# Patient Record
Sex: MALE | Race: WHITE | NOT HISPANIC OR LATINO
[De-identification: names, ages, dates, MRNs, and addresses within clinical notes are randomized per-mention and may not be internally consistent; named-entity substitution may affect disease eponyms.]

---

## 2021-01-01 ENCOUNTER — RESULT REVIEW (OUTPATIENT)
Age: 86
End: 2021-01-01

## 2021-01-01 ENCOUNTER — OUTPATIENT (OUTPATIENT)
Dept: OUTPATIENT SERVICES | Facility: HOSPITAL | Age: 86
LOS: 1 days | Discharge: HOME | End: 2021-01-01

## 2021-01-01 ENCOUNTER — APPOINTMENT (OUTPATIENT)
Dept: GASTROENTEROLOGY | Facility: CLINIC | Age: 86
End: 2021-01-01

## 2021-01-01 ENCOUNTER — INPATIENT (INPATIENT)
Facility: HOSPITAL | Age: 86
LOS: 26 days | Discharge: NURSING, REHAB OR LONG-TERM CARE | End: 2021-11-19
Attending: INTERNAL MEDICINE | Admitting: INTERNAL MEDICINE
Payer: MEDICARE

## 2021-01-01 ENCOUNTER — INPATIENT (INPATIENT)
Facility: HOSPITAL | Age: 86
LOS: 1 days | End: 2021-12-08
Attending: INTERNAL MEDICINE | Admitting: INTERNAL MEDICINE
Payer: MEDICARE

## 2021-01-01 ENCOUNTER — INPATIENT (INPATIENT)
Facility: HOSPITAL | Age: 86
LOS: 12 days | Discharge: NURSING, REHAB OR LONG-TERM CARE | End: 2021-09-14
Attending: HOSPITALIST | Admitting: HOSPITALIST
Payer: MEDICARE

## 2021-01-01 ENCOUNTER — TRANSCRIPTION ENCOUNTER (OUTPATIENT)
Age: 86
End: 2021-01-01

## 2021-01-01 VITALS
TEMPERATURE: 98 F | HEIGHT: 67 IN | HEART RATE: 86 BPM | RESPIRATION RATE: 19 BRPM | OXYGEN SATURATION: 98 % | DIASTOLIC BLOOD PRESSURE: 70 MMHG | SYSTOLIC BLOOD PRESSURE: 148 MMHG

## 2021-01-01 VITALS
DIASTOLIC BLOOD PRESSURE: 77 MMHG | HEIGHT: 67 IN | TEMPERATURE: 98 F | SYSTOLIC BLOOD PRESSURE: 178 MMHG | RESPIRATION RATE: 18 BRPM | HEART RATE: 75 BPM | OXYGEN SATURATION: 99 % | WEIGHT: 134.92 LBS

## 2021-01-01 VITALS
HEART RATE: 80 BPM | WEIGHT: 136.03 LBS | SYSTOLIC BLOOD PRESSURE: 156 MMHG | HEIGHT: 78 IN | DIASTOLIC BLOOD PRESSURE: 101 MMHG | OXYGEN SATURATION: 92 % | RESPIRATION RATE: 24 BRPM | TEMPERATURE: 94 F

## 2021-01-01 VITALS
OXYGEN SATURATION: 100 % | HEIGHT: 68 IN | RESPIRATION RATE: 18 BRPM | DIASTOLIC BLOOD PRESSURE: 81 MMHG | SYSTOLIC BLOOD PRESSURE: 206 MMHG | HEART RATE: 62 BPM | TEMPERATURE: 96 F | WEIGHT: 139.99 LBS

## 2021-01-01 VITALS
OXYGEN SATURATION: 100 % | HEART RATE: 56 BPM | RESPIRATION RATE: 20 BRPM | DIASTOLIC BLOOD PRESSURE: 81 MMHG | SYSTOLIC BLOOD PRESSURE: 172 MMHG

## 2021-01-01 VITALS
SYSTOLIC BLOOD PRESSURE: 185 MMHG | RESPIRATION RATE: 18 BRPM | DIASTOLIC BLOOD PRESSURE: 81 MMHG | HEART RATE: 54 BPM | OXYGEN SATURATION: 99 %

## 2021-01-01 VITALS
TEMPERATURE: 98 F | OXYGEN SATURATION: 97 % | DIASTOLIC BLOOD PRESSURE: 59 MMHG | SYSTOLIC BLOOD PRESSURE: 128 MMHG | RESPIRATION RATE: 18 BRPM | HEART RATE: 81 BPM

## 2021-01-01 VITALS
RESPIRATION RATE: 17 BRPM | DIASTOLIC BLOOD PRESSURE: 71 MMHG | SYSTOLIC BLOOD PRESSURE: 132 MMHG | TEMPERATURE: 96 F | HEART RATE: 79 BPM

## 2021-01-01 VITALS — HEART RATE: 44 BPM | OXYGEN SATURATION: 84 %

## 2021-01-01 VITALS
RESPIRATION RATE: 18 BRPM | DIASTOLIC BLOOD PRESSURE: 86 MMHG | OXYGEN SATURATION: 99 % | TEMPERATURE: 96 F | SYSTOLIC BLOOD PRESSURE: 115 MMHG | HEART RATE: 79 BPM | HEIGHT: 66 IN

## 2021-01-01 DIAGNOSIS — I27.20 PULMONARY HYPERTENSION, UNSPECIFIED: ICD-10-CM

## 2021-01-01 DIAGNOSIS — H25.12 AGE-RELATED NUCLEAR CATARACT, LEFT EYE: ICD-10-CM

## 2021-01-01 DIAGNOSIS — Z93.3 COLOSTOMY STATUS: Chronic | ICD-10-CM

## 2021-01-01 DIAGNOSIS — I82.512 CHRONIC EMBOLISM AND THROMBOSIS OF LEFT FEMORAL VEIN: ICD-10-CM

## 2021-01-01 DIAGNOSIS — K50.90 CROHN'S DISEASE, UNSPECIFIED, WITHOUT COMPLICATIONS: ICD-10-CM

## 2021-01-01 DIAGNOSIS — Z98.890 OTHER SPECIFIED POSTPROCEDURAL STATES: Chronic | ICD-10-CM

## 2021-01-01 DIAGNOSIS — I10 ESSENTIAL (PRIMARY) HYPERTENSION: ICD-10-CM

## 2021-01-01 DIAGNOSIS — E78.5 HYPERLIPIDEMIA, UNSPECIFIED: ICD-10-CM

## 2021-01-01 DIAGNOSIS — Z79.82 LONG TERM (CURRENT) USE OF ASPIRIN: ICD-10-CM

## 2021-01-01 DIAGNOSIS — E11.22 TYPE 2 DIABETES MELLITUS WITH DIABETIC CHRONIC KIDNEY DISEASE: ICD-10-CM

## 2021-01-01 DIAGNOSIS — N17.9 ACUTE KIDNEY FAILURE, UNSPECIFIED: ICD-10-CM

## 2021-01-01 DIAGNOSIS — K21.9 GASTRO-ESOPHAGEAL REFLUX DISEASE WITHOUT ESOPHAGITIS: ICD-10-CM

## 2021-01-01 DIAGNOSIS — N30.00 ACUTE CYSTITIS WITHOUT HEMATURIA: ICD-10-CM

## 2021-01-01 DIAGNOSIS — N18.4 CHRONIC KIDNEY DISEASE, STAGE 4 (SEVERE): ICD-10-CM

## 2021-01-01 DIAGNOSIS — R05 COUGH: ICD-10-CM

## 2021-01-01 DIAGNOSIS — R65.20 SEVERE SEPSIS WITHOUT SEPTIC SHOCK: ICD-10-CM

## 2021-01-01 DIAGNOSIS — H25.11 AGE-RELATED NUCLEAR CATARACT, RIGHT EYE: ICD-10-CM

## 2021-01-01 DIAGNOSIS — E87.6 HYPOKALEMIA: ICD-10-CM

## 2021-01-01 DIAGNOSIS — N17.0 ACUTE KIDNEY FAILURE WITH TUBULAR NECROSIS: ICD-10-CM

## 2021-01-01 DIAGNOSIS — I82.452 ACUTE EMBOLISM AND THROMBOSIS OF LEFT PERONEAL VEIN: ICD-10-CM

## 2021-01-01 DIAGNOSIS — L03.311 CELLULITIS OF ABDOMINAL WALL: ICD-10-CM

## 2021-01-01 DIAGNOSIS — J98.11 ATELECTASIS: ICD-10-CM

## 2021-01-01 DIAGNOSIS — E83.42 HYPOMAGNESEMIA: ICD-10-CM

## 2021-01-01 DIAGNOSIS — Z11.59 ENCOUNTER FOR SCREENING FOR OTHER VIRAL DISEASES: ICD-10-CM

## 2021-01-01 DIAGNOSIS — G93.41 METABOLIC ENCEPHALOPATHY: ICD-10-CM

## 2021-01-01 DIAGNOSIS — Z79.84 LONG TERM (CURRENT) USE OF ORAL HYPOGLYCEMIC DRUGS: ICD-10-CM

## 2021-01-01 DIAGNOSIS — E87.1 HYPO-OSMOLALITY AND HYPONATREMIA: ICD-10-CM

## 2021-01-01 DIAGNOSIS — N10 ACUTE PYELONEPHRITIS: ICD-10-CM

## 2021-01-01 DIAGNOSIS — E11.36 TYPE 2 DIABETES MELLITUS WITH DIABETIC CATARACT: ICD-10-CM

## 2021-01-01 DIAGNOSIS — I50.9 HEART FAILURE, UNSPECIFIED: ICD-10-CM

## 2021-01-01 DIAGNOSIS — A41.9 SEPSIS, UNSPECIFIED ORGANISM: ICD-10-CM

## 2021-01-01 DIAGNOSIS — N40.1 BENIGN PROSTATIC HYPERPLASIA WITH LOWER URINARY TRACT SYMPTOMS: ICD-10-CM

## 2021-01-01 DIAGNOSIS — E78.00 PURE HYPERCHOLESTEROLEMIA, UNSPECIFIED: ICD-10-CM

## 2021-01-01 DIAGNOSIS — Z93.3 COLOSTOMY STATUS: ICD-10-CM

## 2021-01-01 DIAGNOSIS — U07.1 COVID-19: ICD-10-CM

## 2021-01-01 DIAGNOSIS — I13.0 HYPERTENSIVE HEART AND CHRONIC KIDNEY DISEASE WITH HEART FAILURE AND STAGE 1 THROUGH STAGE 4 CHRONIC KIDNEY DISEASE, OR UNSPECIFIED CHRONIC KIDNEY DISEASE: ICD-10-CM

## 2021-01-01 DIAGNOSIS — K43.9 VENTRAL HERNIA WITHOUT OBSTRUCTION OR GANGRENE: ICD-10-CM

## 2021-01-01 DIAGNOSIS — J12.82 PNEUMONIA DUE TO CORONAVIRUS DISEASE 2019: ICD-10-CM

## 2021-01-01 DIAGNOSIS — F05 DELIRIUM DUE TO KNOWN PHYSIOLOGICAL CONDITION: ICD-10-CM

## 2021-01-01 DIAGNOSIS — I82.442 ACUTE EMBOLISM AND THROMBOSIS OF LEFT TIBIAL VEIN: ICD-10-CM

## 2021-01-01 DIAGNOSIS — D63.1 ANEMIA IN CHRONIC KIDNEY DISEASE: ICD-10-CM

## 2021-01-01 DIAGNOSIS — E87.5 HYPERKALEMIA: ICD-10-CM

## 2021-01-01 DIAGNOSIS — E87.2 ACIDOSIS: ICD-10-CM

## 2021-01-01 DIAGNOSIS — F03.90 UNSPECIFIED DEMENTIA, UNSPECIFIED SEVERITY, WITHOUT BEHAVIORAL DISTURBANCE, PSYCHOTIC DISTURBANCE, MOOD DISTURBANCE, AND ANXIETY: ICD-10-CM

## 2021-01-01 DIAGNOSIS — E44.0 MODERATE PROTEIN-CALORIE MALNUTRITION: ICD-10-CM

## 2021-01-01 DIAGNOSIS — E11.9 TYPE 2 DIABETES MELLITUS WITHOUT COMPLICATIONS: ICD-10-CM

## 2021-01-01 DIAGNOSIS — R33.9 RETENTION OF URINE, UNSPECIFIED: ICD-10-CM

## 2021-01-01 DIAGNOSIS — K94.02 COLOSTOMY INFECTION: ICD-10-CM

## 2021-01-01 LAB
% ALBUMIN: 54.4 % — SIGNIFICANT CHANGE UP
% ALPHA 1: 7.4 % — SIGNIFICANT CHANGE UP
% ALPHA 2: 16.9 % — SIGNIFICANT CHANGE UP
% BETA: 12.5 % — SIGNIFICANT CHANGE UP
% GAMMA: 8.8 % — SIGNIFICANT CHANGE UP
A1C WITH ESTIMATED AVERAGE GLUCOSE RESULT: 5.8 % — HIGH (ref 4–5.6)
A1C WITH ESTIMATED AVERAGE GLUCOSE RESULT: 8.4 % — HIGH (ref 4–5.6)
ABO RH CONFIRMATION: SIGNIFICANT CHANGE UP
ALBUMIN SERPL ELPH-MCNC: 2.2 G/DL — LOW (ref 3.6–5.5)
ALBUMIN SERPL ELPH-MCNC: 2.3 G/DL — LOW (ref 3.5–5.2)
ALBUMIN SERPL ELPH-MCNC: 2.3 G/DL — LOW (ref 3.5–5.2)
ALBUMIN SERPL ELPH-MCNC: 2.4 G/DL — LOW (ref 3.5–5.2)
ALBUMIN SERPL ELPH-MCNC: 2.5 G/DL — LOW (ref 3.5–5.2)
ALBUMIN SERPL ELPH-MCNC: 2.6 G/DL — LOW (ref 3.5–5.2)
ALBUMIN SERPL ELPH-MCNC: 2.7 G/DL — LOW (ref 3.5–5.2)
ALBUMIN SERPL ELPH-MCNC: 2.8 G/DL — LOW (ref 3.5–5.2)
ALBUMIN SERPL ELPH-MCNC: 2.9 G/DL — LOW (ref 3.5–5.2)
ALBUMIN SERPL ELPH-MCNC: 3 G/DL — LOW (ref 3.5–5.2)
ALBUMIN SERPL ELPH-MCNC: 3.2 G/DL — LOW (ref 3.5–5.2)
ALBUMIN SERPL ELPH-MCNC: 3.3 G/DL — LOW (ref 3.5–5.2)
ALBUMIN SERPL ELPH-MCNC: 3.5 G/DL — SIGNIFICANT CHANGE UP (ref 3.5–5.2)
ALBUMIN/GLOB SERPL ELPH: 1.2 RATIO — SIGNIFICANT CHANGE UP
ALP SERPL-CCNC: 101 U/L — SIGNIFICANT CHANGE UP (ref 30–115)
ALP SERPL-CCNC: 105 U/L — SIGNIFICANT CHANGE UP (ref 30–115)
ALP SERPL-CCNC: 109 U/L — SIGNIFICANT CHANGE UP (ref 30–115)
ALP SERPL-CCNC: 109 U/L — SIGNIFICANT CHANGE UP (ref 30–115)
ALP SERPL-CCNC: 112 U/L — SIGNIFICANT CHANGE UP (ref 30–115)
ALP SERPL-CCNC: 113 U/L — SIGNIFICANT CHANGE UP (ref 30–115)
ALP SERPL-CCNC: 118 U/L — HIGH (ref 30–115)
ALP SERPL-CCNC: 59 U/L — SIGNIFICANT CHANGE UP (ref 30–115)
ALP SERPL-CCNC: 61 U/L — SIGNIFICANT CHANGE UP (ref 30–115)
ALP SERPL-CCNC: 65 U/L — SIGNIFICANT CHANGE UP (ref 30–115)
ALP SERPL-CCNC: 65 U/L — SIGNIFICANT CHANGE UP (ref 30–115)
ALP SERPL-CCNC: 66 U/L — SIGNIFICANT CHANGE UP (ref 30–115)
ALP SERPL-CCNC: 66 U/L — SIGNIFICANT CHANGE UP (ref 30–115)
ALP SERPL-CCNC: 68 U/L — SIGNIFICANT CHANGE UP (ref 30–115)
ALP SERPL-CCNC: 69 U/L — SIGNIFICANT CHANGE UP (ref 30–115)
ALP SERPL-CCNC: 71 U/L — SIGNIFICANT CHANGE UP (ref 30–115)
ALP SERPL-CCNC: 72 U/L — SIGNIFICANT CHANGE UP (ref 30–115)
ALP SERPL-CCNC: 72 U/L — SIGNIFICANT CHANGE UP (ref 30–115)
ALP SERPL-CCNC: 74 U/L — SIGNIFICANT CHANGE UP (ref 30–115)
ALP SERPL-CCNC: 74 U/L — SIGNIFICANT CHANGE UP (ref 30–115)
ALP SERPL-CCNC: 76 U/L — SIGNIFICANT CHANGE UP (ref 30–115)
ALP SERPL-CCNC: 77 U/L — SIGNIFICANT CHANGE UP (ref 30–115)
ALP SERPL-CCNC: 80 U/L — SIGNIFICANT CHANGE UP (ref 30–115)
ALP SERPL-CCNC: 81 U/L — SIGNIFICANT CHANGE UP (ref 30–115)
ALP SERPL-CCNC: 81 U/L — SIGNIFICANT CHANGE UP (ref 30–115)
ALP SERPL-CCNC: 82 U/L — SIGNIFICANT CHANGE UP (ref 30–115)
ALP SERPL-CCNC: 83 U/L — SIGNIFICANT CHANGE UP (ref 30–115)
ALP SERPL-CCNC: 83 U/L — SIGNIFICANT CHANGE UP (ref 30–115)
ALP SERPL-CCNC: 85 U/L — SIGNIFICANT CHANGE UP (ref 30–115)
ALP SERPL-CCNC: 85 U/L — SIGNIFICANT CHANGE UP (ref 30–115)
ALP SERPL-CCNC: 86 U/L — SIGNIFICANT CHANGE UP (ref 30–115)
ALP SERPL-CCNC: 89 U/L — SIGNIFICANT CHANGE UP (ref 30–115)
ALP SERPL-CCNC: 90 U/L — SIGNIFICANT CHANGE UP (ref 30–115)
ALP SERPL-CCNC: 90 U/L — SIGNIFICANT CHANGE UP (ref 30–115)
ALP SERPL-CCNC: 91 U/L — SIGNIFICANT CHANGE UP (ref 30–115)
ALP SERPL-CCNC: 92 U/L — SIGNIFICANT CHANGE UP (ref 30–115)
ALP SERPL-CCNC: 93 U/L — SIGNIFICANT CHANGE UP (ref 30–115)
ALP SERPL-CCNC: 93 U/L — SIGNIFICANT CHANGE UP (ref 30–115)
ALP SERPL-CCNC: 94 U/L — SIGNIFICANT CHANGE UP (ref 30–115)
ALP SERPL-CCNC: 94 U/L — SIGNIFICANT CHANGE UP (ref 30–115)
ALPHA1 GLOB SERPL ELPH-MCNC: 0.3 G/DL — SIGNIFICANT CHANGE UP (ref 0.1–0.4)
ALPHA2 GLOB SERPL ELPH-MCNC: 0.7 G/DL — SIGNIFICANT CHANGE UP (ref 0.5–1)
ALT FLD-CCNC: 10 U/L — SIGNIFICANT CHANGE UP (ref 0–41)
ALT FLD-CCNC: 11 U/L — SIGNIFICANT CHANGE UP (ref 0–41)
ALT FLD-CCNC: 12 U/L — SIGNIFICANT CHANGE UP (ref 0–41)
ALT FLD-CCNC: 13 U/L — SIGNIFICANT CHANGE UP (ref 0–41)
ALT FLD-CCNC: 14 U/L — SIGNIFICANT CHANGE UP (ref 0–41)
ALT FLD-CCNC: 15 U/L — SIGNIFICANT CHANGE UP (ref 0–41)
ALT FLD-CCNC: 16 U/L — SIGNIFICANT CHANGE UP (ref 0–41)
ALT FLD-CCNC: 16 U/L — SIGNIFICANT CHANGE UP (ref 0–41)
ALT FLD-CCNC: 17 U/L — SIGNIFICANT CHANGE UP (ref 0–41)
ALT FLD-CCNC: 17 U/L — SIGNIFICANT CHANGE UP (ref 0–41)
ALT FLD-CCNC: 19 U/L — SIGNIFICANT CHANGE UP (ref 0–41)
ALT FLD-CCNC: 20 U/L — SIGNIFICANT CHANGE UP (ref 0–41)
ALT FLD-CCNC: 22 U/L — SIGNIFICANT CHANGE UP (ref 0–41)
ALT FLD-CCNC: 23 U/L — SIGNIFICANT CHANGE UP (ref 0–41)
ALT FLD-CCNC: 24 U/L — SIGNIFICANT CHANGE UP (ref 0–41)
ALT FLD-CCNC: 29 U/L — SIGNIFICANT CHANGE UP (ref 0–41)
ALT FLD-CCNC: 30 U/L — SIGNIFICANT CHANGE UP (ref 0–41)
ALT FLD-CCNC: 8 U/L — SIGNIFICANT CHANGE UP (ref 0–41)
ALT FLD-CCNC: 9 U/L — SIGNIFICANT CHANGE UP (ref 0–41)
ANION GAP SERPL CALC-SCNC: 10 MMOL/L — SIGNIFICANT CHANGE UP (ref 7–14)
ANION GAP SERPL CALC-SCNC: 11 MMOL/L — SIGNIFICANT CHANGE UP (ref 7–14)
ANION GAP SERPL CALC-SCNC: 12 MMOL/L — SIGNIFICANT CHANGE UP (ref 7–14)
ANION GAP SERPL CALC-SCNC: 12 MMOL/L — SIGNIFICANT CHANGE UP (ref 7–14)
ANION GAP SERPL CALC-SCNC: 13 MMOL/L — SIGNIFICANT CHANGE UP (ref 7–14)
ANION GAP SERPL CALC-SCNC: 13 MMOL/L — SIGNIFICANT CHANGE UP (ref 7–14)
ANION GAP SERPL CALC-SCNC: 14 MMOL/L — SIGNIFICANT CHANGE UP (ref 7–14)
ANION GAP SERPL CALC-SCNC: 15 MMOL/L — HIGH (ref 7–14)
ANION GAP SERPL CALC-SCNC: 16 MMOL/L — HIGH (ref 7–14)
ANION GAP SERPL CALC-SCNC: 17 MMOL/L — HIGH (ref 7–14)
ANION GAP SERPL CALC-SCNC: 18 MMOL/L — HIGH (ref 7–14)
ANION GAP SERPL CALC-SCNC: 19 MMOL/L — HIGH (ref 7–14)
ANION GAP SERPL CALC-SCNC: 20 MMOL/L — HIGH (ref 7–14)
ANION GAP SERPL CALC-SCNC: 20 MMOL/L — HIGH (ref 7–14)
ANION GAP SERPL CALC-SCNC: 22 MMOL/L — HIGH (ref 7–14)
ANION GAP SERPL CALC-SCNC: 23 MMOL/L — HIGH (ref 7–14)
ANION GAP SERPL CALC-SCNC: 24 MMOL/L — HIGH (ref 7–14)
ANION GAP SERPL CALC-SCNC: 7 MMOL/L — SIGNIFICANT CHANGE UP (ref 7–14)
ANION GAP SERPL CALC-SCNC: 9 MMOL/L — SIGNIFICANT CHANGE UP (ref 7–14)
ANISOCYTOSIS BLD QL: SLIGHT — SIGNIFICANT CHANGE UP
APPEARANCE UR: ABNORMAL
APPEARANCE UR: CLEAR — SIGNIFICANT CHANGE UP
APPEARANCE UR: CLEAR — SIGNIFICANT CHANGE UP
APTT BLD: 100 SEC — CRITICAL HIGH (ref 27–39.2)
APTT BLD: 100.4 SEC — CRITICAL HIGH (ref 27–39.2)
APTT BLD: 108.6 SEC — CRITICAL HIGH (ref 27–39.2)
APTT BLD: 111.1 SEC — CRITICAL HIGH (ref 27–39.2)
APTT BLD: 124.6 SEC — CRITICAL HIGH (ref 27–39.2)
APTT BLD: 128.3 SEC — CRITICAL HIGH (ref 27–39.2)
APTT BLD: 137.2 SEC — CRITICAL HIGH (ref 27–39.2)
APTT BLD: 141.2 SEC — CRITICAL HIGH (ref 27–39.2)
APTT BLD: 197.5 SEC — CRITICAL HIGH (ref 27–39.2)
APTT BLD: 29.9 SEC — SIGNIFICANT CHANGE UP (ref 27–39.2)
APTT BLD: 30.1 SEC — SIGNIFICANT CHANGE UP (ref 27–39.2)
APTT BLD: 30.5 SEC — SIGNIFICANT CHANGE UP (ref 27–39.2)
APTT BLD: 30.6 SEC — SIGNIFICANT CHANGE UP (ref 27–39.2)
APTT BLD: 30.7 SEC — SIGNIFICANT CHANGE UP (ref 27–39.2)
APTT BLD: 30.8 SEC — SIGNIFICANT CHANGE UP (ref 27–39.2)
APTT BLD: 31 SEC — SIGNIFICANT CHANGE UP (ref 27–39.2)
APTT BLD: 31.1 SEC — SIGNIFICANT CHANGE UP (ref 27–39.2)
APTT BLD: 31.3 SEC — SIGNIFICANT CHANGE UP (ref 27–39.2)
APTT BLD: 31.3 SEC — SIGNIFICANT CHANGE UP (ref 27–39.2)
APTT BLD: 31.4 SEC — SIGNIFICANT CHANGE UP (ref 27–39.2)
APTT BLD: 31.4 SEC — SIGNIFICANT CHANGE UP (ref 27–39.2)
APTT BLD: 31.5 SEC — SIGNIFICANT CHANGE UP (ref 27–39.2)
APTT BLD: 31.5 SEC — SIGNIFICANT CHANGE UP (ref 27–39.2)
APTT BLD: 31.6 SEC — SIGNIFICANT CHANGE UP (ref 27–39.2)
APTT BLD: 31.9 SEC — SIGNIFICANT CHANGE UP (ref 27–39.2)
APTT BLD: 32.1 SEC — SIGNIFICANT CHANGE UP (ref 27–39.2)
APTT BLD: 32.3 SEC — SIGNIFICANT CHANGE UP (ref 27–39.2)
APTT BLD: 32.4 SEC — SIGNIFICANT CHANGE UP (ref 27–39.2)
APTT BLD: 33 SEC — SIGNIFICANT CHANGE UP (ref 27–39.2)
APTT BLD: 33.4 SEC — SIGNIFICANT CHANGE UP (ref 27–39.2)
APTT BLD: 33.8 SEC — SIGNIFICANT CHANGE UP (ref 27–39.2)
APTT BLD: 34 SEC — SIGNIFICANT CHANGE UP (ref 27–39.2)
APTT BLD: 34.1 SEC — SIGNIFICANT CHANGE UP (ref 27–39.2)
APTT BLD: 34.3 SEC — SIGNIFICANT CHANGE UP (ref 27–39.2)
APTT BLD: 35.4 SEC — SIGNIFICANT CHANGE UP (ref 27–39.2)
APTT BLD: 39.1 SEC — SIGNIFICANT CHANGE UP (ref 27–39.2)
APTT BLD: 39.9 SEC — HIGH (ref 27–39.2)
APTT BLD: 40.9 SEC — HIGH (ref 27–39.2)
APTT BLD: 41.7 SEC — HIGH (ref 27–39.2)
APTT BLD: 43.8 SEC — HIGH (ref 27–39.2)
APTT BLD: 48.9 SEC — HIGH (ref 27–39.2)
APTT BLD: 50.9 SEC — HIGH (ref 27–39.2)
APTT BLD: 54.9 SEC — HIGH (ref 27–39.2)
APTT BLD: 55.5 SEC — HIGH (ref 27–39.2)
APTT BLD: 55.6 SEC — HIGH (ref 27–39.2)
APTT BLD: 56.2 SEC — HIGH (ref 27–39.2)
APTT BLD: 57.8 SEC — HIGH (ref 27–39.2)
APTT BLD: 58.1 SEC — HIGH (ref 27–39.2)
APTT BLD: 58.3 SEC — HIGH (ref 27–39.2)
APTT BLD: 58.8 SEC — HIGH (ref 27–39.2)
APTT BLD: 59.5 SEC — HIGH (ref 27–39.2)
APTT BLD: 60.9 SEC — HIGH (ref 27–39.2)
APTT BLD: 61.8 SEC — HIGH (ref 27–39.2)
APTT BLD: 62.2 SEC — HIGH (ref 27–39.2)
APTT BLD: 62.9 SEC — HIGH (ref 27–39.2)
APTT BLD: 63.1 SEC — HIGH (ref 27–39.2)
APTT BLD: 64.4 SEC — HIGH (ref 27–39.2)
APTT BLD: 65.6 SEC — HIGH (ref 27–39.2)
APTT BLD: 65.6 SEC — HIGH (ref 27–39.2)
APTT BLD: 66.7 SEC — HIGH (ref 27–39.2)
APTT BLD: 67.3 SEC — HIGH (ref 27–39.2)
APTT BLD: 67.4 SEC — HIGH (ref 27–39.2)
APTT BLD: 70 SEC — HIGH (ref 27–39.2)
APTT BLD: 73.7 SEC — CRITICAL HIGH (ref 27–39.2)
APTT BLD: 73.8 SEC — CRITICAL HIGH (ref 27–39.2)
APTT BLD: 75.1 SEC — CRITICAL HIGH (ref 27–39.2)
APTT BLD: 77.4 SEC — CRITICAL HIGH (ref 27–39.2)
APTT BLD: 86.6 SEC — CRITICAL HIGH (ref 27–39.2)
APTT BLD: 90.2 SEC — CRITICAL HIGH (ref 27–39.2)
APTT BLD: 93.9 SEC — CRITICAL HIGH (ref 27–39.2)
APTT BLD: 98.1 SEC — CRITICAL HIGH (ref 27–39.2)
APTT BLD: >200 SEC — CRITICAL HIGH (ref 27–39.2)
AST SERPL-CCNC: 12 U/L — SIGNIFICANT CHANGE UP (ref 0–41)
AST SERPL-CCNC: 12 U/L — SIGNIFICANT CHANGE UP (ref 0–41)
AST SERPL-CCNC: 13 U/L — SIGNIFICANT CHANGE UP (ref 0–41)
AST SERPL-CCNC: 14 U/L — SIGNIFICANT CHANGE UP (ref 0–41)
AST SERPL-CCNC: 14 U/L — SIGNIFICANT CHANGE UP (ref 0–41)
AST SERPL-CCNC: 15 U/L — SIGNIFICANT CHANGE UP (ref 0–41)
AST SERPL-CCNC: 16 U/L — SIGNIFICANT CHANGE UP (ref 0–41)
AST SERPL-CCNC: 17 U/L — SIGNIFICANT CHANGE UP (ref 0–41)
AST SERPL-CCNC: 18 U/L — SIGNIFICANT CHANGE UP (ref 0–41)
AST SERPL-CCNC: 19 U/L — SIGNIFICANT CHANGE UP (ref 0–41)
AST SERPL-CCNC: 20 U/L — SIGNIFICANT CHANGE UP (ref 0–41)
AST SERPL-CCNC: 21 U/L — SIGNIFICANT CHANGE UP (ref 0–41)
AST SERPL-CCNC: 22 U/L — SIGNIFICANT CHANGE UP (ref 0–41)
AST SERPL-CCNC: 31 U/L — SIGNIFICANT CHANGE UP (ref 0–41)
AST SERPL-CCNC: 32 U/L — SIGNIFICANT CHANGE UP (ref 0–41)
AST SERPL-CCNC: 32 U/L — SIGNIFICANT CHANGE UP (ref 0–41)
AST SERPL-CCNC: 38 U/L — SIGNIFICANT CHANGE UP (ref 0–41)
AST SERPL-CCNC: 39 U/L — SIGNIFICANT CHANGE UP (ref 0–41)
B-GLOBULIN SERPL ELPH-MCNC: 0.5 G/DL — SIGNIFICANT CHANGE UP (ref 0.5–1)
BACTERIA # UR AUTO: ABNORMAL
BACTERIA # UR AUTO: NEGATIVE — SIGNIFICANT CHANGE UP
BASE EXCESS BLDV CALC-SCNC: -10.5 MMOL/L — LOW (ref -2–3)
BASE EXCESS BLDV CALC-SCNC: -10.5 MMOL/L — LOW (ref -2–3)
BASE EXCESS BLDV CALC-SCNC: -17.1 MMOL/L — LOW (ref -2–3)
BASOPHILS # BLD AUTO: 0 K/UL — SIGNIFICANT CHANGE UP (ref 0–0.2)
BASOPHILS # BLD AUTO: 0.01 K/UL — SIGNIFICANT CHANGE UP (ref 0–0.2)
BASOPHILS # BLD AUTO: 0.02 K/UL — SIGNIFICANT CHANGE UP (ref 0–0.2)
BASOPHILS # BLD AUTO: 0.03 K/UL — SIGNIFICANT CHANGE UP (ref 0–0.2)
BASOPHILS # BLD AUTO: 0.04 K/UL — SIGNIFICANT CHANGE UP (ref 0–0.2)
BASOPHILS # BLD AUTO: 0.05 K/UL — SIGNIFICANT CHANGE UP (ref 0–0.2)
BASOPHILS # BLD AUTO: 0.08 K/UL — SIGNIFICANT CHANGE UP (ref 0–0.2)
BASOPHILS NFR BLD AUTO: 0 % — SIGNIFICANT CHANGE UP (ref 0–1)
BASOPHILS NFR BLD AUTO: 0.1 % — SIGNIFICANT CHANGE UP (ref 0–1)
BASOPHILS NFR BLD AUTO: 0.2 % — SIGNIFICANT CHANGE UP (ref 0–1)
BASOPHILS NFR BLD AUTO: 0.3 % — SIGNIFICANT CHANGE UP (ref 0–1)
BASOPHILS NFR BLD AUTO: 0.4 % — SIGNIFICANT CHANGE UP (ref 0–1)
BASOPHILS NFR BLD AUTO: 0.5 % — SIGNIFICANT CHANGE UP (ref 0–1)
BASOPHILS NFR BLD AUTO: 0.6 % — SIGNIFICANT CHANGE UP (ref 0–1)
BASOPHILS NFR BLD AUTO: 0.8 % — SIGNIFICANT CHANGE UP (ref 0–1)
BILIRUB SERPL-MCNC: 0.2 MG/DL — SIGNIFICANT CHANGE UP (ref 0.2–1.2)
BILIRUB SERPL-MCNC: 0.3 MG/DL — SIGNIFICANT CHANGE UP (ref 0.2–1.2)
BILIRUB SERPL-MCNC: 0.4 MG/DL — SIGNIFICANT CHANGE UP (ref 0.2–1.2)
BILIRUB SERPL-MCNC: 0.5 MG/DL — SIGNIFICANT CHANGE UP (ref 0.2–1.2)
BILIRUB SERPL-MCNC: <0.2 MG/DL — SIGNIFICANT CHANGE UP (ref 0.2–1.2)
BILIRUB UR-MCNC: NEGATIVE — SIGNIFICANT CHANGE UP
BLD GP AB SCN SERPL QL: SIGNIFICANT CHANGE UP
BUN SERPL-MCNC: 23 MG/DL — HIGH (ref 10–20)
BUN SERPL-MCNC: 24 MG/DL — HIGH (ref 10–20)
BUN SERPL-MCNC: 24 MG/DL — HIGH (ref 10–20)
BUN SERPL-MCNC: 25 MG/DL — HIGH (ref 10–20)
BUN SERPL-MCNC: 25 MG/DL — HIGH (ref 10–20)
BUN SERPL-MCNC: 26 MG/DL — HIGH (ref 10–20)
BUN SERPL-MCNC: 27 MG/DL — HIGH (ref 10–20)
BUN SERPL-MCNC: 28 MG/DL — HIGH (ref 10–20)
BUN SERPL-MCNC: 32 MG/DL — HIGH (ref 10–20)
BUN SERPL-MCNC: 44 MG/DL — HIGH (ref 10–20)
BUN SERPL-MCNC: 46 MG/DL — HIGH (ref 10–20)
BUN SERPL-MCNC: 47 MG/DL — HIGH (ref 10–20)
BUN SERPL-MCNC: 48 MG/DL — HIGH (ref 10–20)
BUN SERPL-MCNC: 49 MG/DL — HIGH (ref 10–20)
BUN SERPL-MCNC: 50 MG/DL — HIGH (ref 10–20)
BUN SERPL-MCNC: 55 MG/DL — HIGH (ref 10–20)
BUN SERPL-MCNC: 57 MG/DL — HIGH (ref 10–20)
BUN SERPL-MCNC: 58 MG/DL — HIGH (ref 10–20)
BUN SERPL-MCNC: 63 MG/DL — CRITICAL HIGH (ref 10–20)
BUN SERPL-MCNC: 66 MG/DL — CRITICAL HIGH (ref 10–20)
BUN SERPL-MCNC: 67 MG/DL — CRITICAL HIGH (ref 10–20)
BUN SERPL-MCNC: 69 MG/DL — CRITICAL HIGH (ref 10–20)
BUN SERPL-MCNC: 72 MG/DL — CRITICAL HIGH (ref 10–20)
BUN SERPL-MCNC: 73 MG/DL — CRITICAL HIGH (ref 10–20)
BUN SERPL-MCNC: 74 MG/DL — CRITICAL HIGH (ref 10–20)
BUN SERPL-MCNC: 75 MG/DL — CRITICAL HIGH (ref 10–20)
BUN SERPL-MCNC: 77 MG/DL — CRITICAL HIGH (ref 10–20)
BUN SERPL-MCNC: 78 MG/DL — CRITICAL HIGH (ref 10–20)
BUN SERPL-MCNC: 79 MG/DL — CRITICAL HIGH (ref 10–20)
BUN SERPL-MCNC: 82 MG/DL — CRITICAL HIGH (ref 10–20)
BUN SERPL-MCNC: 82 MG/DL — CRITICAL HIGH (ref 10–20)
BUN SERPL-MCNC: 84 MG/DL — CRITICAL HIGH (ref 10–20)
BUN SERPL-MCNC: 85 MG/DL — CRITICAL HIGH (ref 10–20)
BUN SERPL-MCNC: 85 MG/DL — CRITICAL HIGH (ref 10–20)
BUN SERPL-MCNC: 87 MG/DL — CRITICAL HIGH (ref 10–20)
BUN SERPL-MCNC: 88 MG/DL — CRITICAL HIGH (ref 10–20)
BUN SERPL-MCNC: 90 MG/DL — CRITICAL HIGH (ref 10–20)
CA-I SERPL-SCNC: 1.15 MMOL/L — SIGNIFICANT CHANGE UP (ref 1.15–1.33)
CA-I SERPL-SCNC: 1.24 MMOL/L — SIGNIFICANT CHANGE UP (ref 1.15–1.33)
CALCIUM SERPL-MCNC: 7 MG/DL — LOW (ref 8.5–10.1)
CALCIUM SERPL-MCNC: 7.5 MG/DL — LOW (ref 8.5–10.1)
CALCIUM SERPL-MCNC: 7.5 MG/DL — LOW (ref 8.5–10.1)
CALCIUM SERPL-MCNC: 7.6 MG/DL — LOW (ref 8.5–10.1)
CALCIUM SERPL-MCNC: 7.7 MG/DL — LOW (ref 8.5–10.1)
CALCIUM SERPL-MCNC: 7.8 MG/DL — LOW (ref 8.5–10.1)
CALCIUM SERPL-MCNC: 7.9 MG/DL — LOW (ref 8.5–10.1)
CALCIUM SERPL-MCNC: 8 MG/DL — LOW (ref 8.5–10.1)
CALCIUM SERPL-MCNC: 8.1 MG/DL — LOW (ref 8.5–10.1)
CALCIUM SERPL-MCNC: 8.2 MG/DL — LOW (ref 8.5–10.1)
CALCIUM SERPL-MCNC: 8.3 MG/DL — LOW (ref 8.5–10.1)
CALCIUM SERPL-MCNC: 8.3 MG/DL — LOW (ref 8.5–10.1)
CALCIUM SERPL-MCNC: 8.4 MG/DL — LOW (ref 8.5–10.1)
CALCIUM SERPL-MCNC: 8.4 MG/DL — LOW (ref 8.5–10.1)
CALCIUM SERPL-MCNC: 8.5 MG/DL — SIGNIFICANT CHANGE UP (ref 8.5–10.1)
CALCIUM SERPL-MCNC: 8.5 MG/DL — SIGNIFICANT CHANGE UP (ref 8.5–10.1)
CALCIUM SERPL-MCNC: 8.6 MG/DL — SIGNIFICANT CHANGE UP (ref 8.5–10.1)
CALCIUM SERPL-MCNC: 8.6 MG/DL — SIGNIFICANT CHANGE UP (ref 8.5–10.1)
CHLORIDE SERPL-SCNC: 100 MMOL/L — SIGNIFICANT CHANGE UP (ref 98–110)
CHLORIDE SERPL-SCNC: 101 MMOL/L — SIGNIFICANT CHANGE UP (ref 98–110)
CHLORIDE SERPL-SCNC: 101 MMOL/L — SIGNIFICANT CHANGE UP (ref 98–110)
CHLORIDE SERPL-SCNC: 102 MMOL/L — SIGNIFICANT CHANGE UP (ref 98–110)
CHLORIDE SERPL-SCNC: 103 MMOL/L — SIGNIFICANT CHANGE UP (ref 98–110)
CHLORIDE SERPL-SCNC: 104 MMOL/L — SIGNIFICANT CHANGE UP (ref 98–110)
CHLORIDE SERPL-SCNC: 105 MMOL/L — SIGNIFICANT CHANGE UP (ref 98–110)
CHLORIDE SERPL-SCNC: 106 MMOL/L — SIGNIFICANT CHANGE UP (ref 98–110)
CHLORIDE SERPL-SCNC: 107 MMOL/L — SIGNIFICANT CHANGE UP (ref 98–110)
CHLORIDE SERPL-SCNC: 109 MMOL/L — SIGNIFICANT CHANGE UP (ref 98–110)
CHLORIDE SERPL-SCNC: 112 MMOL/L — HIGH (ref 98–110)
CHLORIDE SERPL-SCNC: 92 MMOL/L — LOW (ref 98–110)
CHLORIDE SERPL-SCNC: 95 MMOL/L — LOW (ref 98–110)
CHLORIDE SERPL-SCNC: 96 MMOL/L — LOW (ref 98–110)
CHLORIDE SERPL-SCNC: 97 MMOL/L — LOW (ref 98–110)
CHLORIDE SERPL-SCNC: 98 MMOL/L — SIGNIFICANT CHANGE UP (ref 98–110)
CHLORIDE SERPL-SCNC: 99 MMOL/L — SIGNIFICANT CHANGE UP (ref 98–110)
CHLORIDE UR-SCNC: 45 — SIGNIFICANT CHANGE UP
CHOLEST SERPL-MCNC: 116 MG/DL — SIGNIFICANT CHANGE UP
CK MB CFR SERPL CALC: 14.2 NG/ML — HIGH (ref 0.6–6.3)
CK SERPL-CCNC: 63 U/L — SIGNIFICANT CHANGE UP (ref 0–225)
CO2 SERPL-SCNC: 10 MMOL/L — LOW (ref 17–32)
CO2 SERPL-SCNC: 10 MMOL/L — LOW (ref 17–32)
CO2 SERPL-SCNC: 12 MMOL/L — LOW (ref 17–32)
CO2 SERPL-SCNC: 13 MMOL/L — LOW (ref 17–32)
CO2 SERPL-SCNC: 14 MMOL/L — LOW (ref 17–32)
CO2 SERPL-SCNC: 17 MMOL/L — SIGNIFICANT CHANGE UP (ref 17–32)
CO2 SERPL-SCNC: 18 MMOL/L — SIGNIFICANT CHANGE UP (ref 17–32)
CO2 SERPL-SCNC: 19 MMOL/L — SIGNIFICANT CHANGE UP (ref 17–32)
CO2 SERPL-SCNC: 20 MMOL/L — SIGNIFICANT CHANGE UP (ref 17–32)
CO2 SERPL-SCNC: 21 MMOL/L — SIGNIFICANT CHANGE UP (ref 17–32)
CO2 SERPL-SCNC: 22 MMOL/L — SIGNIFICANT CHANGE UP (ref 17–32)
CO2 SERPL-SCNC: 23 MMOL/L — SIGNIFICANT CHANGE UP (ref 17–32)
CO2 SERPL-SCNC: 23 MMOL/L — SIGNIFICANT CHANGE UP (ref 17–32)
CO2 SERPL-SCNC: 24 MMOL/L — SIGNIFICANT CHANGE UP (ref 17–32)
CO2 SERPL-SCNC: 26 MMOL/L — SIGNIFICANT CHANGE UP (ref 17–32)
CO2 SERPL-SCNC: 8 MMOL/L — CRITICAL LOW (ref 17–32)
CO2 SERPL-SCNC: 8 MMOL/L — CRITICAL LOW (ref 17–32)
COD CRY URNS QL: NEGATIVE — SIGNIFICANT CHANGE UP
COLOR SPEC: ABNORMAL
COLOR SPEC: SIGNIFICANT CHANGE UP
COLOR SPEC: SIGNIFICANT CHANGE UP
COLOR SPEC: YELLOW — SIGNIFICANT CHANGE UP
COVID-19 SPIKE DOMAIN AB INTERP: POSITIVE
COVID-19 SPIKE DOMAIN AB INTERP: POSITIVE
COVID-19 SPIKE DOMAIN ANTIBODY RESULT: >250 U/ML — HIGH
COVID-19 SPIKE DOMAIN ANTIBODY RESULT: >250 U/ML — HIGH
CREAT ?TM UR-MCNC: 48 MG/DL — SIGNIFICANT CHANGE UP
CREAT ?TM UR-MCNC: 82 MG/DL — SIGNIFICANT CHANGE UP
CREAT ?TM UR-MCNC: 84 MG/DL — SIGNIFICANT CHANGE UP
CREAT SERPL-MCNC: 2.1 MG/DL — HIGH (ref 0.7–1.5)
CREAT SERPL-MCNC: 2.2 MG/DL — HIGH (ref 0.7–1.5)
CREAT SERPL-MCNC: 2.3 MG/DL — HIGH (ref 0.7–1.5)
CREAT SERPL-MCNC: 2.4 MG/DL — HIGH (ref 0.7–1.5)
CREAT SERPL-MCNC: 2.5 MG/DL — HIGH (ref 0.7–1.5)
CREAT SERPL-MCNC: 2.5 MG/DL — HIGH (ref 0.7–1.5)
CREAT SERPL-MCNC: 4.4 MG/DL — CRITICAL HIGH (ref 0.7–1.5)
CREAT SERPL-MCNC: 4.5 MG/DL — CRITICAL HIGH (ref 0.7–1.5)
CREAT SERPL-MCNC: 4.6 MG/DL — CRITICAL HIGH (ref 0.7–1.5)
CREAT SERPL-MCNC: 4.6 MG/DL — CRITICAL HIGH (ref 0.7–1.5)
CREAT SERPL-MCNC: 4.7 MG/DL — CRITICAL HIGH (ref 0.7–1.5)
CREAT SERPL-MCNC: 4.8 MG/DL — CRITICAL HIGH (ref 0.7–1.5)
CREAT SERPL-MCNC: 4.9 MG/DL — CRITICAL HIGH (ref 0.7–1.5)
CREAT SERPL-MCNC: 4.9 MG/DL — CRITICAL HIGH (ref 0.7–1.5)
CREAT SERPL-MCNC: 5 MG/DL — CRITICAL HIGH (ref 0.7–1.5)
CREAT SERPL-MCNC: 5.1 MG/DL — CRITICAL HIGH (ref 0.7–1.5)
CREAT SERPL-MCNC: 5.2 MG/DL — CRITICAL HIGH (ref 0.7–1.5)
CREAT SERPL-MCNC: 5.4 MG/DL — CRITICAL HIGH (ref 0.7–1.5)
CREAT SERPL-MCNC: 5.5 MG/DL — CRITICAL HIGH (ref 0.7–1.5)
CREAT SERPL-MCNC: 5.7 MG/DL — CRITICAL HIGH (ref 0.7–1.5)
CREAT SERPL-MCNC: 6 MG/DL — CRITICAL HIGH (ref 0.7–1.5)
CREAT SERPL-MCNC: 6 MG/DL — CRITICAL HIGH (ref 0.7–1.5)
CREAT SERPL-MCNC: 6.2 MG/DL — CRITICAL HIGH (ref 0.7–1.5)
CREAT SERPL-MCNC: 6.3 MG/DL — CRITICAL HIGH (ref 0.7–1.5)
CREAT SERPL-MCNC: 6.9 MG/DL — CRITICAL HIGH (ref 0.7–1.5)
CREAT SERPL-MCNC: 7.2 MG/DL — CRITICAL HIGH (ref 0.7–1.5)
CREAT SERPL-MCNC: 7.3 MG/DL — CRITICAL HIGH (ref 0.7–1.5)
CREAT SERPL-MCNC: 7.8 MG/DL — CRITICAL HIGH (ref 0.7–1.5)
CREAT SERPL-MCNC: 7.9 MG/DL — CRITICAL HIGH (ref 0.7–1.5)
CREAT SERPL-MCNC: 8 MG/DL — CRITICAL HIGH (ref 0.7–1.5)
CREAT SERPL-MCNC: 8.1 MG/DL — CRITICAL HIGH (ref 0.7–1.5)
CREAT SERPL-MCNC: 8.3 MG/DL — CRITICAL HIGH (ref 0.7–1.5)
CREAT SERPL-MCNC: 8.3 MG/DL — CRITICAL HIGH (ref 0.7–1.5)
CREAT SERPL-MCNC: 8.5 MG/DL — CRITICAL HIGH (ref 0.7–1.5)
CREAT SERPL-MCNC: 8.5 MG/DL — CRITICAL HIGH (ref 0.7–1.5)
CRP SERPL-MCNC: 48 MG/L — HIGH
CRP SERPL-MCNC: 53 MG/L — HIGH
CULTURE RESULTS: NO GROWTH — SIGNIFICANT CHANGE UP
CULTURE RESULTS: SIGNIFICANT CHANGE UP
D DIMER BLD IA.RAPID-MCNC: 1135 NG/ML DDU — HIGH (ref 0–230)
D DIMER BLD IA.RAPID-MCNC: 1209 NG/ML DDU — HIGH (ref 0–230)
DIFF PNL FLD: ABNORMAL
EOSINOPHIL # BLD AUTO: 0.01 K/UL — SIGNIFICANT CHANGE UP (ref 0–0.7)
EOSINOPHIL # BLD AUTO: 0.01 K/UL — SIGNIFICANT CHANGE UP (ref 0–0.7)
EOSINOPHIL # BLD AUTO: 0.02 K/UL — SIGNIFICANT CHANGE UP (ref 0–0.7)
EOSINOPHIL # BLD AUTO: 0.03 K/UL — SIGNIFICANT CHANGE UP (ref 0–0.7)
EOSINOPHIL # BLD AUTO: 0.04 K/UL — SIGNIFICANT CHANGE UP (ref 0–0.7)
EOSINOPHIL # BLD AUTO: 0.07 K/UL — SIGNIFICANT CHANGE UP (ref 0–0.7)
EOSINOPHIL # BLD AUTO: 0.08 K/UL — SIGNIFICANT CHANGE UP (ref 0–0.7)
EOSINOPHIL # BLD AUTO: 0.08 K/UL — SIGNIFICANT CHANGE UP (ref 0–0.7)
EOSINOPHIL # BLD AUTO: 0.09 K/UL — SIGNIFICANT CHANGE UP (ref 0–0.7)
EOSINOPHIL # BLD AUTO: 0.09 K/UL — SIGNIFICANT CHANGE UP (ref 0–0.7)
EOSINOPHIL # BLD AUTO: 0.11 K/UL — SIGNIFICANT CHANGE UP (ref 0–0.7)
EOSINOPHIL # BLD AUTO: 0.11 K/UL — SIGNIFICANT CHANGE UP (ref 0–0.7)
EOSINOPHIL # BLD AUTO: 0.12 K/UL — SIGNIFICANT CHANGE UP (ref 0–0.7)
EOSINOPHIL # BLD AUTO: 0.13 K/UL — SIGNIFICANT CHANGE UP (ref 0–0.7)
EOSINOPHIL # BLD AUTO: 0.14 K/UL — SIGNIFICANT CHANGE UP (ref 0–0.7)
EOSINOPHIL # BLD AUTO: 0.15 K/UL — SIGNIFICANT CHANGE UP (ref 0–0.7)
EOSINOPHIL # BLD AUTO: 0.15 K/UL — SIGNIFICANT CHANGE UP (ref 0–0.7)
EOSINOPHIL # BLD AUTO: 0.16 K/UL — SIGNIFICANT CHANGE UP (ref 0–0.7)
EOSINOPHIL # BLD AUTO: 0.17 K/UL — SIGNIFICANT CHANGE UP (ref 0–0.7)
EOSINOPHIL # BLD AUTO: 0.18 K/UL — SIGNIFICANT CHANGE UP (ref 0–0.7)
EOSINOPHIL # BLD AUTO: 0.18 K/UL — SIGNIFICANT CHANGE UP (ref 0–0.7)
EOSINOPHIL # BLD AUTO: 0.19 K/UL — SIGNIFICANT CHANGE UP (ref 0–0.7)
EOSINOPHIL # BLD AUTO: 0.22 K/UL — SIGNIFICANT CHANGE UP (ref 0–0.7)
EOSINOPHIL # BLD AUTO: 0.22 K/UL — SIGNIFICANT CHANGE UP (ref 0–0.7)
EOSINOPHIL # BLD AUTO: 0.24 K/UL — SIGNIFICANT CHANGE UP (ref 0–0.7)
EOSINOPHIL # BLD AUTO: 0.25 K/UL — SIGNIFICANT CHANGE UP (ref 0–0.7)
EOSINOPHIL # BLD AUTO: 0.27 K/UL — SIGNIFICANT CHANGE UP (ref 0–0.7)
EOSINOPHIL # BLD AUTO: 0.27 K/UL — SIGNIFICANT CHANGE UP (ref 0–0.7)
EOSINOPHIL # BLD AUTO: 0.3 K/UL — SIGNIFICANT CHANGE UP (ref 0–0.7)
EOSINOPHIL # BLD AUTO: 0.31 K/UL — SIGNIFICANT CHANGE UP (ref 0–0.7)
EOSINOPHIL NFR BLD AUTO: 0.1 % — SIGNIFICANT CHANGE UP (ref 0–8)
EOSINOPHIL NFR BLD AUTO: 0.2 % — SIGNIFICANT CHANGE UP (ref 0–8)
EOSINOPHIL NFR BLD AUTO: 0.4 % — SIGNIFICANT CHANGE UP (ref 0–8)
EOSINOPHIL NFR BLD AUTO: 0.4 % — SIGNIFICANT CHANGE UP (ref 0–8)
EOSINOPHIL NFR BLD AUTO: 0.5 % — SIGNIFICANT CHANGE UP (ref 0–8)
EOSINOPHIL NFR BLD AUTO: 0.7 % — SIGNIFICANT CHANGE UP (ref 0–8)
EOSINOPHIL NFR BLD AUTO: 0.8 % — SIGNIFICANT CHANGE UP (ref 0–8)
EOSINOPHIL NFR BLD AUTO: 0.9 % — SIGNIFICANT CHANGE UP (ref 0–8)
EOSINOPHIL NFR BLD AUTO: 1 % — SIGNIFICANT CHANGE UP (ref 0–8)
EOSINOPHIL NFR BLD AUTO: 1 % — SIGNIFICANT CHANGE UP (ref 0–8)
EOSINOPHIL NFR BLD AUTO: 1.1 % — SIGNIFICANT CHANGE UP (ref 0–8)
EOSINOPHIL NFR BLD AUTO: 1.5 % — SIGNIFICANT CHANGE UP (ref 0–8)
EOSINOPHIL NFR BLD AUTO: 1.6 % — SIGNIFICANT CHANGE UP (ref 0–8)
EOSINOPHIL NFR BLD AUTO: 1.6 % — SIGNIFICANT CHANGE UP (ref 0–8)
EOSINOPHIL NFR BLD AUTO: 1.9 % — SIGNIFICANT CHANGE UP (ref 0–8)
EOSINOPHIL NFR BLD AUTO: 2 % — SIGNIFICANT CHANGE UP (ref 0–8)
EOSINOPHIL NFR BLD AUTO: 2.1 % — SIGNIFICANT CHANGE UP (ref 0–8)
EOSINOPHIL NFR BLD AUTO: 2.3 % — SIGNIFICANT CHANGE UP (ref 0–8)
EOSINOPHIL NFR BLD AUTO: 2.3 % — SIGNIFICANT CHANGE UP (ref 0–8)
EOSINOPHIL NFR BLD AUTO: 2.4 % — SIGNIFICANT CHANGE UP (ref 0–8)
EOSINOPHIL NFR BLD AUTO: 2.5 % — SIGNIFICANT CHANGE UP (ref 0–8)
EOSINOPHIL NFR BLD AUTO: 3.1 % — SIGNIFICANT CHANGE UP (ref 0–8)
EOSINOPHIL NFR BLD AUTO: 3.3 % — SIGNIFICANT CHANGE UP (ref 0–8)
EOSINOPHIL NFR BLD AUTO: 3.4 % — SIGNIFICANT CHANGE UP (ref 0–8)
EOSINOPHIL NFR BLD AUTO: 3.7 % — SIGNIFICANT CHANGE UP (ref 0–8)
EOSINOPHIL NFR BLD AUTO: 4 % — SIGNIFICANT CHANGE UP (ref 0–8)
EPI CELLS # UR: 0 /HPF — SIGNIFICANT CHANGE UP (ref 0–5)
EPI CELLS # UR: 0 /HPF — SIGNIFICANT CHANGE UP (ref 0–5)
EPI CELLS # UR: 1 /HPF — SIGNIFICANT CHANGE UP (ref 0–5)
EPI CELLS # UR: 1 /HPF — SIGNIFICANT CHANGE UP (ref 0–5)
EPI CELLS # UR: 2 /HPF — SIGNIFICANT CHANGE UP (ref 0–5)
EPI CELLS # UR: 2 /HPF — SIGNIFICANT CHANGE UP (ref 0–5)
EPI CELLS # UR: ABNORMAL /HPF
ESTIMATED AVERAGE GLUCOSE: 120 MG/DL — HIGH (ref 68–114)
ESTIMATED AVERAGE GLUCOSE: 194 MG/DL — HIGH (ref 68–114)
FERRITIN SERPL-MCNC: 1179 NG/ML — HIGH (ref 30–400)
FERRITIN SERPL-MCNC: 1401 NG/ML — HIGH (ref 30–400)
FERRITIN SERPL-MCNC: 326 NG/ML — SIGNIFICANT CHANGE UP (ref 30–400)
FOLATE SERPL-MCNC: >20 NG/ML — SIGNIFICANT CHANGE UP
GAMMA GLOBULIN: 0.4 G/DL — LOW (ref 0.6–1.6)
GAS PNL BLDA: SIGNIFICANT CHANGE UP
GAS PNL BLDV: 127 MMOL/L — LOW (ref 136–145)
GAS PNL BLDV: 133 MMOL/L — LOW (ref 136–145)
GAS PNL BLDV: SIGNIFICANT CHANGE UP
GAS PNL BLDV: SIGNIFICANT CHANGE UP
GLUCOSE BLDC GLUCOMTR-MCNC: 101 MG/DL — HIGH (ref 70–99)
GLUCOSE BLDC GLUCOMTR-MCNC: 101 MG/DL — HIGH (ref 70–99)
GLUCOSE BLDC GLUCOMTR-MCNC: 102 MG/DL — HIGH (ref 70–99)
GLUCOSE BLDC GLUCOMTR-MCNC: 106 MG/DL — HIGH (ref 70–99)
GLUCOSE BLDC GLUCOMTR-MCNC: 107 MG/DL — HIGH (ref 70–99)
GLUCOSE BLDC GLUCOMTR-MCNC: 107 MG/DL — HIGH (ref 70–99)
GLUCOSE BLDC GLUCOMTR-MCNC: 108 MG/DL — HIGH (ref 70–99)
GLUCOSE BLDC GLUCOMTR-MCNC: 108 MG/DL — HIGH (ref 70–99)
GLUCOSE BLDC GLUCOMTR-MCNC: 109 MG/DL — HIGH (ref 70–99)
GLUCOSE BLDC GLUCOMTR-MCNC: 113 MG/DL — HIGH (ref 70–99)
GLUCOSE BLDC GLUCOMTR-MCNC: 116 MG/DL — HIGH (ref 70–99)
GLUCOSE BLDC GLUCOMTR-MCNC: 117 MG/DL — HIGH (ref 70–99)
GLUCOSE BLDC GLUCOMTR-MCNC: 119 MG/DL — HIGH (ref 70–99)
GLUCOSE BLDC GLUCOMTR-MCNC: 119 MG/DL — HIGH (ref 70–99)
GLUCOSE BLDC GLUCOMTR-MCNC: 120 MG/DL — HIGH (ref 70–99)
GLUCOSE BLDC GLUCOMTR-MCNC: 122 MG/DL — HIGH (ref 70–99)
GLUCOSE BLDC GLUCOMTR-MCNC: 122 MG/DL — HIGH (ref 70–99)
GLUCOSE BLDC GLUCOMTR-MCNC: 123 MG/DL — HIGH (ref 70–99)
GLUCOSE BLDC GLUCOMTR-MCNC: 123 MG/DL — HIGH (ref 70–99)
GLUCOSE BLDC GLUCOMTR-MCNC: 124 MG/DL — HIGH (ref 70–99)
GLUCOSE BLDC GLUCOMTR-MCNC: 125 MG/DL — HIGH (ref 70–99)
GLUCOSE BLDC GLUCOMTR-MCNC: 127 MG/DL — HIGH (ref 70–99)
GLUCOSE BLDC GLUCOMTR-MCNC: 127 MG/DL — HIGH (ref 70–99)
GLUCOSE BLDC GLUCOMTR-MCNC: 129 MG/DL — HIGH (ref 70–99)
GLUCOSE BLDC GLUCOMTR-MCNC: 129 MG/DL — HIGH (ref 70–99)
GLUCOSE BLDC GLUCOMTR-MCNC: 131 MG/DL — HIGH (ref 70–99)
GLUCOSE BLDC GLUCOMTR-MCNC: 131 MG/DL — HIGH (ref 70–99)
GLUCOSE BLDC GLUCOMTR-MCNC: 132 MG/DL — HIGH (ref 70–99)
GLUCOSE BLDC GLUCOMTR-MCNC: 133 MG/DL — HIGH (ref 70–99)
GLUCOSE BLDC GLUCOMTR-MCNC: 134 MG/DL — HIGH (ref 70–99)
GLUCOSE BLDC GLUCOMTR-MCNC: 135 MG/DL — HIGH (ref 70–99)
GLUCOSE BLDC GLUCOMTR-MCNC: 136 MG/DL — HIGH (ref 70–99)
GLUCOSE BLDC GLUCOMTR-MCNC: 136 MG/DL — HIGH (ref 70–99)
GLUCOSE BLDC GLUCOMTR-MCNC: 137 MG/DL — HIGH (ref 70–99)
GLUCOSE BLDC GLUCOMTR-MCNC: 138 MG/DL — HIGH (ref 70–99)
GLUCOSE BLDC GLUCOMTR-MCNC: 139 MG/DL — HIGH (ref 70–99)
GLUCOSE BLDC GLUCOMTR-MCNC: 139 MG/DL — HIGH (ref 70–99)
GLUCOSE BLDC GLUCOMTR-MCNC: 140 MG/DL — HIGH (ref 70–99)
GLUCOSE BLDC GLUCOMTR-MCNC: 143 MG/DL — HIGH (ref 70–99)
GLUCOSE BLDC GLUCOMTR-MCNC: 143 MG/DL — HIGH (ref 70–99)
GLUCOSE BLDC GLUCOMTR-MCNC: 144 MG/DL — HIGH (ref 70–99)
GLUCOSE BLDC GLUCOMTR-MCNC: 144 MG/DL — HIGH (ref 70–99)
GLUCOSE BLDC GLUCOMTR-MCNC: 147 MG/DL — HIGH (ref 70–99)
GLUCOSE BLDC GLUCOMTR-MCNC: 148 MG/DL — HIGH (ref 70–99)
GLUCOSE BLDC GLUCOMTR-MCNC: 149 MG/DL — HIGH (ref 70–99)
GLUCOSE BLDC GLUCOMTR-MCNC: 150 MG/DL — HIGH (ref 70–99)
GLUCOSE BLDC GLUCOMTR-MCNC: 150 MG/DL — HIGH (ref 70–99)
GLUCOSE BLDC GLUCOMTR-MCNC: 151 MG/DL — HIGH (ref 70–99)
GLUCOSE BLDC GLUCOMTR-MCNC: 152 MG/DL — HIGH (ref 70–99)
GLUCOSE BLDC GLUCOMTR-MCNC: 152 MG/DL — HIGH (ref 70–99)
GLUCOSE BLDC GLUCOMTR-MCNC: 153 MG/DL — HIGH (ref 70–99)
GLUCOSE BLDC GLUCOMTR-MCNC: 153 MG/DL — HIGH (ref 70–99)
GLUCOSE BLDC GLUCOMTR-MCNC: 154 MG/DL — HIGH (ref 70–99)
GLUCOSE BLDC GLUCOMTR-MCNC: 155 MG/DL — HIGH (ref 70–99)
GLUCOSE BLDC GLUCOMTR-MCNC: 155 MG/DL — HIGH (ref 70–99)
GLUCOSE BLDC GLUCOMTR-MCNC: 156 MG/DL — HIGH (ref 70–99)
GLUCOSE BLDC GLUCOMTR-MCNC: 156 MG/DL — HIGH (ref 70–99)
GLUCOSE BLDC GLUCOMTR-MCNC: 157 MG/DL — HIGH (ref 70–99)
GLUCOSE BLDC GLUCOMTR-MCNC: 157 MG/DL — HIGH (ref 70–99)
GLUCOSE BLDC GLUCOMTR-MCNC: 158 MG/DL — HIGH (ref 70–99)
GLUCOSE BLDC GLUCOMTR-MCNC: 158 MG/DL — HIGH (ref 70–99)
GLUCOSE BLDC GLUCOMTR-MCNC: 159 MG/DL — HIGH (ref 70–99)
GLUCOSE BLDC GLUCOMTR-MCNC: 160 MG/DL — HIGH (ref 70–99)
GLUCOSE BLDC GLUCOMTR-MCNC: 161 MG/DL — HIGH (ref 70–99)
GLUCOSE BLDC GLUCOMTR-MCNC: 162 MG/DL — HIGH (ref 70–99)
GLUCOSE BLDC GLUCOMTR-MCNC: 165 MG/DL — HIGH (ref 70–99)
GLUCOSE BLDC GLUCOMTR-MCNC: 168 MG/DL — HIGH (ref 70–99)
GLUCOSE BLDC GLUCOMTR-MCNC: 171 MG/DL — HIGH (ref 70–99)
GLUCOSE BLDC GLUCOMTR-MCNC: 171 MG/DL — HIGH (ref 70–99)
GLUCOSE BLDC GLUCOMTR-MCNC: 172 MG/DL — HIGH (ref 70–99)
GLUCOSE BLDC GLUCOMTR-MCNC: 174 MG/DL — HIGH (ref 70–99)
GLUCOSE BLDC GLUCOMTR-MCNC: 176 MG/DL — HIGH (ref 70–99)
GLUCOSE BLDC GLUCOMTR-MCNC: 177 MG/DL — HIGH (ref 70–99)
GLUCOSE BLDC GLUCOMTR-MCNC: 180 MG/DL — HIGH (ref 70–99)
GLUCOSE BLDC GLUCOMTR-MCNC: 185 MG/DL — HIGH (ref 70–99)
GLUCOSE BLDC GLUCOMTR-MCNC: 186 MG/DL — HIGH (ref 70–99)
GLUCOSE BLDC GLUCOMTR-MCNC: 186 MG/DL — HIGH (ref 70–99)
GLUCOSE BLDC GLUCOMTR-MCNC: 188 MG/DL — HIGH (ref 70–99)
GLUCOSE BLDC GLUCOMTR-MCNC: 191 MG/DL — HIGH (ref 70–99)
GLUCOSE BLDC GLUCOMTR-MCNC: 192 MG/DL — HIGH (ref 70–99)
GLUCOSE BLDC GLUCOMTR-MCNC: 195 MG/DL — HIGH (ref 70–99)
GLUCOSE BLDC GLUCOMTR-MCNC: 196 MG/DL — HIGH (ref 70–99)
GLUCOSE BLDC GLUCOMTR-MCNC: 196 MG/DL — HIGH (ref 70–99)
GLUCOSE BLDC GLUCOMTR-MCNC: 197 MG/DL — HIGH (ref 70–99)
GLUCOSE BLDC GLUCOMTR-MCNC: 199 MG/DL — HIGH (ref 70–99)
GLUCOSE BLDC GLUCOMTR-MCNC: 200 MG/DL — HIGH (ref 70–99)
GLUCOSE BLDC GLUCOMTR-MCNC: 201 MG/DL — HIGH (ref 70–99)
GLUCOSE BLDC GLUCOMTR-MCNC: 201 MG/DL — HIGH (ref 70–99)
GLUCOSE BLDC GLUCOMTR-MCNC: 203 MG/DL — HIGH (ref 70–99)
GLUCOSE BLDC GLUCOMTR-MCNC: 207 MG/DL — HIGH (ref 70–99)
GLUCOSE BLDC GLUCOMTR-MCNC: 207 MG/DL — HIGH (ref 70–99)
GLUCOSE BLDC GLUCOMTR-MCNC: 209 MG/DL — HIGH (ref 70–99)
GLUCOSE BLDC GLUCOMTR-MCNC: 210 MG/DL — HIGH (ref 70–99)
GLUCOSE BLDC GLUCOMTR-MCNC: 213 MG/DL — HIGH (ref 70–99)
GLUCOSE BLDC GLUCOMTR-MCNC: 216 MG/DL — HIGH (ref 70–99)
GLUCOSE BLDC GLUCOMTR-MCNC: 222 MG/DL — HIGH (ref 70–99)
GLUCOSE BLDC GLUCOMTR-MCNC: 235 MG/DL — HIGH (ref 70–99)
GLUCOSE BLDC GLUCOMTR-MCNC: 239 MG/DL — HIGH (ref 70–99)
GLUCOSE BLDC GLUCOMTR-MCNC: 241 MG/DL — HIGH (ref 70–99)
GLUCOSE BLDC GLUCOMTR-MCNC: 269 MG/DL — HIGH (ref 70–99)
GLUCOSE BLDC GLUCOMTR-MCNC: 276 MG/DL — HIGH (ref 70–99)
GLUCOSE BLDC GLUCOMTR-MCNC: 295 MG/DL — HIGH (ref 70–99)
GLUCOSE BLDC GLUCOMTR-MCNC: 309 MG/DL — HIGH (ref 70–99)
GLUCOSE BLDC GLUCOMTR-MCNC: 316 MG/DL — HIGH (ref 70–99)
GLUCOSE BLDC GLUCOMTR-MCNC: 391 MG/DL — HIGH (ref 70–99)
GLUCOSE BLDC GLUCOMTR-MCNC: 75 MG/DL — SIGNIFICANT CHANGE UP (ref 70–99)
GLUCOSE BLDC GLUCOMTR-MCNC: 81 MG/DL — SIGNIFICANT CHANGE UP (ref 70–99)
GLUCOSE BLDC GLUCOMTR-MCNC: 81 MG/DL — SIGNIFICANT CHANGE UP (ref 70–99)
GLUCOSE BLDC GLUCOMTR-MCNC: 85 MG/DL — SIGNIFICANT CHANGE UP (ref 70–99)
GLUCOSE BLDC GLUCOMTR-MCNC: 88 MG/DL — SIGNIFICANT CHANGE UP (ref 70–99)
GLUCOSE BLDC GLUCOMTR-MCNC: 88 MG/DL — SIGNIFICANT CHANGE UP (ref 70–99)
GLUCOSE BLDC GLUCOMTR-MCNC: 92 MG/DL — SIGNIFICANT CHANGE UP (ref 70–99)
GLUCOSE BLDC GLUCOMTR-MCNC: 94 MG/DL — SIGNIFICANT CHANGE UP (ref 70–99)
GLUCOSE BLDC GLUCOMTR-MCNC: 96 MG/DL — SIGNIFICANT CHANGE UP (ref 70–99)
GLUCOSE BLDC GLUCOMTR-MCNC: 96 MG/DL — SIGNIFICANT CHANGE UP (ref 70–99)
GLUCOSE BLDC GLUCOMTR-MCNC: 98 MG/DL — SIGNIFICANT CHANGE UP (ref 70–99)
GLUCOSE BLDC GLUCOMTR-MCNC: 99 MG/DL — SIGNIFICANT CHANGE UP (ref 70–99)
GLUCOSE SERPL-MCNC: 101 MG/DL — HIGH (ref 70–99)
GLUCOSE SERPL-MCNC: 103 MG/DL — HIGH (ref 70–99)
GLUCOSE SERPL-MCNC: 105 MG/DL — HIGH (ref 70–99)
GLUCOSE SERPL-MCNC: 110 MG/DL — HIGH (ref 70–99)
GLUCOSE SERPL-MCNC: 111 MG/DL — HIGH (ref 70–99)
GLUCOSE SERPL-MCNC: 111 MG/DL — HIGH (ref 70–99)
GLUCOSE SERPL-MCNC: 112 MG/DL — HIGH (ref 70–99)
GLUCOSE SERPL-MCNC: 112 MG/DL — HIGH (ref 70–99)
GLUCOSE SERPL-MCNC: 113 MG/DL — HIGH (ref 70–99)
GLUCOSE SERPL-MCNC: 114 MG/DL — HIGH (ref 70–99)
GLUCOSE SERPL-MCNC: 114 MG/DL — HIGH (ref 70–99)
GLUCOSE SERPL-MCNC: 115 MG/DL — HIGH (ref 70–99)
GLUCOSE SERPL-MCNC: 116 MG/DL — HIGH (ref 70–99)
GLUCOSE SERPL-MCNC: 121 MG/DL — HIGH (ref 70–99)
GLUCOSE SERPL-MCNC: 122 MG/DL — HIGH (ref 70–99)
GLUCOSE SERPL-MCNC: 123 MG/DL — HIGH (ref 70–99)
GLUCOSE SERPL-MCNC: 124 MG/DL — HIGH (ref 70–99)
GLUCOSE SERPL-MCNC: 125 MG/DL — HIGH (ref 70–99)
GLUCOSE SERPL-MCNC: 126 MG/DL — HIGH (ref 70–99)
GLUCOSE SERPL-MCNC: 126 MG/DL — HIGH (ref 70–99)
GLUCOSE SERPL-MCNC: 127 MG/DL — HIGH (ref 70–99)
GLUCOSE SERPL-MCNC: 128 MG/DL — HIGH (ref 70–99)
GLUCOSE SERPL-MCNC: 131 MG/DL — HIGH (ref 70–99)
GLUCOSE SERPL-MCNC: 132 MG/DL — HIGH (ref 70–99)
GLUCOSE SERPL-MCNC: 134 MG/DL — HIGH (ref 70–99)
GLUCOSE SERPL-MCNC: 137 MG/DL — HIGH (ref 70–99)
GLUCOSE SERPL-MCNC: 138 MG/DL — HIGH (ref 70–99)
GLUCOSE SERPL-MCNC: 142 MG/DL — HIGH (ref 70–99)
GLUCOSE SERPL-MCNC: 143 MG/DL — HIGH (ref 70–99)
GLUCOSE SERPL-MCNC: 144 MG/DL — HIGH (ref 70–99)
GLUCOSE SERPL-MCNC: 144 MG/DL — HIGH (ref 70–99)
GLUCOSE SERPL-MCNC: 148 MG/DL — HIGH (ref 70–99)
GLUCOSE SERPL-MCNC: 150 MG/DL — HIGH (ref 70–99)
GLUCOSE SERPL-MCNC: 151 MG/DL — HIGH (ref 70–99)
GLUCOSE SERPL-MCNC: 158 MG/DL — HIGH (ref 70–99)
GLUCOSE SERPL-MCNC: 167 MG/DL — HIGH (ref 70–99)
GLUCOSE SERPL-MCNC: 172 MG/DL — HIGH (ref 70–99)
GLUCOSE SERPL-MCNC: 182 MG/DL — HIGH (ref 70–99)
GLUCOSE SERPL-MCNC: 203 MG/DL — HIGH (ref 70–99)
GLUCOSE SERPL-MCNC: 243 MG/DL — HIGH (ref 70–99)
GLUCOSE SERPL-MCNC: 259 MG/DL — HIGH (ref 70–99)
GLUCOSE SERPL-MCNC: 68 MG/DL — LOW (ref 70–99)
GLUCOSE SERPL-MCNC: 80 MG/DL — SIGNIFICANT CHANGE UP (ref 70–99)
GLUCOSE SERPL-MCNC: 86 MG/DL — SIGNIFICANT CHANGE UP (ref 70–99)
GLUCOSE SERPL-MCNC: 88 MG/DL — SIGNIFICANT CHANGE UP (ref 70–99)
GLUCOSE SERPL-MCNC: 88 MG/DL — SIGNIFICANT CHANGE UP (ref 70–99)
GLUCOSE SERPL-MCNC: 90 MG/DL — SIGNIFICANT CHANGE UP (ref 70–99)
GLUCOSE SERPL-MCNC: 93 MG/DL — SIGNIFICANT CHANGE UP (ref 70–99)
GLUCOSE SERPL-MCNC: 96 MG/DL — SIGNIFICANT CHANGE UP (ref 70–99)
GLUCOSE SERPL-MCNC: 98 MG/DL — SIGNIFICANT CHANGE UP (ref 70–99)
GLUCOSE SERPL-MCNC: 99 MG/DL — SIGNIFICANT CHANGE UP (ref 70–99)
GLUCOSE SERPL-MCNC: 99 MG/DL — SIGNIFICANT CHANGE UP (ref 70–99)
GLUCOSE UR QL: ABNORMAL
GLUCOSE UR QL: NEGATIVE MG/DL — SIGNIFICANT CHANGE UP
GLUCOSE UR QL: NEGATIVE — SIGNIFICANT CHANGE UP
GLUCOSE UR QL: SIGNIFICANT CHANGE UP
GLUCOSE UR QL: SIGNIFICANT CHANGE UP
GRAN CASTS # UR COMP ASSIST: NEGATIVE — SIGNIFICANT CHANGE UP
HAV IGG SER QL IA: SIGNIFICANT CHANGE UP
HAV IGM SER-ACNC: SIGNIFICANT CHANGE UP
HBV CORE AB SER-ACNC: SIGNIFICANT CHANGE UP
HBV CORE IGM SER-ACNC: SIGNIFICANT CHANGE UP
HBV SURFACE AB SER-ACNC: REACTIVE
HBV SURFACE AG SER-ACNC: SIGNIFICANT CHANGE UP
HCO3 BLDV-SCNC: 14 MMOL/L — LOW (ref 22–29)
HCO3 BLDV-SCNC: 17 MMOL/L — LOW (ref 22–29)
HCO3 BLDV-SCNC: 9 MMOL/L — CRITICAL LOW (ref 22–29)
HCT VFR BLD CALC: 19.5 % — LOW (ref 42–52)
HCT VFR BLD CALC: 19.7 % — LOW (ref 42–52)
HCT VFR BLD CALC: 21.7 % — LOW (ref 42–52)
HCT VFR BLD CALC: 22.3 % — LOW (ref 42–52)
HCT VFR BLD CALC: 22.9 % — LOW (ref 42–52)
HCT VFR BLD CALC: 23 % — LOW (ref 42–52)
HCT VFR BLD CALC: 23.2 % — LOW (ref 42–52)
HCT VFR BLD CALC: 23.4 % — LOW (ref 42–52)
HCT VFR BLD CALC: 23.6 % — LOW (ref 42–52)
HCT VFR BLD CALC: 23.6 % — LOW (ref 42–52)
HCT VFR BLD CALC: 23.7 % — LOW (ref 42–52)
HCT VFR BLD CALC: 23.9 % — LOW (ref 42–52)
HCT VFR BLD CALC: 24 % — LOW (ref 42–52)
HCT VFR BLD CALC: 24 % — LOW (ref 42–52)
HCT VFR BLD CALC: 24.1 % — LOW (ref 42–52)
HCT VFR BLD CALC: 24.2 % — LOW (ref 42–52)
HCT VFR BLD CALC: 24.3 % — LOW (ref 42–52)
HCT VFR BLD CALC: 24.4 % — LOW (ref 42–52)
HCT VFR BLD CALC: 24.4 % — LOW (ref 42–52)
HCT VFR BLD CALC: 24.5 % — LOW (ref 42–52)
HCT VFR BLD CALC: 24.6 % — LOW (ref 42–52)
HCT VFR BLD CALC: 24.7 % — LOW (ref 42–52)
HCT VFR BLD CALC: 24.7 % — LOW (ref 42–52)
HCT VFR BLD CALC: 24.8 % — LOW (ref 42–52)
HCT VFR BLD CALC: 24.9 % — LOW (ref 42–52)
HCT VFR BLD CALC: 25 % — LOW (ref 42–52)
HCT VFR BLD CALC: 25.2 % — LOW (ref 42–52)
HCT VFR BLD CALC: 25.3 % — LOW (ref 42–52)
HCT VFR BLD CALC: 25.4 % — LOW (ref 42–52)
HCT VFR BLD CALC: 25.5 % — LOW (ref 42–52)
HCT VFR BLD CALC: 25.5 % — LOW (ref 42–52)
HCT VFR BLD CALC: 25.7 % — LOW (ref 42–52)
HCT VFR BLD CALC: 25.9 % — LOW (ref 42–52)
HCT VFR BLD CALC: 25.9 % — LOW (ref 42–52)
HCT VFR BLD CALC: 26 % — LOW (ref 42–52)
HCT VFR BLD CALC: 26.2 % — LOW (ref 42–52)
HCT VFR BLD CALC: 26.3 % — LOW (ref 42–52)
HCT VFR BLD CALC: 26.3 % — LOW (ref 42–52)
HCT VFR BLD CALC: 26.4 % — LOW (ref 42–52)
HCT VFR BLD CALC: 26.7 % — LOW (ref 42–52)
HCT VFR BLD CALC: 26.8 % — LOW (ref 42–52)
HCT VFR BLD CALC: 26.9 % — LOW (ref 42–52)
HCT VFR BLD CALC: 27.1 % — LOW (ref 42–52)
HCT VFR BLD CALC: 27.2 % — LOW (ref 42–52)
HCT VFR BLD CALC: 27.3 % — LOW (ref 42–52)
HCT VFR BLD CALC: 27.3 % — LOW (ref 42–52)
HCT VFR BLD CALC: 27.4 % — LOW (ref 42–52)
HCT VFR BLD CALC: 27.5 % — LOW (ref 42–52)
HCT VFR BLD CALC: 27.9 % — LOW (ref 42–52)
HCT VFR BLD CALC: 28 % — LOW (ref 42–52)
HCT VFR BLD CALC: 28.1 % — LOW (ref 42–52)
HCT VFR BLD CALC: 28.2 % — LOW (ref 42–52)
HCT VFR BLD CALC: 30 % — LOW (ref 42–52)
HCT VFR BLD CALC: 32.6 % — LOW (ref 42–52)
HCT VFR BLD CALC: 32.7 % — LOW (ref 42–52)
HCT VFR BLD CALC: 33.3 % — LOW (ref 42–52)
HCT VFR BLDA CALC: 23 % — LOW (ref 39–51)
HCT VFR BLDA CALC: 56 % — HIGH (ref 39–51)
HDLC SERPL-MCNC: 39 MG/DL — LOW
HGB BLD CALC-MCNC: 18.5 G/DL — HIGH (ref 12.6–17.4)
HGB BLD CALC-MCNC: 7.6 G/DL — LOW (ref 12.6–17.4)
HGB BLD-MCNC: 11 G/DL — LOW (ref 14–18)
HGB BLD-MCNC: 11 G/DL — LOW (ref 14–18)
HGB BLD-MCNC: 11.1 G/DL — LOW (ref 14–18)
HGB BLD-MCNC: 6.2 G/DL — CRITICAL LOW (ref 14–18)
HGB BLD-MCNC: 6.6 G/DL — CRITICAL LOW (ref 14–18)
HGB BLD-MCNC: 6.9 G/DL — CRITICAL LOW (ref 14–18)
HGB BLD-MCNC: 7.3 G/DL — LOW (ref 14–18)
HGB BLD-MCNC: 7.4 G/DL — LOW (ref 14–18)
HGB BLD-MCNC: 7.6 G/DL — LOW (ref 14–18)
HGB BLD-MCNC: 7.6 G/DL — LOW (ref 14–18)
HGB BLD-MCNC: 7.7 G/DL — LOW (ref 14–18)
HGB BLD-MCNC: 7.8 G/DL — LOW (ref 14–18)
HGB BLD-MCNC: 7.9 G/DL — LOW (ref 14–18)
HGB BLD-MCNC: 7.9 G/DL — LOW (ref 14–18)
HGB BLD-MCNC: 8 G/DL — LOW (ref 14–18)
HGB BLD-MCNC: 8.1 G/DL — LOW (ref 14–18)
HGB BLD-MCNC: 8.1 G/DL — LOW (ref 14–18)
HGB BLD-MCNC: 8.2 G/DL — LOW (ref 14–18)
HGB BLD-MCNC: 8.3 G/DL — LOW (ref 14–18)
HGB BLD-MCNC: 8.4 G/DL — LOW (ref 14–18)
HGB BLD-MCNC: 8.4 G/DL — LOW (ref 14–18)
HGB BLD-MCNC: 8.5 G/DL — LOW (ref 14–18)
HGB BLD-MCNC: 8.6 G/DL — LOW (ref 14–18)
HGB BLD-MCNC: 8.7 G/DL — LOW (ref 14–18)
HGB BLD-MCNC: 8.8 G/DL — LOW (ref 14–18)
HGB BLD-MCNC: 8.9 G/DL — LOW (ref 14–18)
HGB BLD-MCNC: 8.9 G/DL — LOW (ref 14–18)
HGB BLD-MCNC: 9 G/DL — LOW (ref 14–18)
HGB BLD-MCNC: 9 G/DL — LOW (ref 14–18)
HGB BLD-MCNC: 9.2 G/DL — LOW (ref 14–18)
HGB BLD-MCNC: 9.3 G/DL — LOW (ref 14–18)
HGB BLD-MCNC: 9.5 G/DL — LOW (ref 14–18)
HOROWITZ INDEX BLDV+IHG-RTO: 21 — SIGNIFICANT CHANGE UP
HYALINE CASTS # UR AUTO: 0 /LPF — SIGNIFICANT CHANGE UP (ref 0–7)
HYALINE CASTS # UR AUTO: 10 /LPF — HIGH (ref 0–7)
HYALINE CASTS # UR AUTO: 3 /LPF — SIGNIFICANT CHANGE UP (ref 0–7)
HYALINE CASTS # UR AUTO: 4 /LPF — SIGNIFICANT CHANGE UP (ref 0–7)
HYALINE CASTS # UR AUTO: 6 /LPF — SIGNIFICANT CHANGE UP (ref 0–7)
HYALINE CASTS # UR AUTO: NEGATIVE — SIGNIFICANT CHANGE UP
IMM GRANULOCYTES NFR BLD AUTO: 0.4 % — HIGH (ref 0.1–0.3)
IMM GRANULOCYTES NFR BLD AUTO: 0.5 % — HIGH (ref 0.1–0.3)
IMM GRANULOCYTES NFR BLD AUTO: 0.6 % — HIGH (ref 0.1–0.3)
IMM GRANULOCYTES NFR BLD AUTO: 0.7 % — HIGH (ref 0.1–0.3)
IMM GRANULOCYTES NFR BLD AUTO: 0.8 % — HIGH (ref 0.1–0.3)
IMM GRANULOCYTES NFR BLD AUTO: 1.2 % — HIGH (ref 0.1–0.3)
IMM GRANULOCYTES NFR BLD AUTO: 1.3 % — HIGH (ref 0.1–0.3)
IMM GRANULOCYTES NFR BLD AUTO: 1.4 % — HIGH (ref 0.1–0.3)
IMM GRANULOCYTES NFR BLD AUTO: 1.5 % — HIGH (ref 0.1–0.3)
IMM GRANULOCYTES NFR BLD AUTO: 1.6 % — HIGH (ref 0.1–0.3)
IMM GRANULOCYTES NFR BLD AUTO: 1.6 % — HIGH (ref 0.1–0.3)
IMM GRANULOCYTES NFR BLD AUTO: 1.8 % — HIGH (ref 0.1–0.3)
IMM GRANULOCYTES NFR BLD AUTO: 2 % — HIGH (ref 0.1–0.3)
IMM GRANULOCYTES NFR BLD AUTO: 2.1 % — HIGH (ref 0.1–0.3)
IMM GRANULOCYTES NFR BLD AUTO: 2.3 % — HIGH (ref 0.1–0.3)
IMM GRANULOCYTES NFR BLD AUTO: 2.6 % — HIGH (ref 0.1–0.3)
IMM GRANULOCYTES NFR BLD AUTO: 2.6 % — HIGH (ref 0.1–0.3)
IMM GRANULOCYTES NFR BLD AUTO: 2.7 % — HIGH (ref 0.1–0.3)
IMM GRANULOCYTES NFR BLD AUTO: 3.9 % — HIGH (ref 0.1–0.3)
IMM GRANULOCYTES NFR BLD AUTO: 4.1 % — HIGH (ref 0.1–0.3)
INR BLD: 1 RATIO — SIGNIFICANT CHANGE UP (ref 0.65–1.3)
INR BLD: 1.02 RATIO — SIGNIFICANT CHANGE UP (ref 0.65–1.3)
INR BLD: 1.05 RATIO — SIGNIFICANT CHANGE UP (ref 0.65–1.3)
INR BLD: 1.07 RATIO — SIGNIFICANT CHANGE UP (ref 0.65–1.3)
INR BLD: 1.08 RATIO — SIGNIFICANT CHANGE UP (ref 0.65–1.3)
INR BLD: 1.08 RATIO — SIGNIFICANT CHANGE UP (ref 0.65–1.3)
INR BLD: 1.28 RATIO — SIGNIFICANT CHANGE UP (ref 0.65–1.3)
INR BLD: 1.38 RATIO — HIGH (ref 0.65–1.3)
INR BLD: 1.41 RATIO — HIGH (ref 0.65–1.3)
INR BLD: 1.48 RATIO — HIGH (ref 0.65–1.3)
INTERPRETATION SERPL IFE-IMP: SIGNIFICANT CHANGE UP
IRON SATN MFR SERPL: 48 % — SIGNIFICANT CHANGE UP (ref 15–50)
IRON SATN MFR SERPL: 87 UG/DL — SIGNIFICANT CHANGE UP (ref 35–150)
KAPPA LC SER QL IFE: 4.29 MG/DL — HIGH (ref 0.33–1.94)
KAPPA/LAMBDA FREE LIGHT CHAIN RATIO, SERUM: 1.11 RATIO — SIGNIFICANT CHANGE UP (ref 0.26–1.65)
KETONES UR-MCNC: 15
KETONES UR-MCNC: NEGATIVE — SIGNIFICANT CHANGE UP
KETONES UR-MCNC: SIGNIFICANT CHANGE UP
LACTATE BLDV-MCNC: 0.7 MMOL/L — SIGNIFICANT CHANGE UP (ref 0.5–2)
LACTATE BLDV-MCNC: 0.7 MMOL/L — SIGNIFICANT CHANGE UP (ref 0.5–2)
LACTATE BLDV-MCNC: 1 MMOL/L — SIGNIFICANT CHANGE UP (ref 0.5–2)
LACTATE SERPL-SCNC: 1.3 MMOL/L — SIGNIFICANT CHANGE UP (ref 0.7–2)
LAMBDA LC SER QL IFE: 3.88 MG/DL — HIGH (ref 0.57–2.63)
LEUKOCYTE ESTERASE UR-ACNC: ABNORMAL
LEUKOCYTE ESTERASE UR-ACNC: NEGATIVE — SIGNIFICANT CHANGE UP
LIPID PNL WITH DIRECT LDL SERPL: 35 MG/DL — SIGNIFICANT CHANGE UP
LYMPHOCYTES # BLD AUTO: 0.29 K/UL — LOW (ref 1.2–3.4)
LYMPHOCYTES # BLD AUTO: 0.29 K/UL — LOW (ref 1.2–3.4)
LYMPHOCYTES # BLD AUTO: 0.32 K/UL — LOW (ref 1.2–3.4)
LYMPHOCYTES # BLD AUTO: 0.33 K/UL — LOW (ref 1.2–3.4)
LYMPHOCYTES # BLD AUTO: 0.34 K/UL — LOW (ref 1.2–3.4)
LYMPHOCYTES # BLD AUTO: 0.35 K/UL — LOW (ref 1.2–3.4)
LYMPHOCYTES # BLD AUTO: 0.38 K/UL — LOW (ref 1.2–3.4)
LYMPHOCYTES # BLD AUTO: 0.38 K/UL — LOW (ref 1.2–3.4)
LYMPHOCYTES # BLD AUTO: 0.39 K/UL — LOW (ref 1.2–3.4)
LYMPHOCYTES # BLD AUTO: 0.39 K/UL — LOW (ref 1.2–3.4)
LYMPHOCYTES # BLD AUTO: 0.4 K/UL — LOW (ref 1.2–3.4)
LYMPHOCYTES # BLD AUTO: 0.4 K/UL — LOW (ref 1.2–3.4)
LYMPHOCYTES # BLD AUTO: 0.41 K/UL — LOW (ref 1.2–3.4)
LYMPHOCYTES # BLD AUTO: 0.42 K/UL — LOW (ref 1.2–3.4)
LYMPHOCYTES # BLD AUTO: 0.42 K/UL — LOW (ref 1.2–3.4)
LYMPHOCYTES # BLD AUTO: 0.43 K/UL — LOW (ref 1.2–3.4)
LYMPHOCYTES # BLD AUTO: 0.44 K/UL — LOW (ref 1.2–3.4)
LYMPHOCYTES # BLD AUTO: 0.46 K/UL — LOW (ref 1.2–3.4)
LYMPHOCYTES # BLD AUTO: 0.47 K/UL — LOW (ref 1.2–3.4)
LYMPHOCYTES # BLD AUTO: 0.47 K/UL — LOW (ref 1.2–3.4)
LYMPHOCYTES # BLD AUTO: 0.49 K/UL — LOW (ref 1.2–3.4)
LYMPHOCYTES # BLD AUTO: 0.5 K/UL — LOW (ref 1.2–3.4)
LYMPHOCYTES # BLD AUTO: 0.51 K/UL — LOW (ref 1.2–3.4)
LYMPHOCYTES # BLD AUTO: 0.51 K/UL — LOW (ref 1.2–3.4)
LYMPHOCYTES # BLD AUTO: 0.53 K/UL — LOW (ref 1.2–3.4)
LYMPHOCYTES # BLD AUTO: 0.54 K/UL — LOW (ref 1.2–3.4)
LYMPHOCYTES # BLD AUTO: 0.56 K/UL — LOW (ref 1.2–3.4)
LYMPHOCYTES # BLD AUTO: 0.57 K/UL — LOW (ref 1.2–3.4)
LYMPHOCYTES # BLD AUTO: 0.58 K/UL — LOW (ref 1.2–3.4)
LYMPHOCYTES # BLD AUTO: 0.6 K/UL — LOW (ref 1.2–3.4)
LYMPHOCYTES # BLD AUTO: 0.63 K/UL — LOW (ref 1.2–3.4)
LYMPHOCYTES # BLD AUTO: 0.65 K/UL — LOW (ref 1.2–3.4)
LYMPHOCYTES # BLD AUTO: 0.66 K/UL — LOW (ref 1.2–3.4)
LYMPHOCYTES # BLD AUTO: 0.67 K/UL — LOW (ref 1.2–3.4)
LYMPHOCYTES # BLD AUTO: 0.68 K/UL — LOW (ref 1.2–3.4)
LYMPHOCYTES # BLD AUTO: 0.68 K/UL — LOW (ref 1.2–3.4)
LYMPHOCYTES # BLD AUTO: 10.4 % — LOW (ref 20.5–51.1)
LYMPHOCYTES # BLD AUTO: 10.4 % — LOW (ref 20.5–51.1)
LYMPHOCYTES # BLD AUTO: 10.5 % — LOW (ref 20.5–51.1)
LYMPHOCYTES # BLD AUTO: 10.6 % — LOW (ref 20.5–51.1)
LYMPHOCYTES # BLD AUTO: 11.1 % — LOW (ref 20.5–51.1)
LYMPHOCYTES # BLD AUTO: 11.4 % — LOW (ref 20.5–51.1)
LYMPHOCYTES # BLD AUTO: 2.4 % — LOW (ref 20.5–51.1)
LYMPHOCYTES # BLD AUTO: 2.6 % — LOW (ref 20.5–51.1)
LYMPHOCYTES # BLD AUTO: 3.2 % — LOW (ref 20.5–51.1)
LYMPHOCYTES # BLD AUTO: 3.2 % — LOW (ref 20.5–51.1)
LYMPHOCYTES # BLD AUTO: 3.6 % — LOW (ref 20.5–51.1)
LYMPHOCYTES # BLD AUTO: 3.6 % — LOW (ref 20.5–51.1)
LYMPHOCYTES # BLD AUTO: 4.1 % — LOW (ref 20.5–51.1)
LYMPHOCYTES # BLD AUTO: 4.3 % — LOW (ref 20.5–51.1)
LYMPHOCYTES # BLD AUTO: 4.4 % — LOW (ref 20.5–51.1)
LYMPHOCYTES # BLD AUTO: 4.4 % — LOW (ref 20.5–51.1)
LYMPHOCYTES # BLD AUTO: 4.7 % — LOW (ref 20.5–51.1)
LYMPHOCYTES # BLD AUTO: 4.7 % — LOW (ref 20.5–51.1)
LYMPHOCYTES # BLD AUTO: 5 % — LOW (ref 20.5–51.1)
LYMPHOCYTES # BLD AUTO: 5.4 % — LOW (ref 20.5–51.1)
LYMPHOCYTES # BLD AUTO: 5.4 % — LOW (ref 20.5–51.1)
LYMPHOCYTES # BLD AUTO: 5.5 % — LOW (ref 20.5–51.1)
LYMPHOCYTES # BLD AUTO: 5.8 % — LOW (ref 20.5–51.1)
LYMPHOCYTES # BLD AUTO: 6 % — LOW (ref 20.5–51.1)
LYMPHOCYTES # BLD AUTO: 6 % — LOW (ref 20.5–51.1)
LYMPHOCYTES # BLD AUTO: 6.2 % — LOW (ref 20.5–51.1)
LYMPHOCYTES # BLD AUTO: 6.2 % — LOW (ref 20.5–51.1)
LYMPHOCYTES # BLD AUTO: 6.9 % — LOW (ref 20.5–51.1)
LYMPHOCYTES # BLD AUTO: 7 % — LOW (ref 20.5–51.1)
LYMPHOCYTES # BLD AUTO: 7.1 % — LOW (ref 20.5–51.1)
LYMPHOCYTES # BLD AUTO: 7.1 % — LOW (ref 20.5–51.1)
LYMPHOCYTES # BLD AUTO: 7.4 % — LOW (ref 20.5–51.1)
LYMPHOCYTES # BLD AUTO: 7.6 % — LOW (ref 20.5–51.1)
LYMPHOCYTES # BLD AUTO: 7.7 % — LOW (ref 20.5–51.1)
LYMPHOCYTES # BLD AUTO: 7.9 % — LOW (ref 20.5–51.1)
LYMPHOCYTES # BLD AUTO: 8 % — LOW (ref 20.5–51.1)
LYMPHOCYTES # BLD AUTO: 8.3 % — LOW (ref 20.5–51.1)
LYMPHOCYTES # BLD AUTO: 8.7 % — LOW (ref 20.5–51.1)
MAGNESIUM SERPL-MCNC: 1.4 MG/DL — LOW (ref 1.8–2.4)
MAGNESIUM SERPL-MCNC: 1.4 MG/DL — LOW (ref 1.8–2.4)
MAGNESIUM SERPL-MCNC: 1.6 MG/DL — LOW (ref 1.8–2.4)
MAGNESIUM SERPL-MCNC: 1.7 MG/DL — LOW (ref 1.8–2.4)
MAGNESIUM SERPL-MCNC: 1.8 MG/DL — SIGNIFICANT CHANGE UP (ref 1.8–2.4)
MAGNESIUM SERPL-MCNC: 1.9 MG/DL — SIGNIFICANT CHANGE UP (ref 1.8–2.4)
MAGNESIUM SERPL-MCNC: 2 MG/DL — SIGNIFICANT CHANGE UP (ref 1.8–2.4)
MAGNESIUM SERPL-MCNC: 2.1 MG/DL — SIGNIFICANT CHANGE UP (ref 1.8–2.4)
MAGNESIUM SERPL-MCNC: 2.2 MG/DL — SIGNIFICANT CHANGE UP (ref 1.8–2.4)
MAGNESIUM SERPL-MCNC: 2.2 MG/DL — SIGNIFICANT CHANGE UP (ref 1.8–2.4)
MANUAL SMEAR VERIFICATION: SIGNIFICANT CHANGE UP
MCHC RBC-ENTMCNC: 28.3 PG — SIGNIFICANT CHANGE UP (ref 27–31)
MCHC RBC-ENTMCNC: 28.4 PG — SIGNIFICANT CHANGE UP (ref 27–31)
MCHC RBC-ENTMCNC: 28.5 PG — SIGNIFICANT CHANGE UP (ref 27–31)
MCHC RBC-ENTMCNC: 28.6 PG — SIGNIFICANT CHANGE UP (ref 27–31)
MCHC RBC-ENTMCNC: 28.7 PG — SIGNIFICANT CHANGE UP (ref 27–31)
MCHC RBC-ENTMCNC: 28.7 PG — SIGNIFICANT CHANGE UP (ref 27–31)
MCHC RBC-ENTMCNC: 28.8 PG — SIGNIFICANT CHANGE UP (ref 27–31)
MCHC RBC-ENTMCNC: 28.9 PG — SIGNIFICANT CHANGE UP (ref 27–31)
MCHC RBC-ENTMCNC: 29 PG — SIGNIFICANT CHANGE UP (ref 27–31)
MCHC RBC-ENTMCNC: 29.1 PG — SIGNIFICANT CHANGE UP (ref 27–31)
MCHC RBC-ENTMCNC: 29.2 PG — SIGNIFICANT CHANGE UP (ref 27–31)
MCHC RBC-ENTMCNC: 29.3 PG — SIGNIFICANT CHANGE UP (ref 27–31)
MCHC RBC-ENTMCNC: 29.4 PG — SIGNIFICANT CHANGE UP (ref 27–31)
MCHC RBC-ENTMCNC: 29.5 PG — SIGNIFICANT CHANGE UP (ref 27–31)
MCHC RBC-ENTMCNC: 29.5 PG — SIGNIFICANT CHANGE UP (ref 27–31)
MCHC RBC-ENTMCNC: 29.6 PG — SIGNIFICANT CHANGE UP (ref 27–31)
MCHC RBC-ENTMCNC: 29.7 G/DL — LOW (ref 32–37)
MCHC RBC-ENTMCNC: 29.7 PG — SIGNIFICANT CHANGE UP (ref 27–31)
MCHC RBC-ENTMCNC: 29.8 PG — SIGNIFICANT CHANGE UP (ref 27–31)
MCHC RBC-ENTMCNC: 29.8 PG — SIGNIFICANT CHANGE UP (ref 27–31)
MCHC RBC-ENTMCNC: 29.9 PG — SIGNIFICANT CHANGE UP (ref 27–31)
MCHC RBC-ENTMCNC: 30 PG — SIGNIFICANT CHANGE UP (ref 27–31)
MCHC RBC-ENTMCNC: 30 PG — SIGNIFICANT CHANGE UP (ref 27–31)
MCHC RBC-ENTMCNC: 30.2 PG — SIGNIFICANT CHANGE UP (ref 27–31)
MCHC RBC-ENTMCNC: 30.4 PG — SIGNIFICANT CHANGE UP (ref 27–31)
MCHC RBC-ENTMCNC: 30.5 PG — SIGNIFICANT CHANGE UP (ref 27–31)
MCHC RBC-ENTMCNC: 30.8 PG — SIGNIFICANT CHANGE UP (ref 27–31)
MCHC RBC-ENTMCNC: 31.6 G/DL — LOW (ref 32–37)
MCHC RBC-ENTMCNC: 31.7 G/DL — LOW (ref 32–37)
MCHC RBC-ENTMCNC: 31.8 G/DL — LOW (ref 32–37)
MCHC RBC-ENTMCNC: 32 G/DL — SIGNIFICANT CHANGE UP (ref 32–37)
MCHC RBC-ENTMCNC: 32.1 G/DL — SIGNIFICANT CHANGE UP (ref 32–37)
MCHC RBC-ENTMCNC: 32.1 G/DL — SIGNIFICANT CHANGE UP (ref 32–37)
MCHC RBC-ENTMCNC: 32.2 G/DL — SIGNIFICANT CHANGE UP (ref 32–37)
MCHC RBC-ENTMCNC: 32.3 G/DL — SIGNIFICANT CHANGE UP (ref 32–37)
MCHC RBC-ENTMCNC: 32.4 G/DL — SIGNIFICANT CHANGE UP (ref 32–37)
MCHC RBC-ENTMCNC: 32.5 G/DL — SIGNIFICANT CHANGE UP (ref 32–37)
MCHC RBC-ENTMCNC: 32.6 G/DL — SIGNIFICANT CHANGE UP (ref 32–37)
MCHC RBC-ENTMCNC: 32.7 G/DL — SIGNIFICANT CHANGE UP (ref 32–37)
MCHC RBC-ENTMCNC: 32.8 G/DL — SIGNIFICANT CHANGE UP (ref 32–37)
MCHC RBC-ENTMCNC: 32.9 G/DL — SIGNIFICANT CHANGE UP (ref 32–37)
MCHC RBC-ENTMCNC: 33 G/DL — SIGNIFICANT CHANGE UP (ref 32–37)
MCHC RBC-ENTMCNC: 33.2 G/DL — SIGNIFICANT CHANGE UP (ref 32–37)
MCHC RBC-ENTMCNC: 33.3 G/DL — SIGNIFICANT CHANGE UP (ref 32–37)
MCHC RBC-ENTMCNC: 33.5 G/DL — SIGNIFICANT CHANGE UP (ref 32–37)
MCHC RBC-ENTMCNC: 33.6 G/DL — SIGNIFICANT CHANGE UP (ref 32–37)
MCHC RBC-ENTMCNC: 34 G/DL — SIGNIFICANT CHANGE UP (ref 32–37)
MCHC RBC-ENTMCNC: 34 G/DL — SIGNIFICANT CHANGE UP (ref 32–37)
MCV RBC AUTO: 100.4 FL — HIGH (ref 80–94)
MCV RBC AUTO: 86.6 FL — SIGNIFICANT CHANGE UP (ref 80–94)
MCV RBC AUTO: 86.7 FL — SIGNIFICANT CHANGE UP (ref 80–94)
MCV RBC AUTO: 87.3 FL — SIGNIFICANT CHANGE UP (ref 80–94)
MCV RBC AUTO: 87.3 FL — SIGNIFICANT CHANGE UP (ref 80–94)
MCV RBC AUTO: 87.5 FL — SIGNIFICANT CHANGE UP (ref 80–94)
MCV RBC AUTO: 87.6 FL — SIGNIFICANT CHANGE UP (ref 80–94)
MCV RBC AUTO: 87.6 FL — SIGNIFICANT CHANGE UP (ref 80–94)
MCV RBC AUTO: 87.8 FL — SIGNIFICANT CHANGE UP (ref 80–94)
MCV RBC AUTO: 87.8 FL — SIGNIFICANT CHANGE UP (ref 80–94)
MCV RBC AUTO: 88.1 FL — SIGNIFICANT CHANGE UP (ref 80–94)
MCV RBC AUTO: 88.2 FL — SIGNIFICANT CHANGE UP (ref 80–94)
MCV RBC AUTO: 88.3 FL — SIGNIFICANT CHANGE UP (ref 80–94)
MCV RBC AUTO: 88.4 FL — SIGNIFICANT CHANGE UP (ref 80–94)
MCV RBC AUTO: 88.6 FL — SIGNIFICANT CHANGE UP (ref 80–94)
MCV RBC AUTO: 88.7 FL — SIGNIFICANT CHANGE UP (ref 80–94)
MCV RBC AUTO: 88.7 FL — SIGNIFICANT CHANGE UP (ref 80–94)
MCV RBC AUTO: 88.8 FL — SIGNIFICANT CHANGE UP (ref 80–94)
MCV RBC AUTO: 88.9 FL — SIGNIFICANT CHANGE UP (ref 80–94)
MCV RBC AUTO: 89 FL — SIGNIFICANT CHANGE UP (ref 80–94)
MCV RBC AUTO: 89.2 FL — SIGNIFICANT CHANGE UP (ref 80–94)
MCV RBC AUTO: 89.3 FL — SIGNIFICANT CHANGE UP (ref 80–94)
MCV RBC AUTO: 89.4 FL — SIGNIFICANT CHANGE UP (ref 80–94)
MCV RBC AUTO: 89.5 FL — SIGNIFICANT CHANGE UP (ref 80–94)
MCV RBC AUTO: 89.5 FL — SIGNIFICANT CHANGE UP (ref 80–94)
MCV RBC AUTO: 89.6 FL — SIGNIFICANT CHANGE UP (ref 80–94)
MCV RBC AUTO: 89.6 FL — SIGNIFICANT CHANGE UP (ref 80–94)
MCV RBC AUTO: 89.7 FL — SIGNIFICANT CHANGE UP (ref 80–94)
MCV RBC AUTO: 89.8 FL — SIGNIFICANT CHANGE UP (ref 80–94)
MCV RBC AUTO: 90.2 FL — SIGNIFICANT CHANGE UP (ref 80–94)
MCV RBC AUTO: 90.2 FL — SIGNIFICANT CHANGE UP (ref 80–94)
MCV RBC AUTO: 90.4 FL — SIGNIFICANT CHANGE UP (ref 80–94)
MCV RBC AUTO: 90.6 FL — SIGNIFICANT CHANGE UP (ref 80–94)
MCV RBC AUTO: 90.6 FL — SIGNIFICANT CHANGE UP (ref 80–94)
MCV RBC AUTO: 90.7 FL — SIGNIFICANT CHANGE UP (ref 80–94)
MCV RBC AUTO: 90.7 FL — SIGNIFICANT CHANGE UP (ref 80–94)
MCV RBC AUTO: 90.8 FL — SIGNIFICANT CHANGE UP (ref 80–94)
MCV RBC AUTO: 90.9 FL — SIGNIFICANT CHANGE UP (ref 80–94)
MCV RBC AUTO: 91 FL — SIGNIFICANT CHANGE UP (ref 80–94)
MCV RBC AUTO: 91.2 FL — SIGNIFICANT CHANGE UP (ref 80–94)
MCV RBC AUTO: 91.2 FL — SIGNIFICANT CHANGE UP (ref 80–94)
MCV RBC AUTO: 91.3 FL — SIGNIFICANT CHANGE UP (ref 80–94)
MCV RBC AUTO: 91.3 FL — SIGNIFICANT CHANGE UP (ref 80–94)
MCV RBC AUTO: 91.5 FL — SIGNIFICANT CHANGE UP (ref 80–94)
MCV RBC AUTO: 91.9 FL — SIGNIFICANT CHANGE UP (ref 80–94)
MCV RBC AUTO: 92 FL — SIGNIFICANT CHANGE UP (ref 80–94)
MCV RBC AUTO: 92.3 FL — SIGNIFICANT CHANGE UP (ref 80–94)
MCV RBC AUTO: 92.5 FL — SIGNIFICANT CHANGE UP (ref 80–94)
MCV RBC AUTO: 93 FL — SIGNIFICANT CHANGE UP (ref 80–94)
MCV RBC AUTO: 93.4 FL — SIGNIFICANT CHANGE UP (ref 80–94)
MCV RBC AUTO: 93.5 FL — SIGNIFICANT CHANGE UP (ref 80–94)
MCV RBC AUTO: 93.9 FL — SIGNIFICANT CHANGE UP (ref 80–94)
MCV RBC AUTO: 97 FL — HIGH (ref 80–94)
MICROCYTES BLD QL: SLIGHT — SIGNIFICANT CHANGE UP
MONOCYTES # BLD AUTO: 0.32 K/UL — SIGNIFICANT CHANGE UP (ref 0.1–0.6)
MONOCYTES # BLD AUTO: 0.35 K/UL — SIGNIFICANT CHANGE UP (ref 0.1–0.6)
MONOCYTES # BLD AUTO: 0.36 K/UL — SIGNIFICANT CHANGE UP (ref 0.1–0.6)
MONOCYTES # BLD AUTO: 0.43 K/UL — SIGNIFICANT CHANGE UP (ref 0.1–0.6)
MONOCYTES # BLD AUTO: 0.44 K/UL — SIGNIFICANT CHANGE UP (ref 0.1–0.6)
MONOCYTES # BLD AUTO: 0.45 K/UL — SIGNIFICANT CHANGE UP (ref 0.1–0.6)
MONOCYTES # BLD AUTO: 0.47 K/UL — SIGNIFICANT CHANGE UP (ref 0.1–0.6)
MONOCYTES # BLD AUTO: 0.47 K/UL — SIGNIFICANT CHANGE UP (ref 0.1–0.6)
MONOCYTES # BLD AUTO: 0.48 K/UL — SIGNIFICANT CHANGE UP (ref 0.1–0.6)
MONOCYTES # BLD AUTO: 0.5 K/UL — SIGNIFICANT CHANGE UP (ref 0.1–0.6)
MONOCYTES # BLD AUTO: 0.51 K/UL — SIGNIFICANT CHANGE UP (ref 0.1–0.6)
MONOCYTES # BLD AUTO: 0.52 K/UL — SIGNIFICANT CHANGE UP (ref 0.1–0.6)
MONOCYTES # BLD AUTO: 0.53 K/UL — SIGNIFICANT CHANGE UP (ref 0.1–0.6)
MONOCYTES # BLD AUTO: 0.54 K/UL — SIGNIFICANT CHANGE UP (ref 0.1–0.6)
MONOCYTES # BLD AUTO: 0.55 K/UL — SIGNIFICANT CHANGE UP (ref 0.1–0.6)
MONOCYTES # BLD AUTO: 0.56 K/UL — SIGNIFICANT CHANGE UP (ref 0.1–0.6)
MONOCYTES # BLD AUTO: 0.58 K/UL — SIGNIFICANT CHANGE UP (ref 0.1–0.6)
MONOCYTES # BLD AUTO: 0.6 K/UL — SIGNIFICANT CHANGE UP (ref 0.1–0.6)
MONOCYTES # BLD AUTO: 0.62 K/UL — HIGH (ref 0.1–0.6)
MONOCYTES # BLD AUTO: 0.63 K/UL — HIGH (ref 0.1–0.6)
MONOCYTES # BLD AUTO: 0.64 K/UL — HIGH (ref 0.1–0.6)
MONOCYTES # BLD AUTO: 0.66 K/UL — HIGH (ref 0.1–0.6)
MONOCYTES # BLD AUTO: 0.67 K/UL — HIGH (ref 0.1–0.6)
MONOCYTES # BLD AUTO: 0.67 K/UL — HIGH (ref 0.1–0.6)
MONOCYTES # BLD AUTO: 0.7 K/UL — HIGH (ref 0.1–0.6)
MONOCYTES # BLD AUTO: 0.72 K/UL — HIGH (ref 0.1–0.6)
MONOCYTES # BLD AUTO: 0.74 K/UL — HIGH (ref 0.1–0.6)
MONOCYTES NFR BLD AUTO: 10.6 % — HIGH (ref 1.7–9.3)
MONOCYTES NFR BLD AUTO: 11.9 % — HIGH (ref 1.7–9.3)
MONOCYTES NFR BLD AUTO: 12.8 % — HIGH (ref 1.7–9.3)
MONOCYTES NFR BLD AUTO: 2.3 % — SIGNIFICANT CHANGE UP (ref 1.7–9.3)
MONOCYTES NFR BLD AUTO: 2.4 % — SIGNIFICANT CHANGE UP (ref 1.7–9.3)
MONOCYTES NFR BLD AUTO: 4.5 % — SIGNIFICANT CHANGE UP (ref 1.7–9.3)
MONOCYTES NFR BLD AUTO: 4.7 % — SIGNIFICANT CHANGE UP (ref 1.7–9.3)
MONOCYTES NFR BLD AUTO: 4.9 % — SIGNIFICANT CHANGE UP (ref 1.7–9.3)
MONOCYTES NFR BLD AUTO: 5.1 % — SIGNIFICANT CHANGE UP (ref 1.7–9.3)
MONOCYTES NFR BLD AUTO: 5.3 % — SIGNIFICANT CHANGE UP (ref 1.7–9.3)
MONOCYTES NFR BLD AUTO: 5.3 % — SIGNIFICANT CHANGE UP (ref 1.7–9.3)
MONOCYTES NFR BLD AUTO: 5.4 % — SIGNIFICANT CHANGE UP (ref 1.7–9.3)
MONOCYTES NFR BLD AUTO: 5.6 % — SIGNIFICANT CHANGE UP (ref 1.7–9.3)
MONOCYTES NFR BLD AUTO: 5.6 % — SIGNIFICANT CHANGE UP (ref 1.7–9.3)
MONOCYTES NFR BLD AUTO: 5.8 % — SIGNIFICANT CHANGE UP (ref 1.7–9.3)
MONOCYTES NFR BLD AUTO: 5.8 % — SIGNIFICANT CHANGE UP (ref 1.7–9.3)
MONOCYTES NFR BLD AUTO: 5.9 % — SIGNIFICANT CHANGE UP (ref 1.7–9.3)
MONOCYTES NFR BLD AUTO: 6 % — SIGNIFICANT CHANGE UP (ref 1.7–9.3)
MONOCYTES NFR BLD AUTO: 6 % — SIGNIFICANT CHANGE UP (ref 1.7–9.3)
MONOCYTES NFR BLD AUTO: 6.3 % — SIGNIFICANT CHANGE UP (ref 1.7–9.3)
MONOCYTES NFR BLD AUTO: 6.5 % — SIGNIFICANT CHANGE UP (ref 1.7–9.3)
MONOCYTES NFR BLD AUTO: 6.6 % — SIGNIFICANT CHANGE UP (ref 1.7–9.3)
MONOCYTES NFR BLD AUTO: 6.8 % — SIGNIFICANT CHANGE UP (ref 1.7–9.3)
MONOCYTES NFR BLD AUTO: 7.2 % — SIGNIFICANT CHANGE UP (ref 1.7–9.3)
MONOCYTES NFR BLD AUTO: 7.4 % — SIGNIFICANT CHANGE UP (ref 1.7–9.3)
MONOCYTES NFR BLD AUTO: 7.5 % — SIGNIFICANT CHANGE UP (ref 1.7–9.3)
MONOCYTES NFR BLD AUTO: 7.9 % — SIGNIFICANT CHANGE UP (ref 1.7–9.3)
MONOCYTES NFR BLD AUTO: 8 % — SIGNIFICANT CHANGE UP (ref 1.7–9.3)
MONOCYTES NFR BLD AUTO: 8.1 % — SIGNIFICANT CHANGE UP (ref 1.7–9.3)
MONOCYTES NFR BLD AUTO: 8.1 % — SIGNIFICANT CHANGE UP (ref 1.7–9.3)
MONOCYTES NFR BLD AUTO: 8.2 % — SIGNIFICANT CHANGE UP (ref 1.7–9.3)
MONOCYTES NFR BLD AUTO: 8.5 % — SIGNIFICANT CHANGE UP (ref 1.7–9.3)
MONOCYTES NFR BLD AUTO: 8.6 % — SIGNIFICANT CHANGE UP (ref 1.7–9.3)
MONOCYTES NFR BLD AUTO: 8.8 % — SIGNIFICANT CHANGE UP (ref 1.7–9.3)
MONOCYTES NFR BLD AUTO: 8.9 % — SIGNIFICANT CHANGE UP (ref 1.7–9.3)
MONOCYTES NFR BLD AUTO: 9.2 % — SIGNIFICANT CHANGE UP (ref 1.7–9.3)
MONOCYTES NFR BLD AUTO: 9.3 % — SIGNIFICANT CHANGE UP (ref 1.7–9.3)
MONOCYTES NFR BLD AUTO: 9.3 % — SIGNIFICANT CHANGE UP (ref 1.7–9.3)
MRSA PCR RESULT.: POSITIVE
MYELOCYTES NFR BLD: 0.9 % — HIGH (ref 0–0)
NEUTROPHILS # BLD AUTO: 11.72 K/UL — HIGH (ref 1.4–6.5)
NEUTROPHILS # BLD AUTO: 14.21 K/UL — HIGH (ref 1.4–6.5)
NEUTROPHILS # BLD AUTO: 21.68 K/UL — HIGH (ref 1.4–6.5)
NEUTROPHILS # BLD AUTO: 3.31 K/UL — SIGNIFICANT CHANGE UP (ref 1.4–6.5)
NEUTROPHILS # BLD AUTO: 3.72 K/UL — SIGNIFICANT CHANGE UP (ref 1.4–6.5)
NEUTROPHILS # BLD AUTO: 3.8 K/UL — SIGNIFICANT CHANGE UP (ref 1.4–6.5)
NEUTROPHILS # BLD AUTO: 3.8 K/UL — SIGNIFICANT CHANGE UP (ref 1.4–6.5)
NEUTROPHILS # BLD AUTO: 4 K/UL — SIGNIFICANT CHANGE UP (ref 1.4–6.5)
NEUTROPHILS # BLD AUTO: 4.08 K/UL — SIGNIFICANT CHANGE UP (ref 1.4–6.5)
NEUTROPHILS # BLD AUTO: 4.35 K/UL — SIGNIFICANT CHANGE UP (ref 1.4–6.5)
NEUTROPHILS # BLD AUTO: 4.38 K/UL — SIGNIFICANT CHANGE UP (ref 1.4–6.5)
NEUTROPHILS # BLD AUTO: 4.71 K/UL — SIGNIFICANT CHANGE UP (ref 1.4–6.5)
NEUTROPHILS # BLD AUTO: 4.78 K/UL — SIGNIFICANT CHANGE UP (ref 1.4–6.5)
NEUTROPHILS # BLD AUTO: 4.93 K/UL — SIGNIFICANT CHANGE UP (ref 1.4–6.5)
NEUTROPHILS # BLD AUTO: 4.94 K/UL — SIGNIFICANT CHANGE UP (ref 1.4–6.5)
NEUTROPHILS # BLD AUTO: 5.62 K/UL — SIGNIFICANT CHANGE UP (ref 1.4–6.5)
NEUTROPHILS # BLD AUTO: 5.62 K/UL — SIGNIFICANT CHANGE UP (ref 1.4–6.5)
NEUTROPHILS # BLD AUTO: 5.93 K/UL — SIGNIFICANT CHANGE UP (ref 1.4–6.5)
NEUTROPHILS # BLD AUTO: 6.07 K/UL — SIGNIFICANT CHANGE UP (ref 1.4–6.5)
NEUTROPHILS # BLD AUTO: 6.4 K/UL — SIGNIFICANT CHANGE UP (ref 1.4–6.5)
NEUTROPHILS # BLD AUTO: 6.47 K/UL — SIGNIFICANT CHANGE UP (ref 1.4–6.5)
NEUTROPHILS # BLD AUTO: 6.52 K/UL — HIGH (ref 1.4–6.5)
NEUTROPHILS # BLD AUTO: 6.52 K/UL — HIGH (ref 1.4–6.5)
NEUTROPHILS # BLD AUTO: 6.76 K/UL — HIGH (ref 1.4–6.5)
NEUTROPHILS # BLD AUTO: 6.88 K/UL — HIGH (ref 1.4–6.5)
NEUTROPHILS # BLD AUTO: 6.91 K/UL — HIGH (ref 1.4–6.5)
NEUTROPHILS # BLD AUTO: 7.04 K/UL — HIGH (ref 1.4–6.5)
NEUTROPHILS # BLD AUTO: 7.16 K/UL — HIGH (ref 1.4–6.5)
NEUTROPHILS # BLD AUTO: 7.26 K/UL — HIGH (ref 1.4–6.5)
NEUTROPHILS # BLD AUTO: 7.32 K/UL — HIGH (ref 1.4–6.5)
NEUTROPHILS # BLD AUTO: 7.86 K/UL — HIGH (ref 1.4–6.5)
NEUTROPHILS # BLD AUTO: 7.91 K/UL — HIGH (ref 1.4–6.5)
NEUTROPHILS # BLD AUTO: 8.17 K/UL — HIGH (ref 1.4–6.5)
NEUTROPHILS # BLD AUTO: 8.32 K/UL — HIGH (ref 1.4–6.5)
NEUTROPHILS # BLD AUTO: 8.33 K/UL — HIGH (ref 1.4–6.5)
NEUTROPHILS # BLD AUTO: 8.86 K/UL — HIGH (ref 1.4–6.5)
NEUTROPHILS # BLD AUTO: 9.05 K/UL — HIGH (ref 1.4–6.5)
NEUTROPHILS # BLD AUTO: 9.36 K/UL — HIGH (ref 1.4–6.5)
NEUTROPHILS # BLD AUTO: 9.53 K/UL — HIGH (ref 1.4–6.5)
NEUTROPHILS # BLD AUTO: 9.87 K/UL — HIGH (ref 1.4–6.5)
NEUTROPHILS NFR BLD AUTO: 73.9 % — SIGNIFICANT CHANGE UP (ref 42.2–75.2)
NEUTROPHILS NFR BLD AUTO: 74 % — SIGNIFICANT CHANGE UP (ref 42.2–75.2)
NEUTROPHILS NFR BLD AUTO: 75.2 % — SIGNIFICANT CHANGE UP (ref 42.2–75.2)
NEUTROPHILS NFR BLD AUTO: 76 % — HIGH (ref 42.2–75.2)
NEUTROPHILS NFR BLD AUTO: 76.1 % — HIGH (ref 42.2–75.2)
NEUTROPHILS NFR BLD AUTO: 76.9 % — HIGH (ref 42.2–75.2)
NEUTROPHILS NFR BLD AUTO: 77.6 % — HIGH (ref 42.2–75.2)
NEUTROPHILS NFR BLD AUTO: 77.6 % — HIGH (ref 42.2–75.2)
NEUTROPHILS NFR BLD AUTO: 78.5 % — HIGH (ref 42.2–75.2)
NEUTROPHILS NFR BLD AUTO: 79.4 % — HIGH (ref 42.2–75.2)
NEUTROPHILS NFR BLD AUTO: 81.3 % — HIGH (ref 42.2–75.2)
NEUTROPHILS NFR BLD AUTO: 81.5 % — HIGH (ref 42.2–75.2)
NEUTROPHILS NFR BLD AUTO: 81.8 % — HIGH (ref 42.2–75.2)
NEUTROPHILS NFR BLD AUTO: 82 % — HIGH (ref 42.2–75.2)
NEUTROPHILS NFR BLD AUTO: 82.1 % — HIGH (ref 42.2–75.2)
NEUTROPHILS NFR BLD AUTO: 82.4 % — HIGH (ref 42.2–75.2)
NEUTROPHILS NFR BLD AUTO: 82.5 % — HIGH (ref 42.2–75.2)
NEUTROPHILS NFR BLD AUTO: 82.9 % — HIGH (ref 42.2–75.2)
NEUTROPHILS NFR BLD AUTO: 83 % — HIGH (ref 42.2–75.2)
NEUTROPHILS NFR BLD AUTO: 83.6 % — HIGH (ref 42.2–75.2)
NEUTROPHILS NFR BLD AUTO: 83.9 % — HIGH (ref 42.2–75.2)
NEUTROPHILS NFR BLD AUTO: 84.4 % — HIGH (ref 42.2–75.2)
NEUTROPHILS NFR BLD AUTO: 84.6 % — HIGH (ref 42.2–75.2)
NEUTROPHILS NFR BLD AUTO: 84.7 % — HIGH (ref 42.2–75.2)
NEUTROPHILS NFR BLD AUTO: 85.1 % — HIGH (ref 42.2–75.2)
NEUTROPHILS NFR BLD AUTO: 85.5 % — HIGH (ref 42.2–75.2)
NEUTROPHILS NFR BLD AUTO: 85.6 % — HIGH (ref 42.2–75.2)
NEUTROPHILS NFR BLD AUTO: 86 % — HIGH (ref 42.2–75.2)
NEUTROPHILS NFR BLD AUTO: 86.2 % — HIGH (ref 42.2–75.2)
NEUTROPHILS NFR BLD AUTO: 87.3 % — HIGH (ref 42.2–75.2)
NEUTROPHILS NFR BLD AUTO: 87.6 % — HIGH (ref 42.2–75.2)
NEUTROPHILS NFR BLD AUTO: 87.9 % — HIGH (ref 42.2–75.2)
NEUTROPHILS NFR BLD AUTO: 88.2 % — HIGH (ref 42.2–75.2)
NEUTROPHILS NFR BLD AUTO: 88.2 % — HIGH (ref 42.2–75.2)
NEUTROPHILS NFR BLD AUTO: 88.3 % — HIGH (ref 42.2–75.2)
NEUTROPHILS NFR BLD AUTO: 88.7 % — HIGH (ref 42.2–75.2)
NEUTROPHILS NFR BLD AUTO: 88.8 % — HIGH (ref 42.2–75.2)
NEUTROPHILS NFR BLD AUTO: 88.9 % — HIGH (ref 42.2–75.2)
NEUTROPHILS NFR BLD AUTO: 90.9 % — HIGH (ref 42.2–75.2)
NEUTROPHILS NFR BLD AUTO: 93.6 % — HIGH (ref 42.2–75.2)
NITRITE UR-MCNC: NEGATIVE — SIGNIFICANT CHANGE UP
NON HDL CHOLESTEROL: 77 MG/DL — SIGNIFICANT CHANGE UP
NRBC # BLD: 0 /100 WBCS — SIGNIFICANT CHANGE UP (ref 0–0)
NT-PROBNP SERPL-SCNC: 2048 PG/ML — HIGH (ref 0–300)
NT-PROBNP SERPL-SCNC: 4241 PG/ML — HIGH (ref 0–300)
OSMOLALITY SERPL: 288 MOS/KG — SIGNIFICANT CHANGE UP (ref 280–301)
OSMOLALITY SERPL: 298 MOS/KG — SIGNIFICANT CHANGE UP (ref 280–301)
OSMOLALITY UR: 236 MOS/KG — SIGNIFICANT CHANGE UP (ref 50–1200)
OSMOLALITY UR: 249 MOS/KG — SIGNIFICANT CHANGE UP (ref 50–1200)
PCO2 BLDV: 23 MMHG — LOW (ref 42–55)
PCO2 BLDV: 26 MMHG — LOW (ref 42–55)
PCO2 BLDV: 41 MMHG — LOW (ref 42–55)
PH BLDV: 7.21 — LOW (ref 7.32–7.43)
PH BLDV: 7.22 — LOW (ref 7.32–7.43)
PH BLDV: 7.33 — SIGNIFICANT CHANGE UP (ref 7.32–7.43)
PH UR: 5.5 — SIGNIFICANT CHANGE UP (ref 5–8)
PH UR: 6.5 — SIGNIFICANT CHANGE UP (ref 5–8)
PH UR: 7 — SIGNIFICANT CHANGE UP (ref 5–8)
PH UR: 7.5 — SIGNIFICANT CHANGE UP (ref 5–8)
PHOSPHATE SERPL-MCNC: 3.8 MG/DL — SIGNIFICANT CHANGE UP (ref 2.1–4.9)
PHOSPHATE SERPL-MCNC: 3.9 MG/DL — SIGNIFICANT CHANGE UP (ref 2.1–4.9)
PHOSPHATE SERPL-MCNC: 4.6 MG/DL — SIGNIFICANT CHANGE UP (ref 2.1–4.9)
PHOSPHATE SERPL-MCNC: 5.6 MG/DL — HIGH (ref 2.1–4.9)
PHOSPHATE SERPL-MCNC: 5.8 MG/DL — HIGH (ref 2.1–4.9)
PHOSPHATE SERPL-MCNC: 5.8 MG/DL — HIGH (ref 2.1–4.9)
PHOSPHATE SERPL-MCNC: 5.9 MG/DL — HIGH (ref 2.1–4.9)
PHOSPHATE SERPL-MCNC: 6 MG/DL — HIGH (ref 2.1–4.9)
PHOSPHATE SERPL-MCNC: 6.1 MG/DL — HIGH (ref 2.1–4.9)
PHOSPHATE SERPL-MCNC: 6.3 MG/DL — HIGH (ref 2.1–4.9)
PHOSPHATE SERPL-MCNC: 6.4 MG/DL — HIGH (ref 2.1–4.9)
PHOSPHATE SERPL-MCNC: 6.6 MG/DL — HIGH (ref 2.1–4.9)
PHOSPHATE SERPL-MCNC: 9.9 MG/DL — HIGH (ref 2.1–4.9)
PLAT MORPH BLD: NORMAL — SIGNIFICANT CHANGE UP
PLATELET # BLD AUTO: 103 K/UL — LOW (ref 130–400)
PLATELET # BLD AUTO: 133 K/UL — SIGNIFICANT CHANGE UP (ref 130–400)
PLATELET # BLD AUTO: 134 K/UL — SIGNIFICANT CHANGE UP (ref 130–400)
PLATELET # BLD AUTO: 135 K/UL — SIGNIFICANT CHANGE UP (ref 130–400)
PLATELET # BLD AUTO: 136 K/UL — SIGNIFICANT CHANGE UP (ref 130–400)
PLATELET # BLD AUTO: 137 K/UL — SIGNIFICANT CHANGE UP (ref 130–400)
PLATELET # BLD AUTO: 138 K/UL — SIGNIFICANT CHANGE UP (ref 130–400)
PLATELET # BLD AUTO: 139 K/UL — SIGNIFICANT CHANGE UP (ref 130–400)
PLATELET # BLD AUTO: 142 K/UL — SIGNIFICANT CHANGE UP (ref 130–400)
PLATELET # BLD AUTO: 144 K/UL — SIGNIFICANT CHANGE UP (ref 130–400)
PLATELET # BLD AUTO: 144 K/UL — SIGNIFICANT CHANGE UP (ref 130–400)
PLATELET # BLD AUTO: 146 K/UL — SIGNIFICANT CHANGE UP (ref 130–400)
PLATELET # BLD AUTO: 146 K/UL — SIGNIFICANT CHANGE UP (ref 130–400)
PLATELET # BLD AUTO: 147 K/UL — SIGNIFICANT CHANGE UP (ref 130–400)
PLATELET # BLD AUTO: 148 K/UL — SIGNIFICANT CHANGE UP (ref 130–400)
PLATELET # BLD AUTO: 148 K/UL — SIGNIFICANT CHANGE UP (ref 130–400)
PLATELET # BLD AUTO: 150 K/UL — SIGNIFICANT CHANGE UP (ref 130–400)
PLATELET # BLD AUTO: 150 K/UL — SIGNIFICANT CHANGE UP (ref 130–400)
PLATELET # BLD AUTO: 152 K/UL — SIGNIFICANT CHANGE UP (ref 130–400)
PLATELET # BLD AUTO: 153 K/UL — SIGNIFICANT CHANGE UP (ref 130–400)
PLATELET # BLD AUTO: 153 K/UL — SIGNIFICANT CHANGE UP (ref 130–400)
PLATELET # BLD AUTO: 157 K/UL — SIGNIFICANT CHANGE UP (ref 130–400)
PLATELET # BLD AUTO: 161 K/UL — SIGNIFICANT CHANGE UP (ref 130–400)
PLATELET # BLD AUTO: 162 K/UL — SIGNIFICANT CHANGE UP (ref 130–400)
PLATELET # BLD AUTO: 162 K/UL — SIGNIFICANT CHANGE UP (ref 130–400)
PLATELET # BLD AUTO: 164 K/UL — SIGNIFICANT CHANGE UP (ref 130–400)
PLATELET # BLD AUTO: 164 K/UL — SIGNIFICANT CHANGE UP (ref 130–400)
PLATELET # BLD AUTO: 165 K/UL — SIGNIFICANT CHANGE UP (ref 130–400)
PLATELET # BLD AUTO: 167 K/UL — SIGNIFICANT CHANGE UP (ref 130–400)
PLATELET # BLD AUTO: 167 K/UL — SIGNIFICANT CHANGE UP (ref 130–400)
PLATELET # BLD AUTO: 168 K/UL — SIGNIFICANT CHANGE UP (ref 130–400)
PLATELET # BLD AUTO: 185 K/UL — SIGNIFICANT CHANGE UP (ref 130–400)
PLATELET # BLD AUTO: 187 K/UL — SIGNIFICANT CHANGE UP (ref 130–400)
PLATELET # BLD AUTO: 188 K/UL — SIGNIFICANT CHANGE UP (ref 130–400)
PLATELET # BLD AUTO: 188 K/UL — SIGNIFICANT CHANGE UP (ref 130–400)
PLATELET # BLD AUTO: 196 K/UL — SIGNIFICANT CHANGE UP (ref 130–400)
PLATELET # BLD AUTO: 197 K/UL — SIGNIFICANT CHANGE UP (ref 130–400)
PLATELET # BLD AUTO: 198 K/UL — SIGNIFICANT CHANGE UP (ref 130–400)
PLATELET # BLD AUTO: 202 K/UL — SIGNIFICANT CHANGE UP (ref 130–400)
PLATELET # BLD AUTO: 204 K/UL — SIGNIFICANT CHANGE UP (ref 130–400)
PLATELET # BLD AUTO: 205 K/UL — SIGNIFICANT CHANGE UP (ref 130–400)
PLATELET # BLD AUTO: 206 K/UL — SIGNIFICANT CHANGE UP (ref 130–400)
PLATELET # BLD AUTO: 206 K/UL — SIGNIFICANT CHANGE UP (ref 130–400)
PLATELET # BLD AUTO: 208 K/UL — SIGNIFICANT CHANGE UP (ref 130–400)
PLATELET # BLD AUTO: 210 K/UL — SIGNIFICANT CHANGE UP (ref 130–400)
PLATELET # BLD AUTO: 210 K/UL — SIGNIFICANT CHANGE UP (ref 130–400)
PLATELET # BLD AUTO: 211 K/UL — SIGNIFICANT CHANGE UP (ref 130–400)
PLATELET # BLD AUTO: 212 K/UL — SIGNIFICANT CHANGE UP (ref 130–400)
PLATELET # BLD AUTO: 214 K/UL — SIGNIFICANT CHANGE UP (ref 130–400)
PLATELET # BLD AUTO: 218 K/UL — SIGNIFICANT CHANGE UP (ref 130–400)
PLATELET # BLD AUTO: 218 K/UL — SIGNIFICANT CHANGE UP (ref 130–400)
PLATELET # BLD AUTO: 219 K/UL — SIGNIFICANT CHANGE UP (ref 130–400)
PLATELET # BLD AUTO: 222 K/UL — SIGNIFICANT CHANGE UP (ref 130–400)
PLATELET # BLD AUTO: 226 K/UL — SIGNIFICANT CHANGE UP (ref 130–400)
PLATELET # BLD AUTO: 228 K/UL — SIGNIFICANT CHANGE UP (ref 130–400)
PLATELET # BLD AUTO: 233 K/UL — SIGNIFICANT CHANGE UP (ref 130–400)
PLATELET # BLD AUTO: 236 K/UL — SIGNIFICANT CHANGE UP (ref 130–400)
PLATELET # BLD AUTO: 250 K/UL — SIGNIFICANT CHANGE UP (ref 130–400)
PLATELET # BLD AUTO: 250 K/UL — SIGNIFICANT CHANGE UP (ref 130–400)
PLATELET # BLD AUTO: 262 K/UL — SIGNIFICANT CHANGE UP (ref 130–400)
PO2 BLDV: 34 MMHG — SIGNIFICANT CHANGE UP
PO2 BLDV: 44 MMHG — SIGNIFICANT CHANGE UP
PO2 BLDV: 59 MMHG — SIGNIFICANT CHANGE UP
POIKILOCYTOSIS BLD QL AUTO: SLIGHT — SIGNIFICANT CHANGE UP
POLYCHROMASIA BLD QL SMEAR: SLIGHT — SIGNIFICANT CHANGE UP
POTASSIUM BLDV-SCNC: 5.4 MMOL/L — HIGH (ref 3.5–5.1)
POTASSIUM BLDV-SCNC: 8 MMOL/L — CRITICAL HIGH (ref 3.5–5.1)
POTASSIUM SERPL-MCNC: 3.1 MMOL/L — LOW (ref 3.5–5)
POTASSIUM SERPL-MCNC: 3.3 MMOL/L — LOW (ref 3.5–5)
POTASSIUM SERPL-MCNC: 3.4 MMOL/L — LOW (ref 3.5–5)
POTASSIUM SERPL-MCNC: 3.5 MMOL/L — SIGNIFICANT CHANGE UP (ref 3.5–5)
POTASSIUM SERPL-MCNC: 3.6 MMOL/L — SIGNIFICANT CHANGE UP (ref 3.5–5)
POTASSIUM SERPL-MCNC: 3.7 MMOL/L — SIGNIFICANT CHANGE UP (ref 3.5–5)
POTASSIUM SERPL-MCNC: 3.8 MMOL/L — SIGNIFICANT CHANGE UP (ref 3.5–5)
POTASSIUM SERPL-MCNC: 3.9 MMOL/L — SIGNIFICANT CHANGE UP (ref 3.5–5)
POTASSIUM SERPL-MCNC: 4 MMOL/L — SIGNIFICANT CHANGE UP (ref 3.5–5)
POTASSIUM SERPL-MCNC: 4.1 MMOL/L — SIGNIFICANT CHANGE UP (ref 3.5–5)
POTASSIUM SERPL-MCNC: 4.2 MMOL/L — SIGNIFICANT CHANGE UP (ref 3.5–5)
POTASSIUM SERPL-MCNC: 4.2 MMOL/L — SIGNIFICANT CHANGE UP (ref 3.5–5)
POTASSIUM SERPL-MCNC: 4.3 MMOL/L — SIGNIFICANT CHANGE UP (ref 3.5–5)
POTASSIUM SERPL-MCNC: 4.4 MMOL/L — SIGNIFICANT CHANGE UP (ref 3.5–5)
POTASSIUM SERPL-MCNC: 4.5 MMOL/L — SIGNIFICANT CHANGE UP (ref 3.5–5)
POTASSIUM SERPL-MCNC: 4.5 MMOL/L — SIGNIFICANT CHANGE UP (ref 3.5–5)
POTASSIUM SERPL-MCNC: 4.7 MMOL/L — SIGNIFICANT CHANGE UP (ref 3.5–5)
POTASSIUM SERPL-MCNC: 4.8 MMOL/L — SIGNIFICANT CHANGE UP (ref 3.5–5)
POTASSIUM SERPL-MCNC: 4.9 MMOL/L — SIGNIFICANT CHANGE UP (ref 3.5–5)
POTASSIUM SERPL-MCNC: 5 MMOL/L — SIGNIFICANT CHANGE UP (ref 3.5–5)
POTASSIUM SERPL-MCNC: 5.2 MMOL/L — HIGH (ref 3.5–5)
POTASSIUM SERPL-MCNC: 5.3 MMOL/L — HIGH (ref 3.5–5)
POTASSIUM SERPL-MCNC: 5.3 MMOL/L — HIGH (ref 3.5–5)
POTASSIUM SERPL-MCNC: 5.4 MMOL/L — HIGH (ref 3.5–5)
POTASSIUM SERPL-MCNC: 5.5 MMOL/L — HIGH (ref 3.5–5)
POTASSIUM SERPL-MCNC: 5.9 MMOL/L — HIGH (ref 3.5–5)
POTASSIUM SERPL-MCNC: 6.2 MMOL/L — CRITICAL HIGH (ref 3.5–5)
POTASSIUM SERPL-SCNC: 3.1 MMOL/L — LOW (ref 3.5–5)
POTASSIUM SERPL-SCNC: 3.3 MMOL/L — LOW (ref 3.5–5)
POTASSIUM SERPL-SCNC: 3.4 MMOL/L — LOW (ref 3.5–5)
POTASSIUM SERPL-SCNC: 3.5 MMOL/L — SIGNIFICANT CHANGE UP (ref 3.5–5)
POTASSIUM SERPL-SCNC: 3.6 MMOL/L — SIGNIFICANT CHANGE UP (ref 3.5–5)
POTASSIUM SERPL-SCNC: 3.7 MMOL/L — SIGNIFICANT CHANGE UP (ref 3.5–5)
POTASSIUM SERPL-SCNC: 3.8 MMOL/L — SIGNIFICANT CHANGE UP (ref 3.5–5)
POTASSIUM SERPL-SCNC: 3.9 MMOL/L — SIGNIFICANT CHANGE UP (ref 3.5–5)
POTASSIUM SERPL-SCNC: 4 MMOL/L — SIGNIFICANT CHANGE UP (ref 3.5–5)
POTASSIUM SERPL-SCNC: 4.1 MMOL/L — SIGNIFICANT CHANGE UP (ref 3.5–5)
POTASSIUM SERPL-SCNC: 4.2 MMOL/L — SIGNIFICANT CHANGE UP (ref 3.5–5)
POTASSIUM SERPL-SCNC: 4.2 MMOL/L — SIGNIFICANT CHANGE UP (ref 3.5–5)
POTASSIUM SERPL-SCNC: 4.3 MMOL/L — SIGNIFICANT CHANGE UP (ref 3.5–5)
POTASSIUM SERPL-SCNC: 4.4 MMOL/L — SIGNIFICANT CHANGE UP (ref 3.5–5)
POTASSIUM SERPL-SCNC: 4.5 MMOL/L — SIGNIFICANT CHANGE UP (ref 3.5–5)
POTASSIUM SERPL-SCNC: 4.5 MMOL/L — SIGNIFICANT CHANGE UP (ref 3.5–5)
POTASSIUM SERPL-SCNC: 4.7 MMOL/L — SIGNIFICANT CHANGE UP (ref 3.5–5)
POTASSIUM SERPL-SCNC: 4.8 MMOL/L — SIGNIFICANT CHANGE UP (ref 3.5–5)
POTASSIUM SERPL-SCNC: 4.9 MMOL/L — SIGNIFICANT CHANGE UP (ref 3.5–5)
POTASSIUM SERPL-SCNC: 5 MMOL/L — SIGNIFICANT CHANGE UP (ref 3.5–5)
POTASSIUM SERPL-SCNC: 5.2 MMOL/L — HIGH (ref 3.5–5)
POTASSIUM SERPL-SCNC: 5.3 MMOL/L — HIGH (ref 3.5–5)
POTASSIUM SERPL-SCNC: 5.3 MMOL/L — HIGH (ref 3.5–5)
POTASSIUM SERPL-SCNC: 5.4 MMOL/L — HIGH (ref 3.5–5)
POTASSIUM SERPL-SCNC: 5.5 MMOL/L — HIGH (ref 3.5–5)
POTASSIUM SERPL-SCNC: 5.9 MMOL/L — HIGH (ref 3.5–5)
POTASSIUM SERPL-SCNC: 6.2 MMOL/L — CRITICAL HIGH (ref 3.5–5)
POTASSIUM UR-SCNC: 19 MMOL/L — SIGNIFICANT CHANGE UP
PROCALCITONIN SERPL-MCNC: 0.23 NG/ML — HIGH (ref 0.02–0.1)
PROCALCITONIN SERPL-MCNC: 0.26 NG/ML — HIGH (ref 0.02–0.1)
PROT ?TM UR-MCNC: 257 MG/DL — HIGH (ref 0–12)
PROT ?TM UR-MCNC: 359 MG/DLG/24H — SIGNIFICANT CHANGE UP
PROT PATTERN SERPL ELPH-IMP: SIGNIFICANT CHANGE UP
PROT SERPL-MCNC: 4.1 G/DL — LOW (ref 6–8.3)
PROT SERPL-MCNC: 4.1 G/DL — LOW (ref 6–8.3)
PROT SERPL-MCNC: 4.2 G/DL — LOW (ref 6–8)
PROT SERPL-MCNC: 4.2 G/DL — LOW (ref 6–8)
PROT SERPL-MCNC: 4.3 G/DL — LOW (ref 6–8)
PROT SERPL-MCNC: 4.3 G/DL — LOW (ref 6–8)
PROT SERPL-MCNC: 4.4 G/DL — LOW (ref 6–8)
PROT SERPL-MCNC: 4.5 G/DL — LOW (ref 6–8)
PROT SERPL-MCNC: 4.6 G/DL — LOW (ref 6–8)
PROT SERPL-MCNC: 4.7 G/DL — LOW (ref 6–8)
PROT SERPL-MCNC: 4.8 G/DL — LOW (ref 6–8)
PROT SERPL-MCNC: 4.9 G/DL — LOW (ref 6–8)
PROT SERPL-MCNC: 5.1 G/DL — LOW (ref 6–8)
PROT SERPL-MCNC: 5.1 G/DL — LOW (ref 6–8)
PROT SERPL-MCNC: 5.2 G/DL — LOW (ref 6–8)
PROT SERPL-MCNC: 5.2 G/DL — LOW (ref 6–8)
PROT SERPL-MCNC: 5.3 G/DL — LOW (ref 6–8)
PROT SERPL-MCNC: 5.5 G/DL — LOW (ref 6–8)
PROT SERPL-MCNC: 5.7 G/DL — LOW (ref 6–8)
PROT SERPL-MCNC: 5.7 G/DL — LOW (ref 6–8)
PROT UR-MCNC: >=300 MG/DL
PROT UR-MCNC: ABNORMAL
PROT/CREAT UR-RTO: 4.4 RATIO — HIGH (ref 0–0.2)
PROTHROM AB SERPL-ACNC: 11.5 SEC — SIGNIFICANT CHANGE UP (ref 9.95–12.87)
PROTHROM AB SERPL-ACNC: 11.7 SEC — SIGNIFICANT CHANGE UP (ref 9.95–12.87)
PROTHROM AB SERPL-ACNC: 12.1 SEC — SIGNIFICANT CHANGE UP (ref 9.95–12.87)
PROTHROM AB SERPL-ACNC: 12.3 SEC — SIGNIFICANT CHANGE UP (ref 9.95–12.87)
PROTHROM AB SERPL-ACNC: 12.4 SEC — SIGNIFICANT CHANGE UP (ref 9.95–12.87)
PROTHROM AB SERPL-ACNC: 12.4 SEC — SIGNIFICANT CHANGE UP (ref 9.95–12.87)
PROTHROM AB SERPL-ACNC: 14.7 SEC — HIGH (ref 9.95–12.87)
PROTHROM AB SERPL-ACNC: 15.8 SEC — HIGH (ref 9.95–12.87)
PROTHROM AB SERPL-ACNC: 16.2 SEC — HIGH (ref 9.95–12.87)
PROTHROM AB SERPL-ACNC: 16.9 SEC — HIGH (ref 9.95–12.87)
RAPID RVP RESULT: SIGNIFICANT CHANGE UP
RBC # BLD: 2.01 M/UL — LOW (ref 4.7–6.1)
RBC # BLD: 2.22 M/UL — LOW (ref 4.7–6.1)
RBC # BLD: 2.39 M/UL — LOW (ref 4.7–6.1)
RBC # BLD: 2.49 M/UL — LOW (ref 4.7–6.1)
RBC # BLD: 2.53 M/UL — LOW (ref 4.7–6.1)
RBC # BLD: 2.58 M/UL — LOW (ref 4.7–6.1)
RBC # BLD: 2.59 M/UL — LOW (ref 4.7–6.1)
RBC # BLD: 2.6 M/UL — LOW (ref 4.7–6.1)
RBC # BLD: 2.61 M/UL — LOW (ref 4.7–6.1)
RBC # BLD: 2.62 M/UL — LOW (ref 4.7–6.1)
RBC # BLD: 2.63 M/UL — LOW (ref 4.7–6.1)
RBC # BLD: 2.64 M/UL — LOW (ref 4.7–6.1)
RBC # BLD: 2.64 M/UL — LOW (ref 4.7–6.1)
RBC # BLD: 2.65 M/UL — LOW (ref 4.7–6.1)
RBC # BLD: 2.68 M/UL — LOW (ref 4.7–6.1)
RBC # BLD: 2.7 M/UL — LOW (ref 4.7–6.1)
RBC # BLD: 2.72 M/UL — LOW (ref 4.7–6.1)
RBC # BLD: 2.73 M/UL — LOW (ref 4.7–6.1)
RBC # BLD: 2.74 M/UL — LOW (ref 4.7–6.1)
RBC # BLD: 2.74 M/UL — LOW (ref 4.7–6.1)
RBC # BLD: 2.75 M/UL — LOW (ref 4.7–6.1)
RBC # BLD: 2.76 M/UL — LOW (ref 4.7–6.1)
RBC # BLD: 2.76 M/UL — LOW (ref 4.7–6.1)
RBC # BLD: 2.77 M/UL — LOW (ref 4.7–6.1)
RBC # BLD: 2.77 M/UL — LOW (ref 4.7–6.1)
RBC # BLD: 2.8 M/UL — LOW (ref 4.7–6.1)
RBC # BLD: 2.81 M/UL — LOW (ref 4.7–6.1)
RBC # BLD: 2.82 M/UL — LOW (ref 4.7–6.1)
RBC # BLD: 2.83 M/UL — LOW (ref 4.7–6.1)
RBC # BLD: 2.87 M/UL — LOW (ref 4.7–6.1)
RBC # BLD: 2.91 M/UL — LOW (ref 4.7–6.1)
RBC # BLD: 2.92 M/UL — LOW (ref 4.7–6.1)
RBC # BLD: 2.93 M/UL — LOW (ref 4.7–6.1)
RBC # BLD: 2.93 M/UL — LOW (ref 4.7–6.1)
RBC # BLD: 2.94 M/UL — LOW (ref 4.7–6.1)
RBC # BLD: 2.94 M/UL — LOW (ref 4.7–6.1)
RBC # BLD: 2.95 M/UL — LOW (ref 4.7–6.1)
RBC # BLD: 2.96 M/UL — LOW (ref 4.7–6.1)
RBC # BLD: 2.97 M/UL — LOW (ref 4.7–6.1)
RBC # BLD: 2.97 M/UL — LOW (ref 4.7–6.1)
RBC # BLD: 2.98 M/UL — LOW (ref 4.7–6.1)
RBC # BLD: 2.99 M/UL — LOW (ref 4.7–6.1)
RBC # BLD: 3 M/UL — LOW (ref 4.7–6.1)
RBC # BLD: 3.02 M/UL — LOW (ref 4.7–6.1)
RBC # BLD: 3.02 M/UL — LOW (ref 4.7–6.1)
RBC # BLD: 3.04 M/UL — LOW (ref 4.7–6.1)
RBC # BLD: 3.07 M/UL — LOW (ref 4.7–6.1)
RBC # BLD: 3.17 M/UL — LOW (ref 4.7–6.1)
RBC # BLD: 3.18 M/UL — LOW (ref 4.7–6.1)
RBC # BLD: 3.32 M/UL — LOW (ref 4.7–6.1)
RBC # BLD: 3.61 M/UL — LOW (ref 4.7–6.1)
RBC # BLD: 3.65 M/UL — LOW (ref 4.7–6.1)
RBC # BLD: 3.74 M/UL — LOW (ref 4.7–6.1)
RBC # FLD: 12.2 % — SIGNIFICANT CHANGE UP (ref 11.5–14.5)
RBC # FLD: 12.4 % — SIGNIFICANT CHANGE UP (ref 11.5–14.5)
RBC # FLD: 12.4 % — SIGNIFICANT CHANGE UP (ref 11.5–14.5)
RBC # FLD: 12.5 % — SIGNIFICANT CHANGE UP (ref 11.5–14.5)
RBC # FLD: 12.6 % — SIGNIFICANT CHANGE UP (ref 11.5–14.5)
RBC # FLD: 12.7 % — SIGNIFICANT CHANGE UP (ref 11.5–14.5)
RBC # FLD: 12.7 % — SIGNIFICANT CHANGE UP (ref 11.5–14.5)
RBC # FLD: 12.8 % — SIGNIFICANT CHANGE UP (ref 11.5–14.5)
RBC # FLD: 12.8 % — SIGNIFICANT CHANGE UP (ref 11.5–14.5)
RBC # FLD: 12.9 % — SIGNIFICANT CHANGE UP (ref 11.5–14.5)
RBC # FLD: 12.9 % — SIGNIFICANT CHANGE UP (ref 11.5–14.5)
RBC # FLD: 13 % — SIGNIFICANT CHANGE UP (ref 11.5–14.5)
RBC # FLD: 14.7 % — HIGH (ref 11.5–14.5)
RBC # FLD: 16.2 % — HIGH (ref 11.5–14.5)
RBC # FLD: 16.3 % — HIGH (ref 11.5–14.5)
RBC # FLD: 16.4 % — HIGH (ref 11.5–14.5)
RBC # FLD: 16.4 % — HIGH (ref 11.5–14.5)
RBC # FLD: 16.5 % — HIGH (ref 11.5–14.5)
RBC # FLD: 16.6 % — HIGH (ref 11.5–14.5)
RBC # FLD: 16.7 % — HIGH (ref 11.5–14.5)
RBC # FLD: 16.8 % — HIGH (ref 11.5–14.5)
RBC # FLD: 16.9 % — HIGH (ref 11.5–14.5)
RBC # FLD: 17 % — HIGH (ref 11.5–14.5)
RBC # FLD: 17 % — HIGH (ref 11.5–14.5)
RBC # FLD: 17.1 % — HIGH (ref 11.5–14.5)
RBC # FLD: 17.2 % — HIGH (ref 11.5–14.5)
RBC # FLD: 17.3 % — HIGH (ref 11.5–14.5)
RBC # FLD: 17.3 % — HIGH (ref 11.5–14.5)
RBC # FLD: 17.4 % — HIGH (ref 11.5–14.5)
RBC # FLD: 17.5 % — HIGH (ref 11.5–14.5)
RBC # FLD: 17.6 % — HIGH (ref 11.5–14.5)
RBC # FLD: 17.8 % — HIGH (ref 11.5–14.5)
RBC # FLD: 18 % — HIGH (ref 11.5–14.5)
RBC # FLD: 18.2 % — HIGH (ref 11.5–14.5)
RBC # FLD: 18.6 % — HIGH (ref 11.5–14.5)
RBC # FLD: 18.8 % — HIGH (ref 11.5–14.5)
RBC # FLD: 18.8 % — HIGH (ref 11.5–14.5)
RBC BLD AUTO: ABNORMAL
RBC CASTS # UR COMP ASSIST: 1 /HPF — SIGNIFICANT CHANGE UP (ref 0–4)
RBC CASTS # UR COMP ASSIST: 2 /HPF — SIGNIFICANT CHANGE UP (ref 0–4)
RBC CASTS # UR COMP ASSIST: 2 /HPF — SIGNIFICANT CHANGE UP (ref 0–4)
RBC CASTS # UR COMP ASSIST: 50 /HPF — HIGH (ref 0–4)
RBC CASTS # UR COMP ASSIST: 7 /HPF — HIGH (ref 0–4)
RBC CASTS # UR COMP ASSIST: 9 /HPF — HIGH (ref 0–4)
RBC CASTS # UR COMP ASSIST: ABNORMAL /HPF
SAO2 % BLDV: 65 % — SIGNIFICANT CHANGE UP
SAO2 % BLDV: 74.1 % — SIGNIFICANT CHANGE UP
SAO2 % BLDV: 91.8 % — SIGNIFICANT CHANGE UP
SARS-COV-2 IGG+IGM SERPL QL IA: >250 U/ML — HIGH
SARS-COV-2 IGG+IGM SERPL QL IA: >250 U/ML — HIGH
SARS-COV-2 IGG+IGM SERPL QL IA: POSITIVE
SARS-COV-2 IGG+IGM SERPL QL IA: POSITIVE
SARS-COV-2 RNA SPEC QL NAA+PROBE: DETECTED
SARS-COV-2 RNA SPEC QL NAA+PROBE: SIGNIFICANT CHANGE UP
SCHISTOCYTES BLD QL AUTO: SLIGHT — SIGNIFICANT CHANGE UP
SODIUM SERPL-SCNC: 123 MMOL/L — LOW (ref 135–146)
SODIUM SERPL-SCNC: 126 MMOL/L — LOW (ref 135–146)
SODIUM SERPL-SCNC: 128 MMOL/L — LOW (ref 135–146)
SODIUM SERPL-SCNC: 128 MMOL/L — LOW (ref 135–146)
SODIUM SERPL-SCNC: 129 MMOL/L — LOW (ref 135–146)
SODIUM SERPL-SCNC: 130 MMOL/L — LOW (ref 135–146)
SODIUM SERPL-SCNC: 130 MMOL/L — LOW (ref 135–146)
SODIUM SERPL-SCNC: 131 MMOL/L — LOW (ref 135–146)
SODIUM SERPL-SCNC: 132 MMOL/L — LOW (ref 135–146)
SODIUM SERPL-SCNC: 132 MMOL/L — LOW (ref 135–146)
SODIUM SERPL-SCNC: 133 MMOL/L — LOW (ref 135–146)
SODIUM SERPL-SCNC: 134 MMOL/L — LOW (ref 135–146)
SODIUM SERPL-SCNC: 135 MMOL/L — SIGNIFICANT CHANGE UP (ref 135–146)
SODIUM SERPL-SCNC: 136 MMOL/L — SIGNIFICANT CHANGE UP (ref 135–146)
SODIUM SERPL-SCNC: 137 MMOL/L — SIGNIFICANT CHANGE UP (ref 135–146)
SODIUM SERPL-SCNC: 138 MMOL/L — SIGNIFICANT CHANGE UP (ref 135–146)
SODIUM SERPL-SCNC: 139 MMOL/L — SIGNIFICANT CHANGE UP (ref 135–146)
SODIUM SERPL-SCNC: 140 MMOL/L — SIGNIFICANT CHANGE UP (ref 135–146)
SODIUM SERPL-SCNC: 141 MMOL/L — SIGNIFICANT CHANGE UP (ref 135–146)
SODIUM SERPL-SCNC: 142 MMOL/L — SIGNIFICANT CHANGE UP (ref 135–146)
SODIUM SERPL-SCNC: 142 MMOL/L — SIGNIFICANT CHANGE UP (ref 135–146)
SODIUM SERPL-SCNC: 146 MMOL/L — SIGNIFICANT CHANGE UP (ref 135–146)
SODIUM SERPL-SCNC: 147 MMOL/L — HIGH (ref 135–146)
SODIUM UR-SCNC: 39 MMOL/L — SIGNIFICANT CHANGE UP
SODIUM UR-SCNC: 46 MMOL/L — SIGNIFICANT CHANGE UP
SP GR SPEC: 1.01 — SIGNIFICANT CHANGE UP (ref 1.01–1.03)
SP GR SPEC: 1.02 — SIGNIFICANT CHANGE UP (ref 1.01–1.03)
SP GR SPEC: 1.02 — SIGNIFICANT CHANGE UP (ref 1.01–1.03)
SPECIMEN SOURCE: SIGNIFICANT CHANGE UP
SURGICAL PATHOLOGY STUDY: SIGNIFICANT CHANGE UP
T PALLIDUM AB TITR SER: NEGATIVE — SIGNIFICANT CHANGE UP
TIBC SERPL-MCNC: 183 UG/DL — LOW (ref 220–430)
TRI-PHOS CRY UR QL COMP ASSIST: NEGATIVE — SIGNIFICANT CHANGE UP
TRIGL SERPL-MCNC: 191 MG/DL — HIGH
TROPONIN T SERPL-MCNC: 0.15 NG/ML — CRITICAL HIGH
TROPONIN T SERPL-MCNC: 0.16 NG/ML — CRITICAL HIGH
TROPONIN T SERPL-MCNC: 0.17 NG/ML — CRITICAL HIGH
TROPONIN T SERPL-MCNC: 0.21 NG/ML — CRITICAL HIGH
TROPONIN T SERPL-MCNC: 0.25 NG/ML — CRITICAL HIGH
TROPONIN T SERPL-MCNC: 0.25 NG/ML — CRITICAL HIGH
TSH SERPL-MCNC: 0.43 UIU/ML — SIGNIFICANT CHANGE UP (ref 0.27–4.2)
TSH SERPL-MCNC: 0.56 UIU/ML — SIGNIFICANT CHANGE UP (ref 0.27–4.2)
UIBC SERPL-MCNC: 96 UG/DL — LOW (ref 110–370)
URATE CRY FLD QL MICRO: NEGATIVE — SIGNIFICANT CHANGE UP
UROBILINOGEN FLD QL: 0.2 MG/DL — SIGNIFICANT CHANGE UP
UROBILINOGEN FLD QL: SIGNIFICANT CHANGE UP
UUN UR-MCNC: 247 MG/DL — SIGNIFICANT CHANGE UP
VIT B12 SERPL-MCNC: 891 PG/ML — SIGNIFICANT CHANGE UP (ref 232–1245)
WBC # BLD: 10.11 K/UL — SIGNIFICANT CHANGE UP (ref 4.8–10.8)
WBC # BLD: 10.25 K/UL — SIGNIFICANT CHANGE UP (ref 4.8–10.8)
WBC # BLD: 10.29 K/UL — SIGNIFICANT CHANGE UP (ref 4.8–10.8)
WBC # BLD: 10.33 K/UL — SIGNIFICANT CHANGE UP (ref 4.8–10.8)
WBC # BLD: 10.59 K/UL — SIGNIFICANT CHANGE UP (ref 4.8–10.8)
WBC # BLD: 10.6 K/UL — SIGNIFICANT CHANGE UP (ref 4.8–10.8)
WBC # BLD: 10.91 K/UL — HIGH (ref 4.8–10.8)
WBC # BLD: 10.98 K/UL — HIGH (ref 4.8–10.8)
WBC # BLD: 11.1 K/UL — HIGH (ref 4.8–10.8)
WBC # BLD: 12.23 K/UL — HIGH (ref 4.8–10.8)
WBC # BLD: 12.39 K/UL — HIGH (ref 4.8–10.8)
WBC # BLD: 13.32 K/UL — HIGH (ref 4.8–10.8)
WBC # BLD: 15.19 K/UL — HIGH (ref 4.8–10.8)
WBC # BLD: 15.27 K/UL — HIGH (ref 4.8–10.8)
WBC # BLD: 23.85 K/UL — HIGH (ref 4.8–10.8)
WBC # BLD: 4.36 K/UL — LOW (ref 4.8–10.8)
WBC # BLD: 4.45 K/UL — LOW (ref 4.8–10.8)
WBC # BLD: 4.84 K/UL — SIGNIFICANT CHANGE UP (ref 4.8–10.8)
WBC # BLD: 5.02 K/UL — SIGNIFICANT CHANGE UP (ref 4.8–10.8)
WBC # BLD: 5.05 K/UL — SIGNIFICANT CHANGE UP (ref 4.8–10.8)
WBC # BLD: 5.26 K/UL — SIGNIFICANT CHANGE UP (ref 4.8–10.8)
WBC # BLD: 5.34 K/UL — SIGNIFICANT CHANGE UP (ref 4.8–10.8)
WBC # BLD: 5.51 K/UL — SIGNIFICANT CHANGE UP (ref 4.8–10.8)
WBC # BLD: 5.52 K/UL — SIGNIFICANT CHANGE UP (ref 4.8–10.8)
WBC # BLD: 5.67 K/UL — SIGNIFICANT CHANGE UP (ref 4.8–10.8)
WBC # BLD: 5.94 K/UL — SIGNIFICANT CHANGE UP (ref 4.8–10.8)
WBC # BLD: 6.15 K/UL — SIGNIFICANT CHANGE UP (ref 4.8–10.8)
WBC # BLD: 6.36 K/UL — SIGNIFICANT CHANGE UP (ref 4.8–10.8)
WBC # BLD: 6.42 K/UL — SIGNIFICANT CHANGE UP (ref 4.8–10.8)
WBC # BLD: 6.47 K/UL — SIGNIFICANT CHANGE UP (ref 4.8–10.8)
WBC # BLD: 6.67 K/UL — SIGNIFICANT CHANGE UP (ref 4.8–10.8)
WBC # BLD: 6.85 K/UL — SIGNIFICANT CHANGE UP (ref 4.8–10.8)
WBC # BLD: 6.88 K/UL — SIGNIFICANT CHANGE UP (ref 4.8–10.8)
WBC # BLD: 7.05 K/UL — SIGNIFICANT CHANGE UP (ref 4.8–10.8)
WBC # BLD: 7.19 K/UL — SIGNIFICANT CHANGE UP (ref 4.8–10.8)
WBC # BLD: 7.29 K/UL — SIGNIFICANT CHANGE UP (ref 4.8–10.8)
WBC # BLD: 7.41 K/UL — SIGNIFICANT CHANGE UP (ref 4.8–10.8)
WBC # BLD: 7.49 K/UL — SIGNIFICANT CHANGE UP (ref 4.8–10.8)
WBC # BLD: 7.51 K/UL — SIGNIFICANT CHANGE UP (ref 4.8–10.8)
WBC # BLD: 7.52 K/UL — SIGNIFICANT CHANGE UP (ref 4.8–10.8)
WBC # BLD: 7.53 K/UL — SIGNIFICANT CHANGE UP (ref 4.8–10.8)
WBC # BLD: 7.8 K/UL — SIGNIFICANT CHANGE UP (ref 4.8–10.8)
WBC # BLD: 7.85 K/UL — SIGNIFICANT CHANGE UP (ref 4.8–10.8)
WBC # BLD: 7.87 K/UL — SIGNIFICANT CHANGE UP (ref 4.8–10.8)
WBC # BLD: 7.92 K/UL — SIGNIFICANT CHANGE UP (ref 4.8–10.8)
WBC # BLD: 7.93 K/UL — SIGNIFICANT CHANGE UP (ref 4.8–10.8)
WBC # BLD: 7.94 K/UL — SIGNIFICANT CHANGE UP (ref 4.8–10.8)
WBC # BLD: 8.12 K/UL — SIGNIFICANT CHANGE UP (ref 4.8–10.8)
WBC # BLD: 8.15 K/UL — SIGNIFICANT CHANGE UP (ref 4.8–10.8)
WBC # BLD: 8.24 K/UL — SIGNIFICANT CHANGE UP (ref 4.8–10.8)
WBC # BLD: 8.28 K/UL — SIGNIFICANT CHANGE UP (ref 4.8–10.8)
WBC # BLD: 8.32 K/UL — SIGNIFICANT CHANGE UP (ref 4.8–10.8)
WBC # BLD: 8.39 K/UL — SIGNIFICANT CHANGE UP (ref 4.8–10.8)
WBC # BLD: 8.44 K/UL — SIGNIFICANT CHANGE UP (ref 4.8–10.8)
WBC # BLD: 8.49 K/UL — SIGNIFICANT CHANGE UP (ref 4.8–10.8)
WBC # BLD: 8.6 K/UL — SIGNIFICANT CHANGE UP (ref 4.8–10.8)
WBC # BLD: 8.64 K/UL — SIGNIFICANT CHANGE UP (ref 4.8–10.8)
WBC # BLD: 8.85 K/UL — SIGNIFICANT CHANGE UP (ref 4.8–10.8)
WBC # BLD: 8.9 K/UL — SIGNIFICANT CHANGE UP (ref 4.8–10.8)
WBC # BLD: 9.1 K/UL — SIGNIFICANT CHANGE UP (ref 4.8–10.8)
WBC # BLD: 9.2 K/UL — SIGNIFICANT CHANGE UP (ref 4.8–10.8)
WBC # BLD: 9.25 K/UL — SIGNIFICANT CHANGE UP (ref 4.8–10.8)
WBC # BLD: 9.73 K/UL — SIGNIFICANT CHANGE UP (ref 4.8–10.8)
WBC # BLD: 9.92 K/UL — SIGNIFICANT CHANGE UP (ref 4.8–10.8)
WBC # FLD AUTO: 10.11 K/UL — SIGNIFICANT CHANGE UP (ref 4.8–10.8)
WBC # FLD AUTO: 10.25 K/UL — SIGNIFICANT CHANGE UP (ref 4.8–10.8)
WBC # FLD AUTO: 10.29 K/UL — SIGNIFICANT CHANGE UP (ref 4.8–10.8)
WBC # FLD AUTO: 10.33 K/UL — SIGNIFICANT CHANGE UP (ref 4.8–10.8)
WBC # FLD AUTO: 10.59 K/UL — SIGNIFICANT CHANGE UP (ref 4.8–10.8)
WBC # FLD AUTO: 10.6 K/UL — SIGNIFICANT CHANGE UP (ref 4.8–10.8)
WBC # FLD AUTO: 10.91 K/UL — HIGH (ref 4.8–10.8)
WBC # FLD AUTO: 10.98 K/UL — HIGH (ref 4.8–10.8)
WBC # FLD AUTO: 11.1 K/UL — HIGH (ref 4.8–10.8)
WBC # FLD AUTO: 12.23 K/UL — HIGH (ref 4.8–10.8)
WBC # FLD AUTO: 12.39 K/UL — HIGH (ref 4.8–10.8)
WBC # FLD AUTO: 13.32 K/UL — HIGH (ref 4.8–10.8)
WBC # FLD AUTO: 15.19 K/UL — HIGH (ref 4.8–10.8)
WBC # FLD AUTO: 15.27 K/UL — HIGH (ref 4.8–10.8)
WBC # FLD AUTO: 23.85 K/UL — HIGH (ref 4.8–10.8)
WBC # FLD AUTO: 4.36 K/UL — LOW (ref 4.8–10.8)
WBC # FLD AUTO: 4.45 K/UL — LOW (ref 4.8–10.8)
WBC # FLD AUTO: 4.84 K/UL — SIGNIFICANT CHANGE UP (ref 4.8–10.8)
WBC # FLD AUTO: 5.02 K/UL — SIGNIFICANT CHANGE UP (ref 4.8–10.8)
WBC # FLD AUTO: 5.05 K/UL — SIGNIFICANT CHANGE UP (ref 4.8–10.8)
WBC # FLD AUTO: 5.26 K/UL — SIGNIFICANT CHANGE UP (ref 4.8–10.8)
WBC # FLD AUTO: 5.34 K/UL — SIGNIFICANT CHANGE UP (ref 4.8–10.8)
WBC # FLD AUTO: 5.51 K/UL — SIGNIFICANT CHANGE UP (ref 4.8–10.8)
WBC # FLD AUTO: 5.52 K/UL — SIGNIFICANT CHANGE UP (ref 4.8–10.8)
WBC # FLD AUTO: 5.67 K/UL — SIGNIFICANT CHANGE UP (ref 4.8–10.8)
WBC # FLD AUTO: 5.94 K/UL — SIGNIFICANT CHANGE UP (ref 4.8–10.8)
WBC # FLD AUTO: 6.15 K/UL — SIGNIFICANT CHANGE UP (ref 4.8–10.8)
WBC # FLD AUTO: 6.36 K/UL — SIGNIFICANT CHANGE UP (ref 4.8–10.8)
WBC # FLD AUTO: 6.42 K/UL — SIGNIFICANT CHANGE UP (ref 4.8–10.8)
WBC # FLD AUTO: 6.47 K/UL — SIGNIFICANT CHANGE UP (ref 4.8–10.8)
WBC # FLD AUTO: 6.67 K/UL — SIGNIFICANT CHANGE UP (ref 4.8–10.8)
WBC # FLD AUTO: 6.85 K/UL — SIGNIFICANT CHANGE UP (ref 4.8–10.8)
WBC # FLD AUTO: 6.88 K/UL — SIGNIFICANT CHANGE UP (ref 4.8–10.8)
WBC # FLD AUTO: 7.05 K/UL — SIGNIFICANT CHANGE UP (ref 4.8–10.8)
WBC # FLD AUTO: 7.19 K/UL — SIGNIFICANT CHANGE UP (ref 4.8–10.8)
WBC # FLD AUTO: 7.29 K/UL — SIGNIFICANT CHANGE UP (ref 4.8–10.8)
WBC # FLD AUTO: 7.41 K/UL — SIGNIFICANT CHANGE UP (ref 4.8–10.8)
WBC # FLD AUTO: 7.49 K/UL — SIGNIFICANT CHANGE UP (ref 4.8–10.8)
WBC # FLD AUTO: 7.51 K/UL — SIGNIFICANT CHANGE UP (ref 4.8–10.8)
WBC # FLD AUTO: 7.52 K/UL — SIGNIFICANT CHANGE UP (ref 4.8–10.8)
WBC # FLD AUTO: 7.53 K/UL — SIGNIFICANT CHANGE UP (ref 4.8–10.8)
WBC # FLD AUTO: 7.8 K/UL — SIGNIFICANT CHANGE UP (ref 4.8–10.8)
WBC # FLD AUTO: 7.85 K/UL — SIGNIFICANT CHANGE UP (ref 4.8–10.8)
WBC # FLD AUTO: 7.87 K/UL — SIGNIFICANT CHANGE UP (ref 4.8–10.8)
WBC # FLD AUTO: 7.92 K/UL — SIGNIFICANT CHANGE UP (ref 4.8–10.8)
WBC # FLD AUTO: 7.93 K/UL — SIGNIFICANT CHANGE UP (ref 4.8–10.8)
WBC # FLD AUTO: 7.94 K/UL — SIGNIFICANT CHANGE UP (ref 4.8–10.8)
WBC # FLD AUTO: 8.12 K/UL — SIGNIFICANT CHANGE UP (ref 4.8–10.8)
WBC # FLD AUTO: 8.15 K/UL — SIGNIFICANT CHANGE UP (ref 4.8–10.8)
WBC # FLD AUTO: 8.24 K/UL — SIGNIFICANT CHANGE UP (ref 4.8–10.8)
WBC # FLD AUTO: 8.28 K/UL — SIGNIFICANT CHANGE UP (ref 4.8–10.8)
WBC # FLD AUTO: 8.32 K/UL — SIGNIFICANT CHANGE UP (ref 4.8–10.8)
WBC # FLD AUTO: 8.39 K/UL — SIGNIFICANT CHANGE UP (ref 4.8–10.8)
WBC # FLD AUTO: 8.44 K/UL — SIGNIFICANT CHANGE UP (ref 4.8–10.8)
WBC # FLD AUTO: 8.49 K/UL — SIGNIFICANT CHANGE UP (ref 4.8–10.8)
WBC # FLD AUTO: 8.6 K/UL — SIGNIFICANT CHANGE UP (ref 4.8–10.8)
WBC # FLD AUTO: 8.64 K/UL — SIGNIFICANT CHANGE UP (ref 4.8–10.8)
WBC # FLD AUTO: 8.85 K/UL — SIGNIFICANT CHANGE UP (ref 4.8–10.8)
WBC # FLD AUTO: 8.9 K/UL — SIGNIFICANT CHANGE UP (ref 4.8–10.8)
WBC # FLD AUTO: 9.1 K/UL — SIGNIFICANT CHANGE UP (ref 4.8–10.8)
WBC # FLD AUTO: 9.2 K/UL — SIGNIFICANT CHANGE UP (ref 4.8–10.8)
WBC # FLD AUTO: 9.25 K/UL — SIGNIFICANT CHANGE UP (ref 4.8–10.8)
WBC # FLD AUTO: 9.73 K/UL — SIGNIFICANT CHANGE UP (ref 4.8–10.8)
WBC # FLD AUTO: 9.92 K/UL — SIGNIFICANT CHANGE UP (ref 4.8–10.8)
WBC UR QL: 16 /HPF — HIGH (ref 0–5)
WBC UR QL: 2 /HPF — SIGNIFICANT CHANGE UP (ref 0–5)
WBC UR QL: 75 /HPF — HIGH (ref 0–5)
WBC UR QL: >50 /HPF
WBC UR QL: >720 /HPF — HIGH (ref 0–5)

## 2021-01-01 PROCEDURE — 99233 SBSQ HOSP IP/OBS HIGH 50: CPT

## 2021-01-01 PROCEDURE — 93970 EXTREMITY STUDY: CPT | Mod: 26

## 2021-01-01 PROCEDURE — 93010 ELECTROCARDIOGRAM REPORT: CPT

## 2021-01-01 PROCEDURE — 99232 SBSQ HOSP IP/OBS MODERATE 35: CPT

## 2021-01-01 PROCEDURE — 99285 EMERGENCY DEPT VISIT HI MDM: CPT

## 2021-01-01 PROCEDURE — 71045 X-RAY EXAM CHEST 1 VIEW: CPT | Mod: 26

## 2021-01-01 PROCEDURE — 99223 1ST HOSP IP/OBS HIGH 75: CPT

## 2021-01-01 PROCEDURE — 70450 CT HEAD/BRAIN W/O DYE: CPT | Mod: 26

## 2021-01-01 PROCEDURE — 99291 CRITICAL CARE FIRST HOUR: CPT | Mod: CS,GC

## 2021-01-01 PROCEDURE — 99291 CRITICAL CARE FIRST HOUR: CPT

## 2021-01-01 PROCEDURE — 99223 1ST HOSP IP/OBS HIGH 75: CPT | Mod: AI

## 2021-01-01 PROCEDURE — 88305 TISSUE EXAM BY PATHOLOGIST: CPT | Mod: 26

## 2021-01-01 PROCEDURE — 99239 HOSP IP/OBS DSCHRG MGMT >30: CPT

## 2021-01-01 PROCEDURE — 76775 US EXAM ABDO BACK WALL LIM: CPT | Mod: 26

## 2021-01-01 PROCEDURE — 74176 CT ABD & PELVIS W/O CONTRAST: CPT | Mod: 26,MA

## 2021-01-01 PROCEDURE — 99222 1ST HOSP IP/OBS MODERATE 55: CPT

## 2021-01-01 PROCEDURE — 45380 COLONOSCOPY AND BIOPSY: CPT | Mod: 53

## 2021-01-01 PROCEDURE — 93306 TTE W/DOPPLER COMPLETE: CPT | Mod: 26

## 2021-01-01 PROCEDURE — 70450 CT HEAD/BRAIN W/O DYE: CPT | Mod: 26,MA

## 2021-01-01 RX ORDER — INFLUENZA VIRUS VACCINE 15; 15; 15; 15 UG/.5ML; UG/.5ML; UG/.5ML; UG/.5ML
0.5 SUSPENSION INTRAMUSCULAR ONCE
Refills: 0 | Status: DISCONTINUED | OUTPATIENT
Start: 2021-01-01 | End: 2021-01-01

## 2021-01-01 RX ORDER — OXYCODONE AND ACETAMINOPHEN 5; 325 MG/1; MG/1
1 TABLET ORAL ONCE
Refills: 0 | Status: DISCONTINUED | OUTPATIENT
Start: 2021-01-01 | End: 2021-01-01

## 2021-01-01 RX ORDER — SODIUM CHLORIDE 9 MG/ML
1000 INJECTION, SOLUTION INTRAVENOUS
Refills: 0 | Status: DISCONTINUED | OUTPATIENT
Start: 2021-01-01 | End: 2021-01-01

## 2021-01-01 RX ORDER — APIXABAN 2.5 MG/1
2.5 TABLET, FILM COATED ORAL
Refills: 0 | Status: CANCELLED | OUTPATIENT
Start: 2021-01-01 | End: 2021-01-01

## 2021-01-01 RX ORDER — LANOLIN ALCOHOL/MO/W.PET/CERES
3 CREAM (GRAM) TOPICAL AT BEDTIME
Refills: 0 | Status: DISCONTINUED | OUTPATIENT
Start: 2021-01-01 | End: 2021-01-01

## 2021-01-01 RX ORDER — ATORVASTATIN CALCIUM 80 MG/1
20 TABLET, FILM COATED ORAL DAILY
Refills: 0 | Status: DISCONTINUED | OUTPATIENT
Start: 2021-01-01 | End: 2021-01-01

## 2021-01-01 RX ORDER — AMLODIPINE BESYLATE 2.5 MG/1
5 TABLET ORAL DAILY
Refills: 0 | Status: DISCONTINUED | OUTPATIENT
Start: 2021-01-01 | End: 2021-01-01

## 2021-01-01 RX ORDER — HYDRALAZINE HCL 50 MG
25 TABLET ORAL THREE TIMES A DAY
Refills: 0 | Status: DISCONTINUED | OUTPATIENT
Start: 2021-01-01 | End: 2021-01-01

## 2021-01-01 RX ORDER — DEXTROSE 50 % IN WATER 50 %
12.5 SYRINGE (ML) INTRAVENOUS ONCE
Refills: 0 | Status: DISCONTINUED | OUTPATIENT
Start: 2021-01-01 | End: 2021-01-01

## 2021-01-01 RX ORDER — GLIMEPIRIDE 1 MG
1 TABLET ORAL
Qty: 0 | Refills: 0 | DISCHARGE

## 2021-01-01 RX ORDER — MAGNESIUM SULFATE 500 MG/ML
1 VIAL (ML) INJECTION ONCE
Refills: 0 | Status: COMPLETED | OUTPATIENT
Start: 2021-01-01 | End: 2021-01-01

## 2021-01-01 RX ORDER — MAGNESIUM SULFATE 500 MG/ML
2 VIAL (ML) INJECTION ONCE
Refills: 0 | Status: COMPLETED | OUTPATIENT
Start: 2021-01-01 | End: 2021-01-01

## 2021-01-01 RX ORDER — HEPARIN SODIUM 5000 [USP'U]/ML
700 INJECTION INTRAVENOUS; SUBCUTANEOUS
Qty: 25000 | Refills: 0 | Status: DISCONTINUED | OUTPATIENT
Start: 2021-01-01 | End: 2021-01-01

## 2021-01-01 RX ORDER — WARFARIN SODIUM 2.5 MG/1
3 TABLET ORAL ONCE
Refills: 0 | Status: COMPLETED | OUTPATIENT
Start: 2021-01-01 | End: 2021-01-01

## 2021-01-01 RX ORDER — SODIUM BICARBONATE 1 MEQ/ML
1300 SYRINGE (ML) INTRAVENOUS EVERY 8 HOURS
Refills: 0 | Status: DISCONTINUED | OUTPATIENT
Start: 2021-01-01 | End: 2021-01-01

## 2021-01-01 RX ORDER — POTASSIUM CHLORIDE 20 MEQ
20 PACKET (EA) ORAL
Refills: 0 | Status: COMPLETED | OUTPATIENT
Start: 2021-01-01 | End: 2021-01-01

## 2021-01-01 RX ORDER — FUROSEMIDE 40 MG
20 TABLET ORAL ONCE
Refills: 0 | Status: COMPLETED | OUTPATIENT
Start: 2021-01-01 | End: 2021-01-01

## 2021-01-01 RX ORDER — TAMSULOSIN HYDROCHLORIDE 0.4 MG/1
2 CAPSULE ORAL
Qty: 60 | Refills: 0
Start: 2021-01-01 | End: 2021-01-01

## 2021-01-01 RX ORDER — DEXTROSE 50 % IN WATER 50 %
15 SYRINGE (ML) INTRAVENOUS ONCE
Refills: 0 | Status: DISCONTINUED | OUTPATIENT
Start: 2021-01-01 | End: 2021-01-01

## 2021-01-01 RX ORDER — POTASSIUM CHLORIDE 20 MEQ
10 PACKET (EA) ORAL ONCE
Refills: 0 | Status: COMPLETED | OUTPATIENT
Start: 2021-01-01 | End: 2021-01-01

## 2021-01-01 RX ORDER — RAMIPRIL 5 MG
1 CAPSULE ORAL
Qty: 0 | Refills: 0 | DISCHARGE

## 2021-01-01 RX ORDER — FUROSEMIDE 40 MG
20 TABLET ORAL DAILY
Refills: 0 | Status: DISCONTINUED | OUTPATIENT
Start: 2021-01-01 | End: 2021-01-01

## 2021-01-01 RX ORDER — SODIUM CHLORIDE 9 MG/ML
1000 INJECTION INTRAMUSCULAR; INTRAVENOUS; SUBCUTANEOUS
Refills: 0 | Status: DISCONTINUED | OUTPATIENT
Start: 2021-01-01 | End: 2021-01-01

## 2021-01-01 RX ORDER — ATORVASTATIN CALCIUM 80 MG/1
20 TABLET, FILM COATED ORAL AT BEDTIME
Refills: 0 | Status: DISCONTINUED | OUTPATIENT
Start: 2021-01-01 | End: 2021-01-01

## 2021-01-01 RX ORDER — DEXTROSE 50 % IN WATER 50 %
25 SYRINGE (ML) INTRAVENOUS ONCE
Refills: 0 | Status: DISCONTINUED | OUTPATIENT
Start: 2021-01-01 | End: 2021-01-01

## 2021-01-01 RX ORDER — CHLORHEXIDINE GLUCONATE 213 G/1000ML
1 SOLUTION TOPICAL
Refills: 0 | Status: DISCONTINUED | OUTPATIENT
Start: 2021-01-01 | End: 2021-01-01

## 2021-01-01 RX ORDER — ASPIRIN/CALCIUM CARB/MAGNESIUM 324 MG
81 TABLET ORAL DAILY
Refills: 0 | Status: DISCONTINUED | OUTPATIENT
Start: 2021-01-01 | End: 2021-01-01

## 2021-01-01 RX ORDER — HEPARIN SODIUM 5000 [USP'U]/ML
1000 INJECTION INTRAVENOUS; SUBCUTANEOUS
Qty: 25000 | Refills: 0 | Status: DISCONTINUED | OUTPATIENT
Start: 2021-01-01 | End: 2021-01-01

## 2021-01-01 RX ORDER — GUAIFENESIN/DEXTROMETHORPHAN 600MG-30MG
10 TABLET, EXTENDED RELEASE 12 HR ORAL
Qty: 280 | Refills: 0
Start: 2021-01-01 | End: 2021-01-01

## 2021-01-01 RX ORDER — CHLORHEXIDINE GLUCONATE 213 G/1000ML
1 SOLUTION TOPICAL DAILY
Refills: 0 | Status: DISCONTINUED | OUTPATIENT
Start: 2021-01-01 | End: 2021-01-01

## 2021-01-01 RX ORDER — PIOGLITAZONE HYDROCHLORIDE 15 MG/1
1 TABLET ORAL
Qty: 0 | Refills: 0 | DISCHARGE

## 2021-01-01 RX ORDER — VANCOMYCIN HCL 1 G
1000 VIAL (EA) INTRAVENOUS ONCE
Refills: 0 | Status: COMPLETED | OUTPATIENT
Start: 2021-01-01 | End: 2021-01-01

## 2021-01-01 RX ORDER — AMLODIPINE BESYLATE 2.5 MG/1
10 TABLET ORAL DAILY
Refills: 0 | Status: DISCONTINUED | OUTPATIENT
Start: 2021-01-01 | End: 2021-01-01

## 2021-01-01 RX ORDER — INSULIN LISPRO 100/ML
VIAL (ML) SUBCUTANEOUS
Refills: 0 | Status: DISCONTINUED | OUTPATIENT
Start: 2021-01-01 | End: 2021-01-01

## 2021-01-01 RX ORDER — POTASSIUM CHLORIDE 20 MEQ
40 PACKET (EA) ORAL ONCE
Refills: 0 | Status: COMPLETED | OUTPATIENT
Start: 2021-01-01 | End: 2021-01-01

## 2021-01-01 RX ORDER — HEPARIN SODIUM 5000 [USP'U]/ML
600 INJECTION INTRAVENOUS; SUBCUTANEOUS
Qty: 25000 | Refills: 0 | Status: DISCONTINUED | OUTPATIENT
Start: 2021-01-01 | End: 2021-01-01

## 2021-01-01 RX ORDER — TAMSULOSIN HYDROCHLORIDE 0.4 MG/1
0.8 CAPSULE ORAL AT BEDTIME
Refills: 0 | Status: DISCONTINUED | OUTPATIENT
Start: 2021-01-01 | End: 2021-01-01

## 2021-01-01 RX ORDER — POLYETHYLENE GLYCOL 3350 17 G/17G
17 POWDER, FOR SOLUTION ORAL ONCE
Refills: 0 | Status: COMPLETED | OUTPATIENT
Start: 2021-01-01 | End: 2021-01-01

## 2021-01-01 RX ORDER — SODIUM CHLORIDE 9 MG/ML
1000 INJECTION INTRAMUSCULAR; INTRAVENOUS; SUBCUTANEOUS ONCE
Refills: 0 | Status: COMPLETED | OUTPATIENT
Start: 2021-01-01 | End: 2021-01-01

## 2021-01-01 RX ORDER — APIXABAN 2.5 MG/1
2.5 TABLET, FILM COATED ORAL EVERY 12 HOURS
Refills: 0 | Status: DISCONTINUED | OUTPATIENT
Start: 2021-01-01 | End: 2021-01-01

## 2021-01-01 RX ORDER — ACETAMINOPHEN 500 MG
650 TABLET ORAL EVERY 6 HOURS
Refills: 0 | Status: DISCONTINUED | OUTPATIENT
Start: 2021-01-01 | End: 2021-01-01

## 2021-01-01 RX ORDER — SODIUM ZIRCONIUM CYCLOSILICATE 10 G/10G
10 POWDER, FOR SUSPENSION ORAL
Refills: 0 | Status: DISCONTINUED | OUTPATIENT
Start: 2021-01-01 | End: 2021-01-01

## 2021-01-01 RX ORDER — PANTOPRAZOLE SODIUM 20 MG/1
1 TABLET, DELAYED RELEASE ORAL
Qty: 0 | Refills: 0 | DISCHARGE
Start: 2021-01-01

## 2021-01-01 RX ORDER — FUROSEMIDE 40 MG
40 TABLET ORAL
Refills: 0 | Status: DISCONTINUED | OUTPATIENT
Start: 2021-01-01 | End: 2021-01-01

## 2021-01-01 RX ORDER — HEPARIN SODIUM 5000 [USP'U]/ML
5000 INJECTION INTRAVENOUS; SUBCUTANEOUS EVERY 8 HOURS
Refills: 0 | Status: DISCONTINUED | OUTPATIENT
Start: 2021-01-01 | End: 2021-01-01

## 2021-01-01 RX ORDER — SODIUM ZIRCONIUM CYCLOSILICATE 10 G/10G
10 POWDER, FOR SUSPENSION ORAL ONCE
Refills: 0 | Status: COMPLETED | OUTPATIENT
Start: 2021-01-01 | End: 2021-01-01

## 2021-01-01 RX ORDER — HEPARIN SODIUM 5000 [USP'U]/ML
5500 INJECTION INTRAVENOUS; SUBCUTANEOUS ONCE
Refills: 0 | Status: DISCONTINUED | OUTPATIENT
Start: 2021-01-01 | End: 2021-01-01

## 2021-01-01 RX ORDER — AMLODIPINE BESYLATE 2.5 MG/1
5 TABLET ORAL
Refills: 0 | Status: DISCONTINUED | OUTPATIENT
Start: 2021-01-01 | End: 2021-01-01

## 2021-01-01 RX ORDER — CEFEPIME 1 G/1
2000 INJECTION, POWDER, FOR SOLUTION INTRAMUSCULAR; INTRAVENOUS ONCE
Refills: 0 | Status: COMPLETED | OUTPATIENT
Start: 2021-01-01 | End: 2021-01-01

## 2021-01-01 RX ORDER — CALCIUM GLUCONATE 100 MG/ML
2 VIAL (ML) INTRAVENOUS ONCE
Refills: 0 | Status: COMPLETED | OUTPATIENT
Start: 2021-01-01 | End: 2021-01-01

## 2021-01-01 RX ORDER — TAMSULOSIN HYDROCHLORIDE 0.4 MG/1
0.4 CAPSULE ORAL AT BEDTIME
Refills: 0 | Status: DISCONTINUED | OUTPATIENT
Start: 2021-01-01 | End: 2021-01-01

## 2021-01-01 RX ORDER — CEFEPIME 1 G/1
1000 INJECTION, POWDER, FOR SOLUTION INTRAMUSCULAR; INTRAVENOUS DAILY
Refills: 0 | Status: DISCONTINUED | OUTPATIENT
Start: 2021-01-01 | End: 2021-01-01

## 2021-01-01 RX ORDER — FUROSEMIDE 40 MG
40 TABLET ORAL ONCE
Refills: 0 | Status: COMPLETED | OUTPATIENT
Start: 2021-01-01 | End: 2021-01-01

## 2021-01-01 RX ORDER — HEPARIN SODIUM 5000 [USP'U]/ML
800 INJECTION INTRAVENOUS; SUBCUTANEOUS
Qty: 25000 | Refills: 0 | Status: DISCONTINUED | OUTPATIENT
Start: 2021-01-01 | End: 2021-01-01

## 2021-01-01 RX ORDER — AMLODIPINE BESYLATE 2.5 MG/1
1 TABLET ORAL
Qty: 0 | Refills: 0 | DISCHARGE

## 2021-01-01 RX ORDER — CALCIUM ACETATE 667 MG
1 TABLET ORAL
Qty: 0 | Refills: 0 | DISCHARGE
Start: 2021-01-01

## 2021-01-01 RX ORDER — CALCIUM GLUCONATE 100 MG/ML
1 VIAL (ML) INTRAVENOUS ONCE
Refills: 0 | Status: COMPLETED | OUTPATIENT
Start: 2021-01-01 | End: 2021-01-01

## 2021-01-01 RX ORDER — DEXTROSE 50 % IN WATER 50 %
50 SYRINGE (ML) INTRAVENOUS ONCE
Refills: 0 | Status: COMPLETED | OUTPATIENT
Start: 2021-01-01 | End: 2021-01-01

## 2021-01-01 RX ORDER — WARFARIN SODIUM 2.5 MG/1
5 TABLET ORAL ONCE
Refills: 0 | Status: DISCONTINUED | OUTPATIENT
Start: 2021-01-01 | End: 2021-01-01

## 2021-01-01 RX ORDER — CALCIUM ACETATE 667 MG
667 TABLET ORAL
Refills: 0 | Status: DISCONTINUED | OUTPATIENT
Start: 2021-01-01 | End: 2021-01-01

## 2021-01-01 RX ORDER — HYDRALAZINE HCL 50 MG
1 TABLET ORAL
Qty: 90 | Refills: 0
Start: 2021-01-01 | End: 2021-01-01

## 2021-01-01 RX ORDER — WARFARIN SODIUM 2.5 MG/1
5 TABLET ORAL ONCE
Refills: 0 | Status: COMPLETED | OUTPATIENT
Start: 2021-01-01 | End: 2021-01-01

## 2021-01-01 RX ORDER — SODIUM BICARBONATE 1 MEQ/ML
50 SYRINGE (ML) INTRAVENOUS ONCE
Refills: 0 | Status: COMPLETED | OUTPATIENT
Start: 2021-01-01 | End: 2021-01-01

## 2021-01-01 RX ORDER — APIXABAN 2.5 MG/1
2.5 TABLET, FILM COATED ORAL
Refills: 0 | Status: DISCONTINUED | OUTPATIENT
Start: 2021-01-01 | End: 2021-01-01

## 2021-01-01 RX ORDER — SODIUM CHLORIDE 9 MG/ML
1000 INJECTION, SOLUTION INTRAVENOUS ONCE
Refills: 0 | Status: COMPLETED | OUTPATIENT
Start: 2021-01-01 | End: 2021-01-01

## 2021-01-01 RX ORDER — APIXABAN 2.5 MG/1
5 TABLET, FILM COATED ORAL ONCE
Refills: 0 | Status: COMPLETED | OUTPATIENT
Start: 2021-01-01 | End: 2021-01-01

## 2021-01-01 RX ORDER — GUAIFENESIN/DEXTROMETHORPHAN 600MG-30MG
10 TABLET, EXTENDED RELEASE 12 HR ORAL
Refills: 0 | Status: DISCONTINUED | OUTPATIENT
Start: 2021-01-01 | End: 2021-01-01

## 2021-01-01 RX ORDER — GLUCAGON INJECTION, SOLUTION 0.5 MG/.1ML
1 INJECTION, SOLUTION SUBCUTANEOUS ONCE
Refills: 0 | Status: DISCONTINUED | OUTPATIENT
Start: 2021-01-01 | End: 2021-01-01

## 2021-01-01 RX ORDER — HEPARIN SODIUM 5000 [USP'U]/ML
2500 INJECTION INTRAVENOUS; SUBCUTANEOUS EVERY 6 HOURS
Refills: 0 | Status: DISCONTINUED | OUTPATIENT
Start: 2021-01-01 | End: 2021-01-01

## 2021-01-01 RX ORDER — APIXABAN 2.5 MG/1
1 TABLET, FILM COATED ORAL
Qty: 60 | Refills: 0
Start: 2021-01-01 | End: 2021-01-01

## 2021-01-01 RX ORDER — FUROSEMIDE 40 MG
1 TABLET ORAL
Qty: 0 | Refills: 0 | DISCHARGE

## 2021-01-01 RX ORDER — ONDANSETRON 8 MG/1
4 TABLET, FILM COATED ORAL EVERY 8 HOURS
Refills: 0 | Status: DISCONTINUED | OUTPATIENT
Start: 2021-01-01 | End: 2021-01-01

## 2021-01-01 RX ORDER — FUROSEMIDE 40 MG
40 TABLET ORAL DAILY
Refills: 0 | Status: DISCONTINUED | OUTPATIENT
Start: 2021-01-01 | End: 2021-01-01

## 2021-01-01 RX ORDER — HEPARIN SODIUM 5000 [USP'U]/ML
5500 INJECTION INTRAVENOUS; SUBCUTANEOUS EVERY 6 HOURS
Refills: 0 | Status: DISCONTINUED | OUTPATIENT
Start: 2021-01-01 | End: 2021-01-01

## 2021-01-01 RX ORDER — HEPARIN SODIUM 5000 [USP'U]/ML
INJECTION INTRAVENOUS; SUBCUTANEOUS
Qty: 25000 | Refills: 0 | Status: DISCONTINUED | OUTPATIENT
Start: 2021-01-01 | End: 2021-01-01

## 2021-01-01 RX ORDER — APIXABAN 2.5 MG/1
5 TABLET, FILM COATED ORAL
Refills: 0 | Status: DISCONTINUED | OUTPATIENT
Start: 2021-01-01 | End: 2021-01-01

## 2021-01-01 RX ORDER — ATORVASTATIN CALCIUM 80 MG/1
2 TABLET, FILM COATED ORAL
Qty: 0 | Refills: 0 | DISCHARGE

## 2021-01-01 RX ORDER — INSULIN HUMAN 100 [IU]/ML
10 INJECTION, SOLUTION SUBCUTANEOUS ONCE
Refills: 0 | Status: COMPLETED | OUTPATIENT
Start: 2021-01-01 | End: 2021-01-01

## 2021-01-01 RX ORDER — FAMOTIDINE 10 MG/ML
1 INJECTION INTRAVENOUS
Qty: 0 | Refills: 0 | DISCHARGE

## 2021-01-01 RX ORDER — INSULIN LISPRO 100/ML
8 VIAL (ML) SUBCUTANEOUS ONCE
Refills: 0 | Status: DISCONTINUED | OUTPATIENT
Start: 2021-01-01 | End: 2021-01-01

## 2021-01-01 RX ORDER — HALOPERIDOL DECANOATE 100 MG/ML
2 INJECTION INTRAMUSCULAR ONCE
Refills: 0 | Status: COMPLETED | OUTPATIENT
Start: 2021-01-01 | End: 2021-01-01

## 2021-01-01 RX ORDER — HEPARIN SODIUM 5000 [USP'U]/ML
10 INJECTION INTRAVENOUS; SUBCUTANEOUS
Qty: 25000 | Refills: 0 | Status: DISCONTINUED | OUTPATIENT
Start: 2021-01-01 | End: 2021-01-01

## 2021-01-01 RX ORDER — PANTOPRAZOLE SODIUM 20 MG/1
40 TABLET, DELAYED RELEASE ORAL
Refills: 0 | Status: DISCONTINUED | OUTPATIENT
Start: 2021-01-01 | End: 2021-01-01

## 2021-01-01 RX ORDER — POLYETHYLENE GLYCOL 3350 17 G/17G
17 POWDER, FOR SOLUTION ORAL DAILY
Refills: 0 | Status: DISCONTINUED | OUTPATIENT
Start: 2021-01-01 | End: 2021-01-01

## 2021-01-01 RX ORDER — RAMIPRIL 5 MG
2 CAPSULE ORAL
Qty: 0 | Refills: 0 | DISCHARGE

## 2021-01-01 RX ORDER — SODIUM CHLORIDE 9 MG/ML
1000 INJECTION, SOLUTION INTRAVENOUS ONCE
Refills: 0 | Status: DISCONTINUED | OUTPATIENT
Start: 2021-01-01 | End: 2021-01-01

## 2021-01-01 RX ORDER — CEFTRIAXONE 500 MG/1
1000 INJECTION, POWDER, FOR SOLUTION INTRAMUSCULAR; INTRAVENOUS EVERY 24 HOURS
Refills: 0 | Status: COMPLETED | OUTPATIENT
Start: 2021-01-01 | End: 2021-01-01

## 2021-01-01 RX ORDER — ACETAMINOPHEN 500 MG
650 TABLET ORAL ONCE
Refills: 0 | Status: DISCONTINUED | OUTPATIENT
Start: 2021-01-01 | End: 2021-01-01

## 2021-01-01 RX ORDER — CEFEPIME 1 G/1
500 INJECTION, POWDER, FOR SOLUTION INTRAMUSCULAR; INTRAVENOUS EVERY 24 HOURS
Refills: 0 | Status: DISCONTINUED | OUTPATIENT
Start: 2021-01-01 | End: 2021-01-01

## 2021-01-01 RX ORDER — LISINOPRIL 2.5 MG/1
40 TABLET ORAL DAILY
Refills: 0 | Status: DISCONTINUED | OUTPATIENT
Start: 2021-01-01 | End: 2021-01-01

## 2021-01-01 RX ORDER — SITAGLIPTIN 50 MG/1
1 TABLET, FILM COATED ORAL
Qty: 0 | Refills: 0 | DISCHARGE

## 2021-01-01 RX ORDER — DEXLANSOPRAZOLE 30 MG/1
1 CAPSULE, DELAYED RELEASE ORAL
Qty: 0 | Refills: 0 | DISCHARGE

## 2021-01-01 RX ORDER — LACTULOSE 10 G/15ML
20 SOLUTION ORAL ONCE
Refills: 0 | Status: COMPLETED | OUTPATIENT
Start: 2021-01-01 | End: 2021-01-01

## 2021-01-01 RX ORDER — ATORVASTATIN CALCIUM 80 MG/1
1 TABLET, FILM COATED ORAL
Qty: 0 | Refills: 0 | DISCHARGE

## 2021-01-01 RX ORDER — HALOPERIDOL DECANOATE 100 MG/ML
1 INJECTION INTRAMUSCULAR ONCE
Refills: 0 | Status: COMPLETED | OUTPATIENT
Start: 2021-01-01 | End: 2021-01-01

## 2021-01-01 RX ORDER — HEPARIN SODIUM 5000 [USP'U]/ML
8 INJECTION INTRAVENOUS; SUBCUTANEOUS
Qty: 25000 | Refills: 0 | Status: DISCONTINUED | OUTPATIENT
Start: 2021-01-01 | End: 2021-01-01

## 2021-01-01 RX ORDER — ASPIRIN/CALCIUM CARB/MAGNESIUM 324 MG
1 TABLET ORAL
Qty: 0 | Refills: 0 | DISCHARGE

## 2021-01-01 RX ORDER — VANCOMYCIN HCL 1 G
500 VIAL (EA) INTRAVENOUS
Refills: 0 | Status: DISCONTINUED | OUTPATIENT
Start: 2021-01-01 | End: 2021-01-01

## 2021-01-01 RX ORDER — INSULIN LISPRO 100/ML
5 VIAL (ML) SUBCUTANEOUS ONCE
Refills: 0 | Status: COMPLETED | OUTPATIENT
Start: 2021-01-01 | End: 2021-01-01

## 2021-01-01 RX ORDER — SENNA PLUS 8.6 MG/1
2 TABLET ORAL AT BEDTIME
Refills: 0 | Status: DISCONTINUED | OUTPATIENT
Start: 2021-01-01 | End: 2021-01-01

## 2021-01-01 RX ORDER — ENOXAPARIN SODIUM 100 MG/ML
40 INJECTION SUBCUTANEOUS DAILY
Refills: 0 | Status: DISCONTINUED | OUTPATIENT
Start: 2021-01-01 | End: 2021-01-01

## 2021-01-01 RX ORDER — SODIUM BICARBONATE 1 MEQ/ML
2 SYRINGE (ML) INTRAVENOUS
Qty: 0 | Refills: 0 | DISCHARGE
Start: 2021-01-01

## 2021-01-01 RX ORDER — PIOGLITAZONE HYDROCHLORIDE 15 MG/1
30 TABLET ORAL DAILY
Refills: 0 | Status: DISCONTINUED | OUTPATIENT
Start: 2021-01-01 | End: 2021-01-01

## 2021-01-01 RX ORDER — FAMOTIDINE 10 MG/ML
20 INJECTION INTRAVENOUS DAILY
Refills: 0 | Status: DISCONTINUED | OUTPATIENT
Start: 2021-01-01 | End: 2021-01-01

## 2021-01-01 RX ORDER — CEFTRIAXONE 500 MG/1
1000 INJECTION, POWDER, FOR SOLUTION INTRAMUSCULAR; INTRAVENOUS ONCE
Refills: 0 | Status: COMPLETED | OUTPATIENT
Start: 2021-01-01 | End: 2021-01-01

## 2021-01-01 RX ORDER — SODIUM BICARBONATE 1 MEQ/ML
0.12 SYRINGE (ML) INTRAVENOUS
Qty: 150 | Refills: 0 | Status: DISCONTINUED | OUTPATIENT
Start: 2021-01-01 | End: 2021-01-01

## 2021-01-01 RX ORDER — FENOFIBRATE,MICRONIZED 130 MG
1 CAPSULE ORAL
Qty: 0 | Refills: 0 | DISCHARGE

## 2021-01-01 RX ORDER — HEPARIN SODIUM 5000 [USP'U]/ML
950 INJECTION INTRAVENOUS; SUBCUTANEOUS
Qty: 25000 | Refills: 0 | Status: DISCONTINUED | OUTPATIENT
Start: 2021-01-01 | End: 2021-01-01

## 2021-01-01 RX ORDER — SODIUM BICARBONATE 1 MEQ/ML
1300 SYRINGE (ML) INTRAVENOUS THREE TIMES A DAY
Refills: 0 | Status: DISCONTINUED | OUTPATIENT
Start: 2021-01-01 | End: 2021-01-01

## 2021-01-01 RX ADMIN — Medication 25 MILLIGRAM(S): at 13:34

## 2021-01-01 RX ADMIN — AMLODIPINE BESYLATE 10 MILLIGRAM(S): 2.5 TABLET ORAL at 05:10

## 2021-01-01 RX ADMIN — Medication 667 MILLIGRAM(S): at 13:10

## 2021-01-01 RX ADMIN — Medication 667 MILLIGRAM(S): at 16:31

## 2021-01-01 RX ADMIN — AMLODIPINE BESYLATE 10 MILLIGRAM(S): 2.5 TABLET ORAL at 06:00

## 2021-01-01 RX ADMIN — Medication 100 GRAM(S): at 13:23

## 2021-01-01 RX ADMIN — HEPARIN SODIUM 5000 UNIT(S): 5000 INJECTION INTRAVENOUS; SUBCUTANEOUS at 22:22

## 2021-01-01 RX ADMIN — Medication 1300 MILLIGRAM(S): at 21:27

## 2021-01-01 RX ADMIN — HEPARIN SODIUM 5000 UNIT(S): 5000 INJECTION INTRAVENOUS; SUBCUTANEOUS at 22:16

## 2021-01-01 RX ADMIN — CHLORHEXIDINE GLUCONATE 1 APPLICATION(S): 213 SOLUTION TOPICAL at 11:27

## 2021-01-01 RX ADMIN — PANTOPRAZOLE SODIUM 40 MILLIGRAM(S): 20 TABLET, DELAYED RELEASE ORAL at 06:47

## 2021-01-01 RX ADMIN — Medication 1300 MILLIGRAM(S): at 22:52

## 2021-01-01 RX ADMIN — Medication 650 MILLIGRAM(S): at 23:31

## 2021-01-01 RX ADMIN — Medication 667 MILLIGRAM(S): at 12:39

## 2021-01-01 RX ADMIN — Medication 1300 MILLIGRAM(S): at 13:05

## 2021-01-01 RX ADMIN — CHLORHEXIDINE GLUCONATE 1 APPLICATION(S): 213 SOLUTION TOPICAL at 12:19

## 2021-01-01 RX ADMIN — Medication 667 MILLIGRAM(S): at 09:56

## 2021-01-01 RX ADMIN — Medication 25 MILLIGRAM(S): at 05:28

## 2021-01-01 RX ADMIN — AMLODIPINE BESYLATE 10 MILLIGRAM(S): 2.5 TABLET ORAL at 05:03

## 2021-01-01 RX ADMIN — Medication 81 MILLIGRAM(S): at 12:27

## 2021-01-01 RX ADMIN — Medication 1300 MILLIGRAM(S): at 13:02

## 2021-01-01 RX ADMIN — TAMSULOSIN HYDROCHLORIDE 0.8 MILLIGRAM(S): 0.4 CAPSULE ORAL at 21:15

## 2021-01-01 RX ADMIN — CHLORHEXIDINE GLUCONATE 1 APPLICATION(S): 213 SOLUTION TOPICAL at 11:12

## 2021-01-01 RX ADMIN — Medication 25 MILLIGRAM(S): at 21:39

## 2021-01-01 RX ADMIN — HEPARIN SODIUM 5000 UNIT(S): 5000 INJECTION INTRAVENOUS; SUBCUTANEOUS at 05:48

## 2021-01-01 RX ADMIN — Medication 2: at 07:50

## 2021-01-01 RX ADMIN — Medication 667 MILLIGRAM(S): at 08:32

## 2021-01-01 RX ADMIN — Medication 1300 MILLIGRAM(S): at 13:03

## 2021-01-01 RX ADMIN — Medication 1300 MILLIGRAM(S): at 05:07

## 2021-01-01 RX ADMIN — CEFTRIAXONE 100 MILLIGRAM(S): 500 INJECTION, POWDER, FOR SOLUTION INTRAMUSCULAR; INTRAVENOUS at 06:01

## 2021-01-01 RX ADMIN — ATORVASTATIN CALCIUM 20 MILLIGRAM(S): 80 TABLET, FILM COATED ORAL at 11:47

## 2021-01-01 RX ADMIN — Medication 25 MILLIGRAM(S): at 14:37

## 2021-01-01 RX ADMIN — Medication 650 MILLIGRAM(S): at 11:51

## 2021-01-01 RX ADMIN — Medication 20 MILLIEQUIVALENT(S): at 21:14

## 2021-01-01 RX ADMIN — Medication 667 MILLIGRAM(S): at 08:12

## 2021-01-01 RX ADMIN — FAMOTIDINE 20 MILLIGRAM(S): 10 INJECTION INTRAVENOUS at 11:56

## 2021-01-01 RX ADMIN — Medication 25 MILLIGRAM(S): at 05:38

## 2021-01-01 RX ADMIN — Medication 40 MILLIGRAM(S): at 05:17

## 2021-01-01 RX ADMIN — ATORVASTATIN CALCIUM 20 MILLIGRAM(S): 80 TABLET, FILM COATED ORAL at 21:39

## 2021-01-01 RX ADMIN — Medication 25 MILLIEQUIVALENT(S): at 16:58

## 2021-01-01 RX ADMIN — Medication 25 MILLIGRAM(S): at 13:09

## 2021-01-01 RX ADMIN — Medication 2: at 07:41

## 2021-01-01 RX ADMIN — Medication 25 MILLIGRAM(S): at 05:33

## 2021-01-01 RX ADMIN — ATORVASTATIN CALCIUM 20 MILLIGRAM(S): 80 TABLET, FILM COATED ORAL at 21:26

## 2021-01-01 RX ADMIN — ATORVASTATIN CALCIUM 20 MILLIGRAM(S): 80 TABLET, FILM COATED ORAL at 11:13

## 2021-01-01 RX ADMIN — Medication 25 MILLIGRAM(S): at 15:58

## 2021-01-01 RX ADMIN — PANTOPRAZOLE SODIUM 40 MILLIGRAM(S): 20 TABLET, DELAYED RELEASE ORAL at 05:35

## 2021-01-01 RX ADMIN — HEPARIN SODIUM 5000 UNIT(S): 5000 INJECTION INTRAVENOUS; SUBCUTANEOUS at 13:42

## 2021-01-01 RX ADMIN — Medication 667 MILLIGRAM(S): at 17:31

## 2021-01-01 RX ADMIN — TAMSULOSIN HYDROCHLORIDE 0.4 MILLIGRAM(S): 0.4 CAPSULE ORAL at 22:14

## 2021-01-01 RX ADMIN — Medication 81 MILLIGRAM(S): at 12:10

## 2021-01-01 RX ADMIN — CHLORHEXIDINE GLUCONATE 1 APPLICATION(S): 213 SOLUTION TOPICAL at 11:09

## 2021-01-01 RX ADMIN — Medication 1300 MILLIGRAM(S): at 21:50

## 2021-01-01 RX ADMIN — Medication 20 MILLIGRAM(S): at 05:16

## 2021-01-01 RX ADMIN — ATORVASTATIN CALCIUM 20 MILLIGRAM(S): 80 TABLET, FILM COATED ORAL at 21:09

## 2021-01-01 RX ADMIN — Medication 2: at 16:27

## 2021-01-01 RX ADMIN — Medication 650 MILLIGRAM(S): at 10:21

## 2021-01-01 RX ADMIN — HEPARIN SODIUM 5000 UNIT(S): 5000 INJECTION INTRAVENOUS; SUBCUTANEOUS at 23:09

## 2021-01-01 RX ADMIN — Medication 25 MILLIGRAM(S): at 22:49

## 2021-01-01 RX ADMIN — Medication 25 MILLIGRAM(S): at 21:12

## 2021-01-01 RX ADMIN — Medication 25 MILLIGRAM(S): at 05:23

## 2021-01-01 RX ADMIN — Medication 81 MILLIGRAM(S): at 12:07

## 2021-01-01 RX ADMIN — Medication 250 MILLIGRAM(S): at 17:38

## 2021-01-01 RX ADMIN — Medication 4: at 16:32

## 2021-01-01 RX ADMIN — Medication 50 MILLIEQUIVALENT(S): at 15:46

## 2021-01-01 RX ADMIN — Medication 25 MILLIGRAM(S): at 06:47

## 2021-01-01 RX ADMIN — POLYETHYLENE GLYCOL 3350 17 GRAM(S): 17 POWDER, FOR SOLUTION ORAL at 12:05

## 2021-01-01 RX ADMIN — Medication 1300 MILLIGRAM(S): at 22:54

## 2021-01-01 RX ADMIN — APIXABAN 5 MILLIGRAM(S): 2.5 TABLET, FILM COATED ORAL at 17:52

## 2021-01-01 RX ADMIN — Medication 1300 MILLIGRAM(S): at 21:21

## 2021-01-01 RX ADMIN — Medication 50 GRAM(S): at 14:45

## 2021-01-01 RX ADMIN — ATORVASTATIN CALCIUM 20 MILLIGRAM(S): 80 TABLET, FILM COATED ORAL at 21:16

## 2021-01-01 RX ADMIN — HEPARIN SODIUM 700 UNIT(S)/HR: 5000 INJECTION INTRAVENOUS; SUBCUTANEOUS at 13:33

## 2021-01-01 RX ADMIN — Medication 25 MILLIGRAM(S): at 06:17

## 2021-01-01 RX ADMIN — Medication 667 MILLIGRAM(S): at 11:28

## 2021-01-01 RX ADMIN — Medication 650 MILLIGRAM(S): at 06:17

## 2021-01-01 RX ADMIN — Medication 25 GRAM(S): at 15:29

## 2021-01-01 RX ADMIN — ATORVASTATIN CALCIUM 20 MILLIGRAM(S): 80 TABLET, FILM COATED ORAL at 11:26

## 2021-01-01 RX ADMIN — Medication 25 MILLIGRAM(S): at 13:44

## 2021-01-01 RX ADMIN — SODIUM ZIRCONIUM CYCLOSILICATE 10 GRAM(S): 10 POWDER, FOR SUSPENSION ORAL at 05:01

## 2021-01-01 RX ADMIN — Medication 650 MILLIGRAM(S): at 02:00

## 2021-01-01 RX ADMIN — Medication 1300 MILLIGRAM(S): at 22:13

## 2021-01-01 RX ADMIN — ATORVASTATIN CALCIUM 20 MILLIGRAM(S): 80 TABLET, FILM COATED ORAL at 21:12

## 2021-01-01 RX ADMIN — PANTOPRAZOLE SODIUM 40 MILLIGRAM(S): 20 TABLET, DELAYED RELEASE ORAL at 05:10

## 2021-01-01 RX ADMIN — CHLORHEXIDINE GLUCONATE 1 APPLICATION(S): 213 SOLUTION TOPICAL at 11:01

## 2021-01-01 RX ADMIN — CHLORHEXIDINE GLUCONATE 1 APPLICATION(S): 213 SOLUTION TOPICAL at 11:47

## 2021-01-01 RX ADMIN — Medication 81 MILLIGRAM(S): at 12:19

## 2021-01-01 RX ADMIN — HEPARIN SODIUM 5000 UNIT(S): 5000 INJECTION INTRAVENOUS; SUBCUTANEOUS at 21:23

## 2021-01-01 RX ADMIN — Medication 25 MILLIGRAM(S): at 13:40

## 2021-01-01 RX ADMIN — Medication 667 MILLIGRAM(S): at 08:20

## 2021-01-01 RX ADMIN — SODIUM CHLORIDE 1000 MILLILITER(S): 9 INJECTION, SOLUTION INTRAVENOUS at 14:52

## 2021-01-01 RX ADMIN — Medication 20 MILLIGRAM(S): at 05:29

## 2021-01-01 RX ADMIN — ATORVASTATIN CALCIUM 20 MILLIGRAM(S): 80 TABLET, FILM COATED ORAL at 22:13

## 2021-01-01 RX ADMIN — Medication 1300 MILLIGRAM(S): at 11:16

## 2021-01-01 RX ADMIN — POLYETHYLENE GLYCOL 3350 17 GRAM(S): 17 POWDER, FOR SOLUTION ORAL at 11:30

## 2021-01-01 RX ADMIN — Medication 667 MILLIGRAM(S): at 12:27

## 2021-01-01 RX ADMIN — Medication 667 MILLIGRAM(S): at 11:51

## 2021-01-01 RX ADMIN — SODIUM CHLORIDE 50 MILLILITER(S): 9 INJECTION, SOLUTION INTRAVENOUS at 10:07

## 2021-01-01 RX ADMIN — Medication 81 MILLIGRAM(S): at 11:49

## 2021-01-01 RX ADMIN — CHLORHEXIDINE GLUCONATE 1 APPLICATION(S): 213 SOLUTION TOPICAL at 05:35

## 2021-01-01 RX ADMIN — AMLODIPINE BESYLATE 10 MILLIGRAM(S): 2.5 TABLET ORAL at 05:37

## 2021-01-01 RX ADMIN — APIXABAN 5 MILLIGRAM(S): 2.5 TABLET, FILM COATED ORAL at 17:08

## 2021-01-01 RX ADMIN — Medication 8: at 12:02

## 2021-01-01 RX ADMIN — CHLORHEXIDINE GLUCONATE 1 APPLICATION(S): 213 SOLUTION TOPICAL at 11:52

## 2021-01-01 RX ADMIN — TAMSULOSIN HYDROCHLORIDE 0.8 MILLIGRAM(S): 0.4 CAPSULE ORAL at 21:45

## 2021-01-01 RX ADMIN — Medication 25 MILLIGRAM(S): at 22:19

## 2021-01-01 RX ADMIN — Medication 650 MILLIGRAM(S): at 14:36

## 2021-01-01 RX ADMIN — ATORVASTATIN CALCIUM 20 MILLIGRAM(S): 80 TABLET, FILM COATED ORAL at 11:58

## 2021-01-01 RX ADMIN — POLYETHYLENE GLYCOL 3350 17 GRAM(S): 17 POWDER, FOR SOLUTION ORAL at 11:36

## 2021-01-01 RX ADMIN — Medication 81 MILLIGRAM(S): at 11:09

## 2021-01-01 RX ADMIN — INSULIN HUMAN 10 UNIT(S): 100 INJECTION, SOLUTION SUBCUTANEOUS at 19:00

## 2021-01-01 RX ADMIN — AMLODIPINE BESYLATE 5 MILLIGRAM(S): 2.5 TABLET ORAL at 05:04

## 2021-01-01 RX ADMIN — Medication 667 MILLIGRAM(S): at 08:59

## 2021-01-01 RX ADMIN — CEFTRIAXONE 100 MILLIGRAM(S): 500 INJECTION, POWDER, FOR SOLUTION INTRAMUSCULAR; INTRAVENOUS at 05:38

## 2021-01-01 RX ADMIN — TAMSULOSIN HYDROCHLORIDE 0.8 MILLIGRAM(S): 0.4 CAPSULE ORAL at 21:29

## 2021-01-01 RX ADMIN — Medication 1300 MILLIGRAM(S): at 06:48

## 2021-01-01 RX ADMIN — ATORVASTATIN CALCIUM 20 MILLIGRAM(S): 80 TABLET, FILM COATED ORAL at 12:39

## 2021-01-01 RX ADMIN — CHLORHEXIDINE GLUCONATE 1 APPLICATION(S): 213 SOLUTION TOPICAL at 12:11

## 2021-01-01 RX ADMIN — Medication 650 MILLIGRAM(S): at 09:09

## 2021-01-01 RX ADMIN — Medication 667 MILLIGRAM(S): at 16:58

## 2021-01-01 RX ADMIN — SENNA PLUS 2 TABLET(S): 8.6 TABLET ORAL at 21:07

## 2021-01-01 RX ADMIN — POLYETHYLENE GLYCOL 3350 17 GRAM(S): 17 POWDER, FOR SOLUTION ORAL at 11:47

## 2021-01-01 RX ADMIN — SENNA PLUS 2 TABLET(S): 8.6 TABLET ORAL at 22:14

## 2021-01-01 RX ADMIN — CHLORHEXIDINE GLUCONATE 1 APPLICATION(S): 213 SOLUTION TOPICAL at 11:24

## 2021-01-01 RX ADMIN — Medication 25 MILLIGRAM(S): at 14:20

## 2021-01-01 RX ADMIN — Medication 1300 MILLIGRAM(S): at 13:28

## 2021-01-01 RX ADMIN — CHLORHEXIDINE GLUCONATE 1 APPLICATION(S): 213 SOLUTION TOPICAL at 13:08

## 2021-01-01 RX ADMIN — HEPARIN SODIUM 5000 UNIT(S): 5000 INJECTION INTRAVENOUS; SUBCUTANEOUS at 21:13

## 2021-01-01 RX ADMIN — Medication 667 MILLIGRAM(S): at 07:34

## 2021-01-01 RX ADMIN — Medication 667 MILLIGRAM(S): at 17:07

## 2021-01-01 RX ADMIN — Medication 1300 MILLIGRAM(S): at 13:08

## 2021-01-01 RX ADMIN — Medication 100 GRAM(S): at 15:57

## 2021-01-01 RX ADMIN — CEFTRIAXONE 100 MILLIGRAM(S): 500 INJECTION, POWDER, FOR SOLUTION INTRAMUSCULAR; INTRAVENOUS at 05:04

## 2021-01-01 RX ADMIN — Medication 25 MILLIGRAM(S): at 05:22

## 2021-01-01 RX ADMIN — Medication 2: at 11:47

## 2021-01-01 RX ADMIN — HEPARIN SODIUM 700 UNIT(S)/HR: 5000 INJECTION INTRAVENOUS; SUBCUTANEOUS at 18:30

## 2021-01-01 RX ADMIN — FAMOTIDINE 20 MILLIGRAM(S): 10 INJECTION INTRAVENOUS at 12:05

## 2021-01-01 RX ADMIN — Medication 40 MILLIGRAM(S): at 05:33

## 2021-01-01 RX ADMIN — Medication 25 MILLIGRAM(S): at 05:55

## 2021-01-01 RX ADMIN — Medication 25 MILLIGRAM(S): at 17:21

## 2021-01-01 RX ADMIN — Medication 25 MILLIGRAM(S): at 21:05

## 2021-01-01 RX ADMIN — Medication 81 MILLIGRAM(S): at 14:09

## 2021-01-01 RX ADMIN — PANTOPRAZOLE SODIUM 40 MILLIGRAM(S): 20 TABLET, DELAYED RELEASE ORAL at 05:03

## 2021-01-01 RX ADMIN — TAMSULOSIN HYDROCHLORIDE 0.8 MILLIGRAM(S): 0.4 CAPSULE ORAL at 21:38

## 2021-01-01 RX ADMIN — Medication 667 MILLIGRAM(S): at 11:46

## 2021-01-01 RX ADMIN — PANTOPRAZOLE SODIUM 40 MILLIGRAM(S): 20 TABLET, DELAYED RELEASE ORAL at 05:01

## 2021-01-01 RX ADMIN — Medication 25 MILLIGRAM(S): at 21:02

## 2021-01-01 RX ADMIN — Medication 1300 MILLIGRAM(S): at 05:01

## 2021-01-01 RX ADMIN — Medication 4: at 12:04

## 2021-01-01 RX ADMIN — HEPARIN SODIUM 5000 UNIT(S): 5000 INJECTION INTRAVENOUS; SUBCUTANEOUS at 05:37

## 2021-01-01 RX ADMIN — OXYCODONE AND ACETAMINOPHEN 1 TABLET(S): 5; 325 TABLET ORAL at 11:57

## 2021-01-01 RX ADMIN — Medication 667 MILLIGRAM(S): at 11:14

## 2021-01-01 RX ADMIN — Medication 650 MILLIGRAM(S): at 17:08

## 2021-01-01 RX ADMIN — CHLORHEXIDINE GLUCONATE 1 APPLICATION(S): 213 SOLUTION TOPICAL at 05:49

## 2021-01-01 RX ADMIN — Medication 81 MILLIGRAM(S): at 12:05

## 2021-01-01 RX ADMIN — TAMSULOSIN HYDROCHLORIDE 0.8 MILLIGRAM(S): 0.4 CAPSULE ORAL at 21:12

## 2021-01-01 RX ADMIN — Medication 667 MILLIGRAM(S): at 11:32

## 2021-01-01 RX ADMIN — Medication 25 MILLIGRAM(S): at 05:08

## 2021-01-01 RX ADMIN — Medication 1300 MILLIGRAM(S): at 21:19

## 2021-01-01 RX ADMIN — ENOXAPARIN SODIUM 40 MILLIGRAM(S): 100 INJECTION SUBCUTANEOUS at 11:09

## 2021-01-01 RX ADMIN — Medication 81 MILLIGRAM(S): at 11:51

## 2021-01-01 RX ADMIN — Medication 667 MILLIGRAM(S): at 17:30

## 2021-01-01 RX ADMIN — Medication 25 MILLIGRAM(S): at 13:04

## 2021-01-01 RX ADMIN — POLYETHYLENE GLYCOL 3350 17 GRAM(S): 17 POWDER, FOR SOLUTION ORAL at 12:09

## 2021-01-01 RX ADMIN — OXYCODONE AND ACETAMINOPHEN 1 TABLET(S): 5; 325 TABLET ORAL at 09:25

## 2021-01-01 RX ADMIN — Medication 667 MILLIGRAM(S): at 17:34

## 2021-01-01 RX ADMIN — AMLODIPINE BESYLATE 10 MILLIGRAM(S): 2.5 TABLET ORAL at 05:31

## 2021-01-01 RX ADMIN — LACTULOSE 20 GRAM(S): 10 SOLUTION ORAL at 11:50

## 2021-01-01 RX ADMIN — ATORVASTATIN CALCIUM 20 MILLIGRAM(S): 80 TABLET, FILM COATED ORAL at 12:08

## 2021-01-01 RX ADMIN — Medication 25 MILLIGRAM(S): at 13:08

## 2021-01-01 RX ADMIN — ATORVASTATIN CALCIUM 20 MILLIGRAM(S): 80 TABLET, FILM COATED ORAL at 21:33

## 2021-01-01 RX ADMIN — HEPARIN SODIUM 5000 UNIT(S): 5000 INJECTION INTRAVENOUS; SUBCUTANEOUS at 21:27

## 2021-01-01 RX ADMIN — Medication 81 MILLIGRAM(S): at 11:36

## 2021-01-01 RX ADMIN — Medication 667 MILLIGRAM(S): at 09:10

## 2021-01-01 RX ADMIN — Medication 40 MILLIGRAM(S): at 14:45

## 2021-01-01 RX ADMIN — PANTOPRAZOLE SODIUM 40 MILLIGRAM(S): 20 TABLET, DELAYED RELEASE ORAL at 05:36

## 2021-01-01 RX ADMIN — Medication 81 MILLIGRAM(S): at 11:08

## 2021-01-01 RX ADMIN — Medication 1300 MILLIGRAM(S): at 06:09

## 2021-01-01 RX ADMIN — ATORVASTATIN CALCIUM 20 MILLIGRAM(S): 80 TABLET, FILM COATED ORAL at 21:15

## 2021-01-01 RX ADMIN — WARFARIN SODIUM 3 MILLIGRAM(S): 2.5 TABLET ORAL at 21:02

## 2021-01-01 RX ADMIN — PANTOPRAZOLE SODIUM 40 MILLIGRAM(S): 20 TABLET, DELAYED RELEASE ORAL at 05:29

## 2021-01-01 RX ADMIN — Medication 40 MILLIEQUIVALENT(S): at 21:17

## 2021-01-01 RX ADMIN — ATORVASTATIN CALCIUM 20 MILLIGRAM(S): 80 TABLET, FILM COATED ORAL at 21:07

## 2021-01-01 RX ADMIN — Medication 25 MILLIGRAM(S): at 13:36

## 2021-01-01 RX ADMIN — Medication 667 MILLIGRAM(S): at 17:21

## 2021-01-01 RX ADMIN — TAMSULOSIN HYDROCHLORIDE 0.4 MILLIGRAM(S): 0.4 CAPSULE ORAL at 21:13

## 2021-01-01 RX ADMIN — Medication 2: at 17:08

## 2021-01-01 RX ADMIN — Medication 667 MILLIGRAM(S): at 08:53

## 2021-01-01 RX ADMIN — AMLODIPINE BESYLATE 10 MILLIGRAM(S): 2.5 TABLET ORAL at 06:10

## 2021-01-01 RX ADMIN — Medication 20 MILLIGRAM(S): at 05:24

## 2021-01-01 RX ADMIN — Medication 25 MILLIGRAM(S): at 21:03

## 2021-01-01 RX ADMIN — Medication 81 MILLIGRAM(S): at 17:45

## 2021-01-01 RX ADMIN — Medication 25 MILLIGRAM(S): at 05:14

## 2021-01-01 RX ADMIN — ATORVASTATIN CALCIUM 20 MILLIGRAM(S): 80 TABLET, FILM COATED ORAL at 21:02

## 2021-01-01 RX ADMIN — Medication 667 MILLIGRAM(S): at 18:07

## 2021-01-01 RX ADMIN — ATORVASTATIN CALCIUM 20 MILLIGRAM(S): 80 TABLET, FILM COATED ORAL at 21:05

## 2021-01-01 RX ADMIN — AMLODIPINE BESYLATE 5 MILLIGRAM(S): 2.5 TABLET ORAL at 05:17

## 2021-01-01 RX ADMIN — Medication 2: at 07:44

## 2021-01-01 RX ADMIN — Medication 20 MILLIGRAM(S): at 06:09

## 2021-01-01 RX ADMIN — HEPARIN SODIUM 5000 UNIT(S): 5000 INJECTION INTRAVENOUS; SUBCUTANEOUS at 21:30

## 2021-01-01 RX ADMIN — Medication 650 MILLIGRAM(S): at 01:12

## 2021-01-01 RX ADMIN — CEFTRIAXONE 100 MILLIGRAM(S): 500 INJECTION, POWDER, FOR SOLUTION INTRAMUSCULAR; INTRAVENOUS at 06:14

## 2021-01-01 RX ADMIN — FAMOTIDINE 20 MILLIGRAM(S): 10 INJECTION INTRAVENOUS at 11:30

## 2021-01-01 RX ADMIN — CHLORHEXIDINE GLUCONATE 1 APPLICATION(S): 213 SOLUTION TOPICAL at 12:08

## 2021-01-01 RX ADMIN — AMLODIPINE BESYLATE 10 MILLIGRAM(S): 2.5 TABLET ORAL at 05:48

## 2021-01-01 RX ADMIN — PANTOPRAZOLE SODIUM 40 MILLIGRAM(S): 20 TABLET, DELAYED RELEASE ORAL at 05:17

## 2021-01-01 RX ADMIN — SENNA PLUS 2 TABLET(S): 8.6 TABLET ORAL at 21:29

## 2021-01-01 RX ADMIN — TAMSULOSIN HYDROCHLORIDE 0.8 MILLIGRAM(S): 0.4 CAPSULE ORAL at 21:19

## 2021-01-01 RX ADMIN — ATORVASTATIN CALCIUM 20 MILLIGRAM(S): 80 TABLET, FILM COATED ORAL at 11:51

## 2021-01-01 RX ADMIN — SODIUM ZIRCONIUM CYCLOSILICATE 10 GRAM(S): 10 POWDER, FOR SUSPENSION ORAL at 17:39

## 2021-01-01 RX ADMIN — HEPARIN SODIUM 5000 UNIT(S): 5000 INJECTION INTRAVENOUS; SUBCUTANEOUS at 06:05

## 2021-01-01 RX ADMIN — Medication 2: at 11:08

## 2021-01-01 RX ADMIN — Medication 667 MILLIGRAM(S): at 11:31

## 2021-01-01 RX ADMIN — PANTOPRAZOLE SODIUM 40 MILLIGRAM(S): 20 TABLET, DELAYED RELEASE ORAL at 05:38

## 2021-01-01 RX ADMIN — Medication 25 MILLIGRAM(S): at 05:35

## 2021-01-01 RX ADMIN — Medication 20 MILLIGRAM(S): at 05:35

## 2021-01-01 RX ADMIN — POLYETHYLENE GLYCOL 3350 17 GRAM(S): 17 POWDER, FOR SOLUTION ORAL at 11:56

## 2021-01-01 RX ADMIN — AMLODIPINE BESYLATE 10 MILLIGRAM(S): 2.5 TABLET ORAL at 05:14

## 2021-01-01 RX ADMIN — Medication 667 MILLIGRAM(S): at 09:25

## 2021-01-01 RX ADMIN — HEPARIN SODIUM 5000 UNIT(S): 5000 INJECTION INTRAVENOUS; SUBCUTANEOUS at 14:02

## 2021-01-01 RX ADMIN — Medication 667 MILLIGRAM(S): at 16:52

## 2021-01-01 RX ADMIN — Medication 25 MILLIGRAM(S): at 15:42

## 2021-01-01 RX ADMIN — Medication 650 MILLIGRAM(S): at 21:20

## 2021-01-01 RX ADMIN — APIXABAN 5 MILLIGRAM(S): 2.5 TABLET, FILM COATED ORAL at 17:10

## 2021-01-01 RX ADMIN — Medication 20 MILLIEQUIVALENT(S): at 13:02

## 2021-01-01 RX ADMIN — Medication 25 MILLIGRAM(S): at 13:41

## 2021-01-01 RX ADMIN — Medication 1300 MILLIGRAM(S): at 21:25

## 2021-01-01 RX ADMIN — Medication 25 MILLIGRAM(S): at 21:08

## 2021-01-01 RX ADMIN — Medication 20 MILLIEQUIVALENT(S): at 17:33

## 2021-01-01 RX ADMIN — Medication 25 MILLIGRAM(S): at 05:52

## 2021-01-01 RX ADMIN — FAMOTIDINE 20 MILLIGRAM(S): 10 INJECTION INTRAVENOUS at 11:20

## 2021-01-01 RX ADMIN — Medication 1300 MILLIGRAM(S): at 22:49

## 2021-01-01 RX ADMIN — Medication 650 MILLIGRAM(S): at 15:00

## 2021-01-01 RX ADMIN — AMLODIPINE BESYLATE 10 MILLIGRAM(S): 2.5 TABLET ORAL at 05:38

## 2021-01-01 RX ADMIN — Medication 25 MILLIGRAM(S): at 13:54

## 2021-01-01 RX ADMIN — Medication 25 MILLIGRAM(S): at 13:03

## 2021-01-01 RX ADMIN — Medication 667 MILLIGRAM(S): at 17:16

## 2021-01-01 RX ADMIN — AMLODIPINE BESYLATE 10 MILLIGRAM(S): 2.5 TABLET ORAL at 06:04

## 2021-01-01 RX ADMIN — AMLODIPINE BESYLATE 10 MILLIGRAM(S): 2.5 TABLET ORAL at 06:17

## 2021-01-01 RX ADMIN — HEPARIN SODIUM 5000 UNIT(S): 5000 INJECTION INTRAVENOUS; SUBCUTANEOUS at 05:14

## 2021-01-01 RX ADMIN — Medication 650 MILLIGRAM(S): at 07:52

## 2021-01-01 RX ADMIN — Medication 667 MILLIGRAM(S): at 11:35

## 2021-01-01 RX ADMIN — ATORVASTATIN CALCIUM 20 MILLIGRAM(S): 80 TABLET, FILM COATED ORAL at 22:14

## 2021-01-01 RX ADMIN — Medication 667 MILLIGRAM(S): at 16:55

## 2021-01-01 RX ADMIN — HEPARIN SODIUM 9 UNIT(S)/HR: 5000 INJECTION INTRAVENOUS; SUBCUTANEOUS at 18:48

## 2021-01-01 RX ADMIN — Medication 25 MILLIGRAM(S): at 21:24

## 2021-01-01 RX ADMIN — Medication 1300 MILLIGRAM(S): at 05:10

## 2021-01-01 RX ADMIN — SENNA PLUS 2 TABLET(S): 8.6 TABLET ORAL at 21:05

## 2021-01-01 RX ADMIN — Medication 12.5 GRAM(S): at 10:16

## 2021-01-01 RX ADMIN — HEPARIN SODIUM 1000 UNIT(S)/HR: 5000 INJECTION INTRAVENOUS; SUBCUTANEOUS at 15:48

## 2021-01-01 RX ADMIN — Medication 25 MILLIGRAM(S): at 05:47

## 2021-01-01 RX ADMIN — Medication 50 GRAM(S): at 18:23

## 2021-01-01 RX ADMIN — AMLODIPINE BESYLATE 10 MILLIGRAM(S): 2.5 TABLET ORAL at 05:49

## 2021-01-01 RX ADMIN — Medication 667 MILLIGRAM(S): at 08:25

## 2021-01-01 RX ADMIN — Medication 25 MILLIGRAM(S): at 13:56

## 2021-01-01 RX ADMIN — HEPARIN SODIUM 5000 UNIT(S): 5000 INJECTION INTRAVENOUS; SUBCUTANEOUS at 05:35

## 2021-01-01 RX ADMIN — FAMOTIDINE 20 MILLIGRAM(S): 10 INJECTION INTRAVENOUS at 11:36

## 2021-01-01 RX ADMIN — Medication 667 MILLIGRAM(S): at 11:09

## 2021-01-01 RX ADMIN — CHLORHEXIDINE GLUCONATE 1 APPLICATION(S): 213 SOLUTION TOPICAL at 11:35

## 2021-01-01 RX ADMIN — PANTOPRAZOLE SODIUM 40 MILLIGRAM(S): 20 TABLET, DELAYED RELEASE ORAL at 05:08

## 2021-01-01 RX ADMIN — Medication 50 MILLILITER(S): at 15:42

## 2021-01-01 RX ADMIN — Medication 25 MILLIGRAM(S): at 05:24

## 2021-01-01 RX ADMIN — Medication 650 MILLIGRAM(S): at 15:46

## 2021-01-01 RX ADMIN — SENNA PLUS 2 TABLET(S): 8.6 TABLET ORAL at 21:23

## 2021-01-01 RX ADMIN — SENNA PLUS 2 TABLET(S): 8.6 TABLET ORAL at 21:12

## 2021-01-01 RX ADMIN — Medication 667 MILLIGRAM(S): at 08:19

## 2021-01-01 RX ADMIN — PANTOPRAZOLE SODIUM 40 MILLIGRAM(S): 20 TABLET, DELAYED RELEASE ORAL at 05:57

## 2021-01-01 RX ADMIN — AMLODIPINE BESYLATE 10 MILLIGRAM(S): 2.5 TABLET ORAL at 05:28

## 2021-01-01 RX ADMIN — Medication 650 MILLIGRAM(S): at 16:31

## 2021-01-01 RX ADMIN — Medication 2: at 11:16

## 2021-01-01 RX ADMIN — ATORVASTATIN CALCIUM 20 MILLIGRAM(S): 80 TABLET, FILM COATED ORAL at 12:27

## 2021-01-01 RX ADMIN — HEPARIN SODIUM 5000 UNIT(S): 5000 INJECTION INTRAVENOUS; SUBCUTANEOUS at 14:20

## 2021-01-01 RX ADMIN — HEPARIN SODIUM 10 UNIT(S)/HR: 5000 INJECTION INTRAVENOUS; SUBCUTANEOUS at 16:44

## 2021-01-01 RX ADMIN — HEPARIN SODIUM 5000 UNIT(S): 5000 INJECTION INTRAVENOUS; SUBCUTANEOUS at 13:44

## 2021-01-01 RX ADMIN — AMLODIPINE BESYLATE 10 MILLIGRAM(S): 2.5 TABLET ORAL at 05:56

## 2021-01-01 RX ADMIN — Medication 1300 MILLIGRAM(S): at 06:57

## 2021-01-01 RX ADMIN — Medication 2: at 07:34

## 2021-01-01 RX ADMIN — HEPARIN SODIUM 5000 UNIT(S): 5000 INJECTION INTRAVENOUS; SUBCUTANEOUS at 16:47

## 2021-01-01 RX ADMIN — Medication 1300 MILLIGRAM(S): at 21:02

## 2021-01-01 RX ADMIN — Medication 25 MILLIGRAM(S): at 21:27

## 2021-01-01 RX ADMIN — Medication 200 GRAM(S): at 19:01

## 2021-01-01 RX ADMIN — Medication 25 MILLIGRAM(S): at 13:10

## 2021-01-01 RX ADMIN — Medication 1300 MILLIGRAM(S): at 13:20

## 2021-01-01 RX ADMIN — Medication 25 MILLIGRAM(S): at 05:30

## 2021-01-01 RX ADMIN — Medication 25 MILLIGRAM(S): at 05:29

## 2021-01-01 RX ADMIN — Medication 4: at 17:28

## 2021-01-01 RX ADMIN — Medication 20 MILLIGRAM(S): at 05:56

## 2021-01-01 RX ADMIN — AMLODIPINE BESYLATE 5 MILLIGRAM(S): 2.5 TABLET ORAL at 05:01

## 2021-01-01 RX ADMIN — Medication 1300 MILLIGRAM(S): at 13:41

## 2021-01-01 RX ADMIN — Medication 1300 MILLIGRAM(S): at 14:37

## 2021-01-01 RX ADMIN — APIXABAN 2.5 MILLIGRAM(S): 2.5 TABLET, FILM COATED ORAL at 06:10

## 2021-01-01 RX ADMIN — Medication 2: at 07:55

## 2021-01-01 RX ADMIN — Medication 81 MILLIGRAM(S): at 12:39

## 2021-01-01 RX ADMIN — CHLORHEXIDINE GLUCONATE 1 APPLICATION(S): 213 SOLUTION TOPICAL at 11:31

## 2021-01-01 RX ADMIN — Medication 81 MILLIGRAM(S): at 11:31

## 2021-01-01 RX ADMIN — HEPARIN SODIUM 5000 UNIT(S): 5000 INJECTION INTRAVENOUS; SUBCUTANEOUS at 15:42

## 2021-01-01 RX ADMIN — Medication 667 MILLIGRAM(S): at 11:27

## 2021-01-01 RX ADMIN — HEPARIN SODIUM 800 UNIT(S)/HR: 5000 INJECTION INTRAVENOUS; SUBCUTANEOUS at 01:48

## 2021-01-01 RX ADMIN — FAMOTIDINE 20 MILLIGRAM(S): 10 INJECTION INTRAVENOUS at 12:06

## 2021-01-01 RX ADMIN — HEPARIN SODIUM 5000 UNIT(S): 5000 INJECTION INTRAVENOUS; SUBCUTANEOUS at 16:59

## 2021-01-01 RX ADMIN — CHLORHEXIDINE GLUCONATE 1 APPLICATION(S): 213 SOLUTION TOPICAL at 05:21

## 2021-01-01 RX ADMIN — Medication 667 MILLIGRAM(S): at 07:38

## 2021-01-01 RX ADMIN — HEPARIN SODIUM 5000 UNIT(S): 5000 INJECTION INTRAVENOUS; SUBCUTANEOUS at 05:56

## 2021-01-01 RX ADMIN — Medication 25 MILLIGRAM(S): at 21:29

## 2021-01-01 RX ADMIN — HEPARIN SODIUM 5000 UNIT(S): 5000 INJECTION INTRAVENOUS; SUBCUTANEOUS at 13:08

## 2021-01-01 RX ADMIN — Medication 2: at 08:07

## 2021-01-01 RX ADMIN — Medication 81 MILLIGRAM(S): at 11:26

## 2021-01-01 RX ADMIN — AMLODIPINE BESYLATE 5 MILLIGRAM(S): 2.5 TABLET ORAL at 05:18

## 2021-01-01 RX ADMIN — Medication 81 MILLIGRAM(S): at 11:56

## 2021-01-01 RX ADMIN — Medication 25 MILLIGRAM(S): at 21:09

## 2021-01-01 RX ADMIN — CHLORHEXIDINE GLUCONATE 1 APPLICATION(S): 213 SOLUTION TOPICAL at 05:28

## 2021-01-01 RX ADMIN — Medication 25 MILLIGRAM(S): at 14:43

## 2021-01-01 RX ADMIN — Medication 6: at 11:56

## 2021-01-01 RX ADMIN — TAMSULOSIN HYDROCHLORIDE 0.8 MILLIGRAM(S): 0.4 CAPSULE ORAL at 23:36

## 2021-01-01 RX ADMIN — SODIUM ZIRCONIUM CYCLOSILICATE 10 GRAM(S): 10 POWDER, FOR SUSPENSION ORAL at 15:47

## 2021-01-01 RX ADMIN — Medication 1300 MILLIGRAM(S): at 12:28

## 2021-01-01 RX ADMIN — Medication 650 MILLIGRAM(S): at 11:38

## 2021-01-01 RX ADMIN — ATORVASTATIN CALCIUM 20 MILLIGRAM(S): 80 TABLET, FILM COATED ORAL at 21:25

## 2021-01-01 RX ADMIN — PANTOPRAZOLE SODIUM 40 MILLIGRAM(S): 20 TABLET, DELAYED RELEASE ORAL at 06:09

## 2021-01-01 RX ADMIN — HEPARIN SODIUM 800 UNIT(S)/HR: 5000 INJECTION INTRAVENOUS; SUBCUTANEOUS at 03:46

## 2021-01-01 RX ADMIN — TAMSULOSIN HYDROCHLORIDE 0.8 MILLIGRAM(S): 0.4 CAPSULE ORAL at 22:13

## 2021-01-01 RX ADMIN — Medication 1300 MILLIGRAM(S): at 12:04

## 2021-01-01 RX ADMIN — Medication 25 MILLIGRAM(S): at 11:52

## 2021-01-01 RX ADMIN — SODIUM CHLORIDE 1000 MILLILITER(S): 9 INJECTION INTRAMUSCULAR; INTRAVENOUS; SUBCUTANEOUS at 20:06

## 2021-01-01 RX ADMIN — FAMOTIDINE 20 MILLIGRAM(S): 10 INJECTION INTRAVENOUS at 11:09

## 2021-01-01 RX ADMIN — ATORVASTATIN CALCIUM 20 MILLIGRAM(S): 80 TABLET, FILM COATED ORAL at 12:44

## 2021-01-01 RX ADMIN — HEPARIN SODIUM 5000 UNIT(S): 5000 INJECTION INTRAVENOUS; SUBCUTANEOUS at 05:22

## 2021-01-01 RX ADMIN — Medication 667 MILLIGRAM(S): at 11:16

## 2021-01-01 RX ADMIN — Medication 1300 MILLIGRAM(S): at 09:42

## 2021-01-01 RX ADMIN — Medication 667 MILLIGRAM(S): at 17:11

## 2021-01-01 RX ADMIN — ATORVASTATIN CALCIUM 20 MILLIGRAM(S): 80 TABLET, FILM COATED ORAL at 21:44

## 2021-01-01 RX ADMIN — FAMOTIDINE 20 MILLIGRAM(S): 10 INJECTION INTRAVENOUS at 11:49

## 2021-01-01 RX ADMIN — SENNA PLUS 2 TABLET(S): 8.6 TABLET ORAL at 21:27

## 2021-01-01 RX ADMIN — Medication 25 MILLIGRAM(S): at 21:25

## 2021-01-01 RX ADMIN — Medication 81 MILLIGRAM(S): at 11:29

## 2021-01-01 RX ADMIN — Medication 650 MILLIGRAM(S): at 05:54

## 2021-01-01 RX ADMIN — AMLODIPINE BESYLATE 10 MILLIGRAM(S): 2.5 TABLET ORAL at 05:24

## 2021-01-01 RX ADMIN — Medication 667 MILLIGRAM(S): at 07:55

## 2021-01-01 RX ADMIN — Medication 1300 MILLIGRAM(S): at 05:33

## 2021-01-01 RX ADMIN — Medication 1300 MILLIGRAM(S): at 21:38

## 2021-01-01 RX ADMIN — Medication 1300 MILLIGRAM(S): at 21:14

## 2021-01-01 RX ADMIN — Medication 25 MILLIGRAM(S): at 14:12

## 2021-01-01 RX ADMIN — ATORVASTATIN CALCIUM 20 MILLIGRAM(S): 80 TABLET, FILM COATED ORAL at 23:09

## 2021-01-01 RX ADMIN — Medication 1300 MILLIGRAM(S): at 05:17

## 2021-01-01 RX ADMIN — CHLORHEXIDINE GLUCONATE 1 APPLICATION(S): 213 SOLUTION TOPICAL at 05:16

## 2021-01-01 RX ADMIN — HEPARIN SODIUM 5000 UNIT(S): 5000 INJECTION INTRAVENOUS; SUBCUTANEOUS at 05:59

## 2021-01-01 RX ADMIN — AMLODIPINE BESYLATE 10 MILLIGRAM(S): 2.5 TABLET ORAL at 05:29

## 2021-01-01 RX ADMIN — AMLODIPINE BESYLATE 5 MILLIGRAM(S): 2.5 TABLET ORAL at 05:32

## 2021-01-01 RX ADMIN — Medication 25 MILLIGRAM(S): at 13:42

## 2021-01-01 RX ADMIN — Medication 1300 MILLIGRAM(S): at 17:41

## 2021-01-01 RX ADMIN — Medication 1300 MILLIGRAM(S): at 21:44

## 2021-01-01 RX ADMIN — Medication 667 MILLIGRAM(S): at 13:08

## 2021-01-01 RX ADMIN — Medication 81 MILLIGRAM(S): at 11:30

## 2021-01-01 RX ADMIN — Medication 25 MILLIGRAM(S): at 05:01

## 2021-01-01 RX ADMIN — Medication 20 MILLIGRAM(S): at 05:14

## 2021-01-01 RX ADMIN — Medication 25 MILLIGRAM(S): at 22:14

## 2021-01-01 RX ADMIN — Medication 650 MILLIGRAM(S): at 21:45

## 2021-01-01 RX ADMIN — Medication 100 MEQ/KG/HR: at 22:44

## 2021-01-01 RX ADMIN — Medication 4: at 11:31

## 2021-01-01 RX ADMIN — AMLODIPINE BESYLATE 10 MILLIGRAM(S): 2.5 TABLET ORAL at 05:16

## 2021-01-01 RX ADMIN — Medication 81 MILLIGRAM(S): at 11:13

## 2021-01-01 RX ADMIN — TAMSULOSIN HYDROCHLORIDE 0.8 MILLIGRAM(S): 0.4 CAPSULE ORAL at 21:05

## 2021-01-01 RX ADMIN — Medication 25 MILLIGRAM(S): at 11:17

## 2021-01-01 RX ADMIN — Medication 81 MILLIGRAM(S): at 12:09

## 2021-01-01 RX ADMIN — Medication 20 MILLIGRAM(S): at 05:08

## 2021-01-01 RX ADMIN — Medication 20 MILLIGRAM(S): at 05:48

## 2021-01-01 RX ADMIN — Medication 81 MILLIGRAM(S): at 11:17

## 2021-01-01 RX ADMIN — Medication 667 MILLIGRAM(S): at 17:24

## 2021-01-01 RX ADMIN — Medication 5 UNIT(S): at 22:31

## 2021-01-01 RX ADMIN — PANTOPRAZOLE SODIUM 40 MILLIGRAM(S): 20 TABLET, DELAYED RELEASE ORAL at 05:51

## 2021-01-01 RX ADMIN — Medication 20 MILLIGRAM(S): at 05:10

## 2021-01-01 RX ADMIN — Medication 40 MILLIGRAM(S): at 05:01

## 2021-01-01 RX ADMIN — APIXABAN 5 MILLIGRAM(S): 2.5 TABLET, FILM COATED ORAL at 17:15

## 2021-01-01 RX ADMIN — TAMSULOSIN HYDROCHLORIDE 0.4 MILLIGRAM(S): 0.4 CAPSULE ORAL at 21:23

## 2021-01-01 RX ADMIN — ATORVASTATIN CALCIUM 20 MILLIGRAM(S): 80 TABLET, FILM COATED ORAL at 22:49

## 2021-01-01 RX ADMIN — POLYETHYLENE GLYCOL 3350 17 GRAM(S): 17 POWDER, FOR SOLUTION ORAL at 12:06

## 2021-01-01 RX ADMIN — Medication 650 MILLIGRAM(S): at 02:39

## 2021-01-01 RX ADMIN — Medication 20 MILLIGRAM(S): at 05:22

## 2021-01-01 RX ADMIN — OXYCODONE AND ACETAMINOPHEN 1 TABLET(S): 5; 325 TABLET ORAL at 10:34

## 2021-01-01 RX ADMIN — Medication 81 MILLIGRAM(S): at 12:08

## 2021-01-01 RX ADMIN — Medication 25 MILLIGRAM(S): at 15:20

## 2021-01-01 RX ADMIN — ATORVASTATIN CALCIUM 20 MILLIGRAM(S): 80 TABLET, FILM COATED ORAL at 23:37

## 2021-01-01 RX ADMIN — Medication 25 MILLIGRAM(S): at 14:42

## 2021-01-01 RX ADMIN — FAMOTIDINE 20 MILLIGRAM(S): 10 INJECTION INTRAVENOUS at 11:47

## 2021-01-01 RX ADMIN — CHLORHEXIDINE GLUCONATE 1 APPLICATION(S): 213 SOLUTION TOPICAL at 05:31

## 2021-01-01 RX ADMIN — SODIUM ZIRCONIUM CYCLOSILICATE 10 GRAM(S): 10 POWDER, FOR SUSPENSION ORAL at 05:04

## 2021-01-01 RX ADMIN — CEFTRIAXONE 100 MILLIGRAM(S): 500 INJECTION, POWDER, FOR SOLUTION INTRAMUSCULAR; INTRAVENOUS at 05:01

## 2021-01-01 RX ADMIN — CEFTRIAXONE 100 MILLIGRAM(S): 500 INJECTION, POWDER, FOR SOLUTION INTRAMUSCULAR; INTRAVENOUS at 14:50

## 2021-01-01 RX ADMIN — Medication 667 MILLIGRAM(S): at 11:53

## 2021-01-01 RX ADMIN — TAMSULOSIN HYDROCHLORIDE 0.8 MILLIGRAM(S): 0.4 CAPSULE ORAL at 21:25

## 2021-01-01 RX ADMIN — AMLODIPINE BESYLATE 10 MILLIGRAM(S): 2.5 TABLET ORAL at 05:09

## 2021-01-01 RX ADMIN — Medication 81 MILLIGRAM(S): at 11:46

## 2021-01-01 RX ADMIN — Medication 667 MILLIGRAM(S): at 12:08

## 2021-01-01 RX ADMIN — HEPARIN SODIUM 5000 UNIT(S): 5000 INJECTION INTRAVENOUS; SUBCUTANEOUS at 07:01

## 2021-01-01 RX ADMIN — Medication 667 MILLIGRAM(S): at 16:32

## 2021-01-01 RX ADMIN — CHLORHEXIDINE GLUCONATE 1 APPLICATION(S): 213 SOLUTION TOPICAL at 11:57

## 2021-01-01 RX ADMIN — CHLORHEXIDINE GLUCONATE 1 APPLICATION(S): 213 SOLUTION TOPICAL at 05:48

## 2021-01-01 RX ADMIN — APIXABAN 5 MILLIGRAM(S): 2.5 TABLET, FILM COATED ORAL at 05:29

## 2021-01-01 RX ADMIN — SENNA PLUS 2 TABLET(S): 8.6 TABLET ORAL at 21:38

## 2021-01-01 RX ADMIN — HEPARIN SODIUM 8 UNIT(S)/HR: 5000 INJECTION INTRAVENOUS; SUBCUTANEOUS at 06:02

## 2021-01-01 RX ADMIN — Medication 25 MILLIGRAM(S): at 14:53

## 2021-01-01 RX ADMIN — Medication 25 MILLIGRAM(S): at 21:44

## 2021-01-01 RX ADMIN — Medication 25 MILLIGRAM(S): at 05:03

## 2021-01-01 RX ADMIN — Medication 1300 MILLIGRAM(S): at 14:29

## 2021-01-01 RX ADMIN — CHLORHEXIDINE GLUCONATE 1 APPLICATION(S): 213 SOLUTION TOPICAL at 12:39

## 2021-01-01 RX ADMIN — Medication 25 MILLIGRAM(S): at 05:16

## 2021-01-01 RX ADMIN — Medication 650 MILLIGRAM(S): at 08:30

## 2021-01-01 RX ADMIN — ATORVASTATIN CALCIUM 20 MILLIGRAM(S): 80 TABLET, FILM COATED ORAL at 21:20

## 2021-01-01 RX ADMIN — Medication 1300 MILLIGRAM(S): at 21:12

## 2021-01-01 RX ADMIN — ATORVASTATIN CALCIUM 20 MILLIGRAM(S): 80 TABLET, FILM COATED ORAL at 21:27

## 2021-01-01 RX ADMIN — TAMSULOSIN HYDROCHLORIDE 0.8 MILLIGRAM(S): 0.4 CAPSULE ORAL at 22:40

## 2021-01-01 RX ADMIN — WARFARIN SODIUM 5 MILLIGRAM(S): 2.5 TABLET ORAL at 21:25

## 2021-01-01 RX ADMIN — Medication 25 MILLIGRAM(S): at 05:18

## 2021-01-01 RX ADMIN — PANTOPRAZOLE SODIUM 40 MILLIGRAM(S): 20 TABLET, DELAYED RELEASE ORAL at 07:42

## 2021-01-01 RX ADMIN — Medication 25 MILLIGRAM(S): at 05:10

## 2021-01-01 RX ADMIN — Medication 1300 MILLIGRAM(S): at 05:49

## 2021-01-01 RX ADMIN — AMLODIPINE BESYLATE 10 MILLIGRAM(S): 2.5 TABLET ORAL at 05:50

## 2021-01-01 RX ADMIN — ATORVASTATIN CALCIUM 20 MILLIGRAM(S): 80 TABLET, FILM COATED ORAL at 21:29

## 2021-01-01 RX ADMIN — Medication 650 MILLIGRAM(S): at 04:30

## 2021-01-01 RX ADMIN — FAMOTIDINE 20 MILLIGRAM(S): 10 INJECTION INTRAVENOUS at 12:09

## 2021-01-01 RX ADMIN — CHLORHEXIDINE GLUCONATE 1 APPLICATION(S): 213 SOLUTION TOPICAL at 11:53

## 2021-01-01 RX ADMIN — SENNA PLUS 2 TABLET(S): 8.6 TABLET ORAL at 22:19

## 2021-01-01 RX ADMIN — Medication 1300 MILLIGRAM(S): at 13:04

## 2021-01-01 RX ADMIN — Medication 25 MILLIGRAM(S): at 21:38

## 2021-01-01 RX ADMIN — CHLORHEXIDINE GLUCONATE 1 APPLICATION(S): 213 SOLUTION TOPICAL at 11:17

## 2021-01-01 RX ADMIN — Medication 20 MILLIGRAM(S): at 06:04

## 2021-01-01 RX ADMIN — Medication 100 GRAM(S): at 15:46

## 2021-01-01 RX ADMIN — HEPARIN SODIUM 700 UNIT(S)/HR: 5000 INJECTION INTRAVENOUS; SUBCUTANEOUS at 05:10

## 2021-01-01 RX ADMIN — Medication 25 MILLIGRAM(S): at 21:14

## 2021-01-01 RX ADMIN — POLYETHYLENE GLYCOL 3350 17 GRAM(S): 17 POWDER, FOR SOLUTION ORAL at 12:19

## 2021-01-01 RX ADMIN — Medication 0: at 16:55

## 2021-01-01 RX ADMIN — Medication 81 MILLIGRAM(S): at 13:08

## 2021-01-01 RX ADMIN — PANTOPRAZOLE SODIUM 40 MILLIGRAM(S): 20 TABLET, DELAYED RELEASE ORAL at 05:33

## 2021-01-01 RX ADMIN — CEFEPIME 100 MILLIGRAM(S): 1 INJECTION, POWDER, FOR SOLUTION INTRAMUSCULAR; INTRAVENOUS at 17:39

## 2021-01-01 RX ADMIN — Medication 2: at 12:10

## 2021-01-01 RX ADMIN — Medication 25 MILLIGRAM(S): at 06:04

## 2021-01-01 RX ADMIN — Medication 650 MILLIGRAM(S): at 21:30

## 2021-01-01 RX ADMIN — PANTOPRAZOLE SODIUM 40 MILLIGRAM(S): 20 TABLET, DELAYED RELEASE ORAL at 05:04

## 2021-01-01 RX ADMIN — Medication 1300 MILLIGRAM(S): at 05:36

## 2021-01-01 RX ADMIN — Medication 1300 MILLIGRAM(S): at 05:08

## 2021-01-01 RX ADMIN — CHLORHEXIDINE GLUCONATE 1 APPLICATION(S): 213 SOLUTION TOPICAL at 11:49

## 2021-01-01 RX ADMIN — Medication 667 MILLIGRAM(S): at 18:11

## 2021-01-01 RX ADMIN — Medication 25 MILLIGRAM(S): at 06:09

## 2021-01-01 RX ADMIN — CHLORHEXIDINE GLUCONATE 1 APPLICATION(S): 213 SOLUTION TOPICAL at 05:14

## 2021-01-01 RX ADMIN — POLYETHYLENE GLYCOL 3350 17 GRAM(S): 17 POWDER, FOR SOLUTION ORAL at 11:09

## 2021-01-01 RX ADMIN — Medication 667 MILLIGRAM(S): at 07:57

## 2021-01-01 RX ADMIN — Medication 25 MILLIGRAM(S): at 21:19

## 2021-01-01 RX ADMIN — PANTOPRAZOLE SODIUM 40 MILLIGRAM(S): 20 TABLET, DELAYED RELEASE ORAL at 05:07

## 2021-01-01 RX ADMIN — TAMSULOSIN HYDROCHLORIDE 0.4 MILLIGRAM(S): 0.4 CAPSULE ORAL at 21:27

## 2021-01-01 RX ADMIN — Medication 20 MILLIEQUIVALENT(S): at 12:44

## 2021-01-01 RX ADMIN — Medication 25 MILLIGRAM(S): at 14:05

## 2021-01-01 RX ADMIN — Medication 667 MILLIGRAM(S): at 08:00

## 2021-01-01 RX ADMIN — Medication 667 MILLIGRAM(S): at 07:59

## 2021-01-01 RX ADMIN — Medication 2: at 16:39

## 2021-01-01 RX ADMIN — Medication 20 MILLIGRAM(S): at 05:49

## 2021-01-01 RX ADMIN — Medication 81 MILLIGRAM(S): at 11:32

## 2021-01-01 RX ADMIN — INSULIN HUMAN 10 UNIT(S): 100 INJECTION, SOLUTION SUBCUTANEOUS at 15:41

## 2021-01-01 RX ADMIN — Medication 25 MILLIGRAM(S): at 14:02

## 2021-01-01 RX ADMIN — SODIUM CHLORIDE 1000 MILLILITER(S): 9 INJECTION INTRAMUSCULAR; INTRAVENOUS; SUBCUTANEOUS at 12:44

## 2021-01-01 RX ADMIN — Medication 650 MILLIGRAM(S): at 06:52

## 2021-01-01 RX ADMIN — Medication 667 MILLIGRAM(S): at 08:08

## 2021-01-01 RX ADMIN — Medication 81 MILLIGRAM(S): at 11:02

## 2021-01-01 RX ADMIN — Medication 1300 MILLIGRAM(S): at 05:04

## 2021-01-01 RX ADMIN — Medication 650 MILLIGRAM(S): at 21:18

## 2021-01-01 RX ADMIN — Medication 40 MILLIGRAM(S): at 17:39

## 2021-01-01 RX ADMIN — Medication 667 MILLIGRAM(S): at 12:19

## 2021-01-01 RX ADMIN — Medication 1300 MILLIGRAM(S): at 22:30

## 2021-01-01 RX ADMIN — Medication 25 MILLIGRAM(S): at 21:07

## 2021-01-01 RX ADMIN — Medication 667 MILLIGRAM(S): at 11:36

## 2021-01-01 RX ADMIN — HEPARIN SODIUM 5000 UNIT(S): 5000 INJECTION INTRAVENOUS; SUBCUTANEOUS at 05:30

## 2021-01-01 RX ADMIN — Medication 2: at 07:53

## 2021-01-01 RX ADMIN — PANTOPRAZOLE SODIUM 40 MILLIGRAM(S): 20 TABLET, DELAYED RELEASE ORAL at 06:57

## 2021-01-01 RX ADMIN — Medication 1300 MILLIGRAM(S): at 13:09

## 2021-01-01 RX ADMIN — SENNA PLUS 2 TABLET(S): 8.6 TABLET ORAL at 23:09

## 2021-01-01 RX ADMIN — POLYETHYLENE GLYCOL 3350 17 GRAM(S): 17 POWDER, FOR SOLUTION ORAL at 11:20

## 2021-01-01 RX ADMIN — PANTOPRAZOLE SODIUM 40 MILLIGRAM(S): 20 TABLET, DELAYED RELEASE ORAL at 05:25

## 2021-01-01 RX ADMIN — TAMSULOSIN HYDROCHLORIDE 0.8 MILLIGRAM(S): 0.4 CAPSULE ORAL at 21:08

## 2021-01-01 RX ADMIN — Medication 40 MILLIGRAM(S): at 06:17

## 2021-01-01 RX ADMIN — Medication 81 MILLIGRAM(S): at 11:57

## 2021-01-01 RX ADMIN — Medication 650 MILLIGRAM(S): at 12:29

## 2021-01-01 RX ADMIN — HEPARIN SODIUM 1200 UNIT(S)/HR: 5000 INJECTION INTRAVENOUS; SUBCUTANEOUS at 10:18

## 2021-01-01 RX ADMIN — Medication 1300 MILLIGRAM(S): at 06:17

## 2021-01-01 RX ADMIN — Medication 81 MILLIGRAM(S): at 11:20

## 2021-01-01 RX ADMIN — Medication 1300 MILLIGRAM(S): at 13:11

## 2021-01-01 RX ADMIN — Medication 25 MILLIGRAM(S): at 05:49

## 2021-01-01 RX ADMIN — CEFEPIME 100 MILLIGRAM(S): 1 INJECTION, POWDER, FOR SOLUTION INTRAMUSCULAR; INTRAVENOUS at 16:54

## 2021-01-01 RX ADMIN — ATORVASTATIN CALCIUM 20 MILLIGRAM(S): 80 TABLET, FILM COATED ORAL at 21:03

## 2021-01-01 RX ADMIN — AMLODIPINE BESYLATE 10 MILLIGRAM(S): 2.5 TABLET ORAL at 05:22

## 2021-01-01 RX ADMIN — Medication 667 MILLIGRAM(S): at 11:57

## 2021-01-01 RX ADMIN — PANTOPRAZOLE SODIUM 40 MILLIGRAM(S): 20 TABLET, DELAYED RELEASE ORAL at 05:50

## 2021-01-01 RX ADMIN — Medication 650 MILLIGRAM(S): at 08:39

## 2021-01-01 RX ADMIN — Medication 1300 MILLIGRAM(S): at 05:29

## 2021-01-01 RX ADMIN — Medication 25 MILLIGRAM(S): at 05:50

## 2021-01-01 RX ADMIN — HALOPERIDOL DECANOATE 2 MILLIGRAM(S): 100 INJECTION INTRAMUSCULAR at 02:52

## 2021-01-01 RX ADMIN — Medication 667 MILLIGRAM(S): at 08:16

## 2021-01-01 RX ADMIN — Medication 1300 MILLIGRAM(S): at 15:58

## 2021-01-01 RX ADMIN — Medication 667 MILLIGRAM(S): at 07:46

## 2021-01-01 RX ADMIN — Medication 50 MILLILITER(S): at 19:00

## 2021-01-01 RX ADMIN — Medication 25 MILLIGRAM(S): at 16:47

## 2021-01-01 RX ADMIN — HEPARIN SODIUM 5000 UNIT(S): 5000 INJECTION INTRAVENOUS; SUBCUTANEOUS at 21:15

## 2021-01-01 RX ADMIN — APIXABAN 5 MILLIGRAM(S): 2.5 TABLET, FILM COATED ORAL at 05:16

## 2021-01-01 RX ADMIN — Medication 20 MILLIGRAM(S): at 20:06

## 2021-01-01 RX ADMIN — APIXABAN 2.5 MILLIGRAM(S): 2.5 TABLET, FILM COATED ORAL at 17:39

## 2021-01-01 RX ADMIN — Medication 81 MILLIGRAM(S): at 11:52

## 2021-01-01 RX ADMIN — TAMSULOSIN HYDROCHLORIDE 0.8 MILLIGRAM(S): 0.4 CAPSULE ORAL at 21:39

## 2021-01-01 RX ADMIN — Medication 667 MILLIGRAM(S): at 08:26

## 2021-01-01 RX ADMIN — HEPARIN SODIUM 5000 UNIT(S): 5000 INJECTION INTRAVENOUS; SUBCUTANEOUS at 22:19

## 2021-01-01 RX ADMIN — Medication 667 MILLIGRAM(S): at 12:42

## 2021-01-01 RX ADMIN — CHLORHEXIDINE GLUCONATE 1 APPLICATION(S): 213 SOLUTION TOPICAL at 11:26

## 2021-01-01 RX ADMIN — Medication 667 MILLIGRAM(S): at 17:08

## 2021-01-01 RX ADMIN — Medication 4: at 11:51

## 2021-01-01 RX ADMIN — Medication 1300 MILLIGRAM(S): at 05:35

## 2021-01-01 RX ADMIN — Medication 667 MILLIGRAM(S): at 17:19

## 2021-01-01 RX ADMIN — TAMSULOSIN HYDROCHLORIDE 0.4 MILLIGRAM(S): 0.4 CAPSULE ORAL at 21:29

## 2021-01-01 RX ADMIN — Medication 25 MILLIGRAM(S): at 06:01

## 2021-01-01 RX ADMIN — Medication 1300 MILLIGRAM(S): at 14:38

## 2021-01-01 RX ADMIN — AMLODIPINE BESYLATE 10 MILLIGRAM(S): 2.5 TABLET ORAL at 17:45

## 2021-01-01 RX ADMIN — Medication 20 MILLIGRAM(S): at 05:28

## 2021-01-01 RX ADMIN — Medication 667 MILLIGRAM(S): at 16:37

## 2021-01-01 RX ADMIN — AMLODIPINE BESYLATE 10 MILLIGRAM(S): 2.5 TABLET ORAL at 06:48

## 2021-01-01 RX ADMIN — Medication 25 MILLIGRAM(S): at 21:20

## 2021-01-01 RX ADMIN — Medication 16.67 GRAM(S): at 12:26

## 2021-01-01 RX ADMIN — FAMOTIDINE 20 MILLIGRAM(S): 10 INJECTION INTRAVENOUS at 17:45

## 2021-01-01 RX ADMIN — SENNA PLUS 2 TABLET(S): 8.6 TABLET ORAL at 21:15

## 2021-01-01 RX ADMIN — Medication 1300 MILLIGRAM(S): at 06:01

## 2021-01-01 RX ADMIN — POLYETHYLENE GLYCOL 3350 17 GRAM(S): 17 POWDER, FOR SOLUTION ORAL at 11:52

## 2021-01-01 RX ADMIN — HEPARIN SODIUM 5000 UNIT(S): 5000 INJECTION INTRAVENOUS; SUBCUTANEOUS at 13:36

## 2021-01-01 RX ADMIN — Medication 650 MILLIGRAM(S): at 10:44

## 2021-01-01 RX ADMIN — Medication 325 MILLIGRAM(S): at 12:29

## 2021-01-01 RX ADMIN — Medication 81 MILLIGRAM(S): at 11:01

## 2021-01-01 RX ADMIN — Medication 1300 MILLIGRAM(S): at 05:23

## 2021-01-01 RX ADMIN — Medication 667 MILLIGRAM(S): at 17:20

## 2021-01-01 RX ADMIN — Medication 667 MILLIGRAM(S): at 12:10

## 2021-01-01 RX ADMIN — Medication 25 MILLIGRAM(S): at 21:15

## 2021-01-01 RX ADMIN — POLYETHYLENE GLYCOL 3350 17 GRAM(S): 17 POWDER, FOR SOLUTION ORAL at 11:50

## 2021-01-01 RX ADMIN — APIXABAN 5 MILLIGRAM(S): 2.5 TABLET, FILM COATED ORAL at 05:14

## 2021-01-01 RX ADMIN — APIXABAN 5 MILLIGRAM(S): 2.5 TABLET, FILM COATED ORAL at 17:05

## 2021-01-01 RX ADMIN — Medication 25 MILLIGRAM(S): at 13:02

## 2021-01-01 RX ADMIN — Medication 25 MILLIGRAM(S): at 21:33

## 2021-01-01 RX ADMIN — PANTOPRAZOLE SODIUM 40 MILLIGRAM(S): 20 TABLET, DELAYED RELEASE ORAL at 06:17

## 2021-01-01 RX ADMIN — APIXABAN 5 MILLIGRAM(S): 2.5 TABLET, FILM COATED ORAL at 05:28

## 2021-01-01 RX ADMIN — Medication 25 MILLIGRAM(S): at 14:29

## 2021-01-01 RX ADMIN — ATORVASTATIN CALCIUM 20 MILLIGRAM(S): 80 TABLET, FILM COATED ORAL at 13:08

## 2021-01-01 RX ADMIN — Medication 20 MILLIGRAM(S): at 06:00

## 2021-01-01 RX ADMIN — Medication 650 MILLIGRAM(S): at 20:40

## 2021-01-01 RX ADMIN — ATORVASTATIN CALCIUM 20 MILLIGRAM(S): 80 TABLET, FILM COATED ORAL at 22:20

## 2021-01-01 RX ADMIN — Medication 667 MILLIGRAM(S): at 07:39

## 2021-01-01 RX ADMIN — Medication 650 MILLIGRAM(S): at 21:02

## 2021-01-01 RX ADMIN — Medication 667 MILLIGRAM(S): at 08:09

## 2021-01-01 RX ADMIN — AMLODIPINE BESYLATE 10 MILLIGRAM(S): 2.5 TABLET ORAL at 05:36

## 2021-01-01 RX ADMIN — AMLODIPINE BESYLATE 10 MILLIGRAM(S): 2.5 TABLET ORAL at 05:52

## 2021-01-01 RX ADMIN — CHLORHEXIDINE GLUCONATE 1 APPLICATION(S): 213 SOLUTION TOPICAL at 11:30

## 2021-01-01 RX ADMIN — Medication 6: at 11:29

## 2021-01-01 RX ADMIN — ATORVASTATIN CALCIUM 20 MILLIGRAM(S): 80 TABLET, FILM COATED ORAL at 21:23

## 2021-01-01 RX ADMIN — Medication 1300 MILLIGRAM(S): at 21:08

## 2021-01-01 RX ADMIN — CHLORHEXIDINE GLUCONATE 1 APPLICATION(S): 213 SOLUTION TOPICAL at 11:21

## 2021-01-01 RX ADMIN — PANTOPRAZOLE SODIUM 40 MILLIGRAM(S): 20 TABLET, DELAYED RELEASE ORAL at 05:23

## 2021-01-01 RX ADMIN — CHLORHEXIDINE GLUCONATE 1 APPLICATION(S): 213 SOLUTION TOPICAL at 12:26

## 2021-01-01 RX ADMIN — Medication 1300 MILLIGRAM(S): at 14:12

## 2021-01-01 RX ADMIN — CHLORHEXIDINE GLUCONATE 1 APPLICATION(S): 213 SOLUTION TOPICAL at 05:56

## 2021-01-01 RX ADMIN — FAMOTIDINE 20 MILLIGRAM(S): 10 INJECTION INTRAVENOUS at 12:19

## 2021-01-01 RX ADMIN — Medication 20 MILLIGRAM(S): at 06:47

## 2021-01-01 RX ADMIN — Medication 667 MILLIGRAM(S): at 07:50

## 2021-01-01 RX ADMIN — Medication 667 MILLIGRAM(S): at 17:38

## 2021-01-01 RX ADMIN — Medication 667 MILLIGRAM(S): at 17:05

## 2021-01-01 RX ADMIN — Medication 667 MILLIGRAM(S): at 16:36

## 2021-01-01 RX ADMIN — Medication 650 MILLIGRAM(S): at 00:48

## 2021-01-01 RX ADMIN — Medication 20 MILLIGRAM(S): at 05:31

## 2021-01-01 RX ADMIN — Medication 25 MILLIGRAM(S): at 05:37

## 2021-01-01 RX ADMIN — AMLODIPINE BESYLATE 10 MILLIGRAM(S): 2.5 TABLET ORAL at 05:08

## 2021-01-01 RX ADMIN — Medication 25 MILLIGRAM(S): at 05:48

## 2021-01-01 RX ADMIN — Medication 650 MILLIGRAM(S): at 12:10

## 2021-01-01 RX ADMIN — Medication 81 MILLIGRAM(S): at 11:28

## 2021-01-01 RX ADMIN — Medication 650 MILLIGRAM(S): at 22:11

## 2021-01-01 RX ADMIN — HEPARIN SODIUM 7.5 UNIT(S)/HR: 5000 INJECTION INTRAVENOUS; SUBCUTANEOUS at 23:29

## 2021-01-01 RX ADMIN — CHLORHEXIDINE GLUCONATE 1 APPLICATION(S): 213 SOLUTION TOPICAL at 05:29

## 2021-01-01 RX ADMIN — HEPARIN SODIUM 5000 UNIT(S): 5000 INJECTION INTRAVENOUS; SUBCUTANEOUS at 21:41

## 2021-01-01 RX ADMIN — Medication 1300 MILLIGRAM(S): at 13:40

## 2021-01-01 RX ADMIN — AMLODIPINE BESYLATE 10 MILLIGRAM(S): 2.5 TABLET ORAL at 05:35

## 2021-01-01 RX ADMIN — HEPARIN SODIUM 8 UNIT(S)/HR: 5000 INJECTION INTRAVENOUS; SUBCUTANEOUS at 08:23

## 2021-01-01 RX ADMIN — ATORVASTATIN CALCIUM 20 MILLIGRAM(S): 80 TABLET, FILM COATED ORAL at 21:30

## 2021-01-01 RX ADMIN — Medication 667 MILLIGRAM(S): at 17:18

## 2021-01-01 RX ADMIN — HEPARIN SODIUM 12 UNIT(S)/HR: 5000 INJECTION INTRAVENOUS; SUBCUTANEOUS at 05:33

## 2021-01-01 RX ADMIN — Medication 2: at 17:11

## 2021-01-01 RX ADMIN — Medication 8: at 11:45

## 2021-01-01 RX ADMIN — Medication 667 MILLIGRAM(S): at 17:22

## 2021-04-07 NOTE — ASU PATIENT PROFILE, ADULT - PMH
History of medical problems  [place] b/l ears, dm, htn, hypercholesteremia, ckd, bowel/bladder fistula   History of medical problems  [place] b/l ears, dm, htn, hypercholesteremia, ckd, bowel/bladder fistula, kidney stones

## 2021-04-07 NOTE — CHART NOTE - NSCHARTNOTEFT_GEN_A_CORE
PACU ANESTHESIA ADMISSION NOTE      Procedure: Cataract extraction with IOL implant OD  Post op diagnosis:  Cataract right eye    ____  Intubated  TV:______       Rate: ______      FiO2: ______    ___x_  Patent Airway    x____  Full return of protective reflexes    ___x_  Full recovery from anesthesia / back to baseline status    Vitals:  T(C): 35.6 (04-07-21 @ 08:49), Max: 35.6 (04-07-21 @ 08:14)  HR: 62 (04-07-21 @ 08:49) (62 - 62)  BP: 206/81 (04-07-21 @ 08:49) (206/81 - 206/81)  RR: 18 (04-07-21 @ 08:49) (18 - 18)  SpO2: 100% (04-07-21 @ 08:49) (100% - 100%)    Mental Status:  __x__ Awake   x_____ Alert   _____ Drowsy   _____ Sedated    Nausea/Vomiting:  ____ NO  ______Yes,   See Post - Op Orders          Pain Scale (0-10):  _____    Treatment: ____ None    __x__ See Post - Op/PCA Orders    Post - Operative Fluids:   ____ Oral   __x__ See Post - Op Orders    Plan: Discharge:   _x__Home       _____Floor     _____Critical Care    _____  Other:_________________    Comments: uneventful anesthesia course no complications. VItals stable. Pt transferred to PACU

## 2021-04-07 NOTE — ASU PATIENT PROFILE, ADULT - PSH
Colostomy present     Colostomy present    History of surgery  procedure for kidney stones ( ?lithotripsy)

## 2021-04-16 PROBLEM — Z87.898 PERSONAL HISTORY OF OTHER SPECIFIED CONDITIONS: Chronic | Status: ACTIVE | Noted: 2021-01-01

## 2021-04-20 NOTE — ASU PATIENT PROFILE, ADULT - PMH
History of medical problems  [place] b/l ears, dm, htn, hypercholesteremia, ckd, bowel/bladder fistula, kidney stones

## 2021-08-17 PROBLEM — Z00.00 ENCOUNTER FOR PREVENTIVE HEALTH EXAMINATION: Status: ACTIVE | Noted: 2021-01-01

## 2021-09-01 NOTE — ED ADULT TRIAGE NOTE - RESPIRATORY RATE (BREATHS/MIN)
"Pt presents to ED reporting \"I need dialysis\". Pt has been receiving dialysis in ED for several rounds due to being unable to set up outpatient dialysis. Case management has been working with pt for same and he was instructed to return to ED for dialysis. Last dialysis 6/19/21.     Iesha Yap, RN  06/23/21 [id number]    " 19

## 2021-09-01 NOTE — ED PROVIDER NOTE - CARE PLAN
Principal Discharge DX:	Urinary retention  Secondary Diagnosis:	Hyponatremia   1 Principal Discharge DX:	Urinary retention  Secondary Diagnosis:	Hyponatremia  Secondary Diagnosis:	JOSH (acute kidney injury)

## 2021-09-01 NOTE — H&P ADULT - NSHPPHYSICALEXAM_GEN_ALL_CORE
Vital Signs Last 24 Hrs  T(C): 36.6 (01 Sep 2021 10:48), Max: 36.6 (01 Sep 2021 10:48)  T(F): 97.9 (01 Sep 2021 10:48), Max: 97.9 (01 Sep 2021 10:48)  HR: 86 (01 Sep 2021 10:48) (86 - 86)  BP: 148/70 (01 Sep 2021 10:48) (148/70 - 148/70)  BP(mean): --  RR: 19 (01 Sep 2021 10:48) (19 - 19)  SpO2: 98% (01 Sep 2021 10:48) (98% - 98%)    GENERAL: NAD, lying in bed comfortably  HEAD:  Atraumatic, Normocephalic  EYES: EOMI, conjunctiva and sclera clear  ENT: Moist mucous membranes  NECK: Supple, No JVD  CHEST/LUNG: Bibasilar crackles; +cough, Unlabored respirations  HEART: Regular rate and rhythm; No murmurs, rubs, or gallops  ABDOMEN: Bowel sounds present; Soft, Nontender, Nondistended.   EXTREMITIES: No clubbing, cyanosis, or edema  NERVOUS SYSTEM:  Alert & Oriented X2, speech clear. No deficits   SKIN: No rashes or lesions

## 2021-09-01 NOTE — H&P ADULT - ATTENDING COMMENTS
HPI: Patient is a poor historian, confused, and majority of history gathered from chart   85 year old gentleman with a PMH of DM, Crohn s/p colostomy, CKD (unknown baseline), HTN, HLD admitted for metabolic encephalopathy and urinary retention. He was recently diagnosed with COVID on . He was noted to be confused walking around outside by his son recently. Additionally for the last day he has been unable to urinate.   He denied any active complaints during my interview, was calm but confused, and had poor insight to her current medical issues.   Luna placed in the ED, unclear how much came out but 1600ml recorded urinary output in flow sheets  CXR b/l opacities, on RA, no resp distress.   Elevated Cr and low GFR noted on labs but baseline Cr unknown.     REVIEW OF SYSTEMS:  CONSTITUTIONAL:  No weakness, fevers, chills, night sweats, weight loss  EYES/ENT: No visual changes. No vertigo or dysphagia  NECK: No neck pain or stiffness  RESPIRATORY: No cough, wheezing, hemoptysis. No shortness of breath  CARDIOVASCULAR: No chest pain or palpitations. No lower extremity edema  GASTROINTESTINAL: No abdominal pain. No nausea, vomiting, diarrhea, or hematemesis  GENITOURINARY: No dysuria or hematuria. +urinary retention   NEUROLOGICAL: No focal numbness or weakness. +confusion   SKIN: No rashes or itching  HEMATOLOGIC: No easy bruising or prolonged bleeding.      PHYSICAL EXAM:  GENERAL: NAD, well-developed, Non-toxic, elderly and frail, stated age   HEAD:  Atraumatic, Normocephalic  EYES: EOMI, Sclera White   NECK: Supple, No JVD  CHEST/LUNG: Clear to auscultation bilaterally; No wheezing, rhonchi, or crackles  HEART: Regular rate and rhythm; s1, s2, No murmurs, rubs, or gallops  ABDOMEN: Soft, Nontender, Nondistended; Left sided colostomy bag in place, Bowel sounds present, No rebound or guarding noted   EXTREMITIES:  +2-3 pitting lower extremity edema. No calf tenderness to palpation.  No clubbing or cyanosis  PSYCH: AAOx1 (Name,  correct, location, and date were grossly incorrect), pleasant, cooperative, not anxious, answers questions inappropriately but fluent speech   NEUROLOGY: 5/5 strength in all extremities, no downward drift. Sensation grossly intact.   SKIN: No rashes or lesions      ASSESSMENT AND PLAN:   Urinary Retention: unclear cause  -Luna in place, give TOV in the morning  -Start flomax   -Check Renal Bladder sono with post void residual volume and prostate size.     JOSH on CKD (baseline unknown) possibly due to retention or COVID/pre-renal:    -IV fluids given in the ED.   -Trend Cr, monitor strict I/O, check Urine Na, Cl, urea, urine Pr:Cr, and Renal US. If no improvement may need a Nephrology consult.   -Avoid nephrotoxic medications. Hold lisinopril and lasix for now until Cr stable/improved.  -Monitor for post obstructive diuresis     Hyponatremia: s/p 2L IVF in the ED  -Hold any more IVF for now  -Repeat CMP, check urine Na, urine Osmo, serum Osmo, and TSH     Metabolic Encephalopathy: suspected secondary to COVID  -Check TSH, B12, Folate, and RPR     COVID-19 Pneumonia: SIRS not present on admission.   -Hold RDM due to kidney function, Hold off on Dexamethasone, on RA and breathing comfortably.   -Follow up inflammatory markers (Ferritin, LDH, CRP, ESR, D-Dimer) and procalcitonin. Doubt superimposed bacterial pneumonia at this time. -Supplemental O2 as needed.   -Check type and screen and COVID antibodies for possible plasma. Consider Tocilizumab    b/l LE edema:   - Check venous duplex and 2D echo (if possible, has COVID)  -On lasix q48 at home, holding for now     HLD / HLN: cont statin  -ACE-I held for now, cont with hydalazine. If Cr improves / stable then can resume ACE-I   -Restart lasix when kidney function improved     Crohns s/p colectomy   Diabetes:  keep fingerstick glucose <180, Basal bolus insulin if FSG >180 persistently     DVT ppx: Heparin  GI ppx: Not indicated  GOC: Full code for now, GOC to be had with son during the daytime.     My note supersedes the residents in the event of a discrepancy. HPI: Patient is a poor historian, confused, and majority of history gathered from chart   85 year old gentleman with a PMH of DM, Crohn s/p colostomy, CKD (unknown baseline), HTN, HLD admitted for metabolic encephalopathy and urinary retention. He was recently diagnosed with COVID on . He was noted to be confused walking around outside by his son recently. Additionally for the last day he has been unable to urinate.   He denied any active complaints during my interview, was calm but confused, and had poor insight to her current medical issues.   Luna placed in the ED, unclear how much came out but 1600ml recorded urinary output in flow sheets  CXR b/l opacities, on RA, no resp distress.   Elevated Cr and low GFR noted on labs but baseline Cr unknown.     REVIEW OF SYSTEMS:  CONSTITUTIONAL:  No weakness, fevers, chills, night sweats, weight loss  EYES/ENT: No visual changes. No vertigo or dysphagia  NECK: No neck pain or stiffness  RESPIRATORY: No cough, wheezing, hemoptysis. No shortness of breath  CARDIOVASCULAR: No chest pain or palpitations. No lower extremity edema  GASTROINTESTINAL: No abdominal pain. No nausea, vomiting, diarrhea, or hematemesis  GENITOURINARY: No dysuria or hematuria. +urinary retention   NEUROLOGICAL: No focal numbness or weakness. +confusion   SKIN: No rashes or itching  HEMATOLOGIC: No easy bruising or prolonged bleeding.      PHYSICAL EXAM:  GENERAL: NAD, well-developed, Non-toxic, elderly and frail, stated age   HEAD:  Atraumatic, Normocephalic  EYES: EOMI, Sclera White   NECK: Supple, No JVD  CHEST/LUNG: Clear to auscultation bilaterally; No wheezing, rhonchi, or crackles  HEART: Regular rate and rhythm; s1, s2, No murmurs, rubs, or gallops  ABDOMEN: Soft, Nontender, Nondistended; Left sided colostomy bag in place, Bowel sounds present, No rebound or guarding noted   EXTREMITIES:  +2-3 pitting lower extremity edema. No calf tenderness to palpation.  No clubbing or cyanosis  PSYCH: AAOx1 (Name,  correct, location, and date were grossly incorrect), pleasant, cooperative, not anxious, answers questions inappropriately but fluent speech   NEUROLOGY: 5/5 strength in all extremities, no downward drift. Sensation grossly intact.   SKIN: No rashes or lesions      ASSESSMENT AND PLAN:   Urinary Retention: unclear cause  -Lnua in place, give TOV in the morning  -Start flomax   -Check Renal Bladder sono with post void residual volume and prostate size.     JOSH on CKD (baseline unknown) possibly due to retention or COVID/pre-renal:    -IV fluids given in the ED.   -Trend Cr, monitor strict I/O, check Urine Na, Cl, urea, urine Pr:Cr, and Renal US. If no improvement may need a Nephrology consult.   -Avoid nephrotoxic medications. Hold lisinopril and lasix for now until Cr stable/improved.  -Monitor for post obstructive diuresis     Hyponatremia: s/p 2L IVF in the ED  -Hold any more IVF for now  -Repeat CMP, check urine Na, urine Osmo, serum Osmo, and TSH     Metabolic Encephalopathy: suspected secondary to COVID  -Check TSH, B12, Folate, and RPR     COVID-19 Pneumonia: SIRS not present on admission.   -Hold RDM due to kidney function, Hold off on Dexamethasone, on RA and breathing comfortably.   -Follow up inflammatory markers (Ferritin, LDH, CRP, ESR, D-Dimer) and procalcitonin. Doubt superimposed bacterial pneumonia at this time. -Supplemental O2 as needed.   -Check type and screen and COVID antibodies for possible plasma. Consider Tocilizumab    b/l LE edema:   - Check venous duplex and 2D echo (if possible, has COVID)  -On lasix q48 at home, holding for now     HLD / HLN: cont statin  -ACE-I held for now, cont with hydralazine. If Cr improves / stable then can resume ACE-I   -Restart lasix when kidney function improved     Crohns s/p colectomy   Diabetes:  keep fingerstick glucose <180, Basal bolus insulin if FSG >180 persistently     DVT ppx: Heparin  GI ppx: Not indicated  GOC: Full code for now, GOC to be had with son during the daytime.     My note supersedes the residents in the event of a discrepancy.

## 2021-09-01 NOTE — ED PROVIDER NOTE - CLINICAL SUMMARY MEDICAL DECISION MAKING FREE TEXT BOX
Pt with urinary retention x 2-3 days, found to have hyponatremia and anitra, no previous labs to compare to, will admit

## 2021-09-01 NOTE — ED ADULT TRIAGE NOTE - CHIEF COMPLAINT QUOTE
Pt presented with c/o burning on urination/difficulty urinating. Pt endorses this happening before a couple months ago, but pt states it resolved on its own. +COVID 8/28. Denies cp/sob/n/v/fever/diarrhea. Denies abd pain.

## 2021-09-01 NOTE — H&P ADULT - NSICDXPASTMEDICALHX_GEN_ALL_CORE_FT
PAST MEDICAL HISTORY:  History of medical problems [place] b/l ears, dm, htn, hypercholesteremia, ckd, bowel/bladder fistula, kidney stones

## 2021-09-01 NOTE — ED PROVIDER NOTE - ATTENDING CONTRIBUTION TO CARE
84 yo m with pmh of dm, htn, bowel resection with colostomy bag, on diuretics, presents with c/o difficulty urinating.  pt says started 2 days ago, only dribbling.  pt denies needing sanford in the past.  pt is moderately poor historian regarding his pmh.  pt denies abd pain, n/v/d, fever or chills.  recently tested positive for covid on 8/28.  denies cough, sore throat, headache.  +vaccinated for covid exam: nad, ncat, perrl, eomi, mmm, rrr, ctab, abd soft, nt,nd, colostomy in placeaox3 imp: pt with urinary retention, will check labs, ua

## 2021-09-01 NOTE — H&P ADULT - HISTORY OF PRESENT ILLNESS
Patient is 85 year old M with PMH of DM, Crohn s/p colostomy, CKD, HTN, HLD here for urinary retention and cough. Patient states he was not able to pee since yesterday. Patient states he usually has a hard time going to the bathroom but has never had a sanford placed. Patient is poor historian and admits to being more forgetful recently. Spoke to sonNicola at [phone] for more information. Patient was living alone in apartment which had bed bugs and is now living with his son. On Saturday, patient went to urgent care for cough and was tested positive for COVID. He was sent home with cough medicine and told to quarantine. His son also tested positive for COVID. Patient got both doses of Moderna COVID vaccine in March. His son also noted that patient has not been acting himself and is confused. He found him wandering around the street in the middle of the night. Patient denies fevers, chest pain, sob, abdominal pain, diarrhea. Patient admits to productive cough and leg swelling.     In ED vitals: /70, HR 86, RR 19, T 97.9, SpO2 98% on RA  CXR revealed bibasilar opacities, Lab significant for Na 123, Cr 2.5, UA was negative   Sanford was placed in ED.

## 2021-09-01 NOTE — ED PROVIDER NOTE - PHYSICAL EXAMINATION
Physical Exam    Vital Signs: I have reviewed the initial vital signs.  Constitutional: well-nourished, appears stated age, no acute distress  Eyes: Conjunctiva pink, Sclera clear  Cardiovascular: S1 and S2, regular rate, regular rhythm, well-perfused extremities, radial pulses equal and 2+  Respiratory: unlabored respiratory effort, clear to auscultation bilaterally no wheezing, rales and rhonchi  Gastrointestinal: soft,suprapubic distention, no pulsatile mass, normal bowl sounds  Musculoskeletal: supple neck, no lower extremity edema, no midline tenderness  Integumentary: warm, dry, no rash  Neurologic: awake, alert, nvi

## 2021-09-01 NOTE — ED PROVIDER NOTE - OBJECTIVE STATEMENT
86 yo male, pmh of htn, hld, dm, presents to ed for retention, x 2 days, suprapubic pressure, mild, aching, no radiation. denies fever, chills, cp, sob, abd pain, le swelling. nvd.

## 2021-09-01 NOTE — H&P ADULT - NSICDXPASTSURGICALHX_GEN_ALL_CORE_FT
PAST SURGICAL HISTORY:  Colostomy present     History of surgery procedure for kidney stones ( ?lithotripsy)

## 2021-09-01 NOTE — H&P ADULT - ASSESSMENT
Patient is 85 year old M with PMH of DM, Crohn s/p colostomy, CKD, HTN, HLD here for urinary retention and cough. Sanford placed in ED draining 600cc urine. Patient tested positive for COVID at urgent care center 8/28.     #Metabolic Encephalopathy likely 2/2 hyponatremia vs delirium   - Na 123 on admission   - s/p 1L NS in ED   - F/u urine lytes, serum osm   - UA negative     #JOSH on CKD likely 2/2 urinary retention   - Cr 2.5 on admission, unknown baseline   - s/p sanford insertion in ED  - s/p 1L IVF in ED   - f/u urine lytes  - f/u renal US     #Cough 2/2 COVID PNA  - COVID-19 PCR positive- Isolation precautions (contact, droplet, airborne)  - CXR: bibasilar infiltrates  - Patient is saturating 98% on RA, maintain SpO2 92-96%  - F/u inflammatory markers (D-dimer, CRP, ferritin)   - Incentive spirometry at bedside  - DVT prophylaxis per protocol  - cough syrup ordered     #LE Edema likely 2/2 CHF   - pt takes lasix 20mg q48hr at home  - f/u echo     #DM - hold home meds, f/u a1c, ISS   #HTN - continue acei and amlodipine   #HLD - cont atorvastatin 20   #GERD - cont famotidine  #h/o Crohn's s/p colostomy - avoid constipation     #Misc   DVT ppx: heparin SQ  GI ppx: pepcid  Diet: DASH/CC  Activity: IAT

## 2021-09-01 NOTE — H&P ADULT - NSHPLABSRESULTS_GEN_ALL_CORE
11.1   5.34  )-----------( 164      ( 01 Sep 2021 11:20 )             32.6           123<L>  |  92<L>  |  32<H>  ----------------------------<  203<H>  4.4   |  17  |  2.5<H>    Ca    8.1<L>      01 Sep 2021 11:20    TPro  5.7<L>  /  Alb  3.3<L>  /  TBili  0.5  /  DBili  x   /  AST  19  /  ALT  15  /  AlkPhos  93            Urinalysis Basic - ( 01 Sep 2021 11:20 )  Color: Light Yellow / Appearance: Clear / S.008 / pH: x  Gluc: x / Ketone: Negative  / Bili: Negative / Urobili: <2 mg/dL   Blood: x / Protein: 300 mg/dL / Nitrite: Negative   Leuk Esterase: Negative / RBC: 2 /HPF / WBC 2 /HPF   Sq Epi: x / Non Sq Epi: 1 /HPF / Bacteria: Negative      Lactate Trend    CAPILLARY BLOOD GLUCOSE

## 2021-09-02 NOTE — PHYSICAL THERAPY INITIAL EVALUATION ADULT - ADDITIONAL COMMENTS
Pt. reports he lives with his son in a private house, unable to recall amount of JORDAN. Pt. reports he was independent PTA and did not use an AD. Pt. reports his son helped him with "stuff in the house".

## 2021-09-02 NOTE — PHYSICAL THERAPY INITIAL EVALUATION ADULT - GAIT TRAINING, PT EVAL
Pt. will amb at least 100 ft using a rolling walker with Sup by D/C. Pt. will ascend and descend at least 6 steps using a HR with CGA by D/C

## 2021-09-02 NOTE — PHYSICAL THERAPY INITIAL EVALUATION ADULT - GENERAL OBSERVATIONS, REHAB EVAL
Pt. encountered alert and NAD, supine in bed (+)on RA (+)sanford cath (+)ostomy bag, agreeable to PTEval. Pt. left as found, supine in bed (+)alarms (+)call bell in reach (+) sanford cath (+)ostomy bag, NAD

## 2021-09-02 NOTE — PHYSICAL THERAPY INITIAL EVALUATION ADULT - PERTINENT HX OF CURRENT PROBLEM, REHAB EVAL
Patient is 85 year old M with PMH of DM, Crohn s/p colostomy, CKD, HTN, HLD here for urinary retention and cough. Patient states he was not able to pee since yesterday. Patient states he usually has a hard time going to the bathroom but has never had a sanford placed. Patient is poor historian and admits to being more forgetful recently. Spoke to sonNicola at [phone] for more information

## 2021-09-03 NOTE — CONSULT NOTE ADULT - ASSESSMENT
85 year old M admitted for urinary retention and cough recently tested positive for COVID. Sanford was placed in the ED which drained 700cc of clear yellow urine and  was called for further evaluation. Patient seen and examined at bedside this morning - patient confused, is a poor historian - unable to provide accurate history.    Plan:  ·	No further acute  intervention indicated at this time  ·	Cont sanford - actively draining clear yellow urine at this time, no evidence of hematuria  ·	UA negative  ·	UCx no growth  ·	Start flomax if not contraindicated  ·	Continue to monitor CBC and BMP  ·	D/c sanford when no longer medically necessary  ·	TOV when ambulating 150 ft or greater - if patient fails TOV replace sanford and have patient f/u OP w/   for further urologic management  ·	Cont care per primary team  ·	Will discuss with attending

## 2021-09-03 NOTE — PROGRESS NOTE ADULT - SUBJECTIVE AND OBJECTIVE BOX
CECILLE FRANKEL  [facility] T2-3A 017 B ([facility] T2-3A)      Patient was evaluated and examined  by bedside, no active complains        REVIEW OF SYSTEMS:  please see pertinent positives mentioned above, all other 12 ROS negative      T(C): , Max: 36.8 (09-02-21 @ 15:01)  HR: 83 (09-03-21 @ 13:37)  BP: 149/61 (09-03-21 @ 13:37)  RR: 18 (09-03-21 @ 13:37)  SpO2: 96% (09-03-21 @ 13:37)  CAPILLARY BLOOD GLUCOSE      POCT Blood Glucose.: 201 mg/dL (03 Sep 2021 11:22)  POCT Blood Glucose.: 144 mg/dL (03 Sep 2021 07:43)  POCT Blood Glucose.: 92 mg/dL (02 Sep 2021 21:11)  POCT Blood Glucose.: 99 mg/dL (02 Sep 2021 16:31)      PHYSICAL EXAM:  General: NAD, AAOX3, patient is laying comfortably in bed  HEENT: AT, NC, Supple, NO JVD, NO CB  Lungs: CTA B/L, no wheezing, no rhonchi  CVS: normal S1, S2, RRR, NO M/G/R  Abdomen: soft, bowel sounds present, non-tender, non-distended  : sanford draining clear yellow urine  Extremities: no edema, no clubbing, no cyanosis, positive peripheral pulses b/l  Neuro: no acute focal neurological deficits, gait not tested   Skin: no rash, no ecchymosis      LAB  CBC  Date: 09-03-21 @ 07:25  Mean cell Qcdccbfhel50.5  Mean cell Hemoglobin Conc33.6  Mean cell Volum 90.6  Platelet count-Automate 236  RBC Count 3.61  Red Cell Distrib Width12.6  WBC Count6.47  % Albumin, Urine--  Hematocrit 32.7  Hemoglobin 11.0  CBC  Date: 09-02-21 @ 07:44  Mean cell Qpwtevrrma34.4  Mean cell Hemoglobin Conc33.0  Mean cell Volum 89.0  Platelet count-Automate 198  RBC Count 3.74  Red Cell Distrib Width12.5  WBC Count5.05  % Albumin, Urine--  Hematocrit 33.3  Hemoglobin 11.0  CBC  Date: 09-01-21 @ 11:20  Mean cell Rwgmhmwvjb57.4  Mean cell Hemoglobin Conc34.0  Mean cell Volum 89.3  Platelet count-Automate 164  RBC Count 3.65  Red Cell Distrib Width12.2  WBC Count5.34  % Albumin, Urine--  Hematocrit 32.6  Hemoglobin 11.1    [facility]  09-03-21 @ 07:25  Blood Gas Arterial-Calcium,Ionized--  Blood Urea Nitrogen, Serum 26 mg/dL<H> [10 - 20]  Carbon Dioxide, Serum18 mmol/L [17 - 32]  Chloride, Ehdlz069 mmol/L [98 - 110]  Creatinie, Serum2.1 mg/dL<H> [0.7 - 1.5]  Glucose, Kmluz317 mg/dL<H> [70 - 99]  Potassium, Serum4.4 mmol/L [3.5 - 5.0]  Sodium, Serum 136 mmol/L [135 - 146]  [facility]  09-02-21 @ 07:44  Blood Gas Arterial-Calcium,Ionized--  Blood Urea Nitrogen, Serum 27 mg/dL<H> [10 - 20]  Carbon Dioxide, Serum19 mmol/L [17 - 32]  Chloride, Ojadp448 mmol/L [98 - 110]  Creatinie, Serum2.3 mg/dL<H> [0.7 - 1.5]  Glucose, Elssg625 mg/dL<H> [70 - 99]  Potassium, Serum4.3 mmol/L [3.5 - 5.0]  Sodium, Serum 136 mmol/L [135 - 146]  [facility]  09-02-21 @ 02:23  Blood Gas Arterial-Calcium,Ionized--  Blood Urea Nitrogen, Serum 28 mg/dL<H> [10 - 20]  Carbon Dioxide, Serum20 mmol/L [17 - 32]  Chloride, Cmqpj122 mmol/L [98 - 110]  Creatinie, Serum2.3 mg/dL<H> [0.7 - 1.5]  Glucose, Dldbt471 mg/dL<H> [70 - 99]  Potassium, Serum3.8 mmol/L [3.5 - 5.0]  Sodium, Serum 131 mmol/L<L> [135 - 146]  [facility]  09-01-21 @ 11:20  Blood Gas Arterial-Calcium,Ionized--  Blood Urea Nitrogen, Serum 32 mg/dL<H> [10 - 20]  Carbon Dioxide, Serum17 mmol/L [17 - 32]  Chloride, Serum92 mmol/L<L> [98 - 110]  Creatinie, Serum2.5 mg/dL<H> [0.7 - 1.5]  Glucose, Ggbjd200 mg/dL<H> [70 - 99]  Potassium, Serum4.4 mmol/L [3.5 - 5.0]  Sodium, Serum 123 mmol/L<L> [135 - 146]              Microbiology:    Culture - Urine (collected 09-01-21 @ 11:20)  Source: Catheterized Catheterized  Final Report (09-02-21 @ 15:28):    No growth        Medications:  acetaminophen   Tablet .. 650 milliGRAM(s) Oral every 6 hours PRN  aluminum hydroxide/magnesium hydroxide/simethicone Suspension 30 milliLiter(s) Oral every 4 hours PRN  amLODIPine   Tablet 10 milliGRAM(s) Oral daily  aspirin enteric coated 81 milliGRAM(s) Oral daily  atorvastatin 20 milliGRAM(s) Oral at bedtime  famotidine    Tablet 20 milliGRAM(s) Oral daily  guaifenesin/dextromethorphan Oral Liquid 10 milliLiter(s) Oral four times a day PRN  heparin   Injectable 5000 Unit(s) SubCutaneous every 8 hours  hydrALAZINE 25 milliGRAM(s) Oral three times a day  influenza   Vaccine 0.5 milliLiter(s) IntraMuscular once  insulin lispro (ADMELOG) corrective regimen sliding scale   SubCutaneous three times a day before meals  melatonin 3 milliGRAM(s) Oral at bedtime PRN  ondansetron Injectable 4 milliGRAM(s) IV Push every 8 hours PRN  polyethylene glycol 3350 17 Gram(s) Oral daily  senna 2 Tablet(s) Oral at bedtime  sodium chloride 0.9%. 1000 milliLiter(s) IV Continuous <Continuous>  tamsulosin 0.4 milliGRAM(s) Oral at bedtime        Assessment and Plan:  Patient is 85 year old M with PMH of DM, Crohn s/p colostomy, CKD, HTN, HLD here for urinary retention and cough. Sanford placed in ED draining 600cc urine. Patient tested positive for COVID at urgent care center 8/28.     # Acute Metabolic Encephalopathy multifactorial in etiology : - clinically mentation has improved   - hyponatremia-- Na 123 on admission - repeated serum sodium improved to 136  -covid  - urinary retention       # Acute Urinary retention- post sanford insertion in ER  - as per  eval- start flomax tx.  - TOV prior to d/c home  - US renal - b/l renal cysts    # Elevated Ddimer level- 1135  -s/p lower ext dopper- chronic left common  femoral vein DVT- patient was not on a/c tx. at home     #JOSH on CKD likely 2/2 urinary retention   - Cr 2.5 on admission, unknown baseline   -renal function stable- cr 2.1    #Covid viral infection- patient has no clinical symptoms  - COVID-19 PCR positive- Isolation precautions (contact, droplet, airborne)  - CXR: bibasilar congestion  - Patient is saturating 98% on RA, no hypoxia  -continue clinical observation    #Mild b/l lower ext.  Edema   - resume pt. on lasix 20 mg po once daily  -outpatient echo    #DM -bsfs monitoring  #HTN - continue hydralazine and amlodipine , lasix  #HLD - cont atorvastatin 20   #GERD - cont famotidine  #h/o Crohn's s/p colostomy - avoid constipation   Physical deconditioning : PT eval today.     DVT proph- heparin subc. tx.     #Progress Note Handoff: Pending PT eval, if able to ambulate will do TOV, monitor renal function.  Family discussion:medical plan of care d/w patient's son Disposition: Home___vs. STR    Total time spent to complete patient's bedside assessment, review medical chart, discuss medical plan of care with covering medical team was more than 35 minutes

## 2021-09-03 NOTE — CONSULT NOTE ADULT - SUBJECTIVE AND OBJECTIVE BOX
Urology Consult Note: 85 year old M with PMH of DM, Crohn s/p colostomy, CKD, HTN, HLD admitted for urinary retention and cough.  Per chart review patient was recently seen at an urgent care for cough and was tested positive for COVID and was subsequently sent home and told to quarantine. Patient got both doses of Moderna COVID vaccine in March. Per chart review patient was not able to pee since yesterday. Sanford was placed in the ED which drained 700cc of clear yellow urine and  was called for further evaluation. Patient seen and examined at bedside this morning - patient confused, is a poor historian - unable to provide accurate history.    PAST MEDICAL & SURGICAL HISTORY:  [place] b/l  DM  HTN  HLD  CKD  bowel/bladder fistula  Kidney Stones  Colostomy    History of surgery  procedure for kidney stones ( ?lithotripsy)    [ x ] Due to altered mental status/intubation, subjective information was not able to be obtained from the patient. History was obtained to the extent possible from review of the chart and collateral sources of information    MEDICATIONS  (STANDING):  amLODIPine   Tablet 10 milliGRAM(s) Oral daily  aspirin enteric coated 81 milliGRAM(s) Oral daily  atorvastatin 20 milliGRAM(s) Oral at bedtime  famotidine    Tablet 20 milliGRAM(s) Oral daily  heparin   Injectable 5000 Unit(s) SubCutaneous every 8 hours  hydrALAZINE 25 milliGRAM(s) Oral three times a day  influenza   Vaccine 0.5 milliLiter(s) IntraMuscular once  insulin lispro (ADMELOG) corrective regimen sliding scale   SubCutaneous three times a day before meals  polyethylene glycol 3350 17 Gram(s) Oral daily  senna 2 Tablet(s) Oral at bedtime  sodium chloride 0.9%. 1000 milliLiter(s) (70 mL/Hr) IV Continuous <Continuous>    MEDICATIONS  (PRN):  acetaminophen   Tablet .. 650 milliGRAM(s) Oral every 6 hours PRN Temp greater or equal to 38.5C (101.3F), Mild Pain (1 - 3)  aluminum hydroxide/magnesium hydroxide/simethicone Suspension 30 milliLiter(s) Oral every 4 hours PRN Dyspepsia  guaifenesin/dextromethorphan Oral Liquid 10 milliLiter(s) Oral four times a day PRN Cough  melatonin 3 milliGRAM(s) Oral at bedtime PRN Insomnia  ondansetron Injectable 4 milliGRAM(s) IV Push every 8 hours PRN Nausea and/or Vomiting    Allergies: No Known Allergies    SOCIAL HISTORY: No illicit drug use    Vital Signs Last 24 Hrs  T(C): 36.1 (03 Sep 2021 05:48), Max: 36.8 (02 Sep 2021 15:01)  T(F): 96.9 (03 Sep 2021 05:48), Max: 98.2 (02 Sep 2021 15:01)  HR: 91 (03 Sep 2021 05:48) (74 - 91)  BP: 161/73 (03 Sep 2021 05:48) (133/68 - 161/73)  RR: 18 (03 Sep 2021 05:48) (18 - 18)  SpO2: 97% (03 Sep 2021 05:48) (95% - 97%)    PHYSICAL EXAM:  Constitutional: NAD, well-developed, well nourished, confused  HEENT: NC/AT  Back: No CVA ttp bilaterally  Resp: No accessory respiratory muscle use  Abd: Soft, NT/ND. No suprapubic ttp  Cardio: +S1/S2  : Circumcised, testicles x2 nontender descended and symmetric, no scrotal edema or ttp, no penile lesions, meatus patent and without blood or discharge, +sanford in place draining clear yellow urine  Neuro: Awake and confused  Psych: Normal mood, normal affect  Skin: Good color, non diaphoretic    I&O's Summary  02 Sep 2021 07:01  -  03 Sep 2021 07:00  --------------------------------------------------------  IN: 0 mL / OUT: 1200 mL / NET: -1200 mL    LABS:                      11.0   6.47  )-----------( 236      ( 03 Sep 2021 07:25 )             32.7     09-03  136  |  103  |  26<H>  ----------------------------<  144<H>  4.4   |  18  |  2.1<H>    Ca    8.4<L>      03 Sep 2021 07:25  TPro  5.7<L>  /  Alb  3.2<L>  /  TBili  0.4  /  DBili  x   /  AST  19  /  ALT  14  /  AlkPhos  94  09-02    Urinalysis (09.01.21 @ 11:20)   Glucose Qualitative, Urine: 100 mg/dL   Blood, Urine: Small   pH Urine: 6.5   Color: Light Yellow   Urine Appearance: Clear   Bilirubin: Negative   Ketone - Urine: Negative   Specific Gravity: 1.008   Protein, Urine: 300 mg/dL   Urobilinogen: <2 mg/dL   Nitrite: Negative   Leukocyte Esterase Concentration: Negative     Urine Microscopic-Add On (NC) (09.01.21 @ 11:20)   Epithelial Cells: 1 /HPF   Bacteria: Negative   Red Blood Cell - Urine: 2 /HPF   Hyaline Casts: 4 /LPF   White Blood Cell - Urine: 2 /HPF     Culture - Urine (09.01.21 @ 11:20)   Specimen Source: Catheterized Catheterized   Culture Results:   No growth       RADIOLOGY & ADDITIONAL STUDIES:    EXAM:  US KIDNEY(S)            PROCEDURE DATE:  09/02/2021            INTERPRETATION:  CLINICAL INFORMATION: Urinary retention    COMPARISON: None available.    TECHNIQUE: Sonography of the kidneys and bladder.    FINDINGS:    Right kidney: 10.5 cm. 2.9 x 2.6 x 2.9 cm renal cyst in the midpole. Normal echogenicity. No evidence of hydronephrosis or solid mass. Vascular flow demonstrated in the hilum.    Left kidney:  9.8 cm. 2.0 x 1.9 x 1.9 cm renal cyst in the upper pole. Normal echogenicity. No evidence of hydronephrosis or solid mass. Vascular flow demonstrated in the hilum.    Urinary bladder: Cannot be evaluated as it is empty    IMPRESSION:  No hydronephrosis  Bilateral renal cysts.      --- End of Report ---

## 2021-09-04 NOTE — PROGRESS NOTE ADULT - SUBJECTIVE AND OBJECTIVE BOX
CECILLE FRANKEL 85y Male  MRN#: [id number]   CODE STATUS: FULL    Hospital Day: 3d    Pt is currently admitted with the primary diagnosis of urinary retention and cough.    SUBJECTIVE  Hospital Course  HPI: Patient is 85 year old M with PMH of DM, Crohn s/p colostomy, CKD, HTN, HLD here for urinary retention and cough. Patient states he was not able to pee since yesterday. Patient states he usually has a hard time going to the bathroom but has never had a sanford placed. Patient is poor historian and admits to being more forgetful recently. Spoke to sonNicola at [phone] for more information. Patient was living alone in apartment which had bed bugs and is now living with his son. On Saturday, patient went to urgent care for cough and was tested positive for COVID. He was sent home with cough medicine and told to quarantine. His son also tested positive for COVID. Patient got both doses of Moderna COVID vaccine in March. His son also noted that patient has not been acting himself and is confused. He found him wandering around the street in the middle of the night. Patient denies fevers, chest pain, sob, abdominal pain, diarrhea. Patient admits to productive cough and leg swelling.     ED: /70, HR 86, RR 19, T 97.9, SpO2 98% on RA.  CXR revealed bibasilar opacities, Lab significant for Na 123, Cr 2.5, UA was negative.  Sanford was placed in ED.       Overnight events   No acute events overnight.    Subjective complaints   Patient seen and examined at bedside.  Denies any complaints this AM but remains very confused (oriented to name only).    Present Today:   - Sanford:  No [  ], Yes [ X ] : Indication: urinary retention, discontinuing today for TOV    - Type of IV Access:       .. CVC/Piccline:  No [ X ], Yes [   ] : Indication:       .. Midline: No [ X ], Yes [   ] : Indication:                                             ----------------------------------------------------------  OBJECTIVE  PAST MEDICAL & SURGICAL HISTORY  History of medical problems  [place] b/l ears, dm, htn, hypercholesteremia, ckd, bowel/bladder fistula, kidney stones    Colostomy present    History of surgery  procedure for kidney stones ( ?lithotripsy)                                              -----------------------------------------------------------  ALLERGIES:  No Known Allergies                                            ------------------------------------------------------------    HOME MEDICATIONS  Home Medications:  amLODIPine 5 mg oral tablet: 1 tab(s) orally 2 times a day (01 Sep 2021 16:54)  Aspir 81 oral delayed release tablet: 1 tab(s) orally once a day (01 Sep 2021 16:54)  atorvastatin 10 mg oral tablet: 2 tab(s) orally once a day (01 Sep 2021 16:54)  famotidine 20 mg oral tablet: 1 tab(s) orally once a day (01 Sep 2021 16:54)  Januvia 50 mg oral tablet: 1 tab(s) orally once a day (01 Sep 2021 16:54)  Lasix 20 mg oral tablet: 1 tab(s) orally every other day (01 Sep 2021 16:54)  pioglitazone 30 mg oral tablet: 1 tab(s) orally once a day (01 Sep 2021 16:54)  ramipril 5 mg oral capsule: 2 cap(s) orally once a day (01 Sep 2021 16:54)                           MEDICATIONS:  STANDING MEDICATIONS  amLODIPine   Tablet 10 milliGRAM(s) Oral daily  aspirin enteric coated 81 milliGRAM(s) Oral daily  atorvastatin 20 milliGRAM(s) Oral at bedtime  chlorhexidine 4% Liquid 1 Application(s) Topical daily  famotidine    Tablet 20 milliGRAM(s) Oral daily  furosemide    Tablet 20 milliGRAM(s) Oral daily  heparin   Injectable 5000 Unit(s) SubCutaneous every 8 hours  hydrALAZINE 25 milliGRAM(s) Oral three times a day  influenza   Vaccine 0.5 milliLiter(s) IntraMuscular once  insulin lispro (ADMELOG) corrective regimen sliding scale   SubCutaneous three times a day before meals  polyethylene glycol 3350 17 Gram(s) Oral daily  senna 2 Tablet(s) Oral at bedtime  tamsulosin 0.4 milliGRAM(s) Oral at bedtime    PRN MEDICATIONS  acetaminophen   Tablet .. 650 milliGRAM(s) Oral every 6 hours PRN  aluminum hydroxide/magnesium hydroxide/simethicone Suspension 30 milliLiter(s) Oral every 4 hours PRN  guaifenesin/dextromethorphan Oral Liquid 10 milliLiter(s) Oral four times a day PRN  melatonin 3 milliGRAM(s) Oral at bedtime PRN  ondansetron Injectable 4 milliGRAM(s) IV Push every 8 hours PRN                                            ------------------------------------------------------------  VITAL SIGNS: Last 24 Hours  T(C): 35.9 (04 Sep 2021 05:00), Max: 36.4 (03 Sep 2021 13:37)  T(F): 96.6 (04 Sep 2021 05:00), Max: 97.5 (03 Sep 2021 13:37)  HR: 79 (04 Sep 2021 05:00) (79 - 88)  BP: 135/60 (04 Sep 2021 05:00) (135/60 - 155/67)  BP(mean): --  RR: 18 (04 Sep 2021 05:00) (18 - 18)  SpO2: 96% (04 Sep 2021 08:07) (96% - 97%)      09-03-21 @ 07:01  -  09-04-21 @ 07:00  --------------------------------------------------------  IN: 0 mL / OUT: 850 mL / NET: -850 mL                                             --------------------------------------------------------------  LABS:                        8.6    4.45  )-----------( 202      ( 04 Sep 2021 04:30 )             25.9     09-04    137  |  106  |  24<H>  ----------------------------<  116<H>  4.9   |  18  |  2.3<H>    Ca    8.1<L>      04 Sep 2021 04:30        Culture - Urine (collected 01 Sep 2021 11:20)  Source: Catheterized Catheterized  Final Report (02 Sep 2021 15:28):    No growth                                              -------------------------------------------------------------  RADIOLOGY:  EXAM:  US KIDNEY(S), 09/02/2021    IMPRESSION: No hydronephrosis. Bilateral renal cysts.                                            --------------------------------------------------------------    PHYSICAL EXAM:  General: lying in bed, NAD, confused  HEENT: nontraumatic, neck supple, conjunctiva clear  LUNGS: mild cough, no increased WOB, speaking comfortably in full sentences on RA  HEART: RRR, S1/S2, 2+ RP and DPP bilateral  ABDOMEN: soft, nontender, nondistended  EXT: moves all extremities  NEURO: AAOx1, CN grossly intact  SKIN: warm, dry, mild edema                                           --------------------------------------------------------------    ASSESSMENT & PLAN  85 year old M with PMH of DM, Crohn s/p colostomy, CKD, HTN, HLD here for urinary retention and cough. Sanford placed in ED draining 600cc urine. Patient tested positive for COVID at urgent care center 8/28.     # AMS, likely due to Acute Metabolic Encephalopathy multifactorial in etiology (hyponatremia, covid, urinary retention)   - f/up CTH to r/out CVA    # Hyponatremia, Na 123 on admission   - repeated serum sodium improved to 136  - covid   - urinary retention     # Acute Urinary retention- post sanford insertion in ER  - as per  eval- start flomax tx.  - TOV prior to d/c home  - US renal - b/l renal cysts    # Elevated Ddimer level- 1135  -s/p lower ext dopper- chronic left common  femoral vein DVT- patient was not on a/c tx. at home     #JOSH on CKD likely 2/2 urinary retention   - Cr 2.5 on admission, unknown baseline   -renal function stable- cr 2.1    #Covid viral infection- patient has no clinical symptoms  - COVID-19 PCR positive- Isolation precautions (contact, droplet, airborne)  - CXR: bibasilar congestion  - Patient is saturating 98% on RA, no hypoxia  -continue clinical observation    #Mild b/l lower ext.  Edema   - resume pt. on lasix 20 mg po once daily  -outpatient echo    #DM -bsfs monitoring  #HTN - continue hydralazine and amlodipine , lasix  #HLD - cont atorvastatin 20   #GERD - cont famotidine  #h/o Crohn's s/p colostomy - avoid constipation   Physical deconditioning : PT eval today.     DVT proph- heparin subc. tx.     #Progress Note Handoff: Pending PT eval, if able to ambulate will do TOV, monitor renal function.                                                                                              ----------------------------------------------------  # DVT prophylaxis     # GI prophylaxis     # Diet     # Activity Score (AM-PAC)    # Code status     # Disposition                                                                              --------------------------------------------------------    # Handoff      CECILLE FRANKEL 85y Male  MRN#: [id number]   CODE STATUS: FULL    Hospital Day: 3d    Pt is currently admitted with the primary diagnosis of urinary retention and cough.    SUBJECTIVE  Hospital Course  HPI: Patient is 85 year old M with PMH of DM, Crohn s/p colostomy, CKD, HTN, HLD here for urinary retention and cough. Patient states he was not able to pee since yesterday. Patient states he usually has a hard time going to the bathroom but has never had a sanford placed. Patient is poor historian and admits to being more forgetful recently. Spoke to sonNicola at [phone] for more information. Patient was living alone in apartment which had bed bugs and is now living with his son. On Saturday, patient went to urgent care for cough and was tested positive for COVID. He was sent home with cough medicine and told to quarantine. His son also tested positive for COVID. Patient got both doses of Moderna COVID vaccine in March. His son also noted that patient has not been acting himself and is confused. He found him wandering around the street in the middle of the night. Patient denies fevers, chest pain, sob, abdominal pain, diarrhea. Patient admits to productive cough and leg swelling.     ED: /70, HR 86, RR 19, T 97.9, SpO2 98% on RA.  CXR revealed bibasilar opacities, Lab significant for Na 123, Cr 2.5, UA was negative.  Sanford was placed in ED.       Overnight events   No acute events overnight.    Subjective complaints   Patient seen and examined at bedside.  Denies any complaints this AM but remains very confused (oriented to name only).    Present Today:   - Sanford:  No [  ], Yes [ X ] : Indication: urinary retention, discontinuing today for TOV    - Type of IV Access:       .. CVC/Piccline:  No [ X ], Yes [   ] : Indication:       .. Midline: No [ X ], Yes [   ] : Indication:                                             ----------------------------------------------------------  OBJECTIVE  PAST MEDICAL & SURGICAL HISTORY  History of medical problems  [place] b/l ears, dm, htn, hypercholesteremia, ckd, bowel/bladder fistula, kidney stones    Colostomy present    History of surgery  procedure for kidney stones ( ?lithotripsy)                                              -----------------------------------------------------------  ALLERGIES:  No Known Allergies                                            ------------------------------------------------------------    HOME MEDICATIONS  Home Medications:  amLODIPine 5 mg oral tablet: 1 tab(s) orally 2 times a day (01 Sep 2021 16:54)  Aspir 81 oral delayed release tablet: 1 tab(s) orally once a day (01 Sep 2021 16:54)  atorvastatin 10 mg oral tablet: 2 tab(s) orally once a day (01 Sep 2021 16:54)  famotidine 20 mg oral tablet: 1 tab(s) orally once a day (01 Sep 2021 16:54)  Januvia 50 mg oral tablet: 1 tab(s) orally once a day (01 Sep 2021 16:54)  Lasix 20 mg oral tablet: 1 tab(s) orally every other day (01 Sep 2021 16:54)  pioglitazone 30 mg oral tablet: 1 tab(s) orally once a day (01 Sep 2021 16:54)  ramipril 5 mg oral capsule: 2 cap(s) orally once a day (01 Sep 2021 16:54)                           MEDICATIONS:  STANDING MEDICATIONS  amLODIPine   Tablet 10 milliGRAM(s) Oral daily  aspirin enteric coated 81 milliGRAM(s) Oral daily  atorvastatin 20 milliGRAM(s) Oral at bedtime  chlorhexidine 4% Liquid 1 Application(s) Topical daily  famotidine    Tablet 20 milliGRAM(s) Oral daily  furosemide    Tablet 20 milliGRAM(s) Oral daily  heparin   Injectable 5000 Unit(s) SubCutaneous every 8 hours  hydrALAZINE 25 milliGRAM(s) Oral three times a day  influenza   Vaccine 0.5 milliLiter(s) IntraMuscular once  insulin lispro (ADMELOG) corrective regimen sliding scale   SubCutaneous three times a day before meals  polyethylene glycol 3350 17 Gram(s) Oral daily  senna 2 Tablet(s) Oral at bedtime  tamsulosin 0.4 milliGRAM(s) Oral at bedtime    PRN MEDICATIONS  acetaminophen   Tablet .. 650 milliGRAM(s) Oral every 6 hours PRN  aluminum hydroxide/magnesium hydroxide/simethicone Suspension 30 milliLiter(s) Oral every 4 hours PRN  guaifenesin/dextromethorphan Oral Liquid 10 milliLiter(s) Oral four times a day PRN  melatonin 3 milliGRAM(s) Oral at bedtime PRN  ondansetron Injectable 4 milliGRAM(s) IV Push every 8 hours PRN                                            ------------------------------------------------------------  VITAL SIGNS: Last 24 Hours  T(C): 35.9 (04 Sep 2021 05:00), Max: 36.4 (03 Sep 2021 13:37)  T(F): 96.6 (04 Sep 2021 05:00), Max: 97.5 (03 Sep 2021 13:37)  HR: 79 (04 Sep 2021 05:00) (79 - 88)  BP: 135/60 (04 Sep 2021 05:00) (135/60 - 155/67)  BP(mean): --  RR: 18 (04 Sep 2021 05:00) (18 - 18)  SpO2: 96% (04 Sep 2021 08:07) (96% - 97%)      09-03-21 @ 07:01  -  09-04-21 @ 07:00  --------------------------------------------------------  IN: 0 mL / OUT: 850 mL / NET: -850 mL                                             --------------------------------------------------------------  LABS:                        8.6    4.45  )-----------( 202      ( 04 Sep 2021 04:30 )             25.9     09-04    137  |  106  |  24<H>  ----------------------------<  116<H>  4.9   |  18  |  2.3<H>    Ca    8.1<L>      04 Sep 2021 04:30        Culture - Urine (collected 01 Sep 2021 11:20)  Source: Catheterized Catheterized  Final Report (02 Sep 2021 15:28):    No growth                                              -------------------------------------------------------------  RADIOLOGY:  EXAM:  US KIDNEY(S), 09/02/2021    IMPRESSION: No hydronephrosis. Bilateral renal cysts.                                            --------------------------------------------------------------    PHYSICAL EXAM:  General: lying in bed, NAD, confused  HEENT: nontraumatic, neck supple, conjunctiva clear  LUNGS: mild cough, no increased WOB, speaking comfortably in full sentences on RA  HEART: RRR, S1/S2, 2+ RP and DPP bilateral  ABDOMEN: soft, nontender, nondistended  EXT: moves all extremities  NEURO: AAOx1, CN grossly intact  SKIN: warm, dry, mild edema                                           --------------------------------------------------------------    ASSESSMENT & PLAN  85 year old M with PMH of DM, Crohn s/p colostomy, CKD, HTN, HLD here for urinary retention and cough. Sanford placed in ED draining 600cc urine. Patient tested positive for COVID at urgent care center 8/28.     # AMS, likely due to Acute Metabolic Encephalopathy multifactorial in etiology (hyponatremia, covid, urinary retention)   - f/up CTH to r/out CVA    # Hyponatremia, Na 123 on admission   - repeated serum sodium improved to 136     # Acute Urinary retention  - s/p sanford insertion in ER  - as per  eval- c/w flomax tx.  - TOV today, f/up bladder scan  - US renal - b/l renal cysts    # Elevated Ddimer level- 1135  - s/p lower ext dopper- chronic left common femoral vein DVT  - patient was not on a/c tx. at home   - attempted to call pt's son, Nicola, to determine if this was a previously known DVT - no answer    #JOSH on CKD likely 2/2 urinary retention   - Cr 2.5 on admission, unknown baseline   -renal function stable- cr 2.1    #Covid viral infection- patient has no clinical symptoms  - COVID-19 PCR positive- Isolation precautions (contact, droplet, airborne)  - CXR: bibasilar congestion  - Patient is saturating 98% on RA, no hypoxia  - continue clinical observation    #Mild b/l lower ext.  Edema   - resume pt. on lasix 20 mg po once daily  - outpatient echo    #DM -bsfs monitoring  #HTN - continue hydralazine and amlodipine , lasix  #HLD - cont atorvastatin 20   #GERD - cont famotidine  #h/o Crohn's s/p colostomy - avoid constipation   Physical deconditioning : PT eval today    DVT proph- heparin subc. tx.     #Progress Note Handoff: Pending PT eval, if able to ambulate will do TOV, monitor renal function.                                                                                ----------------------------------------------------  # DVT prophylaxis: SQH q8h   # GI prophylaxis: famotidine  # Diet: DASH/TLC  # Activity Score (AM-PAC): IAT  # Code status: FULL  # Disposition: from home, pending TOV and cognitive improvement                                                                             --------------------------------------------------------    # Handoff   - f/up with pt's son, Nicola, [phone], to determine if DVT was previously known/treated and if pt has previously tolerated AC  - outpatient echo  - TOV today, f/up bladder scan  - f/up CTH to r/out CVA

## 2021-09-05 NOTE — PROGRESS NOTE ADULT - SUBJECTIVE AND OBJECTIVE BOX
CECILLE FRANKEL  [facility] T2-3A 017 B ([facility] T2-3A)        Patient was evaluated and examined  by bedside, remains confused, failed TOV, no hypoxia        REVIEW OF SYSTEMS:  unable to obtain , patient confused       T(C): , Max: 36.4 (09-04-21 @ 20:35)  HR: 99 (09-05-21 @ 05:00)  BP: 154/68 (09-05-21 @ 05:00)  RR: 20 (09-05-21 @ 05:00)  SpO2: 96% (09-05-21 @ 08:51)  CAPILLARY BLOOD GLUCOSE      POCT Blood Glucose.: 109 mg/dL (05 Sep 2021 11:26)  POCT Blood Glucose.: 151 mg/dL (05 Sep 2021 07:41)  POCT Blood Glucose.: 201 mg/dL (04 Sep 2021 21:05)  POCT Blood Glucose.: 127 mg/dL (04 Sep 2021 16:17)      PHYSICAL EXAM:  General: NAD, Awake, confused , patient is laying comfortably in bed  HEENT: AT, NC, Supple, NO JVD, NO CB  Lungs: CTA B/L, no wheezing, no rhonchi  CVS: normal S1, S2, RRR, NO M/G/R  Abdomen: soft, bowel sounds present, non-tender, non-distended, colostomy present  : sanford present, draining clear yellow urine  Extremities: trace b/l lower ext feet edema,  no clubbing, no cyanosis, positive peripheral pulses b/l  Neuro: no acute focal neurological deficits, confused state, gait not tested   Skin: no rash, no ecchymosis      LAB  CBC  Date: 09-05-21 @ 06:04  Mean cell Thwfqyznro32.2  Mean cell Hemoglobin Conc33.3  Mean cell Volum 90.7  Platelet count-Automate 214  RBC Count 2.91  Red Cell Distrib Width12.8  WBC Count5.51  % Albumin, Urine--  Hematocrit 26.4  Hemoglobin 8.8  CBC  Date: 09-04-21 @ 12:23  Mean cell Fjonraxbgt57.4  Mean cell Hemoglobin Conc32.4  Mean cell Volum 91.0  Platelet count-Automate 167  RBC Count 2.99  Red Cell Distrib Width12.9  WBC Count4.84  % Albumin, Urine--  Hematocrit 27.2  Hemoglobin 8.8  CBC  Date: 09-04-21 @ 04:30  Mean cell Xmnteyeijj02.0  Mean cell Hemoglobin Conc33.2  Mean cell Volum 90.2  Platelet count-Automate 202  RBC Count 2.87  Red Cell Distrib Width12.9  WBC Count4.45  % Albumin, Urine--  Hematocrit 25.9  Hemoglobin 8.6  CBC  Date: 09-03-21 @ 07:25  Mean cell Hjzkdrnjqq19.5  Mean cell Hemoglobin Conc33.6  Mean cell Volum 90.6  Platelet count-Automate 236  RBC Count 3.61  Red Cell Distrib Width12.6  WBC Count6.47  % Albumin, Urine--  Hematocrit 32.7  Hemoglobin 11.0  CBC  Date: 09-02-21 @ 07:44  Mean cell Wbvihqkcno94.4  Mean cell Hemoglobin Conc33.0  Mean cell Volum 89.0  Platelet count-Automate 198  RBC Count 3.74  Red Cell Distrib Width12.5  WBC Count5.05  % Albumin, Urine--  Hematocrit 33.3  Hemoglobin 11.0  CBC  Date: 09-01-21 @ 11:20  Mean cell Dotytboskd63.4  Mean cell Hemoglobin Conc34.0  Mean cell Volum 89.3  Platelet count-Automate 164  RBC Count 3.65  Red Cell Distrib Width12.2  WBC Count5.34  % Albumin, Urine--  Hematocrit 32.6  Hemoglobin 11.1    BMP  09-05-21 @ 06:04  Blood Gas Arterial-Calcium,Ionized--  Blood Urea Nitrogen, Serum 23 mg/dL<H> [10 - 20]  Carbon Dioxide, Serum17 mmol/L [17 - 32]  Chloride, Tvdic840 mmol/L [98 - 110]  Creatinie, Serum2.2 mg/dL<H> [0.7 - 1.5]  Glucose, Xilbb796 mg/dL<H> [70 - 99]  Potassium, Serum4.3 mmol/L [3.5 - 5.0]  Sodium, Serum 135 mmol/L [135 - 146]  BMP  09-04-21 @ 04:30  Blood Gas Arterial-Calcium,Ionized--  Blood Urea Nitrogen, Serum 24 mg/dL<H> [10 - 20]  Carbon Dioxide, Serum18 mmol/L [17 - 32]  Chloride, Zupnm206 mmol/L [98 - 110]  Creatinie, Serum2.3 mg/dL<H> [0.7 - 1.5]  Glucose, Mauxl253 mg/dL<H> [70 - 99]  Potassium, Serum4.9 mmol/L [3.5 - 5.0]  Sodium, Serum 137 mmol/L [135 - 146]  [facility]  09-03-21 @ 07:25  Blood Gas Arterial-Calcium,Ionized--  Blood Urea Nitrogen, Serum 26 mg/dL<H> [10 - 20]  Carbon Dioxide, Serum18 mmol/L [17 - 32]  Chloride, Mvgnf410 mmol/L [98 - 110]  Creatinie, Serum2.1 mg/dL<H> [0.7 - 1.5]  Glucose, Jwbio149 mg/dL<H> [70 - 99]  Potassium, Serum4.4 mmol/L [3.5 - 5.0]  Sodium, Serum 136 mmol/L [135 - 146]  [facility]  09-02-21 @ 07:44  Blood Gas Arterial-Calcium,Ionized--  Blood Urea Nitrogen, Serum 27 mg/dL<H> [10 - 20]  Carbon Dioxide, Serum19 mmol/L [17 - 32]  Chloride, Ugvvx530 mmol/L [98 - 110]  Creatinie, Serum2.3 mg/dL<H> [0.7 - 1.5]  Glucose, Bxytz338 mg/dL<H> [70 - 99]  Potassium, Serum4.3 mmol/L [3.5 - 5.0]  Sodium, Serum 136 mmol/L [135 - 146]  [facility]  09-02-21 @ 02:23  Blood Gas Arterial-Calcium,Ionized--  Blood Urea Nitrogen, Serum 28 mg/dL<H> [10 - 20]  Carbon Dioxide, Serum20 mmol/L [17 - 32]  Chloride, Absny549 mmol/L [98 - 110]  Creatinie, Serum2.3 mg/dL<H> [0.7 - 1.5]  Glucose, Evybn305 mg/dL<H> [70 - 99]  Potassium, Serum3.8 mmol/L [3.5 - 5.0]  Sodium, Serum 131 mmol/L<L> [135 - 146]  [facility]  09-01-21 @ 11:20  Blood Gas Arterial-Calcium,Ionized--  Blood Urea Nitrogen, Serum 32 mg/dL<H> [10 - 20]  Carbon Dioxide, Serum17 mmol/L [17 - 32]  Chloride, Serum92 mmol/L<L> [98 - 110]  Creatinie, Serum2.5 mg/dL<H> [0.7 - 1.5]  Glucose, Kysaz792 mg/dL<H> [70 - 99]  Potassium, Serum4.4 mmol/L [3.5 - 5.0]  Sodium, Serum 123 mmol/L<L> [135 - 146]              Microbiology:    Culture - Urine (collected 09-01-21 @ 11:20)  Source: Catheterized Catheterized  Final Report (09-02-21 @ 15:28):    No growth        Medications:  acetaminophen   Tablet .. 650 milliGRAM(s) Oral every 6 hours PRN  aluminum hydroxide/magnesium hydroxide/simethicone Suspension 30 milliLiter(s) Oral every 4 hours PRN  amLODIPine   Tablet 10 milliGRAM(s) Oral daily  aspirin enteric coated 81 milliGRAM(s) Oral daily  atorvastatin 20 milliGRAM(s) Oral at bedtime  chlorhexidine 4% Liquid 1 Application(s) Topical daily  famotidine    Tablet 20 milliGRAM(s) Oral daily  furosemide    Tablet 20 milliGRAM(s) Oral daily  guaifenesin/dextromethorphan Oral Liquid 10 milliLiter(s) Oral four times a day PRN  heparin   Injectable 5000 Unit(s) SubCutaneous every 8 hours  hydrALAZINE 25 milliGRAM(s) Oral three times a day  influenza   Vaccine 0.5 milliLiter(s) IntraMuscular once  insulin lispro (ADMELOG) corrective regimen sliding scale   SubCutaneous three times a day before meals  magnesium sulfate  IVPB 1 Gram(s) IV Intermittent once  melatonin 3 milliGRAM(s) Oral at bedtime PRN  ondansetron Injectable 4 milliGRAM(s) IV Push every 8 hours PRN  polyethylene glycol 3350 17 Gram(s) Oral daily  senna 2 Tablet(s) Oral at bedtime  tamsulosin 0.4 milliGRAM(s) Oral at bedtime        Assessment and Plan:  Patient is 85 year old M with PMH of DM, Crohn s/p colostomy, CKD, HTN, HLD here for urinary retention and cough. Sanford placed in ED draining 600cc urine. Patient tested positive for COVID at urgent care center 8/28.     # Acute Metabolic Encephalopathy multifactorial in etiology : - clinically mentation has improved , still remains slightly confused   - hyponatremia-- Na 123 on admission - repeated serum sodium improved to 136  -covid  - urinary retention  -s/p  CT head- no acute intracranial changes       # Acute Urinary retention- post sanford insertion in ER  - as per  eval- start flomax tx.  - TOV on 09/04- failed, sanford reinserted   - US renal - b/l renal cysts    # Elevated Ddimer level- 1135  -s/p lower ext dopper- chronic left common  femoral vein DVT- patient was not on a/c tx. at home     #JOSH on CKD likely 2/2 urinary retention   - Cr 2.5 on admission, unknown baseline   -renal function stable- cr 2.1- 2.3    #Covid viral infection- patient has no clinical symptoms  - COVID-19 PCR positive- Isolation precautions (contact, droplet, airborne)  - CXR: bibasilar congestion  - Patient is saturating 98% on RA, no hypoxia  -continue clinical observation    # Hypomagnesemia- supplemented     #Mild b/l lower ext.  Edema   - resumed pt. on lasix 20 mg po once daily  -outpatient echo    # Anemia of CKD- stable h/h    #DM -bsfs monitoring  #HTN - continue hydralazine and amlodipine , lasix  #HLD - cont atorvastatin 20   #GERD - cont famotidine  #h/o Crohn's s/p colostomy - avoid constipation   Physical deconditioning : PT tx. as tolerated     DVT proph- heparin subc. tx.     #Progress Note Handoff: start d/c process to [facility], case management submitted IRENE today, will need insurance auth.   Family discussion: medical plan of care d/w patient's son Disposition:  [facility]    Total time spent to complete patient's bedside assessment, review medical chart, discuss medical plan of care with covering medical team was more than 35 minutes .

## 2021-09-06 NOTE — PROGRESS NOTE ADULT - SUBJECTIVE AND OBJECTIVE BOX
CECILLE FRANKEL 85y Male  MRN#: [id number]   CODE STATUS: FULL    Hospital Day: 5d    Pt is currently admitted with the primary diagnosis of urinary retention and cough.    SUBJECTIVE  Hospital Course  HPI: Patient is 85 year old M with PMH of DM, Crohn s/p colostomy, CKD, HTN, HLD here for urinary retention and cough. Patient states he was not able to pee since yesterday. Patient states he usually has a hard time going to the bathroom but has never had a sanford placed. Patient is poor historian and admits to being more forgetful recently. Spoke to sonNicola at [phone] for more information. Patient was living alone in apartment which had bed bugs and is now living with his son. On Saturday, patient went to urgent care for cough and was tested positive for COVID. He was sent home with cough medicine and told to quarantine. His son also tested positive for COVID. Patient got both doses of Moderna COVID vaccine in March. His son also noted that patient has not been acting himself and is confused. He found him wandering around the street in the middle of the night. Patient denies fevers, chest pain, sob, abdominal pain, diarrhea. Patient admits to productive cough and leg swelling.     ED: /70, HR 86, RR 19, T 97.9, SpO2 98% on RA.  CXR revealed bibasilar opacities, Lab significant for Na 123, Cr 2.5, UA was negative.  Sanford was placed in ED.     9/4: Removed sanford for TOV.  9/5: Reinserted sanford for failed TOV.  Hemodynamically stable and medically optimized for discharge to [facility] pending insurance authorization and placement.      Overnight events   No acute events overnight.    Subjective complaints   Patient seen and examined at bedside.  Denies any complaints this AM including abdominal pain or dysuria.  Remains confused and oriented to person only.    Present Today:   - Sanford:  No [  ], Yes [ X ] : Indication: urinary retention, failed TOV on 9/5    - Type of IV Access:       .. CVC/Piccline:  No [ X ], Yes [   ] : Indication:       .. Midline: No [ X ], Yes [   ] : Indication:                                             ----------------------------------------------------------  OBJECTIVE  PAST MEDICAL & SURGICAL HISTORY  History of medical problems  [place] b/l ears, dm, htn, hypercholesteremia, ckd, bowel/bladder fistula, kidney stones    Colostomy present    History of surgery  procedure for kidney stones ( ?lithotripsy)                                              -----------------------------------------------------------  ALLERGIES:  No Known Allergies                                            ------------------------------------------------------------    HOME MEDICATIONS  Home Medications:  amLODIPine 5 mg oral tablet: 1 tab(s) orally 2 times a day (01 Sep 2021 16:54)  Aspir 81 oral delayed release tablet: 1 tab(s) orally once a day (01 Sep 2021 16:54)  atorvastatin 10 mg oral tablet: 2 tab(s) orally once a day (01 Sep 2021 16:54)  famotidine 20 mg oral tablet: 1 tab(s) orally once a day (01 Sep 2021 16:54)  Januvia 50 mg oral tablet: 1 tab(s) orally once a day (01 Sep 2021 16:54)  Lasix 20 mg oral tablet: 1 tab(s) orally every other day (01 Sep 2021 16:54)  pioglitazone 30 mg oral tablet: 1 tab(s) orally once a day (01 Sep 2021 16:54)  ramipril 5 mg oral capsule: 2 cap(s) orally once a day (01 Sep 2021 16:54)                           MEDICATIONS:  STANDING MEDICATIONS  amLODIPine   Tablet 10 milliGRAM(s) Oral daily  aspirin enteric coated 81 milliGRAM(s) Oral daily  atorvastatin 20 milliGRAM(s) Oral at bedtime  chlorhexidine 4% Liquid 1 Application(s) Topical daily  famotidine    Tablet 20 milliGRAM(s) Oral daily  furosemide    Tablet 20 milliGRAM(s) Oral daily  heparin   Injectable 5000 Unit(s) SubCutaneous every 8 hours  hydrALAZINE 25 milliGRAM(s) Oral three times a day  influenza   Vaccine 0.5 milliLiter(s) IntraMuscular once  insulin lispro (ADMELOG) corrective regimen sliding scale   SubCutaneous three times a day before meals  polyethylene glycol 3350 17 Gram(s) Oral daily  senna 2 Tablet(s) Oral at bedtime  tamsulosin 0.4 milliGRAM(s) Oral at bedtime    PRN MEDICATIONS  acetaminophen   Tablet .. 650 milliGRAM(s) Oral every 6 hours PRN  aluminum hydroxide/magnesium hydroxide/simethicone Suspension 30 milliLiter(s) Oral every 4 hours PRN  guaifenesin/dextromethorphan Oral Liquid 10 milliLiter(s) Oral four times a day PRN  melatonin 3 milliGRAM(s) Oral at bedtime PRN  ondansetron Injectable 4 milliGRAM(s) IV Push every 8 hours PRN                                            ------------------------------------------------------------  VITAL SIGNS: Last 24 Hours  T(C): 37.1 (06 Sep 2021 05:00), Max: 37.1 (06 Sep 2021 05:00)  T(F): 98.8 (06 Sep 2021 05:00), Max: 98.8 (06 Sep 2021 05:00)  HR: 83 (06 Sep 2021 05:00) (57 - 108)  BP: 171/71 (06 Sep 2021 05:00) (130/59 - 171/71)  BP(mean): --  RR: 18 (06 Sep 2021 05:00) (18 - 20)  SpO2: 97% (06 Sep 2021 05:00) (96% - 97%)      09-05-21 @ 07:01  -  09-06-21 @ 07:00  --------------------------------------------------------  IN: 0 mL / OUT: 400 mL / NET: -400 mL                                             --------------------------------------------------------------  LABS:                        8.1    4.36  )-----------( 210      ( 06 Sep 2021 05:55 )             24.9     09-06    135  |  104  |  25<H>  ----------------------------<  125<H>  4.4   |  19  |  2.3<H>    Ca    8.1<L>      06 Sep 2021 05:55  Mg     2.1     09-06    TPro  4.4<L>  /  Alb  2.5<L>  /  TBili  0.3  /  DBili  x   /  AST  39  /  ALT  24  /  AlkPhos  105  09-06                                                    -------------------------------------------------------------  RADIOLOGY:  EXAM:  CT BRAIN, 09/04/2021    IMPRESSION:  No acute intracranial pathology.  Mild chronic microvascular ischemic changes.      EXAM:  DUPLEX SCAN EXT VEINS LOWER BI, 09/02/2021    Impression:  No evidence of deep venous thrombosis or superficial thrombophlebitis in the right lower extremity.  Evidence of deep venous thrombosis noted in the common femoral vein on the left side appears to be chronic clinical correlation recommended.    EXAM:  US KIDNEY(S), 09/02/2021    IMPRESSION: No hydronephrosis. Bilateral renal cysts.                                            --------------------------------------------------------------    PHYSICAL EXAM:  General: lying in bed, NAD, confused  HEENT: nontraumatic, neck supple, conjunctiva clear  LUNGS: no cough or increased WOB, speaking comfortably in full sentences on RA  HEART: RRR, S1/S2, 2+ RP and DPP bilateral  ABDOMEN: soft, nontender, nondistended  EXT: moves all extremities  NEURO: AAOx1 (person only), CN grossly intact  SKIN: warm, dry, mild edema                                           --------------------------------------------------------------    ASSESSMENT & PLAN  85 year old M with PMH of DM, Crohn s/p colostomy, CKD, HTN, HLD here for urinary retention and cough. Sanford placed in ED draining 600cc urine. Patient tested positive for COVID at urgent care center 8/28.     # AMS, likely due to Acute Metabolic Encephalopathy multifactorial in etiology (hyponatremia, covid, urinary retention)   - CTH (9/4): no acute intracranial pathology    # Hyponatremia, Na 123 on admission   - repeated serum sodium improved to 135     # Acute Urinary retention  - s/p sanford insertion in ER  - failed TOV on 9/5, sanford reinserted  - as per  eval- c/w flomax tx.  - US renal - b/l renal cysts    # Elevated Ddimer level- 1135  - s/p lower ext dopper- chronic left common femoral vein DVT  - patient was not on a/c tx. at home     #JOSH on CKD likely 2/2 urinary retention   - Cr 2.5 on admission, unknown baseline   -renal function stable- cr 2.1-2.3    #Covid viral infection- patient has no clinical symptoms  - COVID-19 PCR positive- Isolation precautions (contact, droplet, airborne)  - CXR: bibasilar congestion  - Patient is saturating 97% on RA, no hypoxia  - continue clinical observation    #Mild b/l lower ext.  Edema   - resume pt. on lasix 20 mg po once daily  - outpatient echo    #DM -bsfs monitoring  #HTN - continue hydralazine and amlodipine, lasix  #HLD - cont atorvastatin 20   #GERD - cont famotidine  #h/o Crohn's s/p colostomy - avoid constipation   Physical deconditioning: PT rec home with home PT services, family unwilling to take pt home in current state                                                                                  ----------------------------------------------------  # DVT prophylaxis: SQH q8h   # GI prophylaxis: famotidine  # Diet: DASH/TLC  # Activity Score (AM-PAC): IAT  # Code status: FULL  # Disposition: from home, discharge to [facility] pending placement and insurance authorization                                                                             --------------------------------------------------------    # Handoff   - outpatient echo  - c/w juvenal for now  - discharge to [facility] pending placement and insurance authorization

## 2021-09-07 NOTE — PROGRESS NOTE ADULT - SUBJECTIVE AND OBJECTIVE BOX
CECILLE FRANKEL 85y Male  MRN#: [id number]   CODE STATUS: FULL    Hospital Day: 6d    Pt is currently admitted with the primary diagnosis of urinary retention and cough.    SUBJECTIVE  Hospital Course  HPI: Patient is 85 year old M with PMH of DM, Crohn s/p colostomy, CKD, HTN, HLD here for urinary retention and cough. Patient states he was not able to pee since yesterday. Patient states he usually has a hard time going to the bathroom but has never had a sanford placed. Patient is poor historian and admits to being more forgetful recently. Spoke to sonNicola at [phone] for more information. Patient was living alone in apartment which had bed bugs and is now living with his son. On Saturday, patient went to urgent care for cough and was tested positive for COVID. He was sent home with cough medicine and told to quarantine. His son also tested positive for COVID. Patient got both doses of Moderna COVID vaccine in March. His son also noted that patient has not been acting himself and is confused. He found him wandering around the street in the middle of the night. Patient denies fevers, chest pain, sob, abdominal pain, diarrhea. Patient admits to productive cough and leg swelling.     ED: /70, HR 86, RR 19, T 97.9, SpO2 98% on RA.  CXR revealed bibasilar opacities, Lab significant for Na 123, Cr 2.5, UA was negative.  Safnord was placed in ED.     9/4: Removed sanford for TOV.  9/5: Reinserted sanford for failed TOV.  Hemodynamically stable and medically optimized for discharge to [facility] pending insurance authorization and placement.      Overnight events   No acute events overnight.    Subjective complaints   Patient seen and examined at bedside.  Denies any complaints this AM including abdominal pain or dysuria.  Remains confused and oriented to person only.    Present Today:   - Sanford:  No [  ], Yes [ X ] : Indication: urinary retention, failed TOV on 9/5    - Type of IV Access:       .. CVC/Piccline:  No [ X ], Yes [   ] : Indication:       .. Midline: No [ X ], Yes [   ] : Indication:                                           ----------------------------------------------------------  OBJECTIVE  PAST MEDICAL & SURGICAL HISTORY  History of medical problems  [place] b/l ears, dm, htn, hypercholesteremia, ckd, bowel/bladder fistula, kidney stones    Colostomy present    History of surgery  procedure for kidney stones ( ?lithotripsy)                                              -----------------------------------------------------------  ALLERGIES:  No Known Allergies                                            ------------------------------------------------------------    HOME MEDICATIONS  Home Medications:  amLODIPine 5 mg oral tablet: 1 tab(s) orally 2 times a day (01 Sep 2021 16:54)  Aspir 81 oral delayed release tablet: 1 tab(s) orally once a day (01 Sep 2021 16:54)  atorvastatin 10 mg oral tablet: 2 tab(s) orally once a day (01 Sep 2021 16:54)  famotidine 20 mg oral tablet: 1 tab(s) orally once a day (01 Sep 2021 16:54)  Januvia 50 mg oral tablet: 1 tab(s) orally once a day (01 Sep 2021 16:54)  Lasix 20 mg oral tablet: 1 tab(s) orally every other day (01 Sep 2021 16:54)  pioglitazone 30 mg oral tablet: 1 tab(s) orally once a day (01 Sep 2021 16:54)  ramipril 5 mg oral capsule: 2 cap(s) orally once a day (01 Sep 2021 16:54)                           MEDICATIONS:  STANDING MEDICATIONS  amLODIPine   Tablet 10 milliGRAM(s) Oral daily  aspirin enteric coated 81 milliGRAM(s) Oral daily  atorvastatin 20 milliGRAM(s) Oral at bedtime  chlorhexidine 4% Liquid 1 Application(s) Topical daily  famotidine    Tablet 20 milliGRAM(s) Oral daily  furosemide    Tablet 20 milliGRAM(s) Oral daily  heparin   Injectable 5000 Unit(s) SubCutaneous every 8 hours  hydrALAZINE 25 milliGRAM(s) Oral three times a day  influenza   Vaccine 0.5 milliLiter(s) IntraMuscular once  insulin lispro (ADMELOG) corrective regimen sliding scale   SubCutaneous three times a day before meals  polyethylene glycol 3350 17 Gram(s) Oral daily  senna 2 Tablet(s) Oral at bedtime  tamsulosin 0.4 milliGRAM(s) Oral at bedtime    PRN MEDICATIONS  acetaminophen   Tablet .. 650 milliGRAM(s) Oral every 6 hours PRN  aluminum hydroxide/magnesium hydroxide/simethicone Suspension 30 milliLiter(s) Oral every 4 hours PRN  guaifenesin/dextromethorphan Oral Liquid 10 milliLiter(s) Oral four times a day PRN  melatonin 3 milliGRAM(s) Oral at bedtime PRN  ondansetron Injectable 4 milliGRAM(s) IV Push every 8 hours PRN                                            ------------------------------------------------------------  VITAL SIGNS: Last 24 Hours  T(C): 36.1 (07 Sep 2021 05:00), Max: 36.9 (06 Sep 2021 20:33)  T(F): 97 (07 Sep 2021 05:00), Max: 98.4 (06 Sep 2021 20:33)  HR: 80 (07 Sep 2021 05:00) (80 - 94)  BP: 130/60 (07 Sep 2021 05:00) (125/56 - 140/65)  BP(mean): --  RR: 16 (07 Sep 2021 05:00) (16 - 16)  SpO2: 98% (07 Sep 2021 05:00) (95% - 98%)      09-06-21 @ 07:01  -  09-07-21 @ 07:00  --------------------------------------------------------  IN: 700 mL / OUT: 1400 mL / NET: -700 mL                                             --------------------------------------------------------------  LABS:                        8.1    4.36  )-----------( 210      ( 06 Sep 2021 05:55 )             24.9     09-06    135  |  104  |  25<H>  ----------------------------<  125<H>  4.4   |  19  |  2.3<H>    Ca    8.1<L>      06 Sep 2021 05:55  Mg     2.1     09-06    TPro  4.4<L>  /  Alb  2.5<L>  /  TBili  0.3  /  DBili  x   /  AST  39  /  ALT  24  /  AlkPhos  105  09-06                                                  -------------------------------------------------------------  RADIOLOGY:  EXAM:  CT BRAIN, 09/04/2021    IMPRESSION:  No acute intracranial pathology.  Mild chronic microvascular ischemic changes.      EXAM:  DUPLEX SCAN EXT VEINS LOWER BI, 09/02/2021    Impression:  No evidence of deep venous thrombosis or superficial thrombophlebitis in the right lower extremity.  Evidence of deep venous thrombosis noted in the common femoral vein on the left side appears to be chronic clinical correlation recommended.    EXAM:  US KIDNEY(S), 09/02/2021    IMPRESSION: No hydronephrosis. Bilateral renal cysts.                                            --------------------------------------------------------------    PHYSICAL EXAM:  General: lying in bed, NAD, confused  HEENT: nontraumatic, neck supple, conjunctiva clear  LUNGS: no cough or increased WOB, speaking comfortably in full sentences on RA  HEART: RRR, S1/S2, 2+ RP and DPP bilateral  ABDOMEN: soft, nontender, nondistended  EXT: moves all extremities  NEURO: AAOx1 (person only), CN grossly intact  SKIN: warm, dry, mild edema                                           --------------------------------------------------------------    ASSESSMENT & PLAN  85 year old M with PMH of DM, Crohn s/p colostomy, CKD, HTN, HLD here for urinary retention and cough. Sanford placed in ED draining 600cc urine. Patient tested positive for COVID at urgent care center 8/28.     # AMS, likely due to Acute Metabolic Encephalopathy multifactorial in etiology (hyponatremia, covid, urinary retention)   - CTH (9/4): no acute intracranial pathology    # Hyponatremia, Na 123 on admission   - repeated serum sodium improved to 135     # Acute Urinary retention  - s/p sanford insertion in ER  - failed TOV on 9/5, sanford reinserted  - as per  eval- c/w flomax tx.  - US renal - b/l renal cysts    # Elevated Ddimer level- 1135  - s/p lower ext dopper- chronic left common femoral vein DVT  - patient was not on a/c tx. at home   - per pt son, no previous knowledge of chronic DVT    #JOSH on CKD likely 2/2 urinary retention   - Cr 2.5 on admission, unknown baseline   -renal function stable- cr 2.1-2.3    #Covid viral infection- patient has no clinical symptoms  - COVID-19 PCR positive- Isolation precautions (contact, droplet, airborne)  - CXR: bibasilar congestion  - Patient is saturating 97% on RA, no hypoxia  - continue clinical observation    #Mild b/l lower ext.  Edema   - resume pt. on lasix 20 mg po once daily  - outpatient echo    #DM -bsfs monitoring  #HTN - continue hydralazine and amlodipine, lasix  #HLD - cont atorvastatin 20   #GERD - cont famotidine  #h/o Crohn's s/p colostomy - avoid constipation   Physical deconditioning: PT rec home with home PT services, family unwilling to take pt home in current state                                                                                  ----------------------------------------------------  # DVT prophylaxis: SQH q8h   # GI prophylaxis: famotidine  # Diet: DASH/TLC  # Activity Score (AM-PAC): IAT  # Code status: FULL  # Disposition: from home, discharge to [facility] pending placement and insurance authorization                                                                             --------------------------------------------------------    # Handoff   - outpatient echo  - c/w juvenal for now  - discharge to [facility] pending placement and insurance authorization

## 2021-09-08 NOTE — PROGRESS NOTE ADULT - SUBJECTIVE AND OBJECTIVE BOX
CECILLE FRANKEL 85y Male  MRN#: [id number]   CODE STATUS: FULL    Hospital Day: 7d    Pt is currently admitted with the primary diagnosis of urinary retention and cough.    SUBJECTIVE  Hospital Course  HPI: Patient is 85 year old M with PMH of DM, Crohn s/p colostomy, CKD, HTN, HLD here for urinary retention and cough. Patient states he was not able to pee since yesterday. Patient states he usually has a hard time going to the bathroom but has never had a sanford placed. Patient is poor historian and admits to being more forgetful recently. Spoke to sonNicola at [phone] for more information. Patient was living alone in apartment which had bed bugs and is now living with his son. On Saturday, patient went to urgent care for cough and was tested positive for COVID. He was sent home with cough medicine and told to quarantine. His son also tested positive for COVID. Patient got both doses of Moderna COVID vaccine in March. His son also noted that patient has not been acting himself and is confused. He found him wandering around the street in the middle of the night. Patient denies fevers, chest pain, sob, abdominal pain, diarrhea. Patient admits to productive cough and leg swelling.     ED: /70, HR 86, RR 19, T 97.9, SpO2 98% on RA.  CXR revealed bibasilar opacities, Lab significant for Na 123, Cr 2.5, UA was negative.  Sanford was placed in ED.     9/4: Removed sanford for TOV.  9/5: Reinserted sanford for failed TOV.  Hemodynamically stable and medically optimized for discharge to [facility] pending insurance authorization and placement.      Overnight events   No acute events overnight.    Subjective complaints   Patient seen and examined at bedside.  No complaints this AM.  Patient remains confused.    Present Today:   - Sanford:  No [  ], Yes [ X ] : Indication: urinary retention    - Type of IV Access:       .. CVC/Piccline:  No [ X ], Yes [   ] : Indication:       .. Midline: No [ X ], Yes [   ] : Indication:                                             ----------------------------------------------------------  OBJECTIVE  PAST MEDICAL & SURGICAL HISTORY  History of medical problems  [place] b/l ears, dm, htn, hypercholesteremia, ckd, bowel/bladder fistula, kidney stones    Colostomy present    History of surgery  procedure for kidney stones ( ?lithotripsy)                                              -----------------------------------------------------------  ALLERGIES:  No Known Allergies                                            ------------------------------------------------------------    HOME MEDICATIONS  Home Medications:  amLODIPine 5 mg oral tablet: 1 tab(s) orally 2 times a day (01 Sep 2021 16:54)  Aspir 81 oral delayed release tablet: 1 tab(s) orally once a day (01 Sep 2021 16:54)  atorvastatin 10 mg oral tablet: 2 tab(s) orally once a day (01 Sep 2021 16:54)  famotidine 20 mg oral tablet: 1 tab(s) orally once a day (01 Sep 2021 16:54)  Januvia 50 mg oral tablet: 1 tab(s) orally once a day (01 Sep 2021 16:54)  Lasix 20 mg oral tablet: 1 tab(s) orally every other day (01 Sep 2021 16:54)  pioglitazone 30 mg oral tablet: 1 tab(s) orally once a day (01 Sep 2021 16:54)  ramipril 5 mg oral capsule: 2 cap(s) orally once a day (01 Sep 2021 16:54)                           MEDICATIONS:  STANDING MEDICATIONS  amLODIPine   Tablet 10 milliGRAM(s) Oral daily  aspirin enteric coated 81 milliGRAM(s) Oral daily  atorvastatin 20 milliGRAM(s) Oral at bedtime  chlorhexidine 4% Liquid 1 Application(s) Topical daily  famotidine    Tablet 20 milliGRAM(s) Oral daily  furosemide    Tablet 20 milliGRAM(s) Oral daily  heparin   Injectable 5000 Unit(s) SubCutaneous every 8 hours  hydrALAZINE 25 milliGRAM(s) Oral three times a day  influenza   Vaccine 0.5 milliLiter(s) IntraMuscular once  insulin lispro (ADMELOG) corrective regimen sliding scale   SubCutaneous three times a day before meals  polyethylene glycol 3350 17 Gram(s) Oral daily  senna 2 Tablet(s) Oral at bedtime  tamsulosin 0.4 milliGRAM(s) Oral at bedtime    PRN MEDICATIONS  acetaminophen   Tablet .. 650 milliGRAM(s) Oral every 6 hours PRN  aluminum hydroxide/magnesium hydroxide/simethicone Suspension 30 milliLiter(s) Oral every 4 hours PRN  guaifenesin/dextromethorphan Oral Liquid 10 milliLiter(s) Oral four times a day PRN  melatonin 3 milliGRAM(s) Oral at bedtime PRN  ondansetron Injectable 4 milliGRAM(s) IV Push every 8 hours PRN                                            ------------------------------------------------------------  VITAL SIGNS: Last 24 Hours  T(C): 36.9 (08 Sep 2021 05:02), Max: 36.9 (08 Sep 2021 05:02)  T(F): 98.4 (08 Sep 2021 05:02), Max: 98.4 (08 Sep 2021 05:02)  HR: 88 (08 Sep 2021 05:02) (80 - 89)  BP: 131/61 (08 Sep 2021 05:02) (131/61 - 179/70)  BP(mean): --  RR: 18 (08 Sep 2021 08:06) (17 - 18)  SpO2: 94% (08 Sep 2021 08:06) (93% - 95%)      09-07-21 @ 07:01  -  09-08-21 @ 07:00  --------------------------------------------------------  IN: 0 mL / OUT: 900 mL / NET: -900 mL                                             --------------------------------------------------------------  LABS:                        8.2    5.26  )-----------( 212      ( 08 Sep 2021 07:19 )             25.2     09-08    139  |  104  |  26<H>  ----------------------------<  122<H>  4.4   |  19  |  2.4<H>    Ca    8.0<L>      08 Sep 2021 07:19  Mg     2.1     09-08    TPro  4.6<L>  /  Alb  2.6<L>  /  TBili  0.3  /  DBili  x   /  AST  31  /  ALT  29  /  AlkPhos  113  09-08                                                -------------------------------------------------------------  RADIOLOGY:  EXAM:  CT BRAIN, 09/04/2021    IMPRESSION:  No acute intracranial pathology.  Mild chronic microvascular ischemic changes.      EXAM:  DUPLEX SCAN EXT VEINS LOWER BI, 09/02/2021    Impression:  No evidence of deep venous thrombosis or superficial thrombophlebitis in the right lower extremity.  Evidence of deep venous thrombosis noted in the common femoral vein on the left side appears to be chronic clinical correlation recommended.    EXAM:  US KIDNEY(S), 09/02/2021    IMPRESSION: No hydronephrosis. Bilateral renal cysts.                                            --------------------------------------------------------------    PHYSICAL EXAM:  General: lying in bed, NAD, confused  HEENT: nontraumatic, neck supple, conjunctiva clear  LUNGS: no cough or increased WOB, speaking comfortably in full sentences on RA  HEART: RRR, S1/S2, 2+ RP and DPP bilateral  ABDOMEN: soft, nontender, nondistended  EXT: moves all extremities  NEURO: AAOx1 (person only), CN grossly intact  SKIN: warm, dry, mild edema                                           --------------------------------------------------------------    ASSESSMENT & PLAN  85 year old M with PMH of DM, Crohn s/p colostomy, CKD, HTN, HLD here for urinary retention and cough. Sanford placed in ED draining 600cc urine. Patient tested positive for COVID at urgent care center 8/28.     # AMS, likely due to Acute Metabolic Encephalopathy multifactorial in etiology (hyponatremia, covid, urinary retention)   - CTH (9/4): no acute intracranial pathology    # Hyponatremia, Na 123 on admission   - repeated serum sodium improved to 135     # Acute Urinary retention  - s/p sanford insertion in ER  - failed TOV on 9/5, sanford reinserted  - as per  eval- c/w flomax tx.  - US renal - b/l renal cysts    # Elevated Ddimer level- 1135  - s/p lower ext dopper- chronic left common femoral vein DVT  - patient was not on a/c tx. at home   - per pt son, no previous knowledge of chronic DVT    #JOSH on CKD likely 2/2 urinary retention   - Cr 2.5 on admission, unknown baseline   -renal function stable- cr 2.1-2.3    #Covid viral infection- patient has no clinical symptoms  - COVID-19 PCR positive- Isolation precautions (contact, droplet, airborne)  - CXR: bibasilar congestion  - Patient is saturating 97% on RA, no hypoxia  - continue clinical observation    #Mild b/l lower ext.  Edema   - resume pt. on lasix 20 mg po once daily  - outpatient echo    #DM -bsfs monitoring  #HTN - continue hydralazine and amlodipine, lasix  #HLD - cont atorvastatin 20   #GERD - cont famotidine  #h/o Crohn's s/p colostomy - avoid constipation   Physical deconditioning: PT rec home with home PT services, family unwilling to take pt home due to confusion and inability to care for colostomy bag                                                                                  ----------------------------------------------------  # DVT prophylaxis: SQH q8h   # GI prophylaxis: famotidine  # Diet: DASH/TLC  # Activity Score (AM-PAC): IAT  # Code status: FULL  # Disposition: from home, discharge to [facility] pending placement and insurance authorization                                                                             --------------------------------------------------------    # Handoff   - outpatient echo  - c/w juvenal for now  - discharge to [facility] pending placement and insurance authorization

## 2021-09-08 NOTE — PROGRESS NOTE ADULT - SUBJECTIVE AND OBJECTIVE BOX
MARLYS CECILLE  85y  Male      Patient is a 85y old  Male who presents with a chief complaint of Urinary Retention, Cough (08 Sep 2021 13:30)      INTERVAL HPI/OVERNIGHT EVENTS:  He is confused.   Vital Signs Last 24 Hrs  T(C): 36.3 (08 Sep 2021 13:54), Max: 36.9 (08 Sep 2021 05:02)  T(F): 97.3 (08 Sep 2021 13:54), Max: 98.4 (08 Sep 2021 05:02)  HR: 86 (08 Sep 2021 13:54) (86 - 89)  BP: 133/63 (08 Sep 2021 13:54) (131/61 - 153/70)  BP(mean): --  RR: 18 (08 Sep 2021 13:54) (17 - 18)  SpO2: 94% (08 Sep 2021 13:54) (94% - 95%)      09-07-21 @ 07:01  -  09-08-21 @ 07:00  --------------------------------------------------------  IN: 0 mL / OUT: 900 mL / NET: -900 mL            Consultant(s) Notes Reviewed:  [x ] YES  [ ] NO          MEDICATIONS  (STANDING):  amLODIPine   Tablet 10 milliGRAM(s) Oral daily  aspirin enteric coated 81 milliGRAM(s) Oral daily  atorvastatin 20 milliGRAM(s) Oral at bedtime  chlorhexidine 4% Liquid 1 Application(s) Topical daily  famotidine    Tablet 20 milliGRAM(s) Oral daily  furosemide    Tablet 20 milliGRAM(s) Oral daily  heparin   Injectable 5000 Unit(s) SubCutaneous every 8 hours  hydrALAZINE 25 milliGRAM(s) Oral three times a day  influenza   Vaccine 0.5 milliLiter(s) IntraMuscular once  insulin lispro (ADMELOG) corrective regimen sliding scale   SubCutaneous three times a day before meals  polyethylene glycol 3350 17 Gram(s) Oral daily  senna 2 Tablet(s) Oral at bedtime  tamsulosin 0.4 milliGRAM(s) Oral at bedtime    MEDICATIONS  (PRN):  acetaminophen   Tablet .. 650 milliGRAM(s) Oral every 6 hours PRN Temp greater or equal to 38.5C (101.3F), Mild Pain (1 - 3)  aluminum hydroxide/magnesium hydroxide/simethicone Suspension 30 milliLiter(s) Oral every 4 hours PRN Dyspepsia  guaifenesin/dextromethorphan Oral Liquid 10 milliLiter(s) Oral four times a day PRN Cough  melatonin 3 milliGRAM(s) Oral at bedtime PRN Insomnia  ondansetron Injectable 4 milliGRAM(s) IV Push every 8 hours PRN Nausea and/or Vomiting      LABS                          8.2    5.26  )-----------( 212      ( 08 Sep 2021 07:19 )             25.2     09-08    139  |  104  |  26<H>  ----------------------------<  122<H>  4.4   |  19  |  2.4<H>    Ca    8.0<L>      08 Sep 2021 07:19  Mg     2.1     09-08    TPro  4.6<L>  /  Alb  2.6<L>  /  TBili  0.3  /  DBili  x   /  AST  31  /  ALT  29  /  AlkPhos  113  09-08          Lactate Trend        CAPILLARY BLOOD GLUCOSE      POCT Blood Glucose.: 134 mg/dL (08 Sep 2021 11:12)        RADIOLOGY & ADDITIONAL TESTS:    Imaging Personally Reviewed:  [ ] YES  [ ] NO    HEALTH ISSUES - PROBLEM Dx:          PHYSICAL EXAM:  GENERAL: NAD, well-developed.  HEAD:  Atraumatic, Normocephalic.  EYES: EOMI, PERRLA, conjunctiva and sclera clear.  NECK: Supple, No JVD.  CHEST/LUNG: Clear to auscultation bilaterally; No wheeze.  HEART: Regular rate and rhythm; S1 S2. Diastolic Murmur 2/6 on left sternum.   ABDOMEN: Soft, Nontender, Nondistended; Bowel sounds present.  EXTREMITIES:  2+ Peripheral Pulses, No clubbing, cyanosis, or edema.  PSYCH: disoriented to place and his age.   NEUROLOGY: non-focal.  SKIN: No rashes or lesions.

## 2021-09-09 NOTE — PROGRESS NOTE ADULT - SUBJECTIVE AND OBJECTIVE BOX
CECILLE FRANKEL 85y Male  MRN#: [id number]   CODE STATUS: FULL    Hospital Day: 8d    Pt is currently admitted with the primary diagnosis of urinary retention and cough.    SUBJECTIVE  Hospital Course  HPI: Patient is 85 year old M with PMH of DM, Crohn s/p colostomy, CKD, HTN, HLD here for urinary retention and cough. Patient states he was not able to pee since yesterday. Patient states he usually has a hard time going to the bathroom but has never had a sanford placed. Patient is poor historian and admits to being more forgetful recently. Spoke to sonNicola at [phone] for more information. Patient was living alone in apartment which had bed bugs and is now living with his son. On Saturday, patient went to urgent care for cough and was tested positive for COVID. He was sent home with cough medicine and told to quarantine. His son also tested positive for COVID. Patient got both doses of Moderna COVID vaccine in March. His son also noted that patient has not been acting himself and is confused. He found him wandering around the street in the middle of the night. Patient denies fevers, chest pain, sob, abdominal pain, diarrhea. Patient admits to productive cough and leg swelling.     ED: /70, HR 86, RR 19, T 97.9, SpO2 98% on RA.  CXR revealed bibasilar opacities, Lab significant for Na 123, Cr 2.5, UA was negative.  Sanford was placed in ED.     9/4: Removed sanford for TOV.  9/5: Reinserted sanford for failed TOV.  Hemodynamically stable and medically optimized for discharge to [facility] pending insurance authorization and placement.      Overnight events   No acute events overnight.    Subjective complaints   Patient seen and examined at bedside.  No new complaints this AM.  Patient remains confused and oriented to person only.    Present Today:   - Sanford:  No [  ], Yes [ X ] : Indication: urinary retention, s/p failed TOV on 9/5    - Type of IV Access:       .. CVC/Piccline:  No [ X ], Yes [   ] : Indication:       .. Midline: No [ X ], Yes [   ] : Indication:                                             ----------------------------------------------------------  OBJECTIVE  PAST MEDICAL & SURGICAL HISTORY  History of medical problems  [place] b/l ears, dm, htn, hypercholesteremia, ckd, bowel/bladder fistula, kidney stones    Colostomy present    History of surgery  procedure for kidney stones ( ?lithotripsy)                                              -----------------------------------------------------------  ALLERGIES:  No Known Allergies                                            ------------------------------------------------------------    HOME MEDICATIONS  Home Medications:  amLODIPine 5 mg oral tablet: 1 tab(s) orally 2 times a day (01 Sep 2021 16:54)  Aspir 81 oral delayed release tablet: 1 tab(s) orally once a day (01 Sep 2021 16:54)  atorvastatin 10 mg oral tablet: 2 tab(s) orally once a day (01 Sep 2021 16:54)  famotidine 20 mg oral tablet: 1 tab(s) orally once a day (01 Sep 2021 16:54)  Januvia 50 mg oral tablet: 1 tab(s) orally once a day (01 Sep 2021 16:54)  Lasix 20 mg oral tablet: 1 tab(s) orally every other day (01 Sep 2021 16:54)  pioglitazone 30 mg oral tablet: 1 tab(s) orally once a day (01 Sep 2021 16:54)  ramipril 5 mg oral capsule: 2 cap(s) orally once a day (01 Sep 2021 16:54)                           MEDICATIONS:  STANDING MEDICATIONS  amLODIPine   Tablet 10 milliGRAM(s) Oral daily  aspirin enteric coated 81 milliGRAM(s) Oral daily  atorvastatin 20 milliGRAM(s) Oral at bedtime  chlorhexidine 4% Liquid 1 Application(s) Topical daily  famotidine    Tablet 20 milliGRAM(s) Oral daily  furosemide    Tablet 20 milliGRAM(s) Oral daily  heparin   Injectable 5000 Unit(s) SubCutaneous every 8 hours  hydrALAZINE 25 milliGRAM(s) Oral three times a day  influenza   Vaccine 0.5 milliLiter(s) IntraMuscular once  insulin lispro (ADMELOG) corrective regimen sliding scale   SubCutaneous three times a day before meals  polyethylene glycol 3350 17 Gram(s) Oral daily  senna 2 Tablet(s) Oral at bedtime  tamsulosin 0.8 milliGRAM(s) Oral at bedtime    PRN MEDICATIONS  acetaminophen   Tablet .. 650 milliGRAM(s) Oral every 6 hours PRN  aluminum hydroxide/magnesium hydroxide/simethicone Suspension 30 milliLiter(s) Oral every 4 hours PRN  guaifenesin/dextromethorphan Oral Liquid 10 milliLiter(s) Oral four times a day PRN  melatonin 3 milliGRAM(s) Oral at bedtime PRN  ondansetron Injectable 4 milliGRAM(s) IV Push every 8 hours PRN                                            ------------------------------------------------------------  VITAL SIGNS: Last 24 Hours  T(C): 36.4 (09 Sep 2021 05:00), Max: 36.5 (08 Sep 2021 21:00)  T(F): 97.5 (09 Sep 2021 05:00), Max: 97.7 (08 Sep 2021 21:00)  HR: 72 (09 Sep 2021 05:00) (72 - 86)  BP: 121/59 (09 Sep 2021 05:00) (121/59 - 145/67)  BP(mean): --  RR: 18 (09 Sep 2021 05:00) (18 - 18)  SpO2: 94% (09 Sep 2021 05:00) (93% - 94%)      09-08-21 @ 07:01  -  09-09-21 @ 07:00  --------------------------------------------------------  IN: 0 mL / OUT: 1125 mL / NET: -1125 mL                                             --------------------------------------------------------------  LABS:                        7.7    5.02  )-----------( 204      ( 09 Sep 2021 05:42 )             23.6     09-09    140  |  107  |  25<H>  ----------------------------<  110<H>  3.9   |  21  |  2.2<H>    Ca    7.8<L>      09 Sep 2021 05:42  Mg     2.0     09-09    TPro  4.4<L>  /  Alb  2.4<L>  /  TBili  0.2  /  DBili  x   /  AST  22  /  ALT  23  /  AlkPhos  101  09-09                                                -------------------------------------------------------------  RADIOLOGY:  EXAM:  CT BRAIN, 09/04/2021    IMPRESSION:  No acute intracranial pathology.  Mild chronic microvascular ischemic changes.      EXAM:  DUPLEX SCAN EXT VEINS LOWER BI, 09/02/2021    Impression:  No evidence of deep venous thrombosis or superficial thrombophlebitis in the right lower extremity.  Evidence of deep venous thrombosis noted in the common femoral vein on the left side appears to be chronic clinical correlation recommended.    EXAM:  US KIDNEY(S), 09/02/2021    IMPRESSION: No hydronephrosis. Bilateral renal cysts.                                            --------------------------------------------------------------    PHYSICAL EXAM:  General: lying in bed, NAD, confused  HEENT: nontraumatic, neck supple, conjunctiva clear  LUNGS: no cough or increased WOB, speaking comfortably in full sentences on RA  HEART: RRR, S1/S2, 2+ RP and DPP bilateral  ABDOMEN: soft, nontender, nondistended  EXT: moves all extremities  NEURO: AAOx1 (person only), CN grossly intact  SKIN: warm, dry, mild edema                                           --------------------------------------------------------------    ASSESSMENT & PLAN  85 year old M with PMH of DM, Crohn s/p colostomy, CKD, HTN, HLD here for urinary retention and cough. Sanford placed in ED draining 600cc urine. Patient tested positive for COVID at urgent care center 8/28.     # AMS, likely due to Acute Metabolic Encephalopathy multifactorial in etiology (hyponatremia, covid, urinary retention)   - CTH (9/4): no acute intracranial pathology    # Hyponatremia, Na 123 on admission - resolved  - repeated serum sodium improved to 135     #Normocytic anemia, Hgb downtrending (8.2>7.7) - likely secondary to anemia of chronic disease vs blood loss  - f/up repeat CBC  - f/up FOBT    # Acute Urinary retention  - s/p sanford insertion in ER  - failed TOV on 9/5, sanford reinserted  - as per  eval- c/w flomax tx. - increased to 0.8mg daily  - US renal - b/l renal cysts    # Elevated Ddimer level- 1135  - s/p lower ext dopper- chronic left common femoral vein DVT  - patient was not on a/c tx. at home   - per pt son, no previous knowledge of chronic DVT  - holding therapeutic AC in setting of downtrending Hgb    #JOSH on CKD likely 2/2 urinary retention   - Cr 2.5 on admission, unknown baseline   -renal function stable- cr 2.1-2.3    #Covid viral infection- patient has no clinical symptoms  - COVID-19 PCR positive- Isolation precautions (contact, droplet, airborne)  - CXR: bibasilar congestion  - Patient is saturating 97% on RA, no hypoxia  - continue clinical observation    #Mild b/l lower ext.  Edema   - resume pt. on lasix 20 mg po once daily  - outpatient echo    #DM -bsfs monitoring  #HTN - continue hydralazine and amlodipine, lasix  #HLD - cont atorvastatin 20   #GERD - cont famotidine  #h/o Crohn's s/p colostomy - avoid constipation   Physical deconditioning: PT rec home with home PT services, family unwilling to take pt home due to confusion and inability to care for colostomy bag                                                                                  ----------------------------------------------------  # DVT prophylaxis: SQH q8h   # GI prophylaxis: famotidine  # Diet: DASH/TLC  # Activity Score (AM-PAC): IAT  # Code status: FULL  # Disposition: from home, discharge to [facility] pending placement and insurance authorization                                                                             --------------------------------------------------------    # Handoff   - f/up repeat CBC  - f/up FOBT  - outpatient echo  - c/w juvenal for now  - discharge to [facility] pending placement and insurance authorization

## 2021-09-09 NOTE — PROGRESS NOTE ADULT - SUBJECTIVE AND OBJECTIVE BOX
Patient is a 85y old  Male who presents with a chief complaint of Urinary Retention, Cough (09 Sep 2021 11:45)      Patient seen and examined at bedside.  Patient denies any chest pain or shortness of breath.    ALLERGIES:  No Known Allergies    MEDICATIONS:  acetaminophen   Tablet .. 650 milliGRAM(s) Oral every 6 hours PRN  aluminum hydroxide/magnesium hydroxide/simethicone Suspension 30 milliLiter(s) Oral every 4 hours PRN  amLODIPine   Tablet 10 milliGRAM(s) Oral daily  aspirin enteric coated 81 milliGRAM(s) Oral daily  atorvastatin 20 milliGRAM(s) Oral at bedtime  chlorhexidine 4% Liquid 1 Application(s) Topical daily  famotidine    Tablet 20 milliGRAM(s) Oral daily  furosemide    Tablet 20 milliGRAM(s) Oral daily  guaifenesin/dextromethorphan Oral Liquid 10 milliLiter(s) Oral four times a day PRN  heparin   Injectable 5000 Unit(s) SubCutaneous every 8 hours  hydrALAZINE 25 milliGRAM(s) Oral three times a day  influenza   Vaccine 0.5 milliLiter(s) IntraMuscular once  insulin lispro (ADMELOG) corrective regimen sliding scale   SubCutaneous three times a day before meals  melatonin 3 milliGRAM(s) Oral at bedtime PRN  ondansetron Injectable 4 milliGRAM(s) IV Push every 8 hours PRN  polyethylene glycol 3350 17 Gram(s) Oral daily  senna 2 Tablet(s) Oral at bedtime  tamsulosin 0.8 milliGRAM(s) Oral at bedtime    Vital Signs Last 24 Hrs  T(F): 96.7 (09 Sep 2021 13:00), Max: 97.7 (08 Sep 2021 21:00)  HR: 78 (09 Sep 2021 13:00) (72 - 83)  BP: 133/60 (09 Sep 2021 13:00) (121/59 - 145/67)  RR: 18 (09 Sep 2021 05:00) (18 - 18)  SpO2: 94% (09 Sep 2021 05:00) (93% - 94%)  I&O's Summary    08 Sep 2021 07:01  -  09 Sep 2021 07:00  --------------------------------------------------------  IN: 0 mL / OUT: 1125 mL / NET: -1125 mL    09 Sep 2021 07:01  -  09 Sep 2021 16:54  --------------------------------------------------------  IN: 0 mL / OUT: 500 mL / NET: -500 mL        PHYSICAL EXAM:  General: NAD, A/O x 1  ENT: MMM  Neck: Supple, No JVD  Lungs: RLL wheezing, no crackles   Cardio: RRR, S1/S2, No murmurs  Abdomen: Soft, Nontender, Nondistended; Bowel sounds present  Extremities: No cyanosis, No edema    LABS:                        8.7    5.94  )-----------( 226      ( 09 Sep 2021 11:42 )             27.4     09-09    140  |  107  |  25  ----------------------------<  110  3.9   |  21  |  2.2    Ca    7.8      09 Sep 2021 05:42  Mg     2.0     09-09    TPro  4.4  /  Alb  2.4  /  TBili  0.2  /  DBili  x   /  AST  22  /  ALT  23  /  AlkPhos  101  09-09    eGFR if Non African American: 26 mL/min/1.73M2 (09-09-21 @ 05:42)  eGFR if : 31 mL/min/1.73M2 (09-09-21 @ 05:42)            09-02 Chol 116 mg/dL LDL -- HDL 39 mg/dL Trig 191 mg/dL              POCT Blood Glucose.: 207 mg/dL (09 Sep 2021 16:37)  POCT Blood Glucose.: 150 mg/dL (09 Sep 2021 11:34)  POCT Blood Glucose.: 138 mg/dL (09 Sep 2021 07:34)  POCT Blood Glucose.: 137 mg/dL (08 Sep 2021 21:43)          COVID-19 PCR: Detected (09-07-21 @ 17:05)  COVID-19 PCR: Detected (09-01-21 @ 13:10)      RADIOLOGY & ADDITIONAL TESTS:  < from: CT Head No Cont (09.04.21 @ 11:55) >  IMPRESSION:    No acute intracranial pathology.    Mild chronic microvascular ischemic changes.    < end of copied text >  < from: [facility] Duplex Lower Ext Vein Scan, Bilat (09.02.21 @ 14:13) >  Impression:    No evidence of deep venous thrombosis or superficial thrombophlebitis in the right lower extremity  Evidence of deep venous thrombosis noted in the common femoral vein on the left side appears to be chronic clinical correlation recommended.    < end of copied text >    Care Discussed with Consultants/Other Providers:

## 2021-09-10 NOTE — PROGRESS NOTE ADULT - SUBJECTIVE AND OBJECTIVE BOX
CECILLE FRANKEL 85y Male  MRN#: [id number]   CODE STATUS: FULL    Hospital Day: 9d    Pt is currently admitted with the primary diagnosis of urinary retention and cough.    SUBJECTIVE  Hospital Course  HPI: Patient is 85 year old M with PMH of DM, Crohn s/p colostomy, CKD, HTN, HLD here for urinary retention and cough. Patient states he was not able to pee since yesterday. Patient states he usually has a hard time going to the bathroom but has never had a sanford placed. Patient is poor historian and admits to being more forgetful recently. Spoke to sonNicola at [phone] for more information. Patient was living alone in apartment which had bed bugs and is now living with his son. On Saturday, patient went to urgent care for cough and was tested positive for COVID. He was sent home with cough medicine and told to quarantine. His son also tested positive for COVID. Patient got both doses of Moderna COVID vaccine in March. His son also noted that patient has not been acting himself and is confused. He found him wandering around the street in the middle of the night. Patient denies fevers, chest pain, sob, abdominal pain, diarrhea. Patient admits to productive cough and leg swelling.     ED: /70, HR 86, RR 19, T 97.9, SpO2 98% on RA.  CXR revealed bibasilar opacities, Lab significant for Na 123, Cr 2.5, UA was negative.  Sanford was placed in ED.     9/4: Removed sanford for TOV.  9/5: Reinserted sanford for failed TOV.  Hemodynamically stable and medically optimized for discharge to [facility] pending insurance authorization and placement.  9/7: Repeat COVID PCR test positive.  9/9: Hgb dropped to 7.7.  No clear sign of bleeding.  FOBT ordered.    Overnight events   No acute events overnight.  FOBT negative and AM Hgb returned to 8.7.    Subjective complaints   Patient seen and examined at bedside.  Patient remains confused about his condition and current situation.  Explained the plan to discharge to a [facility].  Patient expressed understanding and denied any complaints this AM.    Present Today:   - Sanford:  No [  ], Yes [ X ] : Indication: urinary retention, failed TOV on 9/5    - Type of IV Access:       .. CVC/Piccline:  No [ X ], Yes [   ] : Indication:       .. Midline: No [ X ], Yes [   ] : Indication:                                             ----------------------------------------------------------  OBJECTIVE  PAST MEDICAL & SURGICAL HISTORY  History of medical problems  [place] b/l ears, dm, htn, hypercholesteremia, ckd, bowel/bladder fistula, kidney stones    Colostomy present    History of surgery  procedure for kidney stones ( ?lithotripsy)                                              -----------------------------------------------------------  ALLERGIES:  No Known Allergies                                            ------------------------------------------------------------    HOME MEDICATIONS  Home Medications:  amLODIPine 5 mg oral tablet: 1 tab(s) orally 2 times a day (01 Sep 2021 16:54)  Aspir 81 oral delayed release tablet: 1 tab(s) orally once a day (01 Sep 2021 16:54)  atorvastatin 10 mg oral tablet: 2 tab(s) orally once a day (01 Sep 2021 16:54)  famotidine 20 mg oral tablet: 1 tab(s) orally once a day (01 Sep 2021 16:54)  Januvia 50 mg oral tablet: 1 tab(s) orally once a day (01 Sep 2021 16:54)  Lasix 20 mg oral tablet: 1 tab(s) orally every other day (01 Sep 2021 16:54)  pioglitazone 30 mg oral tablet: 1 tab(s) orally once a day (01 Sep 2021 16:54)  ramipril 5 mg oral capsule: 2 cap(s) orally once a day (01 Sep 2021 16:54)                           MEDICATIONS:  STANDING MEDICATIONS  amLODIPine   Tablet 10 milliGRAM(s) Oral daily  aspirin enteric coated 81 milliGRAM(s) Oral daily  atorvastatin 20 milliGRAM(s) Oral at bedtime  chlorhexidine 4% Liquid 1 Application(s) Topical daily  famotidine    Tablet 20 milliGRAM(s) Oral daily  furosemide    Tablet 20 milliGRAM(s) Oral daily  heparin   Injectable 5000 Unit(s) SubCutaneous every 8 hours  hydrALAZINE 25 milliGRAM(s) Oral three times a day  influenza   Vaccine 0.5 milliLiter(s) IntraMuscular once  insulin lispro (ADMELOG) corrective regimen sliding scale   SubCutaneous three times a day before meals  polyethylene glycol 3350 17 Gram(s) Oral daily  senna 2 Tablet(s) Oral at bedtime  tamsulosin 0.8 milliGRAM(s) Oral at bedtime    PRN MEDICATIONS  acetaminophen   Tablet .. 650 milliGRAM(s) Oral every 6 hours PRN  aluminum hydroxide/magnesium hydroxide/simethicone Suspension 30 milliLiter(s) Oral every 4 hours PRN  guaifenesin/dextromethorphan Oral Liquid 10 milliLiter(s) Oral four times a day PRN  melatonin 3 milliGRAM(s) Oral at bedtime PRN  ondansetron Injectable 4 milliGRAM(s) IV Push every 8 hours PRN                                            ------------------------------------------------------------  VITAL SIGNS: Last 24 Hours  T(C): 36.4 (10 Sep 2021 05:03), Max: 36.4 (10 Sep 2021 05:03)  T(F): 97.6 (10 Sep 2021 05:03), Max: 97.6 (10 Sep 2021 05:03)  HR: 79 (10 Sep 2021 05:03) (78 - 89)  BP: 121/58 (10 Sep 2021 05:03) (121/58 - 139/62)  BP(mean): --  RR: 18 (10 Sep 2021 05:03) (18 - 18)  SpO2: 98% (09 Sep 2021 20:39) (98% - 98%)      09-09-21 @ 07:01  -  09-10-21 @ 07:00  --------------------------------------------------------  IN: 0 mL / OUT: 1275 mL / NET: -1275 mL                                             --------------------------------------------------------------  LABS:                        8.7    5.94  )-----------( 226      ( 09 Sep 2021 11:42 )             27.4     09-09    140  |  107  |  25<H>  ----------------------------<  110<H>  3.9   |  21  |  2.2<H>    Ca    7.8<L>      09 Sep 2021 05:42  Mg     2.0     09-09    TPro  4.4<L>  /  Alb  2.4<L>  /  TBili  0.2  /  DBili  x   /  AST  22  /  ALT  23  /  AlkPhos  101  09-09                                              -------------------------------------------------------------  RADIOLOGY:  EXAM:  CT BRAIN, 09/04/2021    IMPRESSION:  No acute intracranial pathology.  Mild chronic microvascular ischemic changes.      EXAM:  DUPLEX SCAN EXT VEINS LOWER BI, 09/02/2021    Impression:  No evidence of deep venous thrombosis or superficial thrombophlebitis in the right lower extremity.  Evidence of deep venous thrombosis noted in the common femoral vein on the left side appears to be chronic clinical correlation recommended.    EXAM:  US KIDNEY(S), 09/02/2021    IMPRESSION: No hydronephrosis. Bilateral renal cysts.                                            --------------------------------------------------------------    PHYSICAL EXAM:  General: lying in bed, NAD, confused  HEENT: nontraumatic, neck supple, conjunctiva clear  LUNGS: no cough or increased WOB, speaking comfortably in full sentences on RA  HEART: RRR, S1/S2, 2+ RP and DPP bilateral  ABDOMEN: soft, nontender, nondistended  EXT: moves all extremities  NEURO: AAOx1 (person only), CN grossly intact  SKIN: warm, dry, mild edema                                           --------------------------------------------------------------    ASSESSMENT & PLAN  85 year old M with PMH of DM, Crohn s/p colostomy, CKD, HTN, HLD here for urinary retention and cough. Sanford placed in ED draining 600cc urine. Patient tested positive for COVID at urgent care center 8/28.     # AMS, likely due to Acute Metabolic Encephalopathy multifactorial in etiology (hyponatremia, covid, urinary retention)   - CTH (9/4): no acute intracranial pathology    # Hyponatremia, Na 123 on admission - resolved  - repeated serum sodium improved to 135     #Normocytic anemia, Hgb downtrending (8.2>7.7>8.7 on 9/10) - improved  - FOBT negative    # Acute Urinary retention  - s/p sanford insertion in ER  - failed TOV on 9/5, sanford reinserted  - as per  eval- c/w flomax tx. - increased to 0.8mg daily  - US renal - b/l renal cysts    # Elevated Ddimer level- 1135  - s/p lower ext dopper- chronic left common femoral vein DVT  - patient was not on a/c tx. at home   - per pt son, no previous knowledge of chronic DVT  - holding therapeutic AC in setting of downtrending Hgb    #JOSH on CKD likely 2/2 urinary retention   - Cr 2.5 on admission, unknown baseline   -renal function stable- cr 2.1-2.3    #Covid viral infection- patient has no clinical symptoms  - COVID-19 PCR positive- Isolation precautions (contact, droplet, airborne)  - CXR: bibasilar congestion  - Patient is saturating 97% on RA, no hypoxia  - continue clinical observation    #Mild b/l lower ext.  Edema   - resume pt. on lasix 20 mg po once daily  - outpatient echo    #DM -bsfs monitoring  #HTN - continue hydralazine and amlodipine, lasix  #HLD - cont atorvastatin 20   #GERD - cont famotidine  #h/o Crohn's s/p colostomy - avoid constipation   Physical deconditioning: PT rec home with home PT services, family unwilling to take pt home due to confusion and inability to care for colostomy bag                                                                                  ----------------------------------------------------  # DVT prophylaxis: SQH q8h   # GI prophylaxis: famotidine  # Diet: DASH/TLC  # Activity Score (AM-PAC): IAT  # Code status: FULL  # Disposition: from home, discharge to [facility] pending placement, denied at [facility]                                                                             --------------------------------------------------------    # Handoff   - outpatient echo  - c/w juvenal for now  - discharge to [facility] pending placement, denied at [facility]

## 2021-09-11 NOTE — PROGRESS NOTE ADULT - SUBJECTIVE AND OBJECTIVE BOX
S: BReahting stable   No new events/complaints  comfortable in bed  AAOx2-3      All other pertinent ROS negative.      09-10-21 @ 07:01  -  09-11-21 @ 07:00  --------------------------------------------------------  IN: 0 mL / OUT: 950 mL / NET: -950 mL    09-11-21 @ 07:01  -  09-11-21 @ 19:02  --------------------------------------------------------  IN: 0 mL / OUT: 201 mL / NET: -201 mL      Vital Signs Last 24 Hrs  T(C): 35.9 (11 Sep 2021 13:33), Max: 36.1 (11 Sep 2021 05:00)  T(F): 96.7 (11 Sep 2021 13:33), Max: 96.9 (11 Sep 2021 05:00)  HR: 62 (11 Sep 2021 13:33) (62 - 91)  BP: 126/60 (11 Sep 2021 13:33) (111/56 - 130/62)  BP(mean): --  RR: 18 (11 Sep 2021 13:33) (18 - 18)  SpO2: 98% (11 Sep 2021 13:33) (96% - 98%)  PHYSICAL EXAM:    Constitutional: NAD, awake and alert, well-developed  HEENT: PERR, EOMI, Normal Hearing, MMM  Neck: Soft and supple, No LAD, No JVD  Respiratory: Breath sounds are clear bilaterally, No wheezing, rales or rhonchi  Cardiovascular: S1 and S2, regular rate and rhythm, no Murmurs, gallops or rubs  Gastrointestinal: Bowel Sounds present, soft, nontender, nondistended, no guarding, no rebound  Extremities: No peripheral edema      MEDICATIONS:  MEDICATIONS  (STANDING):  amLODIPine   Tablet 10 milliGRAM(s) Oral daily  apixaban 5 milliGRAM(s) Oral two times a day  aspirin enteric coated 81 milliGRAM(s) Oral daily  atorvastatin 20 milliGRAM(s) Oral at bedtime  chlorhexidine 4% Liquid 1 Application(s) Topical daily  famotidine    Tablet 20 milliGRAM(s) Oral daily  furosemide    Tablet 20 milliGRAM(s) Oral daily  hydrALAZINE 25 milliGRAM(s) Oral three times a day  influenza   Vaccine 0.5 milliLiter(s) IntraMuscular once  insulin lispro (ADMELOG) corrective regimen sliding scale   SubCutaneous three times a day before meals  polyethylene glycol 3350 17 Gram(s) Oral daily  senna 2 Tablet(s) Oral at bedtime  tamsulosin 0.8 milliGRAM(s) Oral at bedtime      LABS: All Labs Reviewed:                        7.7    6.15  )-----------( 187      ( 11 Sep 2021 04:30 )             23.6     09-11    136  |  104  |  27<H>  ----------------------------<  122<H>  3.9   |  21  |  2.5<H>    Ca    7.9<L>      11 Sep 2021 04:30  Mg     1.9     09-11    TPro  4.5<L>  /  Alb  2.7<L>  /  TBili  0.2  /  DBili  x   /  AST  18  /  ALT  19  /  AlkPhos  109  09-11          Blood Culture:     Radiology: reviewed

## 2021-09-12 NOTE — PROGRESS NOTE ADULT - SUBJECTIVE AND OBJECTIVE BOX
CECILLE FRANKEL 85y Male  MRN#: [id number]   CODE STATUS: FULL    Hospital Day: 11d    Pt is currently admitted with the primary diagnosis of urinary retention and cough.    SUBJECTIVE  Hospital Course  HPI: Patient is 85 year old M with PMH of DM, Crohn s/p colostomy, CKD, HTN, HLD here for urinary retention and cough. Patient states he was not able to pee since yesterday. Patient states he usually has a hard time going to the bathroom but has never had a sanford placed. Patient is poor historian and admits to being more forgetful recently. Spoke to sonNicola at [phone] for more information. Patient was living alone in apartment which had bed bugs and is now living with his son. On Saturday, patient went to urgent care for cough and was tested positive for COVID. He was sent home with cough medicine and told to quarantine. His son also tested positive for COVID. Patient got both doses of Moderna COVID vaccine in March. His son also noted that patient has not been acting himself and is confused. He found him wandering around the street in the middle of the night. Patient denies fevers, chest pain, sob, abdominal pain, diarrhea. Patient admits to productive cough and leg swelling.     ED: /70, HR 86, RR 19, T 97.9, SpO2 98% on RA.  CXR revealed bibasilar opacities, Lab significant for Na 123, Cr 2.5, UA was negative.  Sanford was placed in ED.     9/4: Removed sanford for TOV.  9/5: Reinserted sanford for failed TOV.  Hemodynamically stable and medically optimized for discharge to STR pending insurance authorization and placement.  9/7: Repeat COVID PCR test positive.  9/9: Hgb dropped to 7.7.  No clear sign of bleeding.  FOBT ordered,     FOBT negative. Hgb now stable  Patient is pending STR authorization, denied for [facility], insurance office closed over the weekend     Overnight events   No acute events overnight.     Subjective complaints   Patient seen and examined at bedside.  Patient remains confused about his condition and current situation.  Explained the plan to discharge to a STR.  Patient expressed understanding and denied any complaints this AM.    Present Today:   - Sanford:  No [  ], Yes [ X ] : Indication: urinary retention, failed TOV on 9/5, will go with sanford    - Type of IV Access:       .. CVC/Piccline:  No [ X ], Yes [   ] : Indication:       .. Midline: No [ X ], Yes [   ] : Indication:                                             ----------------------------------------------------------  OBJECTIVE  PAST MEDICAL & SURGICAL HISTORY  History of medical problems  [place] b/l ears, dm, htn, hypercholesteremia, ckd, bowel/bladder fistula, kidney stones    Colostomy present    History of surgery  procedure for kidney stones ( ?lithotripsy)                                              -----------------------------------------------------------  ALLERGIES:  No Known Allergies                                            ------------------------------------------------------------    HOME MEDICATIONS  Home Medications:  amLODIPine 5 mg oral tablet: 1 tab(s) orally 2 times a day (01 Sep 2021 16:54)  Aspir 81 oral delayed release tablet: 1 tab(s) orally once a day (01 Sep 2021 16:54)  atorvastatin 10 mg oral tablet: 2 tab(s) orally once a day (01 Sep 2021 16:54)  famotidine 20 mg oral tablet: 1 tab(s) orally once a day (01 Sep 2021 16:54)  Januvia 50 mg oral tablet: 1 tab(s) orally once a day (01 Sep 2021 16:54)  Lasix 20 mg oral tablet: 1 tab(s) orally every other day (01 Sep 2021 16:54)  pioglitazone 30 mg oral tablet: 1 tab(s) orally once a day (01 Sep 2021 16:54)  ramipril 5 mg oral capsule: 2 cap(s) orally once a day (01 Sep 2021 16:54)                           MEDICATIONS:  STANDING MEDICATIONS  amLODIPine   Tablet 10 milliGRAM(s) Oral daily  aspirin enteric coated 81 milliGRAM(s) Oral daily  atorvastatin 20 milliGRAM(s) Oral at bedtime  chlorhexidine 4% Liquid 1 Application(s) Topical daily  famotidine    Tablet 20 milliGRAM(s) Oral daily  furosemide    Tablet 20 milliGRAM(s) Oral daily  heparin   Injectable 5000 Unit(s) SubCutaneous every 8 hours  hydrALAZINE 25 milliGRAM(s) Oral three times a day  influenza   Vaccine 0.5 milliLiter(s) IntraMuscular once  insulin lispro (ADMELOG) corrective regimen sliding scale   SubCutaneous three times a day before meals  polyethylene glycol 3350 17 Gram(s) Oral daily  senna 2 Tablet(s) Oral at bedtime  tamsulosin 0.8 milliGRAM(s) Oral at bedtime    PRN MEDICATIONS  acetaminophen   Tablet .. 650 milliGRAM(s) Oral every 6 hours PRN  aluminum hydroxide/magnesium hydroxide/simethicone Suspension 30 milliLiter(s) Oral every 4 hours PRN  guaifenesin/dextromethorphan Oral Liquid 10 milliLiter(s) Oral four times a day PRN  melatonin 3 milliGRAM(s) Oral at bedtime PRN  ondansetron Injectable 4 milliGRAM(s) IV Push every 8 hours PRN                                            ------------------------------------------------------------  VITAL SIGNS: Last 24 Hours  Vital Signs Last 24 Hrs  T(C): 35.7 (11 Sep 2021 20:00), Max: 36.1 (11 Sep 2021 05:00)  T(F): 96.3 (11 Sep 2021 20:00), Max: 96.9 (11 Sep 2021 05:00)  HR: 78 (11 Sep 2021 20:00) (62 - 78)  BP: 117/59 (11 Sep 2021 20:00) (111/56 - 126/60)  BP(mean): --  RR: 18 (11 Sep 2021 20:00) (18 - 18)  SpO2: 97% (11 Sep 2021 20:00) (96% - 98%)                                           --------------------------------------------------------------  LABS:                                    7.7    6.15  )-----------( 187      ( 11 Sep 2021 04:30 )             23.6     09-11    136  |  104  |  27<H>  ----------------------------<  122<H>  3.9   |  21  |  2.5<H>    Ca    7.9<L>      11 Sep 2021 04:30  Mg     1.9     09-11    TPro  4.5<L>  /  Alb  2.7<L>  /  TBili  0.2  /  DBili  x   /  AST  18  /  ALT  19  /  AlkPhos  109  09-11                                              -------------------------------------------------------------  RADIOLOGY:  EXAM:  CT BRAIN, 09/04/2021    IMPRESSION:  No acute intracranial pathology.  Mild chronic microvascular ischemic changes.      EXAM:  DUPLEX SCAN EXT VEINS LOWER BI, 09/02/2021    Impression:  No evidence of deep venous thrombosis or superficial thrombophlebitis in the right lower extremity.  Evidence of deep venous thrombosis noted in the common femoral vein on the left side appears to be chronic clinical correlation recommended.    EXAM:  US KIDNEY(S), 09/02/2021    IMPRESSION: No hydronephrosis. Bilateral renal cysts.                                            --------------------------------------------------------------    PHYSICAL EXAM:  General: lying in bed, NAD, confused  HEENT: nontraumatic, neck supple, conjunctiva clear  LUNGS: no cough or increased WOB, speaking comfortably in full sentences on RA  HEART: RRR, S1/S2, 2+ RP and DPP bilateral  ABDOMEN: soft, nontender, nondistended  EXT: moves all extremities  NEURO: AAOx1 (person only), CN grossly intact  SKIN: warm, dry, mild edema                                           --------------------------------------------------------------    ASSESSMENT & PLAN  85 year old M with PMH of DM, Crohn s/p colostomy, CKD, HTN, HLD here for urinary retention and cough. Sanford placed in ED draining 600cc urine. Patient tested positive for COVID at urgent care center 8/28.     # AMS, likely due to Acute Metabolic Encephalopathy multifactorial in etiology (hyponatremia, covid, urinary retention)   - CTH (9/4): no acute intracranial pathology    # Hyponatremia, Na 123 on admission - resolved  - repeated serum sodium improved to 135     #Normocytic anemia, Hgb downtrending (8.2>7.7>8.7 on 9/10) - improved  - FOBT negative    # Acute Urinary retention  - s/p sanford insertion in ER  - failed TOV on 9/5, sanford reinserted  - as per  eval- c/w flomax tx. -  continue 0.8mg daily  - US renal - b/l renal cysts    # Elevated Ddimer level- 1135  - s/p lower ext dopper- chronic left common femoral vein DVT  - patient was not on a/c tx. at home   - per pt son, no previous knowledge of chronic DVT  - Started eliquis     #JOSH on CKD likely 2/2 urinary retention   - Cr 2.5 on admission, unknown baseline   -renal function stable- cr 2.1-2.3    #Covid viral infection- patient has no clinical symptoms  - COVID-19 PCR positive- Isolation precautions (contact, droplet, airborne)  - CXR: bibasilar congestion  - Patient is saturating 97% on RA, no hypoxia  - continue clinical observation    #Mild b/l lower ext.  Edema   - resume pt. on lasix 20 mg po once daily  - outpatient echo    #DM -bsfs monitoring  #HTN - continue hydralazine and amlodipine, lasix  #HLD - cont atorvastatin 20   #GERD - cont famotidine  #h/o Crohn's s/p colostomy - avoid constipation   Physical deconditioning: PT rec home with home PT services, family unwilling to take pt home due to confusion and inability to care for colostomy bag                                                                                  ----------------------------------------------------  # DVT prophylaxis: SQH q8h   # GI prophylaxis: famotidine  # Diet: DASH/TLC  # Activity Score (AM-PAC): IAT  # Code status: FULL  # Disposition: from home, discharge to STR pending placement, denied at [facility], insurance closed over the weekend, cannot get placement until at least Monday                                                                              --------------------------------------------------------    # Handoff   - outpatient echo  - c/w juvenal for now  - discharge to STR pending placement, denied at [facility]

## 2021-09-13 NOTE — PROGRESS NOTE ADULT - ASSESSMENT
85 year old Male with PMH of DM, Crohn s/p colostomy, CKD, HTN, HLD here for urinary retention and cough. Luna placed in ED draining 600cc urine. Patient tested positive for COVID at urgent care center 8/28.     Acute Metabolic Encephalopathy: COVID infection. / Hyponatermia- resolved but still resolved   Underlying dementia:   Stable, patient is still confused and disoriented.   Head CT showed no acute abnormalities.      COVID infection: mild  No Hypoxia.   CXR is clear.     Acute Urinary Retention:   void trial currently taking place  Urine cx no growth.   Continue Flomax-increased to 0.8mg 9/9. TOV after ambulation.     Chronic DVT: of left common femoral vein  Start on Eliquis.     CKD stage 4:   Cr 2.4 stable.   Renal US showed no hydronephrosis.     Acute on chronic normocytic anemia:   Hb was 11 on admission, now 8.7, stable, no acute bleeding.   -occult blood negative  -ferritin unreliable in covid setting     Crohn's s/p colostomy - avoid constipation   Physical deconditioning : PT tx. as tolerated     DVT proph- heparin subc. tx.     #Progress Note Handoff: patient stable to discharge, occult blood negative  Family discussion: medical plan of care d/w patient's son Disposition:  STR once auth is obtained  
85 year old Male with PMH of DM, Crohn s/p colostomy, CKD, HTN, HLD here for urinary retention and cough. Luna placed in ED draining 600cc urine. Patient tested positive for COVID at urgent care center 8/28.     Acute Metabolic Encephalopathy: from hyponatremia and COVID infection.   Underlying dementia:   Stable, patient is still confused and disoriented.   Head CT showed no acute abnormalities.      COVID infection: mild  No Hypoxia.   CXR is clear.     Acute Urinary Retention:   s/p Luna placed.   Urine cx no growth.   Continue Flomax. TOV after ambulation.     Chronic DVT: of left common femoral vein  Start on Eliquis.     CKD stage 4:   Cr 2.4 stable.   Renal US showed no hydronephrosis.     Acute on chronic normocytic anemia:   Hb was 11 on admission, now 8, stable, no acute bleeding.     Crohn's s/p colostomy - avoid constipation   Physical deconditioning : PT tx. as tolerated     DVT proph- heparin subc. tx.     #Progress Note Handoff: start d/c process to STR, case management submitted [facility], will need insurance auth. , d/c covid swab  Family discussion: medical plan of care d/w patient's son Disposition:  STR once auth is obtained  
85 year old Male with PMH of DM, Crohn s/p colostomy, CKD, HTN, HLD here for urinary retention and cough. Sanford placed in ED draining 600cc urine. Patient tested positive for COVID at urgent care center 8/28.     Acute Metabolic Encephalopathy: COVID infection  Underlying dementia:   Stable, patient is still confused and disoriented.   Head CT showed no acute abnormalities.      COVID infection: mild  No Hypoxia.   CXR is clear.     Acute Urinary Retention:   Sanford in place. will be going with sanford.  Flomax-increased to 0.8mg 9/9.    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     CKD stage 4:   Cr stable around 2.2 - 2.4  Renal US showed no hydronephrosis.     Acute on chronic normocytic anemia:   Hb was 11 on admission, now 8.7, stable, no acute bleeding.   -occult blood negative  -ferritin unreliable in covid setting     Crohn's s/p colostomy - avoid constipation   Physical deconditioning : PT tx. as tolerated     DVT proph- heparin subc. tx.     #Progress Note Handoff: patient stable to discharge, occult blood negative  Disposition:  STR [facility] once auth is obtained  
ASSESSMENT & PLAN    85 year old M with PMH of DM, Crohn s/p colostomy, CKD, HTN, HLD here for urinary retention and cough. Sanford placed in ED draining 600cc urine. Patient tested positive for COVID at urgent care center 8/28.     # AMS, likely due to Acute Metabolic Encephalopathy multifactorial in etiology (hyponatremia, covid, urinary retention)   - CTH (9/4): no acute intracranial pathology    # Hyponatremia, Na 123 on admission - resolved and stable  - repeated serum sodium improved to 134     #Normocytic anemia, Hgb downtrending (8.2>7.7>8.7 on 9/10) - improved  - FOBT negative  - c/w to trend    # Acute Urinary retention: may need chronic sanford  - s/p sanford insertion in ER  - failed TOV on 9/5, sanford was reinserted  - as per  eval- c/w flomax tx. -  continue 0.8mg daily  - US renal - b/l renal cysts    # Elevated Ddimer level- 1135  - s/p lower ext dopper- chronic left common femoral vein DVT  - patient was not on a/c tx. at home   - per pt son, no previous knowledge of chronic DVT  - at eliquis 5mg BID currently for 7 days. c/w eliquis 2.5mg BID after 7 days    #JOSH on CKD likely 2/2 urinary retention   - Cr 2.5 on admission, unknown baseline   -renal function stable- cr 2.1-2.3    #Covid viral infection- patient has no clinical symptoms  - COVID-19 PCR positive- Isolation precautions (contact, droplet, airborne)  - CXR: bibasilar congestion  - Patient is satting well on RA, no hypoxia  - continue clinical observation    #Mild b/l lower ext.  Edema: resolved  - c/w lasix 20 mg po once daily  - outpatient echo    #DM -bsfs monitoring  #HTN - continue hydralazine and amlodipine, lasix  #HLD - cont atorvastatin 20   #GERD - cont famotidine  #h/o Crohn's s/p colostomy - avoid constipation   Physical deconditioning: PT rec home with home PT services, family unwilling to take pt home due to confusion and inability to care for colostomy bag                                                                              ----------------------------------------------------  # DVT prophylaxis: SQH q8h   # GI prophylaxis: famotidine  # Diet: DASH/TLC  # Activity Score (AM-PAC): IAT  # Code status: FULL  # Disposition: from home, discharge to STR pending placement, COVID-19 routine PCR                                                                             --------------------------------------------------------    # Handoff   - outpatient echo  - c/w juvenal for now  - discharge to STR pending placement, denied at [facility]
85 year old Male with PMH of DM, Crohn s/p colostomy, CKD, HTN, HLD here for urinary retention and cough. Luna placed in ED draining 600cc urine. Patient tested positive for COVID at urgent care center 8/28.     Acute Metabolic Encephalopathy: COVID infection. / Hyponatermia- resolved but still resolved   Underlying dementia:   Stable, patient is still confused and disoriented.   Head CT showed no acute abnormalities.      COVID infection: mild  No Hypoxia.   CXR is clear.     Acute Urinary Retention:   s/p Luna placed.   Urine cx no growth.   Continue Flomax-increased to 0.8mg 9/9. TOV after ambulation.     Chronic DVT: of left common femoral vein  Start on Eliquis.     CKD stage 4:   Cr 2.4 stable.   Renal US showed no hydronephrosis.     Acute on chronic normocytic anemia:   Hb was 11 on admission, now 8.7, stable, no acute bleeding.   -occult blood ordered  -ferritin unreliable in covid setting     Crohn's s/p colostomy - avoid constipation   Physical deconditioning : PT tx. as tolerated     DVT proph- heparin subc. tx.     #Progress Note Handoff: patient stable to discharge, occult blood is pending  Family discussion: medical plan of care d/w patient's son Disposition:  STR once auth is obtained

## 2021-09-13 NOTE — PROGRESS NOTE ADULT - SUBJECTIVE AND OBJECTIVE BOX
CECILLE FRANKEL 85y Male  MRN#: [id number]   CODE STATUS: full code    Hospital Day: 12d    Pt is currently admitted with the primary diagnosis of hyponatremia, urinary retention    SUBJECTIVE  Hospital Course    Overnight events: No acute events overnight. Patient had no complaints. Denied chest pain, shortness of breath, abdominal pain. Was very particular about the food he has (it is bread) and was wondering whether this could worsen his diabetes.     Subjective complaints                                                 ----------------------------------------------------------  OBJECTIVE  PAST MEDICAL & SURGICAL HISTORY  History of medical problems  [place] b/l ears, dm, htn, hypercholesteremia, ckd, bowel/bladder fistula, kidney stones    Colostomy present    History of surgery  procedure for kidney stones ( ?lithotripsy)                                              -----------------------------------------------------------  ALLERGIES:  No Known Allergies                                            ------------------------------------------------------------    HOME MEDICATIONS  Home Medications:  amLODIPine 5 mg oral tablet: 1 tab(s) orally 2 times a day (01 Sep 2021 16:54)  Aspir 81 oral delayed release tablet: 1 tab(s) orally once a day (01 Sep 2021 16:54)  atorvastatin 10 mg oral tablet: 2 tab(s) orally once a day (01 Sep 2021 16:54)  famotidine 20 mg oral tablet: 1 tab(s) orally once a day (01 Sep 2021 16:54)  Januvia 50 mg oral tablet: 1 tab(s) orally once a day (01 Sep 2021 16:54)  Lasix 20 mg oral tablet: 1 tab(s) orally every other day (01 Sep 2021 16:54)  pioglitazone 30 mg oral tablet: 1 tab(s) orally once a day (01 Sep 2021 16:54)  ramipril 5 mg oral capsule: 2 cap(s) orally once a day (01 Sep 2021 16:54)                           MEDICATIONS:  STANDING MEDICATIONS  amLODIPine   Tablet 10 milliGRAM(s) Oral daily  apixaban 5 milliGRAM(s) Oral two times a day  aspirin enteric coated 81 milliGRAM(s) Oral daily  atorvastatin 20 milliGRAM(s) Oral at bedtime  chlorhexidine 4% Liquid 1 Application(s) Topical daily  famotidine    Tablet 20 milliGRAM(s) Oral daily  furosemide    Tablet 20 milliGRAM(s) Oral daily  hydrALAZINE 25 milliGRAM(s) Oral three times a day  influenza   Vaccine 0.5 milliLiter(s) IntraMuscular once  insulin lispro (ADMELOG) corrective regimen sliding scale   SubCutaneous three times a day before meals  polyethylene glycol 3350 17 Gram(s) Oral daily  senna 2 Tablet(s) Oral at bedtime  tamsulosin 0.8 milliGRAM(s) Oral at bedtime    PRN MEDICATIONS  acetaminophen   Tablet .. 650 milliGRAM(s) Oral every 6 hours PRN  aluminum hydroxide/magnesium hydroxide/simethicone Suspension 30 milliLiter(s) Oral every 4 hours PRN  guaifenesin/dextromethorphan Oral Liquid 10 milliLiter(s) Oral four times a day PRN  melatonin 3 milliGRAM(s) Oral at bedtime PRN  ondansetron Injectable 4 milliGRAM(s) IV Push every 8 hours PRN                                            ------------------------------------------------------------  VITAL SIGNS: Last 24 Hours  T(C): 36.1 (13 Sep 2021 05:41), Max: 36.2 (12 Sep 2021 20:40)  T(F): 96.9 (13 Sep 2021 05:41), Max: 97.2 (12 Sep 2021 20:40)  HR: 73 (13 Sep 2021 05:41) (70 - 82)  BP: 139/65 (13 Sep 2021 05:41) (139/65 - 161/71)  BP(mean): --  RR: 18 (13 Sep 2021 05:41) (18 - 18)  SpO2: 97% (13 Sep 2021 05:41) (96% - 97%)      09-12-21 @ 07:01  -  09-13-21 @ 07:00  --------------------------------------------------------  IN: 0 mL / OUT: 700 mL / NET: -700 mL                                             --------------------------------------------------------------  LABS:                        8.0    6.85  )-----------( 210      ( 13 Sep 2021 05:00 )             24.8     09-13    134<L>  |  103  |  26<H>  ----------------------------<  158<H>  4.3   |  21  |  2.4<H>    Ca    7.7<L>      13 Sep 2021 05:00  Mg     1.8     09-13    TPro  4.6<L>  /  Alb  2.6<L>  /  TBili  0.2  /  DBili  x   /  AST  15  /  ALT  17  /  AlkPhos  91  09-13                                                              -------------------------------------------------------------  RADIOLOGY:                                            --------------------------------------------------------------    PHYSICAL EXAM:  General: Well appearing, comfortable, not in acute distress  LUNGS: CTAB, no rales, on RA  HEART: RRR, no murmur  ABDOMEN: NBS, soft, nontender to palpation  EXT: no peripheral pitting edema   NEURO: AAOx3                                           --------------------------------------------------------------

## 2021-09-13 NOTE — DISCHARGE NOTE PROVIDER - NSDCCPCAREPLAN_GEN_ALL_CORE_FT
PRINCIPAL DISCHARGE DIAGNOSIS  Diagnosis: JOSH (acute kidney injury)  Assessment and Plan of Treatment: You were noted to have a temporary insult to your kidney function either at the time that you arrived at the hospital or during your stay here. We have monitored your kidney function with blood work during your time here and you are at a level that no longer requires continued hospital level care, but we do recommend that you follow up to continually have your kidney function checked. You can follow up with your primary care doctor, or, if recommended in the discharge paperwork, you should follow up with a Kidney specialist called a Nephrologist.      SECONDARY DISCHARGE DIAGNOSES  Diagnosis: Hyponatremia  Assessment and Plan of Treatment:     Diagnosis: JOSH (acute kidney injury)  Assessment and Plan of Treatment:     Diagnosis: 2019 novel coronavirus disease (COVID-19)  Assessment and Plan of Treatment: Coronavirus disease 2019 (COVID-19) is a respiratory illness  that can spread from person to person. The virus that causes  COVID-19 is a novel coronavirus that was first identified during  an investigation into an outbreak in [place], [place].  The virus that causes COVID-19 probably emerged from an  animal source, but is now spreading from person to person.  The virus is thought to spread mainly between people who  are in close contact with one another (within about 6 feet)  through respiratory droplets produced when an infected  person coughs or sneezes. It also may be possible that a person  can get COVID-19 by touching a surface or object that has  the virus on it and then touching their own mouth, nose, or  possibly their eyes, but this is not thought to be the main  way the virus spreads.  Please stay home and avoid contact with others for at least a week after symptoms resolve and follow government guidelines.   Patients with COVID-19 have had mild to severe respiratory  illness with symptoms of  • fever  • cough  • shortness of breath  People can help protect themselves from respiratory illness with  everyday preventive actions.    • Avoid close contact with people who are sick.  • Avoid touching your eyes, nose, and mouth with  unwashed hands.  • Wash your hands often with soap and water for at least 20   seconds. Use an alcohol-based hand  that contains at  least 60% alcohol if soap and water are not available.   Stay home when you are sick.  • Cover your cough or sneeze with a tissue, then throw the  tissue in the trash.  • Clean and disinfect frequently touched objects  and surfaces.  Call 911 and inform them you are covid positive before you decide to go to the emergency room if you have chest pain, difficulty breathing, high fevers, worsening of your symptoms, feel unwell, or have nausea and vomiting.

## 2021-09-13 NOTE — PROGRESS NOTE ADULT - PROVIDER SPECIALTY LIST ADULT
Hospitalist
Hospitalist
Internal Medicine
Hospitalist
Internal Medicine
Internal Medicine
Hospitalist

## 2021-09-13 NOTE — DIETITIAN INITIAL EVALUATION ADULT. - OTHER INFO
Hosp day 12 r/t urinary retention and cough. PMHX DM, Crohn s/p colostomy, CKD, HTN, HLD.   AMS, likely due to acute metabolic encephalopathy- multifactorial  JOSH on CKD  COVID+  Low hgb- no active bleeding, FOBT-, now stable  Mild B/L LE edema  STR placement pending

## 2021-09-13 NOTE — DIETITIAN INITIAL EVALUATION ADULT. - ORAL INTAKE PTA/DIET HISTORY
[phone] Son phone number Called pt's son ([phone] Son phone number). Pt previously lived alone, very picky eater (chicken, burger, steak, hot dogs), reports pt is about 66 inches, does not suspect any recent weight loss. Pt not sure whether his father requires kosher restriction (will cont). Does not take vitamin/minerals, or ONS at home, does not follow a special diet. NKFA. Per pt's RN, fair to good appetite, >50% of meals.

## 2021-09-13 NOTE — DISCHARGE NOTE PROVIDER - HOSPITAL COURSE
85 year old M with PMH of DM, Crohn s/p colostomy, CKD4, HTN, HLD here for urinary retention and cough.  During his admission, patient was found to be COVID-19 positive with CXR findings of b/l bibasilar opacities and admitted into the COVID-19 unit. His admission labs showed he was hyponatremic (123), anemic (11.1), and patient developed an JOSH (Cr 2.5) 2/2 urinary retention. Sanford was placed and drained 700cc of urine. UA was negative, and UCx showed no growth. Urology was consulted and recommended continuing the sanford and started flomax. Patient failed trial of void and required a chronic sanford. Patient's kidney function and electrolytes improved. Patient's anemia was stable, without requiring transfusion. Patient remained afebrile during his hospital stay, on room air 85 year old M with PMH of DM, Crohn s/p colostomy, CKD4, HTN, HLD here for urinary retention and cough.  During his admission, patient was found to be COVID-19 positive with CXR findings of b/l bibasilar opacities and admitted into the COVID-19 unit. His admission labs showed he was hyponatremic (123), anemic (11.1), and patient developed an JOSH (Cr 2.5) 2/2 urinary retention. Sanford was placed and drained 700cc of urine. UA was negative, and UCx showed no growth. Urology was consulted and recommended continuing the sanford and started flomax. Patient failed trial of void and required a chronic sanford. Patient's kidney function and electrolytes improved. Patient's anemia was stable, without requiring transfusion. Patient remained afebrile during his hospital stay, on room air       Attending Note:  Patient seen and examined independently. I personally had a face-to-face encounter with the patient, examined the patient myself and reviewed the plan of care with the housestaff. Agree with resident's note but my note supersedes that of the resident in the matters hereby listed.   D/.c to SNF.   Meds per rec  f/u PCP in 1 week.

## 2021-09-13 NOTE — DIETITIAN INITIAL EVALUATION ADULT. - PERTINENT LABORATORY DATA
POCT Blood Glucose.: 176 mg/dL (13 Sep 2021 07:34)  POCT Blood Glucose.: 269 mg/dL (12 Sep 2021 21:38)  POCT Blood Glucose.: 153 mg/dL (12 Sep 2021 16:31)  POCT Blood Glucose.: 316 mg/dL (12 Sep 2021 11:15)    9/13 H/H 8/24.8 L, Na 134 L, BUN 26 H, Cr 2.4 H, glu 158 H, Ca 7.7 L  9/3 CRP 53 H  9/2 A1c 8.4% H

## 2021-09-13 NOTE — PROGRESS NOTE ADULT - REASON FOR ADMISSION
Urinary Retention, Cough

## 2021-09-13 NOTE — DIETITIAN INITIAL EVALUATION ADULT. - RD TO REMAIN AVAILABLE
Interventions: meals and snacks, coordination of care Monitoring/evaluation: diet order, oral intake, weight, labs, skin status/yes

## 2021-09-13 NOTE — DIETITIAN INITIAL EVALUATION ADULT. - PHYSCIAL ASSESSMENT
Cog/neuro: alert/disoriented to time/situation  Edema: 2+ B/L pedal edema  GI: LBM 9/13 colostomy  Skin: bruised, ecchymotic well nourished

## 2021-09-13 NOTE — DIETITIAN INITIAL EVALUATION ADULT. - DIET TYPE
Cont kosher/DASH/TLC (sodium and cholesterol restricted diet)/consistent carbohydrate (evening snack)

## 2021-09-13 NOTE — DIETITIAN INITIAL EVALUATION ADULT. - PERTINENT MEDS FT
MEDICATIONS  (STANDING):  amLODIPine   Tablet 10 milliGRAM(s) Oral daily  apixaban 5 milliGRAM(s) Oral two times a day  atorvastatin 20 milliGRAM(s) Oral at bedtime  famotidine    Tablet 20 milliGRAM(s) Oral daily  furosemide    Tablet 20 milliGRAM(s) Oral daily  insulin lispro (ADMELOG) corrective regimen sliding scale   SubCutaneous three times a day before meals  polyethylene glycol 3350 17 Gram(s) Oral daily  senna 2 Tablet(s) Oral at bedtime  aluminum hydroxide/magnesium hydroxide/simethicone Suspension 30 milliLiter(s) Oral every 4 hours PRN Dyspepsia  melatonin 3 milliGRAM(s) Oral at bedtime PRN Insomnia  ondansetron Injectable 4 milliGRAM(s) IV Push every 8 hours PRN Nausea and/or Vomiting

## 2021-09-13 NOTE — DISCHARGE NOTE PROVIDER - NSDCMRMEDTOKEN_GEN_ALL_CORE_FT
amLODIPine 5 mg oral tablet: 1 tab(s) orally 2 times a day  Aspir 81 oral delayed release tablet: 1 tab(s) orally once a day  atorvastatin 10 mg oral tablet: 2 tab(s) orally once a day  famotidine 20 mg oral tablet: 1 tab(s) orally once a day  Januvia 50 mg oral tablet: 1 tab(s) orally once a day  Lasix 20 mg oral tablet: 1 tab(s) orally every other day  pioglitazone 30 mg oral tablet: 1 tab(s) orally once a day  ramipril 5 mg oral capsule: 2 cap(s) orally once a day   amLODIPine 5 mg oral tablet: 1 tab(s) orally 2 times a day  apixaban 2.5 mg oral tablet: 1 tab(s) orally 2 times a day  Aspir 81 oral delayed release tablet: 1 tab(s) orally once a day  atorvastatin 10 mg oral tablet: 2 tab(s) orally once a day  famotidine 20 mg oral tablet: 1 tab(s) orally once a day  guaifenesin-dextromethorphan 100 mg-10 mg/5 mL oral liquid: 10 milliliter(s) orally 4 times a day, As needed, Cough  hydrALAZINE 25 mg oral tablet: 1 tab(s) orally 3 times a day  Januvia 50 mg oral tablet: 1 tab(s) orally once a day  Lasix 20 mg oral tablet: 1 tab(s) orally every other day  pioglitazone 30 mg oral tablet: 1 tab(s) orally once a day  ramipril 5 mg oral capsule: 2 cap(s) orally once a day  tamsulosin 0.4 mg oral capsule: 2 cap(s) orally once a day (at bedtime)

## 2021-09-13 NOTE — PROGRESS NOTE ADULT - ATTENDING COMMENTS
Patient is 85 year old M with PMH of DM, Crohn s/p colostomy, CKD, HTN, HLD here for urinary retention and cough. Sanford placed in ED draining 600cc urine. Patient tested positive for COVID at urgent care center 8/28.     # Acute Metabolic Encephalopathy multifactorial in etiology : - clinically mentation has improved , still remains slightly confused   - hyponatremia-- Na 123 on admission - repeated serum sodium improved to 135  -covid  - urinary retention  -s/p  CT head- no acute intracranial changes       # Acute Urinary retention- post sanford insertion in ER  - as per  eval- start flomax tx.  - TOV on 09/04- failed, sanford reinserted   - US renal - b/l renal cysts    # Elevated Ddimer level- 1135  -s/p lower ext dopper- chronic left common  femoral vein DVT- patient was not on a/c tx. at home     #JOSH on CKD likely 2/2 urinary retention   - Cr 2.5 on admission, unknown baseline   -renal function stable- cr 2.1- 2.3    #Covid viral infection- patient has no clinical symptoms  - COVID-19 PCR positive- Isolation precautions (contact, droplet, airborne)  - CXR: bibasilar congestion  - Patient is saturating 98% on RA, no hypoxia  -continue clinical observation    # Hypomagnesemia- supplemented     #Mild b/l lower ext.  Edema   - resumed pt. on lasix 20 mg po once daily  -outpatient echo    # Anemia of CKD- stable h/h    #DM -bsfs monitoring  #HTN - continue hydralazine and amlodipine , lasix  #HLD - cont atorvastatin 20   #GERD - cont famotidine  #h/o Crohn's s/p colostomy - avoid constipation   Physical deconditioning : PT tx. as tolerated     DVT proph- heparin subc. tx.     #Progress Note Handoff: start d/c process to STR, case management submitted IRENE, will need insurance auth.   Family discussion: medical plan of care d/w patient's son Disposition:  STR once auth is obtained    Total time spent to complete patient's bedside assessment, review medical chart, discuss medical plan of care with covering medical team was more than 35 minutes .
Patient is 85 year old M with PMH of DM, Crohn s/p colostomy, CKD, HTN, HLD here for urinary retention and cough. Sanford placed in ED draining 600cc urine. Patient tested positive for COVID at urgent care center 8/28.     # Acute Metabolic Encephalopathy multifactorial in etiology : - clinically mentation has improved , still remains slightly confused   - hyponatremia-- Na 123 on admission - repeated serum sodium improved to 135  -covid  - urinary retention  -s/p  CT head- no acute intracranial changes       # Acute Urinary retention- post sanford insertion in ER  - as per  eval- start flomax tx.  - TOV on 09/04- failed, sanford reinserted   - US renal - b/l renal cysts    # Elevated Ddimer level- 1135  -s/p lower ext dopper- chronic left common  femoral vein DVT- patient was not on a/c tx. at home     #JOSH on CKD likely 2/2 urinary retention   - Cr 2.5 on admission, unknown baseline   -renal function stable- cr 2.1- 2.3    #Covid viral infection- patient has no clinical symptoms  - COVID-19 PCR positive- Isolation precautions (contact, droplet, airborne)  - CXR: bibasilar congestion  - Patient is saturating 98% on RA, no hypoxia  -continue clinical observation    # Hypomagnesemia- supplemented     #Mild b/l lower ext.  Edema   - resumed pt. on lasix 20 mg po once daily  -outpatient echo    # Anemia of CKD- stable h/h    #DM -bsfs monitoring  #HTN - continue hydralazine and amlodipine , lasix  #HLD - cont atorvastatin 20   #GERD - cont famotidine  #h/o Crohn's s/p colostomy - avoid constipation   Physical deconditioning : PT tx. as tolerated     DVT proph- heparin subc. tx.     #Progress Note Handoff: start d/c process to STR, case management submitted IRENE, will need insurance auth. , d/c covid swab  Family discussion: medical plan of care d/w patient's son Disposition:  STR once auth is obtained    Total time spent to complete patient's bedside assessment, review medical chart, discuss medical plan of care with covering medical team was more than 35 minutes .
85 year old Male with PMH of DM, Crohn s/p colostomy, CKD, HTN, HLD here for urinary retention and cough. Sanford placed in ED draining 600cc urine. Patient tested positive for COVID at urgent care center 8/28.     Acute Metabolic Encephalopathy: COVID infection  Underlying dementia:   Stable, patient is still confused and disoriented.   Head CT showed no acute abnormalities.      COVID infection: mild  No Hypoxia.   CXR is clear.     Acute Urinary Retention:   Sanford in place. will be going with sanford.  Flomax-increased to 0.8mg 9/9.    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     CKD stage 4:   Cr stable around 2.2 - 2.4  Renal US showed no hydronephrosis.     Acute on chronic normocytic anemia:   Hb was 11 on admission, now 8.7, stable, no acute bleeding.   -occult blood negative  -ferritin unreliable in covid setting     Crohn's s/p colostomy - avoid constipation   Physical deconditioning : PT tx. as tolerated     DVT proph- heparin subc. tx.     #Progress Note Handoff: patient stable to discharge, occult blood negative  Disposition:  [facility] once auth is obtained
85 year old Male with PMH of DM, Crohn s/p colostomy, CKD, HTN, HLD here for urinary retention and cough. Sanford placed in ED draining 600cc urine. Patient tested positive for COVID at urgent care center 8/28.     Acute Metabolic Encephalopathy: COVID infection  Underlying dementia:   Stable, patient is still confused and disoriented.   Head CT showed no acute abnormalities.      COVID infection: mild  No Hypoxia.   CXR is clear.     Acute Urinary Retention:   Sanford in place. will be going with sanford.  Flomax-increased to 0.8mg 9/9.    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     CKD stage 4:   Cr stable around 2.2 - 2.4  Renal US showed no hydronephrosis.     Acute on chronic normocytic anemia:   Hb was 11 on admission, now 8.7, stable, no acute bleeding.   -occult blood negative  -ferritin unreliable in covid setting     Crohn's s/p colostomy - avoid constipation   Physical deconditioning : PT tx. as tolerated     DVT proph- heparin subc. tx.     #Progress Note Handoff: patient stable to discharge, occult blood negative  Disposition:  [facility] tomorrow (auth is obtained ). Repeat PCR 9/13 per daughter request
Patient is 85 year old M with PMH of DM, Crohn s/p colostomy, CKD, HTN, HLD here for urinary retention and cough. Sanford placed in ED draining 600cc urine. Patient tested positive for COVID at urgent care center 8/28.     # Acute Metabolic Encephalopathy multifactorial in etiology : - clinically mentation has improved , still remains slightly confused   - hyponatremia-- Na 123 on admission - repeated serum sodium improved to 136  -covid  - urinary retention  -f/up CT head       # Acute Urinary retention- post sanford insertion in ER  - as per  eval- start flomax tx.  - TOV on 09/04  - US renal - b/l renal cysts    # Elevated Ddimer level- 1135  -s/p lower ext dopper- chronic left common  femoral vein DVT- patient was not on a/c tx. at home     #JOSH on CKD likely 2/2 urinary retention   - Cr 2.5 on admission, unknown baseline   -renal function stable- cr 2.1- 2.3    #Covid viral infection- patient has no clinical symptoms  - COVID-19 PCR positive- Isolation precautions (contact, droplet, airborne)  - CXR: bibasilar congestion  - Patient is saturating 98% on RA, no hypoxia  -continue clinical observation    #Mild b/l lower ext.  Edema   - resumed pt. on lasix 20 mg po once daily  -outpatient echo    #DM -bsfs monitoring  #HTN - continue hydralazine and amlodipine , lasix  #HLD - cont atorvastatin 20   #GERD - cont famotidine  #h/o Crohn's s/p colostomy - avoid constipation   Physical deconditioning : PT tx. as tolerated     DVT proph- heparin subc. tx.     #Progress Note Handoff: PT f/up today, TOV today, start d/c process, f/up patient's son choices for STR's, f/up CT head  Family discussion: medical plan of care d/w patient's son Disposition:  STR    Total time spent to complete patient's bedside assessment, review medical chart, discuss medical plan of care with covering medical team was more than 35 minutes

## 2021-09-14 NOTE — DISCHARGE NOTE NURSING/CASE MANAGEMENT/SOCIAL WORK - PATIENT PORTAL LINK FT
You can access the FollowMyHealth Patient Portal offered by [facility] by registering at the following website: [email or web address]. By joining [facility]’s FollowMyHealth portal, you will also be able to view your health information using other applications (apps) compatible with our system.

## 2021-10-23 NOTE — ED ADULT NURSE NOTE - NSICDXPASTMEDICALHX_GEN_ALL_CORE_FT
PAST MEDICAL HISTORY:  History of medical problems [place] b/l ears, dm, htn, hypercholesteremia, ckd, bowel/bladder fistula, kidney stones

## 2021-10-23 NOTE — ED PROVIDER NOTE - NSICDXPASTMEDICALHX_GEN_ALL_CORE_FT
PAST MEDICAL HISTORY:  History of medical problems [place] b/l ears, dm, htn, hypercholesteremia, ckd, bowel/bladder fistula, kidney stones

## 2021-10-23 NOTE — ED PROVIDER NOTE - CLINICAL SUMMARY MEDICAL DECISION MAKING FREE TEXT BOX
85 male here for weakness. Had screening labs imaging cardiac monitoring medications and reevaluation, plan is for inpatient admission to monitored setting for continued management. Found to have acute renal failure with concurrent UTI, will treat for sepsis and admit for continued care.

## 2021-10-23 NOTE — ED ADULT TRIAGE NOTE - CHIEF COMPLAINT QUOTE
pt BIBA for worsening UTI , sent from [facility]  pt on antibiotics but has not been taking them because he has difficulty taking pills; pt c/o pain upon urination

## 2021-10-23 NOTE — H&P ADULT - NSICDXPASTMEDICALHX_GEN_ALL_CORE_FT
PAST MEDICAL HISTORY:  History of medical problems [place] b/l ears, dm, htn, hypercholesteremia, ckd, bowel/bladder fistula, kidney stones

## 2021-10-23 NOTE — ED PROVIDER NOTE - OBJECTIVE STATEMENT
Pt is a 85 year old male with PMH DM, HTN, HLD, Crohn's disease with colostomy in place, CHF, BPH presents to ED with complaints of weakness. Pt states 10 days prior was diagnosed with UTI, placed on bactrim, Family states due to size of pill, pt was unable to swallow pill, only took 1/2 dose for 5 days. Family states over past few days pt becoming increasingly lethargic, having chills as well. Denies cough, chest pain, sob, abdominal pain, NVCD.

## 2021-10-23 NOTE — ED ADULT NURSE NOTE - NSIMPLEMENTINTERV_GEN_ALL_ED
Implemented All Fall Risk Interventions:  [place] to call system. Call bell, personal items and telephone within reach. Instruct patient to call for assistance. Room bathroom lighting operational. Non-slip footwear when patient is off stretcher. Physically safe environment: no spills, clutter or unnecessary equipment. Stretcher in lowest position, wheels locked, appropriate side rails in place. Provide visual cue, wrist band, yellow gown, etc. Monitor gait and stability. Monitor for mental status changes and reorient to person, place, and time. Review medications for side effects contributing to fall risk. Reinforce activity limits and safety measures with patient and family.

## 2021-10-23 NOTE — CONSULT NOTE ADULT - ASSESSMENT
ASSESSMENT: Patient is 85M with PMH of DM, Crohn s/p colostomy, CKD 4, HTN, HLD, BPH presents to ED with complaints of weakness. Labs were remarkable for Hb 6.2 (baseline ~8), Na 126, K 6.2, HCO3 8, AG 18, BUN 90, Cr 8.3 (baseline 2.3). UA was negative. He was given rocephin x1, 2L IVF bolus, insulin and dextrose, calcium gluconate x1, sodium bicarb x1, lokelma 10g x1, and lasix x1. He received 1U PRBC.     Vascular surgery was consulted for possible placement of UDALL. Patient was admitted to the ICU, total urine output has been 221cc and patient is getting, 0.236meq of bicarb. Repeat venous laboratory values is as follows showing improvement: pH Venous: 7.21, pCO2 Venous: 23 mmHg, pO2 Venous: 59 mmHg, HCO3 Venous: 9 mmol/L, Base Excess Venous: -17.1 mmol/L, Oxygen Saturation Venous: 91.8 %, Blood Gas Venous - Lactate: 0.70 mmol/L, Blood Gas Calcium Ionized - Venous: 1.24 mmol/L, Blood Gas Venous Potassium: 5.4 mmol/L, Blood Gas Venous Sodium: 127 mmol/L.     PLAN:   - Possible UDALL placement if needed per primary team  - Patient seen/examined and plan discussed with Vascular Fellow   - Plan to be discussed with Attending Vascular Surgeon    VASCULAR SPECTRA [id number] ASSESSMENT: Patient is 85M with PMH of DM, Crohn s/p colostomy, CKD 4, HTN, HLD, BPH presents to ED with complaints of weakness. Labs were remarkable for Hb 6.2 (baseline ~8), Na 126, K 6.2, HCO3 8, AG 18, BUN 90, Cr 8.3 (baseline 2.3). UA was negative. He was given rocephin x1, 2L IVF bolus, insulin and dextrose, calcium gluconate x1, sodium bicarb x1, lokelma 10g x1, and lasix x1. He received 1U PRBC.     Vascular surgery was consulted for possible placement of UDALL. Patient was admitted to the ICU, total urine output has been 221cc and patient is getting, 0.236meq of bicarb. Repeat venous laboratory values is as follows showing improvement: pH Venous: 7.21, pCO2 Venous: 23 mmHg, pO2 Venous: 59 mmHg, HCO3 Venous: 9 mmol/L, Base Excess Venous: -17.1 mmol/L, Oxygen Saturation Venous: 91.8 %, Blood Gas Venous - Lactate: 0.70 mmol/L, Blood Gas Calcium Ionized - Venous: 1.24 mmol/L, Blood Gas Venous Potassium: 5.4 mmol/L, Blood Gas Venous Sodium: 127 mmol/L.     PLAN:   - Possible UDALL placement if needed per primary team  - Patient seen/examined and plan discussed with Vascular Fellow, Dr. Rowe  - Plan to be discussed with Attending Vascular Surgeon, Dr. Espinal    VASCULAR SPECTRA [id number]

## 2021-10-23 NOTE — CONSULT NOTE ADULT - SUBJECTIVE AND OBJECTIVE BOX
Patient is a 85y old  Male who presents with a chief complaint of weakness (23 Oct 2021 19:11)      HPI: ***    Patient denies fevers/chills, denies lightheadedness/dizziness, denies SOB/chest pain, denies nausea/vomiting, denies constipation/diarrhea.      ROS: 10-system review is otherwise negative except HPI above.      PAST MEDICAL & SURGICAL HISTORY:  History of medical problems  [place] b/l ears, dm, htn, hypercholesteremia, ckd, bowel/bladder fistula, kidney stones    Colostomy present    History of surgery  procedure for kidney stones ( ?lithotripsy)      FAMILY HISTORY:    [] Family history not pertinent as reviewed with the patient and family    SOCIAL HISTORY:  ***    ALLERGIES: No Known Allergies      HOME MEDICATIONS:  ***    CURRENT MEDICATIONS  MEDICATIONS (STANDING): amLODIPine Oral Tab/Cap - Peds 5 milliGRAM(s) Oral two times a day  aspirin enteric coated 81 milliGRAM(s) Oral daily  atorvastatin Oral Tab/Cap - Peds 20 milliGRAM(s) Oral daily  cefTRIAXone   IVPB 1000 milliGRAM(s) IV Intermittent every 24 hours  dextrose 5% 1000 milliLiter(s) IV Continuous <Continuous>  pantoprazole    Tablet 40 milliGRAM(s) Oral before breakfast  sodium zirconium cyclosilicate 10 Gram(s) Oral two times a day  tamsulosin 0.8 milliGRAM(s) Oral at bedtime    MEDICATIONS (PRN):  --------------------------------------------------------------------------------------------    Vitals:   T(C): 34.3 (10-23-21 @ 18:10), Max: 35.8 (10-23-21 @ 13:05)  HR: 9 (10-23-21 @ 18:10) ( - 89)  BP: 102/49 (10-23-21 @ 18:10) (102/49 - 117/58)  RR: 20 (10-23-21 @ 18:10) (18 - 20)  SpO2: 99% (10-23-21 @ 18:10) (97% - 99%)  CAPILLARY BLOOD GLUCOSE      POCT Blood Glucose.: 92 mg/dL (23 Oct 2021 16:42)    CAPILLARY BLOOD GLUCOSE      POCT Blood Glucose.: 92 mg/dL (23 Oct 2021 16:42)      Height (cm): 167.6 (10-23 @ 13:05)    PHYSICAL EXAM: ***  General: NAD  Neuro: AAOx3  HEENT: PERRL, EOMI  Cardio: RRR  Resp: Good effort  Thorax: No chest wall tenderness  Breast: No lesions/masses, no drainage  GI/Abd: Soft, NT/ND, no rebound/guarding  Vascular: Extremities are normal in size, color and temperature   b/l DP/PT palpable, weak signals at b/l DP/PT.  ***  Skin: Intact, no breakdown  Lymphatic/Nodes: No palpable lymphadenopathy  Musculoskeletal: All 4 extremities moving spontaneously, no limitations.   Wound: ***  --------------------------------------------------------------------------------------------    LABS  CBC (10-23 @ 14:18)                              6.2<LL>                         7.05    )----------------(  168        86.2<H>% Neutrophils, 4.7<L>% Lymphocytes, ANC: 6.07                                19.5<L>    BMP (10-23 @ 14:18)             126<L>  |  100     |  90<HH>		Ca++ --      Ca 8.2<L>             ---------------------------------( 114<H>		Mg --                 6.2<HH>  |  8<LL>   |  8.3<HH>			Ph --        LFTs (10-23 @ 14:18)      TPro 5.5<L> / Alb 3.5 / TBili <0.2 / DBili -- / AST 20 / ALT 16 / AlkPhos 81    Coags (10-23 @ 14:18)  aPTT 32.1 / INR 1.02 / PT 11.70      ABG (10-23 @ 14:18)      /  /  /  /  / %     Lactate:  1.3      --------------------------------------------------------------------------------------------    MICROBIOLOGY  Urinalysis (10-23 @ 16:05):     Color: Light Yellow / Appearance: Clear / S.013 / pH: 6.5 / Gluc: Trace / Ketones: Negative / Bili: Negative / Urobili: <2 mg/dL / Protein :300 mg/dL<!> / Nitrites: Negative / Leuk.Est: Small<!> / RBC: 1 / WBC: 16<H> / Sq Epi:  / Non Sq Epi: 1 / Bacteria Negative         --------------------------------------------------------------------------------------------    IMAGING      ASSESSMENT: Patient is a 85y old m with ****    PLAN:   -   -   -   -   - Patient seen/examined and plan discussed with Vascular Fellow   - Plan to be discussed with Attending Vascular Surgeon    VASCULAR SPECTRA [id number]   Patient is a 85y old  Male who presents with a chief complaint of weakness (23 Oct 2021 19:11)    HPI: Patient is 85M with PMH of DM, Crohn s/p colostomy, CKD 4, HTN, HLD, BPH presents to ED with complaints of weakness. Pt states 10 days prior was diagnosed with UTI, placed on bactrim, Family states due to size of pill, pt was unable to swallow pill, only took 1/2 dose for 5 days. Family states over past few days pt becoming increasingly lethargic, having chills as well. Denies cough, chest pain, sob, abdominal pain, NVCD. In the ED, CT a/p was done and showed Urinary bladder wall is thickened in the setting of under-distention. Intraluminal air is likely secondary to Luna. If clinically warranted, correlate with UA to rule out acute cystitis. Labs were remarkable for Hb 6.2 (baseline ~8), Na 126, K 6.2, HCO3 8, AG 18, BUN 90, Cr 8.3 (baseline 2.3). UA was negative. He was given rocephin x1, 2L IVF bolus, insulin and dextrose, calcium gluconate x1, sodium bicarb x1, lokelma 10g x1, and lasix x1. He received 1U PRBC.     Vascular surgery was consulted for placement of UDALL    Patient denies fevers/chills, denies lightheadedness/dizziness, denies SOB/chest pain, denies nausea/vomiting, denies constipation/diarrhea.      ROS: 10-system review is otherwise negative except HPI above.      PAST MEDICAL & SURGICAL HISTORY:  History of medical problems  [place] b/l ears, dm, htn, hypercholesteremia, ckd, bowel/bladder fistula, kidney stones    Colostomy present    History of surgery  procedure for kidney stones ( ?lithotripsy)    FAMILY HISTORY:    ALLERGIES: No Known Allergies    CURRENT MEDICATIONS  MEDICATIONS (STANDING): amLODIPine Oral Tab/Cap - Peds 5 milliGRAM(s) Oral two times a day  aspirin enteric coated 81 milliGRAM(s) Oral daily  atorvastatin Oral Tab/Cap - Peds 20 milliGRAM(s) Oral daily  cefTRIAXone   IVPB 1000 milliGRAM(s) IV Intermittent every 24 hours  dextrose 5% 1000 milliLiter(s) IV Continuous <Continuous>  pantoprazole    Tablet 40 milliGRAM(s) Oral before breakfast  sodium zirconium cyclosilicate 10 Gram(s) Oral two times a day  tamsulosin 0.8 milliGRAM(s) Oral at bedtime    MEDICATIONS (PRN):  --------------------------------------------------------------------------------------------    Vitals:   T(C): 34.3 (10-23-21 @ 18:10), Max: 35.8 (10-23-21 @ 13:05)  HR: 9 (10-23-21 @ 18:10) (9 - 89)  BP: 102/49 (10-23-21 @ 18:10) (102/49 - 117/58)  RR: 20 (10-23-21 @ 18:10) (18 - 20)  SpO2: 99% (10-23-21 @ 18:10) (97% - 99%)  CAPILLARY BLOOD GLUCOSE      POCT Blood Glucose.: 92 mg/dL (23 Oct 2021 16:42)    CAPILLARY BLOOD GLUCOSE    POCT Blood Glucose.: 92 mg/dL (23 Oct 2021 16:42)    Height (cm): 167.6 (10-23 @ 13:05)    PHYSICAL EXAM: ***  General: NAD  Neuro: AAOx3  HEENT: PERRL, EOMI  Cardio: RRR  Resp: Good effort  Thorax: No chest wall tenderness  Breast: No lesions/masses, no drainage  GI/Abd: Soft, NT/ND, no rebound/guarding  Vascular: Extremities are normal in size, color and temperature   b/l DP/PT palpable, weak signals at b/l DP/PT.  ***  Skin: Intact, no breakdown  Lymphatic/Nodes: No palpable lymphadenopathy  Musculoskeletal: All 4 extremities moving spontaneously, no limitations.   Wound: ***  --------------------------------------------------------------------------------------------    LABS  CBC (10-23 @ 14:18)                              6.2<LL>                         7.05    )----------------(  168        86.2<H>% Neutrophils, 4.7<L>% Lymphocytes, ANC: 6.07                                19.5<L>    BMP (10-23 @ 14:18)             126<L>  |  100     |  90<HH>		Ca++ --      Ca 8.2<L>             ---------------------------------( 114<H>		Mg --                 6.2<HH>  |  8<LL>   |  8.3<HH>			Ph --        LFTs (10-23 @ 14:18)      TPro 5.5<L> / Alb 3.5 / TBili <0.2 / DBili -- / AST 20 / ALT 16 / AlkPhos 81    Coags (10-23 @ 14:18)  aPTT 32.1 / INR 1.02 / PT 11.70      ABG (10-23 @ 14:18)      /  /  /  /  / %     Lactate:  1.3      --------------------------------------------------------------------------------------------    MICROBIOLOGY  Urinalysis (10-23 @ 16:05):     Color: Light Yellow / Appearance: Clear / S.013 / pH: 6.5 / Gluc: Trace / Ketones: Negative / Bili: Negative / Urobili: <2 mg/dL / Protein :300 mg/dL<!> / Nitrites: Negative / Leuk.Est: Small<!> / RBC: 1 / WBC: 16<H> / Sq Epi:  / Non Sq Epi: 1 / Bacteria Negative         --------------------------------------------------------------------------------------------    ASSESSMENT: Patient is a 85y old m with ****    PLAN:   -   -   -   -   - Patient seen/examined and plan discussed with Vascular Fellow   - Plan to be discussed with Attending Vascular Surgeon    VASCULAR SPECTRA [id number]   Patient is a 85y old  Male who presents with a chief complaint of weakness (23 Oct 2021 19:11)    HPI: Patient is 85M with PMH of DM, Crohn s/p colostomy, CKD 4, HTN, HLD, BPH presents to ED with complaints of weakness. Pt states 10 days prior was diagnosed with UTI, placed on bactrim, Family states due to size of pill, pt was unable to swallow pill, only took 1/2 dose for 5 days. Family states over past few days pt becoming increasingly lethargic, having chills as well. Denies cough, chest pain, sob, abdominal pain, NVCD. In the ED, CT a/p was done and showed Urinary bladder wall is thickened in the setting of under-distention. Intraluminal air is likely secondary to Luna. If clinically warranted, correlate with UA to rule out acute cystitis. Labs were remarkable for Hb 6.2 (baseline ~8), Na 126, K 6.2, HCO3 8, AG 18, BUN 90, Cr 8.3 (baseline 2.3). UA was negative. He was given rocephin x1, 2L IVF bolus, insulin and dextrose, calcium gluconate x1, sodium bicarb x1, lokelma 10g x1, and lasix x1. He received 1U PRBC.     Vascular surgery was consulted for placement of UDALL    Patient denies fevers/chills, denies lightheadedness/dizziness, denies SOB/chest pain, denies nausea/vomiting, denies constipation/diarrhea.      ROS: 10-system review is otherwise negative except HPI above.      PAST MEDICAL & SURGICAL HISTORY:  History of medical problems  [place] b/l ears, dm, htn, hypercholesteremia, ckd, bowel/bladder fistula, kidney stones    Colostomy present    History of surgery  procedure for kidney stones ( ?lithotripsy)    FAMILY HISTORY:    ALLERGIES: No Known Allergies    CURRENT MEDICATIONS  MEDICATIONS (STANDING): amLODIPine Oral Tab/Cap - Peds 5 milliGRAM(s) Oral two times a day  aspirin enteric coated 81 milliGRAM(s) Oral daily  atorvastatin Oral Tab/Cap - Peds 20 milliGRAM(s) Oral daily  cefTRIAXone   IVPB 1000 milliGRAM(s) IV Intermittent every 24 hours  dextrose 5% 1000 milliLiter(s) IV Continuous <Continuous>  pantoprazole    Tablet 40 milliGRAM(s) Oral before breakfast  sodium zirconium cyclosilicate 10 Gram(s) Oral two times a day  tamsulosin 0.8 milliGRAM(s) Oral at bedtime    MEDICATIONS (PRN):  --------------------------------------------------------------------------------------------    Vitals:   T(C): 34.3 (10-23-21 @ 18:10), Max: 35.8 (10-23-21 @ 13:05)  HR: 9 (10-23-21 @ 18:10) (9 - 89)  BP: 102/49 (10-23-21 @ 18:10) (102/49 - 117/58)  RR: 20 (10-23-21 @ 18:10) (18 - 20)  SpO2: 99% (10-23-21 @ 18:10) (97% - 99%)  CAPILLARY BLOOD GLUCOSE      POCT Blood Glucose.: 92 mg/dL (23 Oct 2021 16:42)    CAPILLARY BLOOD GLUCOSE    POCT Blood Glucose.: 92 mg/dL (23 Oct 2021 16:42)    Height (cm): 167.6 (10-23 @ 13:05)    PHYSICAL EXAM: ***  General: NAD  Neuro: AAOx3  HEENT: PERRL, EOMI  Cardio: RRR  Resp: Good effort  Thorax: No chest wall tenderness  Breast: No lesions/masses, no drainage  GI/Abd: Soft, NT/ND, no rebound/guarding  Vascular: Extremities are normal in size, color and temperature   b/l DP/PT palpable, weak signals at b/l DP/PT.  ***  Skin: Intact, no breakdown  Lymphatic/Nodes: No palpable lymphadenopathy  Musculoskeletal: All 4 extremities moving spontaneously, no limitations.   Wound: ***  --------------------------------------------------------------------------------------------    LABS  CBC (10-23 @ 14:18)                              6.2<LL>                         7.05    )----------------(  168        86.2<H>% Neutrophils, 4.7<L>% Lymphocytes, ANC: 6.07                                19.5<L>    BMP (10-23 @ 14:18)             126<L>  |  100     |  90<HH>		Ca++ --      Ca 8.2<L>             ---------------------------------( 114<H>		Mg --                 6.2<HH>  |  8<LL>   |  8.3<HH>			Ph --        LFTs (10-23 @ 14:18)      TPro 5.5<L> / Alb 3.5 / TBili <0.2 / DBili -- / AST 20 / ALT 16 / AlkPhos 81    Coags (10-23 @ 14:18)  aPTT 32.1 / INR 1.02 / PT 11.70      ABG (10-23 @ 14:18)      /  /  /  /  / %     Lactate:  1.3      --------------------------------------------------------------------------------------------    MICROBIOLOGY  Urinalysis (10-23 @ 16:05):     Color: Light Yellow / Appearance: Clear / S.013 / pH: 6.5 / Gluc: Trace / Ketones: Negative / Bili: Negative / Urobili: <2 mg/dL / Protein :300 mg/dL<!> / Nitrites: Negative / Leuk.Est: Small<!> / RBC: 1 / WBC: 16<H> / Sq Epi:  / Non Sq Epi: 1 / Bacteria Negative         --------------------------------------------------------------------------------------------    ASSESSMENT: Patient is a 85y old m with ****    PLAN:   -   -   -   -   - Patient seen/examined and plan discussed with Vascular Fellow   - Plan to be discussed with Attending Vascular Surgeon    VASCULAR SPECTRA [id number]   Patient is a 85y old  Male who presents with a chief complaint of weakness (23 Oct 2021 19:11)    HPI: Patient is 85M with PMH of DM, Crohn s/p colostomy, CKD 4, HTN, HLD, BPH presents to ED with complaints of weakness. Pt states 10 days prior was diagnosed with UTI, placed on bactrim, Family states due to size of pill, pt was unable to swallow pill, only took 1/2 dose for 5 days. Family states over past few days pt becoming increasingly lethargic, having chills as well. Denies cough, chest pain, sob, abdominal pain, NVCD. In the ED, CT a/p was done and showed Urinary bladder wall is thickened in the setting of under-distention. Intraluminal air is likely secondary to Luna. If clinically warranted, correlate with UA to rule out acute cystitis. Labs were remarkable for Hb 6.2 (baseline ~8), Na 126, K 6.2, HCO3 8, AG 18, BUN 90, Cr 8.3 (baseline 2.3). UA was negative. He was given rocephin x1, 2L IVF bolus, insulin and dextrose, calcium gluconate x1, sodium bicarb x1, lokelma 10g x1, and lasix x1. He received 1U PRBC.   Vascular surgery was consulted for placement of UDALL. Patient was admitted to the ICU, total urine output has been 221cc and patient is getting, 0.236meq of bicarb. Repeat venous laboratory values is as follows showing improvement: pH Venous: 7.21, pCO2 Venous: 23 mmHg, pO2 Venous: 59 mmHg, HCO3 Venous: 9 mmol/L, Base Excess Venous: -17.1 mmol/L, Oxygen Saturation Venous: 91.8 %, Blood Gas Venous - Lactate: 0.70 mmol/L, Blood Gas Calcium Ionized - Venous: 1.24 mmol/L, Blood Gas Venous Potassium: 5.4 mmol/L, Blood Gas Venous Sodium: 127 mmol/L.   ROS: 10-system review is otherwise negative except HPI above.      PAST MEDICAL & SURGICAL HISTORY:  History of medical problems  [place] b/l ears, dm, htn, hypercholesteremia, ckd, bowel/bladder fistula, kidney stones    Colostomy present    History of surgery  procedure for kidney stones ( ?lithotripsy)    FAMILY HISTORY:    ALLERGIES: No Known Allergies    CURRENT MEDICATIONS  MEDICATIONS (STANDING): amLODIPine Oral Tab/Cap - Peds 5 milliGRAM(s) Oral two times a day  aspirin enteric coated 81 milliGRAM(s) Oral daily  atorvastatin Oral Tab/Cap - Peds 20 milliGRAM(s) Oral daily  cefTRIAXone   IVPB 1000 milliGRAM(s) IV Intermittent every 24 hours  dextrose 5% 1000 milliLiter(s) IV Continuous <Continuous>  pantoprazole    Tablet 40 milliGRAM(s) Oral before breakfast  sodium zirconium cyclosilicate 10 Gram(s) Oral two times a day  tamsulosin 0.8 milliGRAM(s) Oral at bedtime    MEDICATIONS (PRN):  --------------------------------------------------------------------------------------------    Vitals:   T(C): 34.3 (10-23-21 @ 18:10), Max: 35.8 (10-23-21 @ 13:05)  HR: 9 (10-23-21 @ 18:10) (9 - 89)  BP: 102/49 (10-23-21 @ 18:10) (102/49 - 117/58)  RR: 20 (10-23-21 @ 18:10) (18 - 20)  SpO2: 99% (10-23-21 @ 18:10) (97% - 99%)  CAPILLARY BLOOD GLUCOSE      POCT Blood Glucose.: 92 mg/dL (23 Oct 2021 16:42)    CAPILLARY BLOOD GLUCOSE    POCT Blood Glucose.: 92 mg/dL (23 Oct 2021 16:42)    Height (cm): 167.6 (10-23 @ 13:05)    PHYSICAL EXAM:  General: NAD  Neuro: AAOx3  HEENT: PERRL, EOMI  Cardio: RRR  Resp: Good effort  Thorax: No chest wall tenderness  Vascular: Extremities are normal in size, color and temperature, b/l DP/PT palpable, mild bl le edema  Musculoskeletal: All 4 extremities moving spontaneously, no limitations.   --------------------------------------------------------------------------------------------    LABS  CBC (10-23 @ 14:18)                              6.2<LL>                         7.05    )----------------(  168        86.2<H>% Neutrophils, 4.7<L>% Lymphocytes, ANC: 6.07                                19.5<L>    BMP (10-23 @ 14:18)             126<L>  |  100     |  90<HH>		Ca++ --      Ca 8.2<L>             ---------------------------------( 114<H>		Mg --                 6.2<HH>  |  8<LL>   |  8.3<HH>			Ph --        LFTs (10-23 @ 14:18)      TPro 5.5<L> / Alb 3.5 / TBili <0.2 / DBili -- / AST 20 / ALT 16 / AlkPhos 81    Coags (10-23 @ 14:18)  aPTT 32.1 / INR 1.02 / PT 11.70      ABG (10-23 @ 14:18)      /  /  /  /  / %     Lactate:  1.3      --------------------------------------------------------------------------------------------    MICROBIOLOGY  Urinalysis (10-23 @ 16:05):     Color: Light Yellow / Appearance: Clear / S.013 / pH: 6.5 / Gluc: Trace / Ketones: Negative / Bili: Negative / Urobili: <2 mg/dL / Protein :300 mg/dL<!> / Nitrites: Negative / Leuk.Est: Small<!> / RBC: 1 / WBC: 16<H> / Sq Epi:  / Non Sq Epi: 1 / Bacteria Negative         --------------------------------------------------------------------------------------------    ASSESSMENT: Patient is 85M with PMH of DM, Crohn s/p colostomy, CKD 4, HTN, HLD, BPH presents to ED with complaints of weakness. Labs were remarkable for Hb 6.2 (baseline ~8), Na 126, K 6.2, HCO3 8, AG 18, BUN 90, Cr 8.3 (baseline 2.3). UA was negative. He was given rocephin x1, 2L IVF bolus, insulin and dextrose, calcium gluconate x1, sodium bicarb x1, lokelma 10g x1, and lasix x1. He received 1U PRBC.     Vascular surgery was consulted for placement of UDALL. Patient was admitted to the ICU, total urine output has been 221cc and patient is getting, 0.236meq of bicarb. Repeat venous laboratory values is as follows showing improvement: pH Venous: 7.21, pCO2 Venous: 23 mmHg, pO2 Venous: 59 mmHg, HCO3 Venous: 9 mmol/L, Base Excess Venous: -17.1 mmol/L, Oxygen Saturation Venous: 91.8 %, Blood Gas Venous - Lactate: 0.70 mmol/L, Blood Gas Calcium Ionized - Venous: 1.24 mmol/L, Blood Gas Venous Potassium: 5.4 mmol/L, Blood Gas Venous Sodium: 127 mmol/L.     PLAN:   - Patient seen/examined and plan discussed with Vascular Fellow   - Plan to be discussed with Attending Vascular Surgeon    VASCULAR SPECTRA [id number]   Patient is a 85y old  Male who presents with a chief complaint of weakness (23 Oct 2021 19:11)    HPI: Patient is 85M with PMH of DM, Crohn s/p colostomy, CKD 4, HTN, HLD, BPH presents to ED with complaints of weakness. Pt states 10 days prior was diagnosed with UTI, placed on bactrim, Family states due to size of pill, pt was unable to swallow pill, only took 1/2 dose for 5 days. Family states over past few days pt becoming increasingly lethargic, having chills as well. Denies cough, chest pain, sob, abdominal pain, NVCD. In the ED, CT a/p was done and showed Urinary bladder wall is thickened in the setting of under-distention. Intraluminal air is likely secondary to Luna. If clinically warranted, correlate with UA to rule out acute cystitis. Labs were remarkable for Hb 6.2 (baseline ~8), Na 126, K 6.2, HCO3 8, AG 18, BUN 90, Cr 8.3 (baseline 2.3). UA was negative. He was given rocephin x1, 2L IVF bolus, insulin and dextrose, calcium gluconate x1, sodium bicarb x1, lokelma 10g x1, and lasix x1. He received 1U PRBC.   Vascular surgery was consulted for possible placement of UDALL. Patient was admitted to the ICU, total urine output has been 221cc and patient is getting, 0.236meq of bicarb. Repeat venous laboratory values is as follows showing improvement: pH Venous: 7.21, pCO2 Venous: 23 mmHg, pO2 Venous: 59 mmHg, HCO3 Venous: 9 mmol/L, Base Excess Venous: -17.1 mmol/L, Oxygen Saturation Venous: 91.8 %, Blood Gas Venous - Lactate: 0.70 mmol/L, Blood Gas Calcium Ionized - Venous: 1.24 mmol/L, Blood Gas Venous Potassium: 5.4 mmol/L, Blood Gas Venous Sodium: 127 mmol/L.   ROS: 10-system review is otherwise negative except HPI above.      PAST MEDICAL & SURGICAL HISTORY:  History of medical problems  [place] b/l ears, dm, htn, hypercholesteremia, ckd, bowel/bladder fistula, kidney stones    Colostomy present    History of surgery  procedure for kidney stones ( ?lithotripsy)    FAMILY HISTORY:    ALLERGIES: No Known Allergies    CURRENT MEDICATIONS  MEDICATIONS (STANDING): amLODIPine Oral Tab/Cap - Peds 5 milliGRAM(s) Oral two times a day  aspirin enteric coated 81 milliGRAM(s) Oral daily  atorvastatin Oral Tab/Cap - Peds 20 milliGRAM(s) Oral daily  cefTRIAXone   IVPB 1000 milliGRAM(s) IV Intermittent every 24 hours  dextrose 5% 1000 milliLiter(s) IV Continuous <Continuous>  pantoprazole    Tablet 40 milliGRAM(s) Oral before breakfast  sodium zirconium cyclosilicate 10 Gram(s) Oral two times a day  tamsulosin 0.8 milliGRAM(s) Oral at bedtime    MEDICATIONS (PRN):  --------------------------------------------------------------------------------------------    Vitals:   T(C): 34.3 (10-23-21 @ 18:10), Max: 35.8 (10-23-21 @ 13:05)  HR: 9 (10-23-21 @ 18:10) (9 - 89)  BP: 102/49 (10-23-21 @ 18:10) (102/49 - 117/58)  RR: 20 (10-23-21 @ 18:10) (18 - 20)  SpO2: 99% (10-23-21 @ 18:10) (97% - 99%)  CAPILLARY BLOOD GLUCOSE      POCT Blood Glucose.: 92 mg/dL (23 Oct 2021 16:42)    CAPILLARY BLOOD GLUCOSE    POCT Blood Glucose.: 92 mg/dL (23 Oct 2021 16:42)    Height (cm): 167.6 (10-23 @ 13:05)    PHYSICAL EXAM:  General: NAD  Neuro: AAOx3  HEENT: PERRL, EOMI  Cardio: RRR  Resp: Good effort  Thorax: No chest wall tenderness  Vascular: Extremities are normal in size, color and temperature, b/l DP/PT palpable, mild bl le edema  Musculoskeletal: All 4 extremities moving spontaneously, no limitations.   --------------------------------------------------------------------------------------------    LABS  CBC (10-23 @ 14:18)                              6.2<LL>                         7.05    )----------------(  168        86.2<H>% Neutrophils, 4.7<L>% Lymphocytes, ANC: 6.07                                19.5<L>    BMP (10-23 @ 14:18)             126<L>  |  100     |  90<HH>		Ca++ --      Ca 8.2<L>             ---------------------------------( 114<H>		Mg --                 6.2<HH>  |  8<LL>   |  8.3<HH>			Ph --        LFTs (10-23 @ 14:18)      TPro 5.5<L> / Alb 3.5 / TBili <0.2 / DBili -- / AST 20 / ALT 16 / AlkPhos 81    Coags (10-23 @ 14:18)  aPTT 32.1 / INR 1.02 / PT 11.70      ABG (10-23 @ 14:18)      /  /  /  /  / %     Lactate:  1.3      --------------------------------------------------------------------------------------------    MICROBIOLOGY  Urinalysis (10-23 @ 16:05):     Color: Light Yellow / Appearance: Clear / S.013 / pH: 6.5 / Gluc: Trace / Ketones: Negative / Bili: Negative / Urobili: <2 mg/dL / Protein :300 mg/dL<!> / Nitrites: Negative / Leuk.Est: Small<!> / RBC: 1 / WBC: 16<H> / Sq Epi:  / Non Sq Epi: 1 / Bacteria Negative         --------------------------------------------------------------------------------------------    ASSESSMENT: Patient is 85M with PMH of DM, Crohn s/p colostomy, CKD 4, HTN, HLD, BPH presents to ED with complaints of weakness. Labs were remarkable for Hb 6.2 (baseline ~8), Na 126, K 6.2, HCO3 8, AG 18, BUN 90, Cr 8.3 (baseline 2.3). UA was negative. He was given rocephin x1, 2L IVF bolus, insulin and dextrose, calcium gluconate x1, sodium bicarb x1, lokelma 10g x1, and lasix x1. He received 1U PRBC.     Vascular surgery was consulted for placement of UDALL. Patient was admitted to the ICU, total urine output has been 221cc and patient is getting, 0.236meq of bicarb. Repeat venous laboratory values is as follows showing improvement: pH Venous: 7.21, pCO2 Venous: 23 mmHg, pO2 Venous: 59 mmHg, HCO3 Venous: 9 mmol/L, Base Excess Venous: -17.1 mmol/L, Oxygen Saturation Venous: 91.8 %, Blood Gas Venous - Lactate: 0.70 mmol/L, Blood Gas Calcium Ionized - Venous: 1.24 mmol/L, Blood Gas Venous Potassium: 5.4 mmol/L, Blood Gas Venous Sodium: 127 mmol/L.     PLAN:   - Patient seen/examined and plan discussed with Vascular Fellow   - Plan to be discussed with Attending Vascular Surgeon    VASCULAR SPECTRA [id number]   Patient is a 85y old  Male who presents with a chief complaint of weakness (23 Oct 2021 19:11)    HPI: Patient is 85M with PMH of DM, Crohn s/p colostomy, CKD 4, HTN, HLD, BPH presents to ED with complaints of weakness. Pt states 10 days prior was diagnosed with UTI, placed on bactrim, Family states due to size of pill, pt was unable to swallow pill, only took 1/2 dose for 5 days. Family states over past few days pt becoming increasingly lethargic, having chills as well. Denies cough, chest pain, sob, abdominal pain, NVCD. In the ED, CT a/p was done and showed Urinary bladder wall is thickened in the setting of under-distention. Intraluminal air is likely secondary to Luna. If clinically warranted, correlate with UA to rule out acute cystitis. Labs were remarkable for Hb 6.2 (baseline ~8), Na 126, K 6.2, HCO3 8, AG 18, BUN 90, Cr 8.3 (baseline 2.3). UA was negative. He was given rocephin x1, 2L IVF bolus, insulin and dextrose, calcium gluconate x1, sodium bicarb x1, lokelma 10g x1, and lasix x1. He received 1U PRBC.   Vascular surgery was consulted for possible placement of UDALL. Patient was admitted to the ICU, total urine output has been 221cc and patient is getting, 0.236meq of bicarb. Repeat venous laboratory values is as follows showing improvement: pH Venous: 7.21, pCO2 Venous: 23 mmHg, pO2 Venous: 59 mmHg, HCO3 Venous: 9 mmol/L, Base Excess Venous: -17.1 mmol/L, Oxygen Saturation Venous: 91.8 %, Blood Gas Venous - Lactate: 0.70 mmol/L, Blood Gas Calcium Ionized - Venous: 1.24 mmol/L, Blood Gas Venous Potassium: 5.4 mmol/L, Blood Gas Venous Sodium: 127 mmol/L.   ROS: 10-system review is otherwise negative except HPI above.      PAST MEDICAL & SURGICAL HISTORY:  History of medical problems  [place] b/l ears, dm, htn, hypercholesteremia, ckd, bowel/bladder fistula, kidney stones    Colostomy present    History of surgery  procedure for kidney stones ( ?lithotripsy)    FAMILY HISTORY:    ALLERGIES: No Known Allergies    CURRENT MEDICATIONS  MEDICATIONS (STANDING): amLODIPine Oral Tab/Cap - Peds 5 milliGRAM(s) Oral two times a day  aspirin enteric coated 81 milliGRAM(s) Oral daily  atorvastatin Oral Tab/Cap - Peds 20 milliGRAM(s) Oral daily  cefTRIAXone   IVPB 1000 milliGRAM(s) IV Intermittent every 24 hours  dextrose 5% 1000 milliLiter(s) IV Continuous <Continuous>  pantoprazole    Tablet 40 milliGRAM(s) Oral before breakfast  sodium zirconium cyclosilicate 10 Gram(s) Oral two times a day  tamsulosin 0.8 milliGRAM(s) Oral at bedtime    MEDICATIONS (PRN):  --------------------------------------------------------------------------------------------    Vitals:   T(C): 34.3 (10-23-21 @ 18:10), Max: 35.8 (10-23-21 @ 13:05)  HR: 9 (10-23-21 @ 18:10) (9 - 89)  BP: 102/49 (10-23-21 @ 18:10) (102/49 - 117/58)  RR: 20 (10-23-21 @ 18:10) (18 - 20)  SpO2: 99% (10-23-21 @ 18:10) (97% - 99%)  CAPILLARY BLOOD GLUCOSE      POCT Blood Glucose.: 92 mg/dL (23 Oct 2021 16:42)    CAPILLARY BLOOD GLUCOSE    POCT Blood Glucose.: 92 mg/dL (23 Oct 2021 16:42)    Height (cm): 167.6 (10-23 @ 13:05)    PHYSICAL EXAM:  General: NAD  Neuro: AAOx3  HEENT: PERRL, EOMI  Cardio: RRR  Resp: Good effort  Thorax: No chest wall tenderness  Vascular: Extremities are normal in size, color and temperature, b/l DP/PT palpable, bl radial and ulnar pulses, mild bl le edema  Musculoskeletal: All 4 extremities moving spontaneously, no limitations.   --------------------------------------------------------------------------------------------    LABS  CBC (10-23 @ 14:18)                              6.2<LL>                         7.05    )----------------(  168        86.2<H>% Neutrophils, 4.7<L>% Lymphocytes, ANC: 6.07                                19.5<L>    BMP (10-23 @ 14:18)             126<L>  |  100     |  90<HH>		Ca++ --      Ca 8.2<L>             ---------------------------------( 114<H>		Mg --                 6.2<HH>  |  8<LL>   |  8.3<HH>			Ph --        LFTs (10-23 @ 14:18)      TPro 5.5<L> / Alb 3.5 / TBili <0.2 / DBili -- / AST 20 / ALT 16 / AlkPhos 81    Coags (10-23 @ 14:18)  aPTT 32.1 / INR 1.02 / PT 11.70      ABG (10-23 @ 14:18)      /  /  /  /  / %     Lactate:  1.3      --------------------------------------------------------------------------------------------    MICROBIOLOGY  Urinalysis (10-23 @ 16:05):     Color: Light Yellow / Appearance: Clear / S.013 / pH: 6.5 / Gluc: Trace / Ketones: Negative / Bili: Negative / Urobili: <2 mg/dL / Protein :300 mg/dL<!> / Nitrites: Negative / Leuk.Est: Small<!> / RBC: 1 / WBC: 16<H> / Sq Epi:  / Non Sq Epi: 1 / Bacteria Negative         --------------------------------------------------------------------------------------------    ASSESSMENT: Patient is 85M with PMH of DM, Crohn s/p colostomy, CKD 4, HTN, HLD, BPH presents to ED with complaints of weakness. Labs were remarkable for Hb 6.2 (baseline ~8), Na 126, K 6.2, HCO3 8, AG 18, BUN 90, Cr 8.3 (baseline 2.3). UA was negative. He was given rocephin x1, 2L IVF bolus, insulin and dextrose, calcium gluconate x1, sodium bicarb x1, lokelma 10g x1, and lasix x1. He received 1U PRBC.     Vascular surgery was consulted for placement of UDALL. Patient was admitted to the ICU, total urine output has been 221cc and patient is getting, 0.236meq of bicarb. Repeat venous laboratory values is as follows showing improvement: pH Venous: 7.21, pCO2 Venous: 23 mmHg, pO2 Venous: 59 mmHg, HCO3 Venous: 9 mmol/L, Base Excess Venous: -17.1 mmol/L, Oxygen Saturation Venous: 91.8 %, Blood Gas Venous - Lactate: 0.70 mmol/L, Blood Gas Calcium Ionized - Venous: 1.24 mmol/L, Blood Gas Venous Potassium: 5.4 mmol/L, Blood Gas Venous Sodium: 127 mmol/L.     PLAN:   - Patient seen/examined and plan discussed with Vascular Fellow   - Plan to be discussed with Attending Vascular Surgeon    VASCULAR SPECTRA 6004   Patient is a 85y old  Male who presents with a chief complaint of weakness (23 Oct 2021 19:11)    HPI: Patient is 85M with PMH of DM, Crohn s/p colostomy, CKD 4, HTN, HLD, BPH presents to ED with complaints of weakness. Pt states 10 days prior was diagnosed with UTI, placed on bactrim, Family states due to size of pill, pt was unable to swallow pill, only took 1/2 dose for 5 days. Family states over past few days pt becoming increasingly lethargic, having chills as well. Denies cough, chest pain, sob, abdominal pain, NVCD. In the ED, CT a/p was done and showed Urinary bladder wall is thickened in the setting of under-distention. Intraluminal air is likely secondary to Luna. If clinically warranted, correlate with UA to rule out acute cystitis. Labs were remarkable for Hb 6.2 (baseline ~8), Na 126, K 6.2, HCO3 8, AG 18, BUN 90, Cr 8.3 (baseline 2.3). UA was negative. He was given rocephin x1, 2L IVF bolus, insulin and dextrose, calcium gluconate x1, sodium bicarb x1, lokelma 10g x1, and lasix x1. He received 1U PRBC.   Vascular surgery was consulted for possible placement of UDALL. Patient was admitted to the ICU, total urine output has been 221cc and patient is getting, 0.236meq of bicarb. Repeat venous laboratory values is as follows showing improvement: pH Venous: 7.21, pCO2 Venous: 23 mmHg, pO2 Venous: 59 mmHg, HCO3 Venous: 9 mmol/L, Base Excess Venous: -17.1 mmol/L, Oxygen Saturation Venous: 91.8 %, Blood Gas Venous - Lactate: 0.70 mmol/L, Blood Gas Calcium Ionized - Venous: 1.24 mmol/L, Blood Gas Venous Potassium: 5.4 mmol/L, Blood Gas Venous Sodium: 127 mmol/L.   ROS: 10-system review is otherwise negative except HPI above.      PAST MEDICAL & SURGICAL HISTORY:  History of medical problems  [place] b/l ears, dm, htn, hypercholesteremia, ckd, bowel/bladder fistula, kidney stones    Colostomy present    History of surgery  procedure for kidney stones ( ?lithotripsy)    FAMILY HISTORY:    ALLERGIES: No Known Allergies    CURRENT MEDICATIONS  MEDICATIONS (STANDING): amLODIPine Oral Tab/Cap - Peds 5 milliGRAM(s) Oral two times a day  aspirin enteric coated 81 milliGRAM(s) Oral daily  atorvastatin Oral Tab/Cap - Peds 20 milliGRAM(s) Oral daily  cefTRIAXone   IVPB 1000 milliGRAM(s) IV Intermittent every 24 hours  dextrose 5% 1000 milliLiter(s) IV Continuous <Continuous>  pantoprazole    Tablet 40 milliGRAM(s) Oral before breakfast  sodium zirconium cyclosilicate 10 Gram(s) Oral two times a day  tamsulosin 0.8 milliGRAM(s) Oral at bedtime    MEDICATIONS (PRN):  --------------------------------------------------------------------------------------------    Vitals:   T(C): 34.3 (10-23-21 @ 18:10), Max: 35.8 (10-23-21 @ 13:05)  HR: 9 (10-23-21 @ 18:10) (9 - 89)  BP: 102/49 (10-23-21 @ 18:10) (102/49 - 117/58)  RR: 20 (10-23-21 @ 18:10) (18 - 20)  SpO2: 99% (10-23-21 @ 18:10) (97% - 99%)  CAPILLARY BLOOD GLUCOSE      POCT Blood Glucose.: 92 mg/dL (23 Oct 2021 16:42)    CAPILLARY BLOOD GLUCOSE    POCT Blood Glucose.: 92 mg/dL (23 Oct 2021 16:42)    Height (cm): 167.6 (10-23 @ 13:05)    PHYSICAL EXAM:  General: NAD  Neuro: AAOx3  HEENT: PERRL, EOMI  Cardio: RRR  Resp: Good effort  Thorax: No chest wall tenderness  Vascular: Extremities are normal in size, color and temperature, b/l DP/PT palpable, bl radial and ulnar pulses, mild bl le edema  Musculoskeletal: All 4 extremities moving spontaneously, no limitations.   --------------------------------------------------------------------------------------------    LABS  CBC (10-23 @ 14:18)                              6.2<LL>                         7.05    )----------------(  168        86.2<H>% Neutrophils, 4.7<L>% Lymphocytes, ANC: 6.07                                19.5<L>    BMP (10-23 @ 14:18)             126<L>  |  100     |  90<HH>		Ca++ --      Ca 8.2<L>             ---------------------------------( 114<H>		Mg --                 6.2<HH>  |  8<LL>   |  8.3<HH>			Ph --        LFTs (10-23 @ 14:18)      TPro 5.5<L> / Alb 3.5 / TBili <0.2 / DBili -- / AST 20 / ALT 16 / AlkPhos 81    Coags (10-23 @ 14:18)  aPTT 32.1 / INR 1.02 / PT 11.70      ABG (10-23 @ 14:18)      /  /  /  /  / %     Lactate:  1.3      --------------------------------------------------------------------------------------------    MICROBIOLOGY  Urinalysis (10-23 @ 16:05):     Color: Light Yellow / Appearance: Clear / S.013 / pH: 6.5 / Gluc: Trace / Ketones: Negative / Bili: Negative / Urobili: <2 mg/dL / Protein :300 mg/dL<!> / Nitrites: Negative / Leuk.Est: Small<!> / RBC: 1 / WBC: 16<H> / Sq Epi:  / Non Sq Epi: 1 / Bacteria Negative         --------------------------------------------------------------------------------------------

## 2021-10-23 NOTE — ED ADULT NURSE REASSESSMENT NOTE - NS ED NURSE REASSESS COMMENT FT1
Pt reassessed A/O times 4 Vs stable on cardiac monitor back from CT on cardiac monitor T-93.7  rectal ED ATTENDING MADE AWARE  Worming blanket provide comfort provide on the bed all medication is given as per order tolerated well 1 unit PRBC order is started on going tolerated well ,safety precaution on progress on going nursing observation .

## 2021-10-23 NOTE — ED ADULT NURSE NOTE - OBJECTIVE STATEMENT
As per Family pt noted with dysuria HX-UTI on antibiotic  unable to swallow poor appetite weakness SOB , chest congestion , pt leaving alone at home with plane to  be placed at [facility]  assisted leaving  S/P PPD with HX- PPD positive , Pt noted with B/L lower  extremity edema ,

## 2021-10-23 NOTE — H&P ADULT - NSHPLABSRESULTS_GEN_ALL_CORE
6.2    7.05  )-----------( 168      ( 23 Oct 2021 14:18 )             19.5       10-23    126<L>  |  100  |  90<HH>  ----------------------------<  114<H>  6.2<HH>   |  8<LL>  |  8.3<HH>    Ca    8.2<L>      23 Oct 2021 14:18    TPro  5.5<L>  /  Alb  3.5  /  TBili  <0.2  /  DBili  x   /  AST  20  /  ALT  16  /  AlkPhos  81  10-23              Urinalysis Basic - ( 23 Oct 2021 16:05 )    Color: Light Yellow / Appearance: Clear / S.013 / pH: x  Gluc: x / Ketone: Negative  / Bili: Negative / Urobili: <2 mg/dL   Blood: x / Protein: 300 mg/dL / Nitrite: Negative   Leuk Esterase: Small / RBC: 1 /HPF / WBC 16 /HPF   Sq Epi: x / Non Sq Epi: 1 /HPF / Bacteria: Negative        PT/INR - ( 23 Oct 2021 14:18 )   PT: 11.70 sec;   INR: 1.02 ratio         PTT - ( 23 Oct 2021 14:18 )  PTT:32.1 sec    Lactate Trend  10-23 @ 14:18 Lactate:1.3             CAPILLARY BLOOD GLUCOSE      POCT Blood Glucose.: 92 mg/dL (23 Oct 2021 16:42)

## 2021-10-23 NOTE — H&P ADULT - ATTENDING COMMENTS
events noted, weakness/ acute on chronic renal failure/ anemia no active bleed/ recent tx for UTI, DVT on eliquis, severe metabolic acidosis    PLAN:    CNS: avoid CNS depressant    HEENT:  Oral care    PULMONARY:  HOB @ 45 degrees, aspiration precaution, on RA, ABG    CARDIOVASCULAR: d5 3 amp 100 cc/h, CE, echo    GI: GI prophylaxis / protonix                                         Feeding low k diet    RENAL:  F/u  lytes.  Correct as needed. accurate I/O, improved uo, trrend CMP    INFECTIOUS DISEASE: per ID    HEMATOLOGICAL:  DVT prophylaxis. LE doppler , hold ac, sp 2 units PRBC,    ENDOCRINE:  Follow up FS.  Insulin protocol if needed.    DISPOSITION: Pt requires continued monitoring in the MICU

## 2021-10-23 NOTE — ED PROVIDER NOTE - PROGRESS NOTE DETAILS
: spoke with nephrology Tony, rpt CMP after treatment, admit to ICU, call back with results, possible dialysis intervention : spoke with ICU fellow, admit to ICU

## 2021-10-23 NOTE — H&P ADULT - ASSESSMENT
IMPRESSION:    Acute renal failure with HAGMA  CKD4  Acute on chronic anemia s/p 1U PRBC  HO of covid  Crohn s/p colostomy    PLAN:    CNS: Avoid CNS depressants.     HEENT: Oral care    PULMONARY:  HOB @ 45 degrees.     CARDIOVASCULAR: resume home meds. Keep I=O. avoid fluid overload. s/p 2L IVF    GI: GI prophylaxis.  NPO for now.  Bowel regimen     RENAL:  Follow up lytes. Correct as needed. f/u VBG. Nephrology consulted for HD. Vascular consult for possible udall placement for HD. Bicarb gtt. Luna to monitor UO    INFECTIOUS DISEASE: f/u Cultures. C/w Abx. ID consult. Procal.     HEMATOLOGICAL:  Monitor off AC. f/u CBC 1130 pm. Trend CBC. Transfuse if Hb <7.     ENDOCRINE:  Follow up FS.  Insulin per protocol.     MICU  Full code     IMPRESSION:    Acute renal failure with HAGMA  CKD4  Acute on chronic anemia s/p 1U PRBC  HO of covid  HO DVT on eliquis  Crohn s/p colostomy    PLAN:    CNS: Avoid CNS depressants.     HEENT: Oral care    PULMONARY:  HOB @ 45 degrees.     CARDIOVASCULAR: resume home meds. Keep I=O. avoid fluid overload. s/p 2L IVF    GI: GI prophylaxis.  NPO for now.  Bowel regimen     RENAL:  Follow up lytes. Correct as needed. f/u VBG. Nephrology consulted for HD. Vascular consult for possible udall placement for HD. Bicarb gtt. Luna to monitor UO    INFECTIOUS DISEASE: f/u Cultures. Monitor off Abx. ID consult. Procal.     HEMATOLOGICAL:  Monitor off AC. f/u CBC 1130 pm. Trend CBC. Transfuse if Hb <7. LE duplex    ENDOCRINE:  Follow up FS.  Insulin per protocol.     MICU  Full code     IMPRESSION:    Acute renal failure with HAGMA  Possible acute cystitis  CKD4  Acute on chronic anemia s/p 1U PRBC  HO of covid  HO DVT on eliquis  Crohn s/p colostomy    PLAN:    CNS: Avoid CNS depressants.     HEENT: Oral care    PULMONARY:  HOB @ 45 degrees.     CARDIOVASCULAR: resume home meds. Keep I=O. avoid fluid overload. s/p 2L IVF    GI: GI prophylaxis.  NPO for now.  Bowel regimen     RENAL:  Follow up lytes. Correct as needed. f/u VBG. Nephrology consulted for HD. Vascular consult for possible udall placement for HD. Bicarb gtt. Luna to monitor UO    INFECTIOUS DISEASE: f/u Cultures. c/w Abx. ID consult. Procal.     HEMATOLOGICAL:  Monitor off AC. f/u CBC 1130 pm. Trend CBC. Transfuse if Hb <7. LE duplex    ENDOCRINE:  Follow up FS.  Insulin per protocol.     MICU  Full code     IMPRESSION:    Acute on chronic renal failure with HAGMA  Possible acute cystitis  CKD4  Acute on chronic anemia s/p 1U PRBC  HO of covid  HO DVT on eliquis  Crohn s/p colostomy    PLAN:    CNS: Avoid CNS depressants.     HEENT: Oral care    PULMONARY:  HOB @ 45 degrees.     CARDIOVASCULAR: resume home meds. Keep I=O. avoid fluid overload. s/p 2L IVF    GI: GI prophylaxis.  NPO for now.  Bowel regimen     RENAL:  Follow up lytes. Correct as needed. f/u VBG. Nephrology consulted for HD. Vascular consult for possible udall placement for HD. Bicarb gtt. Luna to monitor UO    INFECTIOUS DISEASE: f/u Cultures. c/w Abx. ID consult. Procal.     HEMATOLOGICAL:  Monitor off AC. f/u CBC 1130 pm. Trend CBC. Transfuse if Hb <7. LE duplex    ENDOCRINE:  Follow up FS.  Insulin per protocol.     MICU  Full code

## 2021-10-23 NOTE — ED ADULT NURSE REASSESSMENT NOTE - NS ED NURSE REASSESS COMMENT FT1
Pt reassessed A/O times  4 Vs see flow sheet S/P  1 unit PRBC  completed comfort provide Lasix 20 mg IVP order is given , pt on cardiac monitor , worming planked on place ,pt with low grade fever skin worm to touch ,B/L lower extremity edema   denies no pain I SOB SPO2-98% on R/.A report filling much better on going nursing observation . Pt admitted to ICU report is given to RN waiting for transport

## 2021-10-23 NOTE — H&P ADULT - NSHPPHYSICALEXAM_GEN_ALL_CORE
GENERAL: NAD, speaks in full sentences, no signs of respiratory distress  HEAD:  Atraumatic, Normocephalic  EYES: EOMI, PERRLA  NECK: Supple  CHEST/LUNG: Clear to auscultation bilaterally; No wheeze; No crackles; No accessory muscles used  HEART: Regular rate and rhythm; No murmurs;   ABDOMEN: Soft, Nontender, Nondistended; Bowel sounds present; No guarding  EXTREMITIES:  No cyanosis or edema  NEUROLOGY: non-focal  SKIN: No rashes or lesions GENERAL: NAD, speaks in full sentences, no signs of respiratory distress  HEAD:  Atraumatic, Normocephalic  EYES: EOMI, PERRLA  NECK: Supple  CHEST/LUNG: Clear to auscultation bilaterally; No wheeze; No crackles; No accessory muscles used  HEART: Regular rate and rhythm; No murmurs;   ABDOMEN: Soft, Nontender, Nondistended; Colostomy  EXTREMITIES:  No cyanosis or edema  NEUROLOGY: non-focal  SKIN: No rashes or lesions

## 2021-10-23 NOTE — ED PROVIDER NOTE - NS ED ROS FT
Constitutional: (-) fever (+) weakness  Eyes/ENT: (-) blurry vision, (-) epistaxis  Cardiovascular: (-) chest pain, (-) syncope  Respiratory: (-) cough, (-) shortness of breath  Gastrointestinal: (-) vomiting, (-) diarrhea (+) dysuria   Musculoskeletal: (-) neck pain, (-) back pain, (-) joint pain  Integumentary: (-) rash, (-) edema  Neurological: (-) headache, (-) altered mental status  Psychiatric: (-) hallucinations  Allergic/Immunologic: (-) pruritus

## 2021-10-23 NOTE — H&P ADULT - HISTORY OF PRESENT ILLNESS
Patient is 85 year old M with PMH of DM, Crohn s/p colostomy, CKD 4, HTN, HLD, BPH presents to ED with complaints of weakness. Pt states 10 days prior was diagnosed with UTI, placed on bactrim, Family states due to size of pill, pt was unable to swallow pill, only took 1/2 dose for 5 days. Family states over past few days pt becoming increasingly lethargic, having chills as well. Denies cough, chest pain, sob, abdominal pain, NVCD.    In the ED, CT a/p was done and showed Urinary bladder wall is thickened in the setting of under-distention. Intraluminal air is likely secondary to Luna. If clinically warranted, correlate with UA to rule out acute cystitis. Labs were remarkable for Hb 6.2 (baseline ~8), Na 126, K 6.2, HCO3 8, AG 18, BUN 90, Cr 8.3 (baseline 2.3). UA was negative. He was given rocephin x1, 2L IVF bolus, insulin and dextrose, calcium gluconate x1, sodium bicarb x1, lokelma 10g x1, and lasix x1. He received 1U PRBC.     Vital Signs Last 24 Hrs  T(C): 34.3 (23 Oct 2021 18:10), Max: 35.8 (23 Oct 2021 13:05)  T(F): 93.7 (23 Oct 2021 18:10), Max: 96.4 (23 Oct 2021 13:05)  HR: 9 (23 Oct 2021 18:10) (9 - 89)  BP: 102/49 (23 Oct 2021 18:10) (102/49 - 117/58)  BP(mean): --  ABP: --  ABP(mean): --  RR: 20 (23 Oct 2021 18:10) (18 - 20)  SpO2: 99% (23 Oct 2021 18:10) (97% - 99%)     85 year old M with PMH of DM, Crohn s/p colostomy, CKD 4, HTN, HLD, BPH presents to ED with complaints of weakness. Pt states 10 days prior was diagnosed with UTI, placed on bactrim, Family states due to size of pill, pt was unable to swallow pill, only took 1/2 dose for 5 days. Family states over past few days pt becoming increasingly lethargic, having chills as well. Denies cough, chest pain, sob, abdominal pain, NVCD.  In the ED, CT a/p was done and showed Urinary bladder wall is thickened in the setting of under-distention. Intraluminal air is likely secondary to Luna. If clinically warranted, correlate with UA to rule out acute cystitis. Labs were remarkable for Hb 6.2 (baseline ~8), Na 126, K 6.2, HCO3 8, AG 18, BUN 90, Cr 8.3 (baseline 2.3). UA was negative. He was given rocephin x1, 2L IVF bolus, insulin and dextrose, calcium gluconate x1, sodium bicarb x1, lokelma 10g x1, and lasix x1. He received 1U PRBC.     Vital Signs Last 24 Hrs  T(C): 34.3 (23 Oct 2021 18:10), Max: 35.8 (23 Oct 2021 13:05)  T(F): 93.7 (23 Oct 2021 18:10), Max: 96.4 (23 Oct 2021 13:05)  HR: 9 (23 Oct 2021 18:10) (9 - 89)  BP: 102/49 (23 Oct 2021 18:10) (102/49 - 117/58)  RR: 20 (23 Oct 2021 18:10) (18 - 20)  SpO2: 99% (23 Oct 2021 18:10) (97% - 99%)    admitted to MICU

## 2021-10-23 NOTE — ED PROVIDER NOTE - PHYSICAL EXAMINATION
Physical Exam    Vital Signs: I have reviewed the initial vital signs.  Constitutional: well-nourished, appears stated age, no acute distress  Eyes: Conjunctiva pink, Sclera clear, PERRLA, EOMI.  Cardiovascular: S1 and S2, regular rate, regular rhythm, well-perfused extremities, radial pulses equal and 2+  Respiratory: unlabored respiratory effort, clear to auscultation bilaterally no wheezing, rales and rhonchi  Gastrointestinal: soft, non-tender abdomen, no pulsatile mass, normal bowl sounds. Colostomy in place, site clean, no erythema or pain  Musculoskeletal: supple neck, no lower extremity edema, no midline tenderness  Integumentary: warm, dry, no rash  Neurologic: awake, alert, cranial nerves II-XII grossly intact, extremities’ motor and sensory functions grossly intact  Psychiatric: appropriate mood, appropriate affect

## 2021-10-23 NOTE — ED PROVIDER NOTE - CARE PLAN
1 Principal Discharge DX:	Acute cystitis  Secondary Diagnosis:	Acute sepsis  Secondary Diagnosis:	Hyperkalemia  Secondary Diagnosis:	JOSH (acute kidney injury)

## 2021-10-23 NOTE — ED PROVIDER NOTE - ATTENDING CONTRIBUTION TO CARE
I personally evaluated the patient. I reviewed the Resident’s or Physician Assistant’s note (as assigned above), and agree with the findings and plan except as documented in my note.     85 male here for constitutional weakness malaise and chills from home. Multiple comorbidities, lives at home with assistance by family. Recent Rx for UTI with difficulty administering the Rx so they halved it.     ROS otherwise unremarkable    PE: elderly male in no distress. CV: pulses intact. CHEST: normal work of breathing. ABD: nondistended. no rebound. SKIN: normal. EXT: FROM. NEURO: cognitively impaired, no focal deficits, deconditioned    Impression: sepsis     Plan: IV labs imaging supportive care and reevaluation

## 2021-10-24 NOTE — CONSULT NOTE ADULT - SUBJECTIVE AND OBJECTIVE BOX
NEPHROLOGY CONSULTATION NOTE    CECILLE FRANKEL  85y  Male  MRN-[id number]    CC:   Patient is a 85y old  Male who presents with a chief complaint of weakness (24 Oct 2021 05:35)      HPI:  Patient is 85 year old M with PMH of DM, Crohn s/p colostomy, CKD 4, HTN, HLD, BPH presents to ED with complaints of weakness. Pt states 10 days prior was diagnosed with UTI, placed on bactrim, Family states due to size of pill, pt was unable to swallow pill, only took 1/2 dose for 5 days. Family states over past few days pt becoming increasingly lethargic, having chills as well. Denies cough, chest pain, sob, abdominal pain, NVCD.    In the ED, CT a/p was done and showed Urinary bladder wall is thickened in the setting of under-distention. Intraluminal air is likely secondary to Luna. If clinically warranted, correlate with UA to rule out acute cystitis. Labs were remarkable for Hb 6.2 (baseline ~8), Na 126, K 6.2, HCO3 8, AG 18, BUN 90, Cr 8.3 (baseline 2.3). UA was negative. He was given rocephin x1, 2L IVF bolus, insulin and dextrose, calcium gluconate x1, sodium bicarb x1, lokelma 10g x1, and lasix x1. He received 1U PRBC.     Vital Signs Last 24 Hrs  T(C): 34.3 (23 Oct 2021 18:10), Max: 35.8 (23 Oct 2021 13:05)  T(F): 93.7 (23 Oct 2021 18:10), Max: 96.4 (23 Oct 2021 13:05)  HR: 9 (23 Oct 2021 18:10) (9 - 89)  BP: 102/49 (23 Oct 2021 18:10) (102/49 - 117/58)  BP(mean): --  ABP: --  ABP(mean): --  RR: 20 (23 Oct 2021 18:10) (18 - 20)  SpO2: 99% (23 Oct 2021 18:10) (97% - 99%)     (23 Oct 2021 19:11)      PAST MEDICAL & SURGICAL HISTORY:  History of medical problems  [place] b/l ears, dm, htn, hypercholesteremia, ckd, bowel/bladder fistula, kidney stones    Colostomy present    History of surgery  procedure for kidney stones ( ?lithotripsy)      Allergies:  No Known Allergies    Home Medications Reviewed  Hospital Medications:   MEDICATIONS  (STANDING):  amLODIPine Oral Tab/Cap - Peds 5 milliGRAM(s) Oral two times a day  aspirin enteric coated 81 milliGRAM(s) Oral daily  atorvastatin Oral Tab/Cap - Peds 20 milliGRAM(s) Oral daily  calcium acetate 667 milliGRAM(s) Oral three times a day with meals  cefTRIAXone   IVPB 1000 milliGRAM(s) IV Intermittent every 24 hours  chlorhexidine 4% Liquid 1 Application(s) Topical daily  pantoprazole    Tablet 40 milliGRAM(s) Oral before breakfast  sodium bicarbonate  Infusion 0.236 mEq/kG/Hr (100 mL/Hr) IV Continuous <Continuous>  sodium zirconium cyclosilicate 10 Gram(s) Oral two times a day  tamsulosin 0.8 milliGRAM(s) Oral at bedtime    Home Medications:  amLODIPine 5 mg oral tablet: 1 tab(s) orally 2 times a day (01 Sep 2021 16:54)  Aspir 81 oral delayed release tablet: 1 tab(s) orally once a day (01 Sep 2021 16:54)  atorvastatin 10 mg oral tablet: 2 tab(s) orally once a day (01 Sep 2021 16:54)  famotidine 20 mg oral tablet: 1 tab(s) orally once a day (01 Sep 2021 16:54)  Januvia 50 mg oral tablet: 1 tab(s) orally once a day (01 Sep 2021 16:54)  Lasix 20 mg oral tablet: 1 tab(s) orally every other day (01 Sep 2021 16:54)  pioglitazone 30 mg oral tablet: 1 tab(s) orally once a day (01 Sep 2021 16:54)  ramipril 5 mg oral capsule: 2 cap(s) orally once a day (01 Sep 2021 16:54)      SOCIAL HISTORY:  Social History:      FAMILY HISTORY:      REVIEW OF SYSTEMS:   All other review of systems is negative unless indicated above.    VITALS:  T(F): 96.3 (10-24-21 @ 08:00), Max: 96.4 (10-23-21 @ 13:05)  HR: 76 (10-24-21 @ 08:00)  BP: 107/52 (10-24-21 @ 08:00)  RR: 16 (10-24-21 @ 08:00)  SpO2: 98% (10-24-21 @ 08:00)    Height (cm): 200.7 (10-23 @ 21:)  Weight (kg): 66.5 (10-23 @ 21:)  BMI (kg/m2): 16.5 (10-23 @ 21:)  BSA (m2): 2 (10-23 @ 21:)  I&O's Detail    23 Oct 2021 07:  -  24 Oct 2021 07:00  --------------------------------------------------------  IN:    IV PiggyBack: 50 mL    Sodium Bicarbonate: 1000 mL  Total IN: 1050 mL    OUT:    Indwelling Catheter - Urethral (mL): 476 mL  Total OUT: 476 mL    Total NET: 574 mL      24 Oct 2021 07:01  -  24 Oct 2021 08:46  --------------------------------------------------------  IN:    Sodium Bicarbonate: 100 mL  Total IN: 100 mL    OUT:    Indwelling Catheter - Urethral (mL): 35 mL  Total OUT: 35 mL    Total NET: 65 mL            I&O's Summary    23 Oct 2021 07:  -  24 Oct 2021 07:00  --------------------------------------------------------  IN: 1050 mL / OUT: 476 mL / NET: 574 mL    24 Oct 2021 07:  -  24 Oct 2021 08:46  --------------------------------------------------------  IN: 100 mL / OUT: 35 mL / NET: 65 mL        PHYSICAL EXAM:  Gen: NAD  resp: decreased bs at the bases  card: S1/S2  abd: soft  ext: +edema    LABS:  Daily Height in cm: 200.66 (23 Oct 2021 21:00)    Daily Weight in k.5 (24 Oct 2021 06:00)  Blood Gas Profile - Venous (10.23.21 @ 22:11)    pH, Venous: 7.21    pCO2, Venous: 23 mmHg    pO2, Venous: 59 mmHg    HCO3, Venous: 9 mmol/L    Base Excess, Venous: -17.1 mmol/L    Oxygen Saturation, Venous: 91.8 %  Blood Gas Venous - Potassium: 5.4 mmol/L (10-23-21 @ 22:11)    10-24    128<L>  |  100  |  88<HH>  ----------------------------<  88  5.3<H>   |  10<L>  |  8.1<HH>    Ca    7.7<L>      24 Oct 2021 04:45  Phos  6.4     10-24  Mg     1.4     10-24    TPro  4.7<L>  /  Alb  2.9<L>  /  TBili  <0.2  /  DBili      /  AST  22  /  ALT  16  /  AlkPhos  68  10-24      Creatinine Trend:   Creatinine, Serum: 8.1 mg/dL (10-24-21 @ 04:45)  Creatinine, Serum: 8.0 mg/dL (10-24-21 @ 00:37)  Creatinine, Serum: 8.3 mg/dL (10-23-21 @ 14:18)  Creatinine, Serum: 2.4 mg/dL (21 @ 09:42)  Creatinine, Serum: 2.4 mg/dL (21 @ 05:00)  Creatinine, Serum: 2.3 mg/dL (21 @ 04:30)                            6.9    7.53  )-----------( 146      ( 24 Oct 2021 04:45 )             21.7     Mean Cell Volume: 90.8 fL (10-24-21 @ 04:45)    Urine Studies:  Urinalysis Basic - ( 23 Oct 2021 16:05 )    Color: Light Yellow / Appearance: Clear / S.013 / pH:   Gluc:  / Ketone: Negative  / Bili: Negative / Urobili: <2 mg/dL   Blood:  / Protein: 300 mg/dL / Nitrite: Negative   Leuk Esterase: Small / RBC: 1 /HPF / WBC 16 /HPF   Sq Epi:  / Non Sq Epi: 1 /HPF / Bacteria: Negative      Sodium, Random Urine: 39.0 mmoL/L ( @ 19:33)  Creatinine, Random Urine: 84 mg/dL ( @ 19:33)            RADIOLOGY & ADDITIONAL STUDIES:        CT Abdomen and Pelvis No Cont:   EXAM:  CT ABDOMEN AND PELVIS        PROCEDURE DATE:  10/23/2021      INTERPRETATION:  CLINICAL STATEMENT: Abdominal pain, fever    TECHNIQUE: Contiguous axial CT images were obtained from the lower chest to the pubic symphysis without intravenous contrast.  Oral contrast was not administered.  Reformatted images in the coronal and sagittal planes were acquired.    Limited examination secondary to minimal intra-abdominal fat and lack of contrast.    COMPARISON CT: None.    FINDINGS:    LOWER CHEST: Small bilateral pleural effusions with adjacent atelectasis, greater on the right. Bibasilar atelectasis/scarring.    HEPATOBILIARY: Cholelithiasis..    SPLEEN: Unremarkable.    PANCREAS: Unremarkable.    ADRENAL GLANDS: Unremarkable.    KIDNEYS: Nonobstructing right renal calculus. No hydronephrosis bilaterally. Indeterminate bilateral exophytic renal lesions.    ABDOMINOPELVIC NODES: Unremarkable.    PELVIC ORGANS: Urinary bladder wall is collapsed with noted intraluminal Luna andair. Prostate measures 5.67 m in transverse diameter.    PERITONEUM/MESENTERY/BOWEL: Large left-sided spigelian hernia containing large and small bowel. No evidence of bowel obstruction or pneumoperitoneum. No ascites. Colostomy noted in the left lower quadrant.    BONES/SOFT TISSUES: Sclerotic lesion is noted along bilateral iliac bones, likely bone islands. Degenerative changes of the visualized spine.    OTHER: IVC filter noted. Calcific atherosclerosis of the abdominal aorta.    IMPRESSION:    1.  Urinary bladder wall is thickened in the setting of under-distention. Intraluminal air is likely secondary to Luna. If clinically warranted, correlate with UA to rule out acute cystitis.  2.  Bowel containing spigelian hernia without evidence of bowel obstruction or pneumoperitoneum.  3.  Small bilateral pleural effusions with adjacent atelectasis.  4.  Indeterminate bilateral renal lesions. Nonemergent reimaging with renal protocol recommended.

## 2021-10-24 NOTE — CONSULT NOTE ADULT - ASSESSMENT
# JOSH on CKD 4 - re-renal/ r/o ATN d/t severe sepsis / acute on chronic anemia  # Metabolic acidosis w/ anion gap   # Hyponatremia / low eav  # Acute on chronic anemia   # Acute cystitis  # Hx of crohn s/o colostomy    Recommendations:  - non oliguric; urine output improved overnight, creat stable   - hold amlodipine/tamsulosin d/t hypotension  - decrease bicarb gtt to 50cc/hr, d/c if Na level down trending  - start sodium bicarb po 1300mg q8h   - cont phoslo, replete Mg to 2  - low K diet, cont lokelma   - bumex 2mg iv if hypervolemia w/ respiratory compromise  - check serum osm, urine osm/urine Na/urine creat   - f/u cxs, cont ceftriaxone, appreciate ID eval  - no urgency for RRT now, f/u abg   - RBC transfusion as needed to maintain hgb > 7, check iron stores, check serum free light chain ratio, spep/jaya   Will follow  # JOSH on CKD 4 - re-renal/ r/o ATN d/t severe sepsis / acute on chronic anemia  # Metabolic acidosis w/ anion gap   # Hyponatremia / low eav  # Acute on chronic anemia   # Acute cystitis  # Hx of crohn s/o colostomy    Recommendations:  - non oliguric; urine output improved overnight, creat stable   - hold amlodipine/tamsulosin d/t hypotension  - decrease bicarb gtt to 50cc/hr, d/c if Na level down trending  - start sodium bicarb po 1300mg q8h   - cont phoslo, replete Mg to 2  - low K diet, cont lokelma   - bumex 2mg iv if hypervolemia w/ respiratory compromise  - check serum osm, urine osm/urine Na/urine creat   - f/u cxs, cont ceftriaxone, appreciate ID eval  - CT noted w/o hydronephrosis  - no urgency for RRT now, f/u abg   - RBC transfusion as needed to maintain hgb > 7, check iron stores, check serum free light chain ratio, spep/jaya   Will follow

## 2021-10-24 NOTE — CONSULT NOTE ADULT - ASSESSMENT
ASSESSMENT  85y M admitted with ACUTE SEPSIS        PROBLEMS  Pt with Severe Sepsis (T<96.8F, Pulse>90, Resp Rate>20), metabolic encephalopathy, JOSH due to UTI in pt with nephrolithiasis    U/A  Nitrite: Negative /Leuk Esterase: Small / RBC: 1 /HPF / WBC 16 /HPF  / Bacteria: Negative    Urinary bladder wall is thickened on CT. Nonobstructing right renal calculus. No hydronephrosis bilaterally. Indeterminate bilateral exophytic renal lesions. Has IVC.    New problem with additional W/U\  acute illness which poses threat to bodily function     On cefTRIAXone   IVPB 1000 milliGRAM(s) IV Intermittent every 24 hours    CT with bilateral pleural effusions with adjacent atelectasis.    Hyponatremia    Malnutrition  BMI (kg/m2): 16.5 (10-23-21 @ 21:00)    PLAN  - Await blood cultures, urine culture  - Continue Ceftriaxone  - Repeat sodium, wbc  - Consider Nutrition consult

## 2021-10-24 NOTE — CONSULT NOTE ADULT - SUBJECTIVE AND OBJECTIVE BOX
CECILLE FRANKEL  85y, Male  Allergy: No Known Allergies      CHIEF COMPLAINT: weakness (23 Oct 2021 20:34)      HPI:  Patient is 85 year old M with PMH of lithotrypsy for nephrolithiasis,DM, Crohn s/p colostomy, CKD 4, HTN, HLD, BPH presents to ED with complaints of weakness. Pt states 10 days prior was diagnosed with UTI, placed on bactrim, Family states due to size of pill, pt was unable to swallow pill, only took 1/2 dose for 5 days. Family states over past few days pt becoming increasingly lethargic, having chills as well. Denies cough, chest pain, sob, abdominal pain, NVCD. In the ED, CT a/p was done and showed Urinary bladder wall is thickened in the setting of under-distention. Intraluminal air is likely secondary to Luna. If clinically warranted, correlate with UA to rule out acute cystitis. Labs were remarkable for Hb 6.2 (baseline ~8), Na 126, K 6.2, HCO3 8, AG 18, BUN 90, Cr 8.3 (baseline 2.3). UA was negative. He was given rocephin x1, 2L IVF bolus, insulin and dextrose, calcium gluconate x1, sodium bicarb x1, lokelma 10g x1, and lasix x1. He received 1U PRBC.       FAMILY HISTORY:    PAST MEDICAL & SURGICAL HISTORY:  History of medical problems  [place] b/l ears, dm, htn, hypercholesteremia, ckd, bowel/bladder fistula, kidney stones    Colostomy present    History of surgery  procedure for kidney stones ( ?lithotripsy)        SOCIAL HISTORY  Social History:  lives with son   denies smoking, alcohol and drug use (01 Sep 2021 16:03)        ROS  HEENT: Denies headache, rhinorrhea, sore throat, eye pain  CV: Denies CP, palpitations  PULM: Denies SOB, cough  GI: Denies abdominal pain, diarrhea  : Denies dysuria, hematuria  MSK: Denies arthralgias  SKIN: Denies rash   NEURO: Denies paresthesias, weakness  PSYCH: Denies depression    VITALS:  T(F): 96, Max: 96.4 (10-23-21 @ 13:05)  HR: 74  BP: 126/61  RR: 17Vital Signs Last 24 Hrs  T(C): 35.6 (24 Oct 2021 04:00), Max: 35.8 (23 Oct 2021 13:05)  T(F): 96 (24 Oct 2021 04:00), Max: 96.4 (23 Oct 2021 13:05)  HR: 74 (24 Oct 2021 05:00) (9 - 89)  BP: 126/61 (24 Oct 2021 05:00) (102/49 - 126/61)  BP(mean): 101 (24 Oct 2021 05:00) (59 - 101)  RR: 17 (24 Oct 2021 05:00) (16 - 30)  SpO2: 99% (24 Oct 2021 05:00) (97% - 99%)    PHYSICAL EXAM:  Gen: NAD, resting in bed  HEENT: Normocephalic, atraumatic  Neck: supple, no lymphadenopathy  CV: Regular rate & regular rhythm  Lungs: decreased BS at bases, no fremitus  Abdomen: Soft, BS present  Ext: Warm, well perfused  Neuro: non focal, awake  Skin: no rash, no erythema    TESTS & MEASUREMENTS:                        6.9    7.53  )-----------( 146      ( 24 Oct 2021 04:45 )             21.7     10-24    128<L>  |  98  |  87<HH>  ----------------------------<  86  5.4<H>   |  8<LL>  |  8.0<HH>    Ca    7.7<L>      24 Oct 2021 00:37    TPro  5.5<L>  /  Alb  3.5  /  TBili  <0.2  /  DBili  x   /  AST  20  /  ALT  16  /  AlkPhos  81  10-23    eGFR if Non African American: 6 mL/min/1.73M2 (10-24-21 @ 00:37)  eGFR if : 6 mL/min/1.73M2 (10-24-21 @ 00:37)  eGFR if Non African American: 5 mL/min/1.73M2 (10-23-21 @ 14:18)  eGFR if African American: 6 mL/min/1.73M2 (10-23-21 @ 14:18)    LIVER FUNCTIONS - ( 23 Oct 2021 14:18 )  Alb: 3.5 g/dL / Pro: 5.5 g/dL / ALK PHOS: 81 U/L / ALT: 16 U/L / AST: 20 U/L / GGT: x           Urinalysis Basic - ( 23 Oct 2021 16:05 )    Color: Light Yellow / Appearance: Clear / S.013 / pH: x  Gluc: x / Ketone: Negative  / Bili: Negative / Urobili: <2 mg/dL   Blood: x / Protein: 300 mg/dL / Nitrite: Negative   Leuk Esterase: Small / RBC: 1 /HPF / WBC 16 /HPF   Sq Epi: x / Non Sq Epi: 1 /HPF / Bacteria: Negative          Blood Gas Venous - Lactate: 0.70 mmol/L (10-23-21 @ 22:11)  Lactate, Blood: 1.3 mmol/L (10-23-21 @ 14:18)      INFECTIOUS DISEASES TESTING      RADIOLOGY & ADDITIONAL TESTS:  I have personally reviewed the last Chest xray  CXR      CT  CT Abdomen and Pelvis No Cont:   EXAM:  CT ABDOMEN AND PELVIS            PROCEDURE DATE:  10/23/2021            INTERPRETATION:  CLINICAL STATEMENT: Abdominal pain, fever    TECHNIQUE: Contiguous axial CT images were obtained from the lower chest to the pubic symphysis without intravenous contrast.  Oral contrast was not administered.  Reformatted images in the coronal and sagittal planes were acquired.    Limited examination secondary to minimal intra-abdominal fat and lack of contrast.    COMPARISON CT: None.    FINDINGS:    LOWER CHEST: Small bilateral pleural effusions with adjacent atelectasis, greater on the right. Bibasilar atelectasis/scarring.    HEPATOBILIARY: Cholelithiasis..    SPLEEN: Unremarkable.    PANCREAS: Unremarkable.    ADRENAL GLANDS: Unremarkable.    KIDNEYS: Nonobstructing right renal calculus. No hydronephrosis bilaterally. Indeterminate bilateral exophytic renal lesions.    ABDOMINOPELVIC NODES: Unremarkable.    PELVIC ORGANS: Urinary bladder wall is collapsed with noted intraluminal Luna andair. Prostate measures 5.67 m in transverse diameter.    PERITONEUM/MESENTERY/BOWEL: Large left-sided spigelian hernia containing large and small bowel. No evidence of bowel obstruction or pneumoperitoneum. No ascites. Colostomy noted in the left lower quadrant.    BONES/SOFT TISSUES: Sclerotic lesion is noted along bilateral iliac bones, likely bone islands. Degenerative changes of the visualized spine.    OTHER: IVC filter noted. Calcific atherosclerosis of the abdominal aorta.    IMPRESSION:    1.  Urinary bladder wall is thickened in the setting of under-distention. Intraluminal air is likely secondary to Luna. If clinically warranted, correlate with UA to rule out acute cystitis.  2.  Bowel containing spigelian hernia without evidence of bowel obstruction or pneumoperitoneum.  3.  Small bilateral pleural effusions with adjacent atelectasis.  4.  Indeterminate bilateral renal lesions. Nonemergent reimaging with renal protocol recommended.    --- End of Report ---            MARBELLA CHILDERS DO; Resident Radiologist  This document has been electronically signed.  REBECA BARON MD; Attending Radiologist  This document has been electronically signed. Oct 23 2021  7:00PM (10-23-21 @ 17:23)      CARDIOLOGY TESTING  12 Lead ECG:   Ventricular Rate 65 BPM    Atrial Rate 65 BPM    P-R Interval 150 ms    QRS Duration 130 ms    Q-T Interval 422 ms    QTC Calculation(Bazett) 438 ms    P Axis 93 degrees    R Axis 113 degrees    T Axis -3 degrees    Diagnosis Line *** Suspect arm lead reversal, interpretation assumes no reversal  Normal sinus rhythm with sinus arrhythmia  Right bundle branch block  Left posterior fascicular block  *** Bifascicular block ***  Possible Inferior infarct , age undetermined  Anterior infarct , age undetermined  T wave abnormality, consider lateral ischemia  Abnormal ECG    Confirmed by MEGAN MOLINA MD ([id number]) on 10/23/2021 8:40:57 PM (10-23-21 @ 14:36)      MEDICATIONS  amLODIPine Oral Tab/Cap - Peds 5  aspirin enteric coated 81  atorvastatin Oral Tab/Cap - Peds 20  cefTRIAXone   IVPB 1000  chlorhexidine 4% Liquid 1  pantoprazole    Tablet 40  sodium bicarbonate  Infusion 0.236  sodium zirconium cyclosilicate 10  tamsulosin 0.8      ANTIBIOTICS:  cefTRIAXone   IVPB 1000 milliGRAM(s) IV Intermittent every 24 hours      All available historical data has been reviewed

## 2021-10-25 NOTE — DIETITIAN INITIAL EVALUATION ADULT. - MALNUTRITION
Moderate malnutrition in the setting of chronic illness as evidenced by mild muscle wasting and mild edema masking weight loss

## 2021-10-25 NOTE — PROGRESS NOTE ADULT - SUBJECTIVE AND OBJECTIVE BOX
CECILLE FRANKEL  85y, Male    All available historical data reviewed    OVERNIGHT EVENTS:  no fevers  alert, confused  no pressors  no overt abdominal pain  no pressors    ROS:  General: Denies rigors, nightsweats  HEENT: Denies headache, rhinorrhea, sore throat, eye pain  CV: Denies CP, palpitations  PULM: Denies wheezing, hemoptysis  GI: Denies hematemesis, hematochezia, melena  : Denies discharge, hematuria  MSK: Denies arthralgias, myalgias  SKIN: Denies rash, lesions  NEURO: Denies paresthesias, weakness  PSYCH: Denies depression, anxiety    VITALS:  T(F): 97.5, Max: 97.5 (10-25-21 @ 04:00)  HR: 60  BP: 109/49  RR: 17Vital Signs Last 24 Hrs  T(C): 36.4 (25 Oct 2021 04:00), Max: 36.4 (25 Oct 2021 04:00)  T(F): 97.5 (25 Oct 2021 04:00), Max: 97.5 (25 Oct 2021 04:00)  HR: 60 (25 Oct 2021 07:00) (60 - 80)  BP: 109/49 (25 Oct 2021 07:00) (100/39 - 127/53)  BP(mean): 85 (25 Oct 2021 07:00) (58 - 95)  RR: 17 (25 Oct 2021 07:00) (14 - 30)  SpO2: 98% (25 Oct 2021 08:56) (95% - 100%)    TESTS & MEASUREMENTS:                        7.9    7.80  )-----------( 138      ( 25 Oct 2021 05:00 )             24.4     10-25    131<L>  |  98  |  85<HH>  ----------------------------<  98  4.4   |  14<L>  |  8.0<HH>    Ca    7.9<L>      25 Oct 2021 05:00  Phos  6.1     10-  Mg     1.8     10-25    TPro  4.7<L>  /  Alb  2.9<L>  /  TBili  <0.2  /  DBili  x   /  AST  20  /  ALT  14  /  AlkPhos  65  10-25    LIVER FUNCTIONS - ( 25 Oct 2021 05:00 )  Alb: 2.9 g/dL / Pro: 4.7 g/dL / ALK PHOS: 65 U/L / ALT: 14 U/L / AST: 20 U/L / GGT: x             Culture - Urine (collected 10-23-21 @ 16:05)  Source: Clean Catch Clean Catch (Midstream)  Final Report (10-24-21 @ 21:39):    No growth    Culture - Blood (collected 10-23-21 @ 14:18)  Source: .Blood Blood-Peripheral  Preliminary Report (10-24-21 @ 20:01):    No growth to date.      Urinalysis Basic - ( 23 Oct 2021 16:05 )    Color: Light Yellow / Appearance: Clear / S.013 / pH: x  Gluc: x / Ketone: Negative  / Bili: Negative / Urobili: <2 mg/dL   Blood: x / Protein: 300 mg/dL / Nitrite: Negative   Leuk Esterase: Small / RBC: 1 /HPF / WBC 16 /HPF   Sq Epi: x / Non Sq Epi: 1 /HPF / Bacteria: Negative          RADIOLOGY & ADDITIONAL TESTS:  Personal review of radiological diagnostics performed  Echo and EKG results noted when applicable.     MEDICATIONS:  aspirin enteric coated 81 milliGRAM(s) Oral daily  atorvastatin Oral Tab/Cap - Peds 20 milliGRAM(s) Oral daily  calcium acetate 667 milliGRAM(s) Oral three times a day with meals  cefTRIAXone   IVPB 1000 milliGRAM(s) IV Intermittent every 24 hours  chlorhexidine 4% Liquid 1 Application(s) Topical daily  pantoprazole    Tablet 40 milliGRAM(s) Oral before breakfast  sodium bicarbonate 1300 milliGRAM(s) Oral three times a day  sodium bicarbonate  Infusion 0.118 mEq/kG/Hr IV Continuous <Continuous>  sodium zirconium cyclosilicate 10 Gram(s) Oral two times a day      ANTIBIOTICS:  cefTRIAXone   IVPB 1000 milliGRAM(s) IV Intermittent every 24 hours

## 2021-10-25 NOTE — DIETITIAN NUTRITION RISK NOTIFICATION - TREATMENT: THE FOLLOWING DIET HAS BEEN RECOMMENDED
Diet, Regular:   For patients receiving Renal Replacement - No Protein Restr, No Conc K, No Conc Phos, Low Sodium  Prosource Gelatein 20 Sugar Free     Qty per Day:  2  Supplement Feeding Modality:  Oral  Nepro Cans or Servings Per Day:  1       Frequency:  Daily (10-25-21 @ 10:57) [Pending Verification By Attending]  Diet, Regular:   Consistent Carbohydrate {No Snacks}  DASH/TLC {Sodium & Cholesterol Restricted}  For patients receiving Renal Replacement - No Protein Restr, No Conc K, No Conc Phos, Low Sodium  No Concentrated Potassium  No Concentrated Phosphorus  Low Sodium (10-24-21 @ 18:46) [Active]

## 2021-10-25 NOTE — PROGRESS NOTE ADULT - ASSESSMENT
Impression    Acute on chronic renal failure non oliguric  UTO  CKD4  Acute on chronic anemia s/p 2U PRBC  HO of covid  HO DVT on eliquis  Crohn s/p colostomy      PLAN:    CNS: avoid CNS depressant    HEENT:  Oral care    PULMONARY:  HOB @ 45 degrees, aspiration precaution, cxr    CARDIOVASCULAR: d5 3 amp 50 cc/h,    GI: GI prophylaxis / protonix                                         Feeding low k diet    RENAL:  F/u  lytes.  Correct as needed. accurate I/O, improved uo, trrend CMP    INFECTIOUS DISEASE: per ID    HEMATOLOGICAL:  DVT prophylaxis. LE doppler , hold ac, sp 2 units PRBC, IV heparin    ENDOCRINE:  Follow up FS.  Insulin protocol if needed.    DISPOSITION: SDU

## 2021-10-25 NOTE — DIETITIAN INITIAL EVALUATION ADULT. - ORAL INTAKE PTA/DIET HISTORY
Hx obtained from pt and son via phone. Pt is a picky eater; per son, "there are only about 10 things he would eat." Pt underwent colostomy procedure ~40 years ago. Does not really have any diet restrictions for Crohn's sp colostomy, but follows low salt diet for his kidney condition. Does not limit any foods d/t DM. Dislikes cheese and fish. Denies food allergy/intolerance, seconded by son. Denies chewing/swallowing issues. Pt endorses he can eat an apple, but would not eat dry or chewy meats. Hx obtained from pt and son via phone. Pt is a picky eater; per son, "there are only about 10 things he would eat." Pt underwent colostomy procedure ~40 years ago. Does not really have any diet restrictions for Crohn's sp colostomy, but follows low salt diet for his kidney condition. Does not limit any foods d/t DM. Dislikes cheese and fish. Denies food allergy/intolerance, seconded by son. Denies chewing/swallowing issues.

## 2021-10-25 NOTE — PROGRESS NOTE ADULT - ASSESSMENT
# JOSH on CKD 4 - re-renal/ r/o ATN d/t severe sepsis / acute on chronic anemia  # Metabolic acidosis w/ anion gap   # Hyponatremia / low eav  # Acute on chronic anemia   # Acute cystitis  # Hx of crohn s/o colostomy    Recommendations:  - non oliguric; urine output better  creat slightly better  - cont  bicarb gtt to 50cc/hr, d/c if Na level down trending  -cont  sodium bicarb po 1300mg q8h   - cont phoslo, replete Mg to 2  - low K diet, dc lokelma  - bumex 2mg iv if hypervolemia w/ respiratory compromise  - f/u cxs, cont ceftriaxone, appreciate ID eval  - CT noted w/o hydronephrosis  - no urgency for RRT now, f/u abg   - RBC transfusion as needed to maintain hgb > 7, check iron stores, check serum free light chain ratio, spep/jaya   Will follow

## 2021-10-25 NOTE — CHART NOTE - NSCHARTNOTEFT_GEN_A_CORE
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 Pt states 10 days prior was diagnosed with UTI, placed on bactrim, Family states due to size of pill, pt was unable to swallow pill, only took 1/2 dose for 5 days. Family states over past few days pt was becoming increasingly lethargic, having chills as well. Denies cough, chest pain, sob, abdominal pain, NVCD. In the ED, CT a/p was done and showed Urinary bladder wall is thickened in the setting of under-distention. Intraluminal air is likely secondary to Luna.   Patient had JOSH, baseline CKD 4, Cr reaching 8.3. Currently not candidate for dialysis as electrolytes (potassium, bicarb) have improved.. Patient received lokelam tid & sodium bicarb, initially drip 3 amp 100 ml/hr then 50 ml/hr, Also receiving 1300 NaHCO3 tid. Some fluid overload (3+ LE edema and b/l opacities on CXR), not currently in Respiratory distress..  Troponin trending up, 0.17 -> 0.21, repeat pending, ECG showed RBBB, no ST changes. No chest pain or nausea.      Overnight events     Subjective complaints     Present Today:   - Luna:  No [  ], Yes [ x  ] : Indication:  Retention    - Type of IV Access:       .. CVC/Piccline:  No [  ], Yes [   ] : Indication:       .. Midline: No [  ], Yes [   ] : Indication:                                              OBJECTIVE  PAST MEDICAL & SURGICAL HISTORY  History of medical problems  [place] b/l ears, dm, htn, hypercholesteremia, ckd, bowel/bladder fistula, kidney stones    Colostomy present    History of surgery  procedure for kidney stones ( ?lithotripsy)                                                ALLERGIES:  No Known Allergies                           HOME MEDICATIONS  Home Medications:  amLODIPine 5 mg oral tablet: 1 tab(s) orally 2 times a day (01 Sep 2021 16:54)  Aspir 81 oral delayed release tablet: 1 tab(s) orally once a day (01 Sep 2021 16:54)  atorvastatin 10 mg oral tablet: 2 tab(s) orally once a day (01 Sep 2021 16:54)  famotidine 20 mg oral tablet: 1 tab(s) orally once a day (01 Sep 2021 16:54)  Januvia 50 mg oral tablet: 1 tab(s) orally once a day (01 Sep 2021 16:54)  Lasix 20 mg oral tablet: 1 tab(s) orally every other day (01 Sep 2021 16:54)  pioglitazone 30 mg oral tablet: 1 tab(s) orally once a day (01 Sep 2021 16:54)  ramipril 5 mg oral capsule: 2 cap(s) orally once a day (01 Sep 2021 16:54)                           MEDICATIONS:  STANDING MEDICATIONS  aspirin enteric coated 81 milliGRAM(s) Oral daily  atorvastatin Oral Tab/Cap - Peds 20 milliGRAM(s) Oral daily  calcium acetate 667 milliGRAM(s) Oral three times a day with meals  cefTRIAXone   IVPB 1000 milliGRAM(s) IV Intermittent every 24 hours  chlorhexidine 4% Liquid 1 Application(s) Topical daily  heparin  Infusion.  Unit(s)/Hr IV Continuous <Continuous>  pantoprazole    Tablet 40 milliGRAM(s) Oral before breakfast  sodium bicarbonate 1300 milliGRAM(s) Oral three times a day  sodium bicarbonate  Infusion 0.118 mEq/kG/Hr IV Continuous <Continuous>    PRN MEDICATIONS                                            ------------------------------------------------------------  VITAL SIGNS: Last 24 Hours  T(C): 36.2 (25 Oct 2021 12:00), Max: 36.4 (25 Oct 2021 04:00)  T(F): 97.1 (25 Oct 2021 12:00), Max: 97.5 (25 Oct 2021 04:00)  HR: 64 (25 Oct 2021 13:00) (60 - 78)  BP: 125/54 (25 Oct 2021 13:00) (98/58 - 127/53)  BP(mean): 74 (25 Oct 2021 13:00) (58 - 94)  RR: 17 (25 Oct 2021 13:00) (14 - 30)  SpO2: 100% (25 Oct 2021 13:00) (95% - 100%)      10-24-21 @ 07:01  -  10-25-21 @ 07:00  --------------------------------------------------------  IN: 1910 mL / OUT: 660 mL / NET: 1250 mL    10-25-21 @ 07:01  -  10-25-21 @ 14:07  --------------------------------------------------------  IN: 638 mL / OUT: 425 mL / NET: 213 mL                                               LABS:                        7.9    7.80  )-----------( 138      ( 25 Oct 2021 05:00 )             24.4     10-25    131<L>  |  98  |  85<HH>  ----------------------------<  98  4.4   |  14<L>  |  8.0<HH>    Ca    7.9<L>      25 Oct 2021 05:00  Phos  6.1     10-25  Mg     1.8     10-25    TPro  4.7<L>  /  Alb  2.9<L>  /  TBili  <0.2  /  DBili  x   /  AST  20  /  ALT  14  /  AlkPhos  65  10-25    PT/INR - ( 23 Oct 2021 14:18 )   PT: 11.70 sec;   INR: 1.02 ratio         PTT - ( 25 Oct 2021 12:03 )  PTT:43.8 sec  Urinalysis Basic - ( 23 Oct 2021 16:05 )    Color: Light Yellow / Appearance: Clear / S.013 / pH: x  Gluc: x / Ketone: Negative  / Bili: Negative / Urobili: <2 mg/dL   Blood: x / Protein: 300 mg/dL / Nitrite: Negative   Leuk Esterase: Small / RBC: 1 /HPF / WBC 16 /HPF   Sq Epi: x / Non Sq Epi: 1 /HPF / Bacteria: Negative      ABG - ( 24 Oct 2021 08:48 )  pH, Arterial: 7.35  pH, Blood: x     /  pCO2: 24    /  pO2: 85    / HCO3: 13    / Base Excess: -11.1 /  SaO2: 98.9              Troponin T, Serum: 0.21 ng/mL *HH* (10-24-21 @ 21:15)        Culture - Urine (collected 23 Oct 2021 16:05)  Source: Clean Catch Clean Catch (Midstream)  Final Report (24 Oct 2021 21:39):    No growth    Culture - Blood (collected 23 Oct 2021 14:18)  Source: .Blood Blood-Peripheral  Preliminary Report (24 Oct 2021 20:01):    No growth to date.        CARDIAC MARKERS ( 24 Oct 2021 21:15 )  x     / 0.21 ng/mL / x     / x     / x      CARDIAC MARKERS ( 24 Oct 2021 11:32 )  x     / 0.17 ng/mL / x     / x     / x                                                  RADIOLOGY:    < from: Xray Chest 1 View- PORTABLE-Urgent (Xray Chest 1 View- PORTABLE-Urgent .) (10.25.21 @ 10:38) >    Increasing bibasilar opacities/effusions, right greater than left.    < end of copied text >    < from: CT Abdomen and Pelvis No Cont (10.23.21 @ 17:23) >    1.  Urinary bladder wall is thickened in the setting of under-distention. Intraluminal air is likely secondary to Luna. If clinically warranted, correlate with UA to rule out acute cystitis.  2.  Bowel containing spigelian hernia without evidence of bowel obstruction or pneumoperitoneum.  3.  Small bilateral pleural effusions with adjacent atelectasis.  4.  Indeterminate bilateral renal lesions. Nonemergent reimaging with renal protocol recommended.    < end of copied text >        PHYSICAL EXAM:  GENERAL: NAD, lying in bed comfortably  EYES: conjunctiva and sclera clear  ENT: Moist mucous membranes  NECK: No JVD  CHEST/LUNG: Clear to auscultation bilaterally; No rales, rhonchi, wheezing, or rubs. Unlabored respirations  HEART: Regular rate and rhythm; No murmurs  ABDOMEN: BSx4; Soft, nontender, nondistended, colostomy, erythema around stoma, nonbloody colostomy bag  EXTREMITIES:  3+ LE edema  NERVOUS SYSTEM:  A&Ox3, forgetful  SKIN: No rashes or lesions                                         ASSESSMENT & PLAN  85 year old male with BPH and CKD 4 presenting for pyelonephritis & postrenal obstructive JOSH. Patient is hemodynamically stable. No need for dialysis for now.    Acute on chronic renal failure non oliguric  UTO  CKD4  Acute on chronic anemia s/p 2U PRBC  HO of covid  HO DVT on eliquis  Crohn s/p colostomy      PLAN:    CNS: avoid CNS depressant    HEENT:  Oral care    PULMONARY:  HOB @ 45 degrees, aspiration precaution, cxr -> Increased opacities.  - Diuresis with bumex 2 mg IV if in respiratory distress    CARDIOVASCULAR: - continue d5 3 amp NaHCO3 50 cc/h + PO NaHCO3 1300 mg tid  Trop 0.17 -> 0.21, no chest pain, ECG showed RBBB, no ST changes. f/u 11 am Trop -> If elevated, repeat Trop & ECG, C/S Cardio  f/u echo    GI: GI prophylaxis / protonix, Feeding low k renal diet    RENAL:  F/u  lytes, f/u CMP @ 4 pm, continue Phoslo.  Correct as needed. accurate I/O, improved uo (660 ml/24 hrs), continue NaHCO3 as above  - f/u iron stores, check serum free light chain ratio, spep/jaya   - Postrenal obstructive JOSH [FeNa 6.2% > 4%]    INFECTIOUS DISEASE: per ID [Ceftriaxone 10/23, pyelonephritis, Cellulitis around stoma]  - U/A -ve, but patient took Antibiotics as outpatient    HEMATOLOGICAL:  DVT prophylaxis. LE doppler , hold oral ac, sp 2 units PRBC [Hb < 7 x 2], stable Hb, IV heparin, f/u PTT @ 4 pm, @ 11:30 pm [currently on heparin due to risk of bleed, monitor Hb, consider switching back to OAC once Hb stable for some time. No melena/hematochezia/hematemesis/ecchymosis/bleed on CT Abdomen]    ENDOCRINE:  Follow up FS.  Insulin protocol if needed. Currently has good glucose levels in ICU, target 140-180, a1c 8.4                                                                                ----------------------------------------------------  # DVT prophylaxis heparin drip    # GI prophylaxis protonix    # Diet regular, renal restriction    # Activity Score (AM-PAC) As tolerated    # Code status FULL    # Disposition SDU                                                                             --------------------------------------------------------    # Handoff     f/u troponin @ 11 am, f/u CMP @ 4 pm, bumex 2 mg IV if respiratory distress, f/u ptt @ 4 pm & 11:30 pm, target [60-80], f/u echo, f/u LE duplex  If troponin elevated: Repeat troponin & ECG, c/s cardio CECILLE FRANKEL 85y Male  MRN#: [id number]   CODE STATUS:________    Hospital Day: 2d    Pt is currently admitted with the primary diagnosis of Postrenal JOSH    SUBJECTIVE  Hospital Course  85 year old M with PMH of lithotrypsy for nephrolithiasis, DM, Crohn s/p colostomy, CKD 4, HTN, HLD, BPH, left common femoral DVT (likely choronic), anemia (baseline Hb 8) presents to ED with complaints of weakness. Pt states 10 days prior was diagnosed with UTI, placed on bactrim, Family states due to size of pill, pt was unable to swallow pill, only took 1/2 dose for 5 days. Family states over past few days pt was becoming increasingly lethargic, having chills as well. Denies cough, chest pain, sob, abdominal pain, NVCD. In the ED, CT a/p was done and showed Urinary bladder wall is thickened in the setting of under-distention. Intraluminal air is likely secondary to Luna.   Patient had JOSH, baseline CKD 4, Cr reaching 8.3. Currently not candidate for dialysis as electrolytes (potassium, bicarb) have improved.. Patient received lokelam tid & sodium bicarb, initially drip 3 amp 100 ml/hr then 50 ml/hr, Also receiving 1300 NaHCO3 tid. Some fluid overload (3+ LE edema and b/l opacities on CXR), not currently in Respiratory distress..  Troponin trending up, 0.17 -> 0.21, repeat pending, ECG showed RBBB, no ST changes. No chest pain or nausea.      Overnight events     Subjective complaints     Present Today:   - Luna:  No [  ], Yes [ x  ] : Indication:  Retention    - Type of IV Access:       .. CVC/Piccline:  No [  ], Yes [   ] : Indication:       .. Midline: No [  ], Yes [   ] : Indication:                                              OBJECTIVE  PAST MEDICAL & SURGICAL HISTORY  History of medical problems  [place] b/l ears, dm, htn, hypercholesteremia, ckd, bowel/bladder fistula, kidney stones    Colostomy present    History of surgery  procedure for kidney stones ( ?lithotripsy)                                                ALLERGIES:  No Known Allergies                           HOME MEDICATIONS  Home Medications:  amLODIPine 5 mg oral tablet: 1 tab(s) orally 2 times a day (01 Sep 2021 16:54)  Aspir 81 oral delayed release tablet: 1 tab(s) orally once a day (01 Sep 2021 16:54)  atorvastatin 10 mg oral tablet: 2 tab(s) orally once a day (01 Sep 2021 16:54)  famotidine 20 mg oral tablet: 1 tab(s) orally once a day (01 Sep 2021 16:54)  Januvia 50 mg oral tablet: 1 tab(s) orally once a day (01 Sep 2021 16:54)  Lasix 20 mg oral tablet: 1 tab(s) orally every other day (01 Sep 2021 16:54)  pioglitazone 30 mg oral tablet: 1 tab(s) orally once a day (01 Sep 2021 16:54)  ramipril 5 mg oral capsule: 2 cap(s) orally once a day (01 Sep 2021 16:54)                           MEDICATIONS:  STANDING MEDICATIONS  aspirin enteric coated 81 milliGRAM(s) Oral daily  atorvastatin Oral Tab/Cap - Peds 20 milliGRAM(s) Oral daily  calcium acetate 667 milliGRAM(s) Oral three times a day with meals  cefTRIAXone   IVPB 1000 milliGRAM(s) IV Intermittent every 24 hours  chlorhexidine 4% Liquid 1 Application(s) Topical daily  heparin  Infusion.  Unit(s)/Hr IV Continuous <Continuous>  pantoprazole    Tablet 40 milliGRAM(s) Oral before breakfast  sodium bicarbonate 1300 milliGRAM(s) Oral three times a day  sodium bicarbonate  Infusion 0.118 mEq/kG/Hr IV Continuous <Continuous>    PRN MEDICATIONS                                            ------------------------------------------------------------  VITAL SIGNS: Last 24 Hours  T(C): 36.2 (25 Oct 2021 12:00), Max: 36.4 (25 Oct 2021 04:00)  T(F): 97.1 (25 Oct 2021 12:00), Max: 97.5 (25 Oct 2021 04:00)  HR: 64 (25 Oct 2021 13:00) (60 - 78)  BP: 125/54 (25 Oct 2021 13:00) (98/58 - 127/53)  BP(mean): 74 (25 Oct 2021 13:00) (58 - 94)  RR: 17 (25 Oct 2021 13:00) (14 - 30)  SpO2: 100% (25 Oct 2021 13:00) (95% - 100%)      10-24-21 @ 07:01  -  10-25-21 @ 07:00  --------------------------------------------------------  IN: 1910 mL / OUT: 660 mL / NET: 1250 mL    10-25-21 @ 07:01  -  10-25-21 @ 14:07  --------------------------------------------------------  IN: 638 mL / OUT: 425 mL / NET: 213 mL                                               LABS:                        7.9    7.80  )-----------( 138      ( 25 Oct 2021 05:00 )             24.4     10-25    131<L>  |  98  |  85<HH>  ----------------------------<  98  4.4   |  14<L>  |  8.0<HH>    Ca    7.9<L>      25 Oct 2021 05:00  Phos  6.1     10-25  Mg     1.8     10-25    TPro  4.7<L>  /  Alb  2.9<L>  /  TBili  <0.2  /  DBili  x   /  AST  20  /  ALT  14  /  AlkPhos  65  10-25    PT/INR - ( 23 Oct 2021 14:18 )   PT: 11.70 sec;   INR: 1.02 ratio         PTT - ( 25 Oct 2021 12:03 )  PTT:43.8 sec  Urinalysis Basic - ( 23 Oct 2021 16:05 )    Color: Light Yellow / Appearance: Clear / S.013 / pH: x  Gluc: x / Ketone: Negative  / Bili: Negative / Urobili: <2 mg/dL   Blood: x / Protein: 300 mg/dL / Nitrite: Negative   Leuk Esterase: Small / RBC: 1 /HPF / WBC 16 /HPF   Sq Epi: x / Non Sq Epi: 1 /HPF / Bacteria: Negative      ABG - ( 24 Oct 2021 08:48 )  pH, Arterial: 7.35  pH, Blood: x     /  pCO2: 24    /  pO2: 85    / HCO3: 13    / Base Excess: -11.1 /  SaO2: 98.9              Troponin T, Serum: 0.21 ng/mL *HH* (10-24-21 @ 21:15)        Culture - Urine (collected 23 Oct 2021 16:05)  Source: Clean Catch Clean Catch (Midstream)  Final Report (24 Oct 2021 21:39):    No growth    Culture - Blood (collected 23 Oct 2021 14:18)  Source: .Blood Blood-Peripheral  Preliminary Report (24 Oct 2021 20:01):    No growth to date.        CARDIAC MARKERS ( 24 Oct 2021 21:15 )  x     / 0.21 ng/mL / x     / x     / x      CARDIAC MARKERS ( 24 Oct 2021 11:32 )  x     / 0.17 ng/mL / x     / x     / x                                                  RADIOLOGY:    < from: Xray Chest 1 View- PORTABLE-Urgent (Xray Chest 1 View- PORTABLE-Urgent .) (10.25.21 @ 10:38) >    Increasing bibasilar opacities/effusions, right greater than left.    < end of copied text >    < from: CT Abdomen and Pelvis No Cont (10.23.21 @ 17:23) >    1.  Urinary bladder wall is thickened in the setting of under-distention. Intraluminal air is likely secondary to Luna. If clinically warranted, correlate with UA to rule out acute cystitis.  2.  Bowel containing spigelian hernia without evidence of bowel obstruction or pneumoperitoneum.  3.  Small bilateral pleural effusions with adjacent atelectasis.  4.  Indeterminate bilateral renal lesions. Nonemergent reimaging with renal protocol recommended.    < end of copied text >        PHYSICAL EXAM:  GENERAL: NAD, lying in bed comfortably  EYES: conjunctiva and sclera clear  ENT: Moist mucous membranes  NECK: No JVD  CHEST/LUNG: Clear to auscultation bilaterally; No rales, rhonchi, wheezing, or rubs. Unlabored respirations  HEART: Regular rate and rhythm; No murmurs  ABDOMEN: BSx4; Soft, nontender, nondistended, colostomy, erythema around stoma, nonbloody colostomy bag  EXTREMITIES:  3+ LE edema  NERVOUS SYSTEM:  A&Ox3, forgetful  SKIN: No rashes or lesions                                         ASSESSMENT & PLAN  85 year old male with BPH and CKD 4 presenting for pyelonephritis & postrenal obstructive JOSH. Patient is hemodynamically stable. No need for dialysis for now.    Acute on chronic renal failure non oliguric  UTO  CKD4  Acute on chronic anemia s/p 2U PRBC  HO of covid  HO DVT on eliquis  Crohn s/p colostomy  Pyelonephritis  Cellulitis  Volume Overload    PLAN:    CNS: avoid CNS depressant    HEENT:  Oral care    PULMONARY:  HOB @ 45 degrees, aspiration precaution, cxr -> Increased opacities.  - Diuresis with bumex 2 mg IV if in respiratory distress    CARDIOVASCULAR: - continue d5 3 amp NaHCO3 50 cc/h + PO NaHCO3 1300 mg tid  Trop 0.17 -> 0.21, no chest pain, ECG showed RBBB, no ST changes. f/u 11 am Trop -> If elevated, repeat Trop & ECG, C/S Cardio  f/u echo    GI: GI prophylaxis / protonix, Feeding low k renal diet, f/u passing BM (passing low volume)    RENAL:  F/u  lytes, f/u CMP @ 4 pm, continue Phoslo.  Correct as needed. accurate I/O, improved uo (660 ml/24 hrs), continue NaHCO3 as above  - f/u iron stores, check serum free light chain ratio, spep/jaya   - Postrenal obstructive JOSH [FeNa 6.2% > 4%]    INFECTIOUS DISEASE: per ID [Ceftriaxone 10/23, pyelonephritis, Cellulitis around stoma]  - U/A -ve, but patient took Antibiotics as outpatient    HEMATOLOGICAL:  DVT prophylaxis. LE doppler , hold oral ac, sp 2 units PRBC [Hb < 7 x 2], stable Hb, IV heparin, f/u PTT @ 4 pm, @ 11:30 pm [currently on heparin due to risk of bleed, monitor Hb, consider switching back to OAC once Hb stable for some time. No melena/hematochezia/hematemesis/ecchymosis/bleed on CT Abdomen]    ENDOCRINE:  Follow up FS.  Insulin protocol if needed. Currently has good glucose levels in ICU, target 140-180, a1c 8.4                                                                                ----------------------------------------------------  # DVT prophylaxis heparin drip    # GI prophylaxis protonix    # Diet regular, renal restriction    # Activity Score (AM-PAC) As tolerated    # Code status FULL    # Disposition SDU                                                                             --------------------------------------------------------    # Handoff     f/u troponin @ 11 am, f/u CMP @ 4 pm, bumex 2 mg IV if respiratory distress, f/u ptt @ 4 pm & 11:30 pm, target [60-80], f/u echo, f/u LE duplex  If troponin elevated: Repeat troponin & ECG, c/s cardio

## 2021-10-25 NOTE — DIETITIAN INITIAL EVALUATION ADULT. - PERTINENT LABORATORY DATA
(10/25) H/H 7.9/24.4, FS -124, Mag 1.8, Phos 6.1, Na 131, K 4.4, BUN 85, creat 8.0, GFR 6  (9/2) trig 191, chol 116, A1C 8.4

## 2021-10-25 NOTE — DIETITIAN INITIAL EVALUATION ADULT. - OTHER CALCULATIONS
est kcal/pro needs based on inc metabolic needs r/t Severe Sepsis, AoCKD4 2/2 UTI; MSJ*1.2-1.3= 1495-1620kcal; fluids per LIP

## 2021-10-25 NOTE — DIETITIAN INITIAL EVALUATION ADULT. - OTHER INFO
hx cont- no noticeable recent weight loss. Normal PO intake PTA. hx cont- Has well-fitting dentures. Pt endorses he can eat an apple, but would not eat dry or chewy meats. Ht 5'6". -140lbs. No noticeable recent weight loss. Normal PO intake PTA with no appetite change. No supplement use.    Pertinent Medical Interventions  Impression: Acute on chronic renal failure non oliguric; UTO; CKD4; Acute on chronic anemia s/p 2U PRBC; HO of covid; HO DVT on eliquis; Crohn s/p colostomy  - low k diet, abx per ID, sodium bicarb po, cont phoslo, replete Mg to 2, low K diet, cont lokelma     Subjective  Pt had <25% intake from breakfast this AM d/t food being cold and food preferences. Denies n/v/d/c.

## 2021-10-25 NOTE — DIETITIAN INITIAL EVALUATION ADULT. - CONTINUE CURRENT NUTRITION CARE PLAN
No, recommend diet change to liberalize diet as much as possible. No indication for consistent carb restrictions given good BG control. Hold cholesterol restrictions given ongoing poor PO intake.

## 2021-10-25 NOTE — PROGRESS NOTE ADULT - SUBJECTIVE AND OBJECTIVE BOX
Over Night Events: events noted, on bicarb drips 50 cc/h      PHYSICAL EXAM    ICU Vital Signs Last 24 Hrs  T(C): 36.4 (25 Oct 2021 04:00), Max: 36.4 (25 Oct 2021 04:00)  T(F): 97.5 (25 Oct 2021 04:00), Max: 97.5 (25 Oct 2021 04:00)  HR: 60 (25 Oct 2021 07:00) (60 - 80)  BP: 109/49 (25 Oct 2021 07:00) (100/39 - 127/53)  BP(mean): 85 (25 Oct 2021 07:00) (58 - 95)  RR: 17 (25 Oct 2021 07:00) (14 - 30)  SpO2: 95% (25 Oct 2021 07:00) (95% - 100%)      General: ill looking            Lymph Nodes: No cervical LN   Lungs: dec bs both bases  Cardiovascular: SOLANGE /   Abdomen: Soft, Positive BS, COLOSTOMY  Extremities: No clubbing   Skin: Warm  Neurological: Non focal       10-24-21 @ 07:01  -  10-25-21 @ 07:00  --------------------------------------------------------  IN:    IV PiggyBack: 50 mL    Oral Fluid: 240 mL    PRBCs (Packed Red Blood Cells): 320 mL    Sodium Bicarbonate: 200 mL    Sodium Bicarbonate: 1100 mL  Total IN: 1910 mL    OUT:    Indwelling Catheter - Urethral (mL): 660 mL  Total OUT: 660 mL    Total NET: 1250 mL          LABS:                          7.9    7.80  )-----------( 138      ( 25 Oct 2021 05:00 )             24.4                                               10-25    131<L>  |  98  |  85<HH>  ----------------------------<  98  4.4   |  14<L>  |  8.0<HH>    Ca    7.9<L>      25 Oct 2021 05:00  Phos  6.1     10-  Mg     1.8     10-    TPro  4.7<L>  /  Alb  2.9<L>  /  TBili  <0.2  /  DBili  x   /  AST  20  /  ALT  14  /  AlkPhos  65  10-25      PT/INR - ( 23 Oct 2021 14:18 )   PT: 11.70 sec;   INR: 1.02 ratio         PTT - ( 23 Oct 2021 14:18 )  PTT:32.1 sec                                       Urinalysis Basic - ( 23 Oct 2021 16:05 )    Color: Light Yellow / Appearance: Clear / S.013 / pH: x  Gluc: x / Ketone: Negative  / Bili: Negative / Urobili: <2 mg/dL   Blood: x / Protein: 300 mg/dL / Nitrite: Negative   Leuk Esterase: Small / RBC: 1 /HPF / WBC 16 /HPF   Sq Epi: x / Non Sq Epi: 1 /HPF / Bacteria: Negative        CARDIAC MARKERS ( 24 Oct 2021 21:15 )  x     / 0.21 ng/mL / x     / x     / x      CARDIAC MARKERS ( 24 Oct 2021 11:32 )  x     / 0.17 ng/mL / x     / x     / x                                                LIVER FUNCTIONS - ( 25 Oct 2021 05:00 )  Alb: 2.9 g/dL / Pro: 4.7 g/dL / ALK PHOS: 65 U/L / ALT: 14 U/L / AST: 20 U/L / GGT: x                                                  Culture - Urine (collected 23 Oct 2021 16:05)  Source: Clean Catch Clean Catch (Midstream)  Final Report (24 Oct 2021 21:39):    No growth    Culture - Blood (collected 23 Oct 2021 14:18)  Source: .Blood Blood-Peripheral  Preliminary Report (24 Oct 2021 20:01):    No growth to date.                                                                                       ABG - ( 24 Oct 2021 08:48 )  pH, Arterial: 7.35  pH, Blood: x     /  pCO2: 24    /  pO2: 85    / HCO3: 13    / Base Excess: -11.1 /  SaO2: 98.9                MEDICATIONS  (STANDING):  aspirin enteric coated 81 milliGRAM(s) Oral daily  atorvastatin Oral Tab/Cap - Peds 20 milliGRAM(s) Oral daily  calcium acetate 667 milliGRAM(s) Oral three times a day with meals  cefTRIAXone   IVPB 1000 milliGRAM(s) IV Intermittent every 24 hours  chlorhexidine 4% Liquid 1 Application(s) Topical daily  pantoprazole    Tablet 40 milliGRAM(s) Oral before breakfast  sodium bicarbonate 1300 milliGRAM(s) Oral three times a day  sodium bicarbonate  Infusion 0.118 mEq/kG/Hr (50 mL/Hr) IV Continuous <Continuous>  sodium zirconium cyclosilicate 10 Gram(s) Oral two times a day    MEDICATIONS  (PRN):      Xrays:    P

## 2021-10-25 NOTE — PROGRESS NOTE ADULT - SUBJECTIVE AND OBJECTIVE BOX
Nephrology progress note    THIS IS AN INCOMPLETE NOTE . FULL NOTE TO FOLLOW SHORTLY    Patient is seen and examined, events over the last 24 h noted .    Allergies:  No Known Allergies    Hospital Medications:   MEDICATIONS  (STANDING):  aspirin enteric coated 81 milliGRAM(s) Oral daily  atorvastatin Oral Tab/Cap - Peds 20 milliGRAM(s) Oral daily  calcium acetate 667 milliGRAM(s) Oral three times a day with meals  cefTRIAXone   IVPB 1000 milliGRAM(s) IV Intermittent every 24 hours  chlorhexidine 4% Liquid 1 Application(s) Topical daily  pantoprazole    Tablet 40 milliGRAM(s) Oral before breakfast  sodium bicarbonate 1300 milliGRAM(s) Oral three times a day  sodium bicarbonate  Infusion 0.118 mEq/kG/Hr (50 mL/Hr) IV Continuous <Continuous>  sodium zirconium cyclosilicate 10 Gram(s) Oral two times a day        VITALS:  T(F): 97.4 (10-25-21 @ 08:00), Max: 97.5 (10-25-21 @ 04:00)  HR: 72 (10-25-21 @ 09:00)  BP: 116/55 (10-25-21 @ 09:00)  RR: 23 (10-25-21 @ 09:00)  SpO2: 99% (10-25-21 @ 09:00)  Wt(kg): --    10-23 @ :  -  10-24 @ 07:00  --------------------------------------------------------  IN: 1050 mL / OUT: 476 mL / NET: 574 mL    10-24 @ 07:  -  10-25 @ 07:00  --------------------------------------------------------  IN: 1910 mL / OUT: 660 mL / NET: 1250 mL    10-25 @ 07:  -  10-25 @ 09:23  --------------------------------------------------------  IN: 390 mL / OUT: 0 mL / NET: 390 mL          PHYSICAL EXAM:  Constitutional: NAD  HEENT: anicteric sclera, oropharynx clear, MMM  Neck: No JVD  Respiratory: CTAB, no wheezes, rales or rhonchi  Cardiovascular: S1, S2, RRR  Gastrointestinal: BS+, soft, NT/ND  Extremities: No cyanosis or clubbing. No peripheral edema  :  No sanford.   Skin: No rashes    LABS:  10-25    131<L>  |  98  |  85<HH>  ----------------------------<  98  4.4   |  14<L>  |  8.0<HH>    Creatinine Trend: 8.0<--, 8.3<--, 7.9<--, 7.2<--, 8.1<--, 8.0<--    Ca    7.9<L>      25 Oct 2021 05:00  Phos  6.1     10-25  Mg     1.8     10-25    TPro  4.7<L>  /  Alb  2.9<L>  /  TBili  <0.2  /  DBili      /  AST  20  /  ALT  14  /  AlkPhos  65  10-25                          7.9    7.80  )-----------( 138      ( 25 Oct 2021 05:00 )             24.4       Urine Studies:  Urinalysis Basic - ( 23 Oct 2021 16:05 )    Color: Light Yellow / Appearance: Clear / S.013 / pH:   Gluc:  / Ketone: Negative  / Bili: Negative / Urobili: <2 mg/dL   Blood:  / Protein: 300 mg/dL / Nitrite: Negative   Leuk Esterase: Small / RBC: 1 /HPF / WBC 16 /HPF   Sq Epi:  / Non Sq Epi: 1 /HPF / Bacteria: Negative      Osmolality, Random Urine: 249 mos/kg (10-24 @ 18:44)  Sodium, Random Urine: 46.0 mmoL/L (10-24 @ 18:44)  Creatinine, Random Urine: 48 mg/dL (10-24 @ 18:44)    RADIOLOGY & ADDITIONAL STUDIES:   Nephrology progress note    Patient is seen and examined, events over the last 24 h noted .      Allergies:  No Known Allergies    Hospital Medications:   MEDICATIONS  (STANDING):  aspirin enteric coated 81 milliGRAM(s) Oral daily  atorvastatin Oral Tab/Cap - Peds 20 milliGRAM(s) Oral daily  calcium acetate 667 milliGRAM(s) Oral three times a day with meals  cefTRIAXone   IVPB 1000 milliGRAM(s) IV Intermittent every 24 hours  pantoprazole    Tablet 40 milliGRAM(s) Oral before breakfast  sodium bicarbonate 1300 milliGRAM(s) Oral three times a day  sodium bicarbonate  Infusion 0.118 mEq/kG/Hr (50 mL/Hr) IV Continuous <Continuous>  sodium zirconium cyclosilicate 10 Gram(s) Oral two times a day        VITALS:  T(F): 97.4 (10-25-21 @ 08:00), Max: 97.5 (10-25-21 @ 04:00)  HR: 72 (10-25-21 @ 09:00)  BP: 116/55 (10-25-21 @ 09:00)  RR: 23 (10-25-21 @ 09:00)  SpO2: 99% (10-25-21 @ 09:00)      10-23 @ :  -  10-24 @ 07:00  --------------------------------------------------------  IN: 1050 mL / OUT: 476 mL / NET: 574 mL    10-24 @ 07:  -  10-25 @ 07:00  --------------------------------------------------------  IN: 1910 mL / OUT: 660 mL / NET: 1250 mL    10-25 @ 07:  -  10-25 @ 09:23  --------------------------------------------------------  IN: 390 mL / OUT: 0 mL / NET: 390 mL          PHYSICAL EXAM:  Constitutional: NAD  Neck: No JVD  Respiratory: CTAB,   Cardiovascular: S1, S2, RRR  Gastrointestinal: BS+, soft, NT/ND  Extremities: No cyanosis or clubbing. trace edema   :  No sanford.   Skin: No rashes    LABS:  10-25    131<L>  |  98  |  85<HH>  ----------------------------<  98  4.4   |  14<L>  |  8.0<HH>    Creatinine Trend: 8.0<--, 8.3<--, 7.9<--, 7.2<--, 8.1<--, 8.0<--    Ca    7.9<L>      25 Oct 2021 05:00  Phos  6.1     10-25  Mg     1.8     10-25    TPro  4.7<L>  /  Alb  2.9<L>  /  TBili  <0.2  /  DBili      /  AST  20  /  ALT  14  /  AlkPhos  65  10-25                          7.9    7.80  )-----------( 138      ( 25 Oct 2021 05:00 )             24.4       Urine Studies:  Urinalysis Basic - ( 23 Oct 2021 16:05 )    Color: Light Yellow / Appearance: Clear / S.013 / pH:   Gluc:  / Ketone: Negative  / Bili: Negative / Urobili: <2 mg/dL   Blood:  / Protein: 300 mg/dL / Nitrite: Negative   Leuk Esterase: Small / RBC: 1 /HPF / WBC 16 /HPF   Sq Epi:  / Non Sq Epi: 1 /HPF / Bacteria: Negative      Osmolality, Random Urine: 249 mos/kg (10-24 @ 18:44)  Sodium, Random Urine: 46.0 mmoL/L (10-24 @ 18:44)  Creatinine, Random Urine: 48 mg/dL (10-24 @ 18:44)    RADIOLOGY & ADDITIONAL STUDIES:

## 2021-10-25 NOTE — PROGRESS NOTE ADULT - ASSESSMENT
85 year old M with PMH of lithotrypsy for nephrolithiasis,DM, Crohn s/p colostomy, CKD 4, HTN, HLD, BPH presents to ED with complaints of weakness. Pt states 10 days prior was diagnosed with UTI, placed on bactrim, Family states due to size of pill, pt was unable to swallow pill, only took 1/2 dose for 5 days. Family states over past few days pt becoming increasingly lethargic, having chills as well. Denies cough, chest pain, sob, abdominal pain, NVCD. In the ED, CT a/p was done and showed Urinary bladder wall is thickened in the setting of under-distention. Intraluminal air is likely secondary to Luna.     IMPRESSION;  Acute pyelonephritis  Abdominal wall cellulitis around stoma  Blood & Urine cxs NGTD 10/23    RECOMMENDATIONS;   Rocephin 1 gm iv q24h

## 2021-10-26 NOTE — PROGRESS NOTE ADULT - ASSESSMENT
Impression    Acute on chronic renal failure non oliguric  CKD4  Acute on chronic anemia s/p 2U PRBC  HO of covid  HO DVT on eliquis  Crohn s/p colostomy      PLAN:    CNS: avoid CNS depressant. halodol PRN     HEENT:  Oral care    PULMONARY:  HOB @ 45 degrees, aspiration precaution, CXR reviewed.     CARDIOVASCULAR: d5 3 amp 50 cc/h, Continue , nephrology f/u     GI: GI prophylaxis / protonix                                         Feeding low k diet    RENAL:  F/u  lytes.  Correct as needed. accurate I/O, improved UO , trend BMP.     INFECTIOUS DISEASE: per ID, UA and cultures     HEMATOLOGICAL:  DVT prophylaxis. LE doppler reviewed  , cbc q 12 , adjust ptt.    ENDOCRINE:  Follow up FS.  Insulin protocol if needed.    DISPOSITION: SDU   Impression    Acute on chronic renal failure non oliguric  CKD4  Acute on chronic anemia s/p 2U PRBC  HO of covid  HO DVT on eliquis  Crohn s/p colostomy  delirium      PLAN:    CNS: avoid CNS depressant. haldol PRN     HEENT:  Oral care    PULMONARY:  HOB @ 45 degrees, aspiration precaution, CXR reviewed.     CARDIOVASCULAR: d5 3 amp 50 cc/h, nephrology f/u     GI: GI prophylaxis / protonix                                         Feeding low k diet    RENAL:  F/u  lytes.  Correct as needed. accurate I/O, improved UO , trend BMP.     INFECTIOUS DISEASE: per ID, UA and cultures     HEMATOLOGICAL:  DVT prophylaxis. LE doppler reviewed  , cbc q 12 , adjust ptt.    ENDOCRINE:  Follow up FS.  Insulin protocol if needed.    DISPOSITION: SDU

## 2021-10-26 NOTE — PROGRESS NOTE ADULT - SUBJECTIVE AND OBJECTIVE BOX
Patient is a 85y old  Male who presents with a chief complaint of weakness (25 Oct 2021 09:22)        Over Night Events: no overnight events         ROS:     All ROS are negative except HPI         PHYSICAL EXAM    ICU Vital Signs Last 24 Hrs  T(C): 35.9 (26 Oct 2021 08:15), Max: 36.7 (26 Oct 2021 04:30)  T(F): 96.6 (26 Oct 2021 08:15), Max: 98 (26 Oct 2021 04:30)  HR: 71 (26 Oct 2021 08:15) (60 - 76)  BP: 127/58 (26 Oct 2021 08:15) (98/58 - 133/65)  BP(mean): 84 (26 Oct 2021 08:15) (66 - 107)  ABP: --  ABP(mean): --  RR: 18 (26 Oct 2021 08:15) (15 - 23)  SpO2: 98% (26 Oct 2021 08:15) (97% - 100%)      CONSTITUTIONAL:  ill appearing     ENT:   Airway patent,   Mouth with normal mucosa.   No thrush    EYES:   Pupils equal,   Round and reactive to light.    CARDIAC:   Normal rate,   Regular rhythm.    No edema      Vascular:  Normal systolic impulse  No Carotid bruits    RESPIRATORY:   No wheezing  Bilateral BS  Normal chest expansion  Not tachypneic,  No use of accessory muscles    GASTROINTESTINAL:  colostomy   sanford  BS +ve     NEUROLOGICAL:   AAO x 1 -2     SKIN:   Skin normal color for race,   Warm and dry and intact.   No evidence of rash.            10-25-21 @ 07:01  -  10-26-21 @ 07:00  --------------------------------------------------------  IN:    Heparin Infusion: 96 mL    Oral Fluid: 240 mL    Sodium Bicarbonate: 550 mL  Total IN: 886 mL    OUT:    Colostomy (mL): 625 mL    Indwelling Catheter - Urethral (mL): 305 mL  Total OUT: 930 mL    Total NET: -44 mL          LABS:                            7.9    7.80  )-----------( 138      ( 25 Oct 2021 05:00 )             24.4                                               10-25    133<L>  |  98  |  82<HH>  ----------------------------<  99  4.5   |  17  |  8.5<HH>    Ca    8.0<L>      25 Oct 2021 16:46  Phos  6.1     10-25  Mg     1.8     10-25    TPro  4.7<L>  /  Alb  2.9<L>  /  TBili  <0.2  /  DBili  x   /  AST  20  /  ALT  14  /  AlkPhos  65  10-25      PTT - ( 26 Oct 2021 01:59 )  PTT:108.6 sec                                           CARDIAC MARKERS ( 25 Oct 2021 20:00 )  x     / 0.25 ng/mL / x     / x     / x      CARDIAC MARKERS ( 25 Oct 2021 12:03 )  x     / 0.25 ng/mL / x     / x     / x      CARDIAC MARKERS ( 24 Oct 2021 21:15 )  x     / 0.21 ng/mL / x     / x     / x      CARDIAC MARKERS ( 24 Oct 2021 11:32 )  x     / 0.17 ng/mL / x     / x     / x                                                LIVER FUNCTIONS - ( 25 Oct 2021 05:00 )  Alb: 2.9 g/dL / Pro: 4.7 g/dL / ALK PHOS: 65 U/L / ALT: 14 U/L / AST: 20 U/L / GGT: x                                                  Culture - Urine (collected 23 Oct 2021 16:05)  Source: Clean Catch Clean Catch (Midstream)  Final Report (24 Oct 2021 21:39):    No growth    Culture - Blood (collected 23 Oct 2021 14:18)  Source: .Blood Blood-Peripheral  Preliminary Report (24 Oct 2021 20:01):    No growth to date.                                                                                           MEDICATIONS  (STANDING):  aspirin enteric coated 81 milliGRAM(s) Oral daily  atorvastatin Oral Tab/Cap - Peds 20 milliGRAM(s) Oral daily  calcium acetate 667 milliGRAM(s) Oral three times a day with meals  cefTRIAXone   IVPB 1000 milliGRAM(s) IV Intermittent every 24 hours  chlorhexidine 4% Liquid 1 Application(s) Topical daily  heparin  Infusion 10 Unit(s)/Hr (9 mL/Hr) IV Continuous <Continuous>  pantoprazole    Tablet 40 milliGRAM(s) Oral before breakfast  sodium bicarbonate 1300 milliGRAM(s) Oral three times a day  sodium bicarbonate  Infusion 0.118 mEq/kG/Hr (50 mL/Hr) IV Continuous <Continuous>    MEDICATIONS  (PRN):      New X-rays reviewed:                                                                                  ECHO    CXR interpreted by me:  b/l opacity , worse on right      Patient is a 85y old  Male who presents with a chief complaint of weakness (25 Oct 2021 09:22)        Over Night Events: no overnight events, increase UO    PHYSICAL EXAM    ICU Vital Signs Last 24 Hrs  T(C): 35.9 (26 Oct 2021 08:15), Max: 36.7 (26 Oct 2021 04:30)  T(F): 96.6 (26 Oct 2021 08:15), Max: 98 (26 Oct 2021 04:30)  HR: 71 (26 Oct 2021 08:15) (60 - 76)  BP: 127/58 (26 Oct 2021 08:15) (98/58 - 133/65)  BP(mean): 84 (26 Oct 2021 08:15) (66 - 107  RR: 18 (26 Oct 2021 08:15) (15 - 23)  SpO2: 98% (26 Oct 2021 08:15) (97% - 100%)      CONSTITUTIONAL:  ill appearing     ENT:   Airway patent,   Mouth with normal mucosa.   No thrush    EYES:   Pupils equal,   Round and reactive to light.    CARDIAC:   Normal rate,   Regular rhythm.    No edema    RESPIRATORY:   No wheezing  Bilateral BS  Normal chest expansion  Not tachypneic,  No use of accessory muscles    GASTROINTESTINAL:  colostomy   sanford  BS +ve     NEUROLOGICAL:   AAO x 1 -2     SKIN:   Skin normal color for race,   Warm and dry and intact.   No evidence of rash.            10-25-21 @ 07:01  -  10-26-21 @ 07:00  --------------------------------------------------------  IN:    Heparin Infusion: 96 mL    Oral Fluid: 240 mL    Sodium Bicarbonate: 550 mL  Total IN: 886 mL    OUT:    Colostomy (mL): 625 mL    Indwelling Catheter - Urethral (mL): 305 mL  Total OUT: 930 mL    Total NET: -44 mL          LABS:                            7.9    7.80  )-----------( 138      ( 25 Oct 2021 05:00 )             24.4                                               10-25    133<L>  |  98  |  82<HH>  ----------------------------<  99  4.5   |  17  |  8.5<HH>    Ca    8.0<L>      25 Oct 2021 16:46  Phos  6.1     10-25  Mg     1.8     10-25    TPro  4.7<L>  /  Alb  2.9<L>  /  TBili  <0.2  /  DBili  x   /  AST  20  /  ALT  14  /  AlkPhos  65  10-25      PTT - ( 26 Oct 2021 01:59 )  PTT:108.6 sec                                           CARDIAC MARKERS ( 25 Oct 2021 20:00 )  x     / 0.25 ng/mL / x     / x     / x      CARDIAC MARKERS ( 25 Oct 2021 12:03 )  x     / 0.25 ng/mL / x     / x     / x      CARDIAC MARKERS ( 24 Oct 2021 21:15 )  x     / 0.21 ng/mL / x     / x     / x      CARDIAC MARKERS ( 24 Oct 2021 11:32 )  x     / 0.17 ng/mL / x     / x     / x                                                LIVER FUNCTIONS - ( 25 Oct 2021 05:00 )  Alb: 2.9 g/dL / Pro: 4.7 g/dL / ALK PHOS: 65 U/L / ALT: 14 U/L / AST: 20 U/L / GGT: x                                                  Culture - Urine (collected 23 Oct 2021 16:05)  Source: Clean Catch Clean Catch (Midstream)  Final Report (24 Oct 2021 21:39):    No growth    Culture - Blood (collected 23 Oct 2021 14:18)  Source: .Blood Blood-Peripheral  Preliminary Report (24 Oct 2021 20:01):    No growth to date.                                                                                           MEDICATIONS  (STANDING):  aspirin enteric coated 81 milliGRAM(s) Oral daily  atorvastatin Oral Tab/Cap - Peds 20 milliGRAM(s) Oral daily  calcium acetate 667 milliGRAM(s) Oral three times a day with meals  cefTRIAXone   IVPB 1000 milliGRAM(s) IV Intermittent every 24 hours  chlorhexidine 4% Liquid 1 Application(s) Topical daily  heparin  Infusion 10 Unit(s)/Hr (9 mL/Hr) IV Continuous <Continuous>  pantoprazole    Tablet 40 milliGRAM(s) Oral before breakfast  sodium bicarbonate 1300 milliGRAM(s) Oral three times a day  sodium bicarbonate  Infusion 0.118 mEq/kG/Hr (50 mL/Hr) IV Continuous <Continuous>      CXR reviewed

## 2021-10-26 NOTE — PROGRESS NOTE ADULT - ASSESSMENT
· Assessment	  85 year old M with PMH of lithotrypsy for nephrolithiasis,DM, Crohn s/p colostomy, CKD 4, HTN, HLD, BPH presents to ED with complaints of weakness. Pt states 10 days prior was diagnosed with UTI, placed on bactrim, Family states due to size of pill, pt was unable to swallow pill, only took 1/2 dose for 5 days. Family states over past few days pt becoming increasingly lethargic, having chills as well. Denies cough, chest pain, sob, abdominal pain, NVCD. In the ED, CT a/p was done and showed Urinary bladder wall is thickened in the setting of under-distention. Intraluminal air is likely secondary to Luna.     IMPRESSION;  Acute pyelonephritis. resolving  Abdominal wall cellulitis around stoma> resolving  Blood & Urine cxs NGTD 10/23  WBC 9.9    RECOMMENDATIONS;   Rocephin 1 gm iv q24h  Duration : 7 days  recall prn please

## 2021-10-26 NOTE — PROGRESS NOTE ADULT - SUBJECTIVE AND OBJECTIVE BOX
CECILLE FRANKEL 85y Male  MRN#: [id number]   CODE STATUS:________    Hospital Day: 3d    Pt is currently admitted with the primary diagnosis of     SUBJECTIVE  Overnight/Interval Events:                                              ----------------------------------------------------------  OBJECTIVE  PAST MEDICAL & SURGICAL HISTORY  History of medical problems  [place] b/l ears, dm, htn, hypercholesteremia, ckd, bowel/bladder fistula, kidney stones    Colostomy present    History of surgery  procedure for kidney stones ( ?lithotripsy)                                              -----------------------------------------------------------  ALLERGIES:  No Known Allergies                                            ------------------------------------------------------------    HOME MEDICATIONS  Home Medications:  amLODIPine 5 mg oral tablet: 1 tab(s) orally 2 times a day (01 Sep 2021 16:54)  Aspir 81 oral delayed release tablet: 1 tab(s) orally once a day (01 Sep 2021 16:54)  atorvastatin 10 mg oral tablet: 2 tab(s) orally once a day (01 Sep 2021 16:54)  famotidine 20 mg oral tablet: 1 tab(s) orally once a day (01 Sep 2021 16:54)  Januvia 50 mg oral tablet: 1 tab(s) orally once a day (01 Sep 2021 16:54)  Lasix 20 mg oral tablet: 1 tab(s) orally every other day (01 Sep 2021 16:54)  pioglitazone 30 mg oral tablet: 1 tab(s) orally once a day (01 Sep 2021 16:54)  ramipril 5 mg oral capsule: 2 cap(s) orally once a day (01 Sep 2021 16:54)                           MEDICATIONS:  STANDING MEDICATIONS  aspirin enteric coated 81 milliGRAM(s) Oral daily  atorvastatin Oral Tab/Cap - Peds 20 milliGRAM(s) Oral daily  calcium acetate 667 milliGRAM(s) Oral three times a day with meals  cefTRIAXone   IVPB 1000 milliGRAM(s) IV Intermittent every 24 hours  chlorhexidine 4% Liquid 1 Application(s) Topical daily  heparin  Infusion 10 Unit(s)/Hr IV Continuous <Continuous>  pantoprazole    Tablet 40 milliGRAM(s) Oral before breakfast  sodium bicarbonate 1300 milliGRAM(s) Oral three times a day  sodium bicarbonate  Infusion 0.118 mEq/kG/Hr IV Continuous <Continuous>    PRN MEDICATIONS                                            ------------------------------------------------------------  VITAL SIGNS: Last 24 Hours  T(C): 35.9 (26 Oct 2021 08:15), Max: 36.7 (26 Oct 2021 04:30)  T(F): 96.6 (26 Oct 2021 08:15), Max: 98 (26 Oct 2021 04:30)  HR: 71 (26 Oct 2021 08:15) (60 - 76)  BP: 127/58 (26 Oct 2021 08:15) (98/58 - 133/65)  BP(mean): 84 (26 Oct 2021 08:15) (68 - 107)  RR: 18 (26 Oct 2021 08:15) (15 - 20)  SpO2: 98% (26 Oct 2021 08:15) (97% - 100%)      10-25-21 @ 07:01  -  10-26-21 @ 07:00  --------------------------------------------------------  IN: 886 mL / OUT: 930 mL / NET: -44 mL                                             --------------------------------------------------------------  LABS:                        7.9    7.80  )-----------( 138      ( 25 Oct 2021 05:00 )             24.4     10-25    133<L>  |  98  |  82<HH>  ----------------------------<  99  4.5   |  17  |  8.5<HH>    Ca    8.0<L>      25 Oct 2021 16:46  Phos  6.1     10-25  Mg     1.8     10-25    TPro  4.7<L>  /  Alb  2.9<L>  /  TBili  <0.2  /  DBili  x   /  AST  20  /  ALT  14  /  AlkPhos  65  10-25    PTT - ( 26 Oct 2021 01:59 )  PTT:108.6 sec      Troponin T, Serum: 0.25 ng/mL *HH* (10-25-21 @ 20:00)  Troponin T, Serum: 0.25 ng/mL *HH* (10-25-21 @ 12:03)        Culture - Urine (collected 23 Oct 2021 16:05)  Source: Clean Catch Clean Catch (Midstream)  Final Report (24 Oct 2021 21:39):    No growth    Culture - Blood (collected 23 Oct 2021 14:18)  Source: .Blood Blood-Peripheral  Preliminary Report (24 Oct 2021 20:01):    No growth to date.        CARDIAC MARKERS ( 25 Oct 2021 20:00 )  x     / 0.25 ng/mL / x     / x     / x      CARDIAC MARKERS ( 25 Oct 2021 12:03 )  x     / 0.25 ng/mL / x     / x     / x      CARDIAC MARKERS ( 24 Oct 2021 21:15 )  x     / 0.21 ng/mL / x     / x     / x      CARDIAC MARKERS ( 24 Oct 2021 11:32 )  x     / 0.17 ng/mL / x     / x     / x                                                  -------------------------------------------------------------  RADIOLOGY:                                            --------------------------------------------------------------    PHYSICAL EXAM:  General:   HEENT:  LUNGS:  HEART:  ABDOMEN:  EXT:  NEURO:  SKIN:                                           --------------------------------------------------------------    ASSESSMENT & PLAN                                    # DVT prophylaxis   # GI prophylaxis   # Diet   # Activity Score (AM-PAC)  # Code status   # Disposition      CECILLE FRANKEL 85y Male  MRN#: [id number]   CODE STATUS: full code    Hospital Day: 3d    Pt is currently admitted with the primary diagnosis of acute renal failure    SUBJECTIVE  Overnight/Interval Events: No overnight events. Patient downgraded from ICU last night and on htt drip.                                              ----------------------------------------------------------  OBJECTIVE  PAST MEDICAL & SURGICAL HISTORY  History of medical problems  [place] b/l ears, dm, htn, hypercholesteremia, ckd, bowel/bladder fistula, kidney stones    Colostomy present    History of surgery  procedure for kidney stones ( ?lithotripsy)                                              -----------------------------------------------------------  ALLERGIES:  No Known Allergies                                            ------------------------------------------------------------    HOME MEDICATIONS  Home Medications:  amLODIPine 5 mg oral tablet: 1 tab(s) orally 2 times a day (01 Sep 2021 16:54)  Aspir 81 oral delayed release tablet: 1 tab(s) orally once a day (01 Sep 2021 16:54)  atorvastatin 10 mg oral tablet: 2 tab(s) orally once a day (01 Sep 2021 16:54)  famotidine 20 mg oral tablet: 1 tab(s) orally once a day (01 Sep 2021 16:54)  Januvia 50 mg oral tablet: 1 tab(s) orally once a day (01 Sep 2021 16:54)  Lasix 20 mg oral tablet: 1 tab(s) orally every other day (01 Sep 2021 16:54)  pioglitazone 30 mg oral tablet: 1 tab(s) orally once a day (01 Sep 2021 16:54)  ramipril 5 mg oral capsule: 2 cap(s) orally once a day (01 Sep 2021 16:54)                           MEDICATIONS:  STANDING MEDICATIONS  aspirin enteric coated 81 milliGRAM(s) Oral daily  atorvastatin Oral Tab/Cap - Peds 20 milliGRAM(s) Oral daily  calcium acetate 667 milliGRAM(s) Oral three times a day with meals  cefTRIAXone   IVPB 1000 milliGRAM(s) IV Intermittent every 24 hours  chlorhexidine 4% Liquid 1 Application(s) Topical daily  heparin  Infusion 10 Unit(s)/Hr IV Continuous <Continuous>  pantoprazole    Tablet 40 milliGRAM(s) Oral before breakfast  sodium bicarbonate 1300 milliGRAM(s) Oral three times a day  sodium bicarbonate  Infusion 0.118 mEq/kG/Hr IV Continuous <Continuous>    PRN MEDICATIONS                                            ------------------------------------------------------------  VITAL SIGNS: Last 24 Hours  T(C): 35.9 (26 Oct 2021 08:15), Max: 36.7 (26 Oct 2021 04:30)  T(F): 96.6 (26 Oct 2021 08:15), Max: 98 (26 Oct 2021 04:30)  HR: 71 (26 Oct 2021 08:15) (60 - 76)  BP: 127/58 (26 Oct 2021 08:15) (98/58 - 133/65)  BP(mean): 84 (26 Oct 2021 08:15) (68 - 107)  RR: 18 (26 Oct 2021 08:15) (15 - 20)  SpO2: 98% (26 Oct 2021 08:15) (97% - 100%)      10-25-21 @ 07:01  -  10-26-21 @ 07:00  --------------------------------------------------------  IN: 886 mL / OUT: 930 mL / NET: -44 mL                                             --------------------------------------------------------------  LABS:                        7.9    7.80  )-----------( 138      ( 25 Oct 2021 05:00 )             24.4     10-25    133<L>  |  98  |  82<HH>  ----------------------------<  99  4.5   |  17  |  8.5<HH>    Ca    8.0<L>      25 Oct 2021 16:46  Phos  6.1     10-25  Mg     1.8     10-25    TPro  4.7<L>  /  Alb  2.9<L>  /  TBili  <0.2  /  DBili  x   /  AST  20  /  ALT  14  /  AlkPhos  65  10-25    PTT - ( 26 Oct 2021 01:59 )  PTT:108.6 sec      Troponin T, Serum: 0.25 ng/mL *HH* (10-25-21 @ 20:00)  Troponin T, Serum: 0.25 ng/mL *HH* (10-25-21 @ 12:03)        Culture - Urine (collected 23 Oct 2021 16:05)  Source: Clean Catch Clean Catch (Midstream)  Final Report (24 Oct 2021 21:39):    No growth    Culture - Blood (collected 23 Oct 2021 14:18)  Source: .Blood Blood-Peripheral  Preliminary Report (24 Oct 2021 20:01):    No growth to date.        CARDIAC MARKERS ( 25 Oct 2021 20:00 )  x     / 0.25 ng/mL / x     / x     / x      CARDIAC MARKERS ( 25 Oct 2021 12:03 )  x     / 0.25 ng/mL / x     / x     / x      CARDIAC MARKERS ( 24 Oct 2021 21:15 )  x     / 0.21 ng/mL / x     / x     / x      CARDIAC MARKERS ( 24 Oct 2021 11:32 )  x     / 0.17 ng/mL / x     / x     / x                                                  -------------------------------------------------------------  RADIOLOGY:                                            --------------------------------------------------------------    PHYSICAL EXAM:  General:   HEENT:  LUNGS:  HEART:  ABDOMEN:  EXT:  NEURO:  SKIN:                                           --------------------------------------------------------------    ASSESSMENT & PLAN                                    # DVT prophylaxis   # GI prophylaxis   # Diet   # Activity Score (AM-PAC)  # Code status   # Disposition      CECILLE FRANKEL 85y Male  MRN#: [id number]   CODE STATUS: full code    Hospital Day: 3d    Pt is currently admitted with the primary diagnosis of acute renal failure    SUBJECTIVE  Overnight/Interval Events: No overnight events. Patient downgraded from ICU last night and on htt drip. This morning, he is confused and AAOx2. Son at bedside - working on sending him to assisted living.                                              ----------------------------------------------------------  OBJECTIVE  PAST MEDICAL & SURGICAL HISTORY  History of medical problems  [place] b/l ears, dm, htn, hypercholesteremia, ckd, bowel/bladder fistula, kidney stones    Colostomy present    History of surgery  procedure for kidney stones ( ?lithotripsy)                                              -----------------------------------------------------------  ALLERGIES:  No Known Allergies                                            ------------------------------------------------------------    HOME MEDICATIONS  Home Medications:  amLODIPine 5 mg oral tablet: 1 tab(s) orally 2 times a day (01 Sep 2021 16:54)  Aspir 81 oral delayed release tablet: 1 tab(s) orally once a day (01 Sep 2021 16:54)  atorvastatin 10 mg oral tablet: 2 tab(s) orally once a day (01 Sep 2021 16:54)  famotidine 20 mg oral tablet: 1 tab(s) orally once a day (01 Sep 2021 16:54)  Januvia 50 mg oral tablet: 1 tab(s) orally once a day (01 Sep 2021 16:54)  Lasix 20 mg oral tablet: 1 tab(s) orally every other day (01 Sep 2021 16:54)  pioglitazone 30 mg oral tablet: 1 tab(s) orally once a day (01 Sep 2021 16:54)  ramipril 5 mg oral capsule: 2 cap(s) orally once a day (01 Sep 2021 16:54)                           MEDICATIONS:  STANDING MEDICATIONS  aspirin enteric coated 81 milliGRAM(s) Oral daily  atorvastatin Oral Tab/Cap - Peds 20 milliGRAM(s) Oral daily  calcium acetate 667 milliGRAM(s) Oral three times a day with meals  cefTRIAXone   IVPB 1000 milliGRAM(s) IV Intermittent every 24 hours  chlorhexidine 4% Liquid 1 Application(s) Topical daily  heparin  Infusion 10 Unit(s)/Hr IV Continuous <Continuous>  pantoprazole    Tablet 40 milliGRAM(s) Oral before breakfast  sodium bicarbonate 1300 milliGRAM(s) Oral three times a day  sodium bicarbonate  Infusion 0.118 mEq/kG/Hr IV Continuous <Continuous>    PRN MEDICATIONS                                            ------------------------------------------------------------  VITAL SIGNS: Last 24 Hours  T(C): 35.9 (26 Oct 2021 08:15), Max: 36.7 (26 Oct 2021 04:30)  T(F): 96.6 (26 Oct 2021 08:15), Max: 98 (26 Oct 2021 04:30)  HR: 71 (26 Oct 2021 08:15) (60 - 76)  BP: 127/58 (26 Oct 2021 08:15) (98/58 - 133/65)  BP(mean): 84 (26 Oct 2021 08:15) (68 - 107)  RR: 18 (26 Oct 2021 08:15) (15 - 20)  SpO2: 98% (26 Oct 2021 08:15) (97% - 100%)      10-25-21 @ 07:01  -  10-26-21 @ 07:00  --------------------------------------------------------  IN: 886 mL / OUT: 930 mL / NET: -44 mL                                             --------------------------------------------------------------  LABS:                        7.9    7.80  )-----------( 138      ( 25 Oct 2021 05:00 )             24.4     10-25    133<L>  |  98  |  82<HH>  ----------------------------<  99  4.5   |  17  |  8.5<HH>    Ca    8.0<L>      25 Oct 2021 16:46  Phos  6.1     10-25  Mg     1.8     10-25    TPro  4.7<L>  /  Alb  2.9<L>  /  TBili  <0.2  /  DBili  x   /  AST  20  /  ALT  14  /  AlkPhos  65  10-25    PTT - ( 26 Oct 2021 01:59 )  PTT:108.6 sec      Troponin T, Serum: 0.25 ng/mL *HH* (10-25-21 @ 20:00)  Troponin T, Serum: 0.25 ng/mL *HH* (10-25-21 @ 12:03)        Culture - Urine (collected 23 Oct 2021 16:05)  Source: Clean Catch Clean Catch (Midstream)  Final Report (24 Oct 2021 21:39):    No growth    Culture - Blood (collected 23 Oct 2021 14:18)  Source: .Blood Blood-Peripheral  Preliminary Report (24 Oct 2021 20:01):    No growth to date.        CARDIAC MARKERS ( 25 Oct 2021 20:00 )  x     / 0.25 ng/mL / x     / x     / x      CARDIAC MARKERS ( 25 Oct 2021 12:03 )  x     / 0.25 ng/mL / x     / x     / x      CARDIAC MARKERS ( 24 Oct 2021 21:15 )  x     / 0.21 ng/mL / x     / x     / x      CARDIAC MARKERS ( 24 Oct 2021 11:32 )  x     / 0.17 ng/mL / x     / x     / x                                                  -------------------------------------------------------------  RADIOLOGY:                                            --------------------------------------------------------------    PHYSICAL EXAM:  General:   HEENT:  LUNGS:  HEART:  ABDOMEN:  EXT:  NEURO:  SKIN:                                           --------------------------------------------------------------    ASSESSMENT & PLAN  85 year old M with PMH of nephrolithiasis, DM, Crohn s/p colostomy, CKD 4, HTN, HLD, BPH who presented with weakness,         presents to ED with complaints of weakness. Pt states 10 days prior was diagnosed with UTI, placed on bactrim, Family states due to size of pill, pt was unable to swallow pill, only took 1/2 dose for 5 days. Family states over past few days pt becoming increasingly lethargic, having chills as well. Denies cough, chest pain, sob, abdominal pain, NVCD. In the ED, CT a/p was done and showed Urinary bladder wall is thickened in the setting of under-distention. Intraluminal air is likely secondary to [place].       Acute pyelonephritis  Abdominal wall cellulitis around stoma  Blood & Urine cxs NGTD 10/23  per ID, ceftriaxone 1 g IV q24h    Acute on chronic renal failure   CKD4, non  oliguric    sodium bicarbonate 1300 mg PO TID  c/w lokelma  c/w phoslo        Acute on chronic anemia   s/p 2U PRBC  maintain hgb >7    altered mental status  concern for delirium  avoid CNS depressant. haldol PRN     d5 3 amp 50 cc/h, nephrology f/u    F/u  lytes.  Correct as needed. accurate I/O, improved UO , trend BMP.     I    # DVT prophylaxi:s   # GI prophylaxis   # Diet: renal diet  # Activity Score (AM-PAC)  # Code status: full code  # Disposition: acute, SDU       CECILLE FRANKEL 85y Male  MRN#: [id number]   CODE STATUS: full code    Hospital Day: 3d    Pt is currently admitted with the primary diagnosis of acute renal failure    SUBJECTIVE  Overnight/Interval Events: No overnight events. Patient downgraded from ICU last night and on htt drip. This morning, he is confused and AAOx2. Son at bedside - working on sending him to [facility] [place].                                          ----------------------------------------------------------  OBJECTIVE  PAST MEDICAL & SURGICAL HISTORY  History of medical problems  [place] b/l ears, dm, htn, hypercholesteremia, ckd, bowel/bladder fistula, kidney stones    Colostomy present    History of surgery  procedure for kidney stones ( ?lithotripsy)                                              -----------------------------------------------------------  ALLERGIES:  No Known Allergies                                            ------------------------------------------------------------    HOME MEDICATIONS  Home Medications:  amLODIPine 5 mg oral tablet: 1 tab(s) orally 2 times a day (01 Sep 2021 16:54)  Aspir 81 oral delayed release tablet: 1 tab(s) orally once a day (01 Sep 2021 16:54)  atorvastatin 10 mg oral tablet: 2 tab(s) orally once a day (01 Sep 2021 16:54)  famotidine 20 mg oral tablet: 1 tab(s) orally once a day (01 Sep 2021 16:54)  Januvia 50 mg oral tablet: 1 tab(s) orally once a day (01 Sep 2021 16:54)  Lasix 20 mg oral tablet: 1 tab(s) orally every other day (01 Sep 2021 16:54)  pioglitazone 30 mg oral tablet: 1 tab(s) orally once a day (01 Sep 2021 16:54)  ramipril 5 mg oral capsule: 2 cap(s) orally once a day (01 Sep 2021 16:54)                           MEDICATIONS:  STANDING MEDICATIONS  aspirin enteric coated 81 milliGRAM(s) Oral daily  atorvastatin Oral Tab/Cap - Peds 20 milliGRAM(s) Oral daily  calcium acetate 667 milliGRAM(s) Oral three times a day with meals  cefTRIAXone   IVPB 1000 milliGRAM(s) IV Intermittent every 24 hours  chlorhexidine 4% Liquid 1 Application(s) Topical daily  heparin  Infusion 10 Unit(s)/Hr IV Continuous <Continuous>  pantoprazole    Tablet 40 milliGRAM(s) Oral before breakfast  sodium bicarbonate 1300 milliGRAM(s) Oral three times a day  sodium bicarbonate  Infusion 0.118 mEq/kG/Hr IV Continuous <Continuous>    PRN MEDICATIONS                                            ------------------------------------------------------------  VITAL SIGNS: Last 24 Hours  T(C): 35.9 (26 Oct 2021 08:15), Max: 36.7 (26 Oct 2021 04:30)  T(F): 96.6 (26 Oct 2021 08:15), Max: 98 (26 Oct 2021 04:30)  HR: 71 (26 Oct 2021 08:15) (60 - 76)  BP: 127/58 (26 Oct 2021 08:15) (98/58 - 133/65)  BP(mean): 84 (26 Oct 2021 08:15) (68 - 107)  RR: 18 (26 Oct 2021 08:15) (15 - 20)  SpO2: 98% (26 Oct 2021 08:15) (97% - 100%)      10-25-21 @ 07:01  -  10-26-21 @ 07:00  --------------------------------------------------------  IN: 886 mL / OUT: 930 mL / NET: -44 mL                                             --------------------------------------------------------------  LABS:                        7.9    7.80  )-----------( 138      ( 25 Oct 2021 05:00 )             24.4     10-25    133<L>  |  98  |  82<HH>  ----------------------------<  99  4.5   |  17  |  8.5<HH>    Ca    8.0<L>      25 Oct 2021 16:46  Phos  6.1     10-25  Mg     1.8     10-25    TPro  4.7<L>  /  Alb  2.9<L>  /  TBili  <0.2  /  DBili  x   /  AST  20  /  ALT  14  /  AlkPhos  65  10-25    PTT - ( 26 Oct 2021 01:59 )  PTT:108.6 sec      Troponin T, Serum: 0.25 ng/mL *HH* (10-25-21 @ 20:00)  Troponin T, Serum: 0.25 ng/mL *HH* (10-25-21 @ 12:03)        Culture - Urine (collected 23 Oct 2021 16:05)  Source: Clean Catch Clean Catch (Midstream)  Final Report (24 Oct 2021 21:39):    No growth    Culture - Blood (collected 23 Oct 2021 14:18)  Source: .Blood Blood-Peripheral  Preliminary Report (24 Oct 2021 20:01):    No growth to date.        CARDIAC MARKERS ( 25 Oct 2021 20:00 )  x     / 0.25 ng/mL / x     / x     / x      CARDIAC MARKERS ( 25 Oct 2021 12:03 )  x     / 0.25 ng/mL / x     / x     / x      CARDIAC MARKERS ( 24 Oct 2021 21:15 )  x     / 0.21 ng/mL / x     / x     / x      CARDIAC MARKERS ( 24 Oct 2021 11:32 )  x     / 0.17 ng/mL / x     / x     / x                                                  -------------------------------------------------------------  RADIOLOGY:                                            --------------------------------------------------------------    PHYSICAL EXAM:  General: ill appearing, lying in bed  HEENT: normocephalic, atraumatic, PERRLA  LUNGS: bilateral BS, no wheezing, normal chest expanison  HEART: normal S1 and S2, regular rate and rhythm  ABDOMEN: soft, nontender, has colostomy bag and [place]  EXT: no leg edema  NEURO: AAO2, responsive to commands                                           --------------------------------------------------------------    ASSESSMENT & PLAN  85 year old M with PMH of nephrolithiasis, DM, Crohn s/p colostomy, CKD 4, HTN, HLD, BPH who presented with weakness. In the ED, CT a/p was done and showed Urinary bladder wall is thickened in the setting of under-distention. Intraluminal air is likely secondary to [place].     altered mental status  likely 2/2 metabolic encephaopathy  -avoid CNS depressant. haldol PRN   -mag 1.8; s/p 2g mag sulfate  -strict Is and Os      Acute on chronic renal failure   CKD4, non-oliguric  creat 8, BUN 82, potassium 3.8, sodium 133, ph 6.3, bicarb 18    c/w sodium bicarbonate 1300 mg PO TID  c/w sodium bicarb infusion at 50cc/h  c/w phoslo  per nephro, bumex if hypervolemic w/respiratory compromise  f/u serum free light chain    Acute pyelonephritis  Abdominal wall cellulitis around stoma  Blood & Urine cxs NGTD 10/23  per ID, ceftriaxone 1 g IV q24h    Acute on chronic anemia   hgb 8.3 stable  s/p 2U PRBC  maintain hgb >7      # DVT prophylaxis: htt drip (on elliquis and hx of DVT)  # GI prophylaxis: pantoprazole  # Diet: renal diet  # Activity Score (AM-PAC)  # Code status: full code  # Disposition: acute, SDU

## 2021-10-26 NOTE — PROGRESS NOTE ADULT - ASSESSMENT
# JOSH on CKD 4 - re-renal/ r/o ATN d/t severe sepsis / acute on chronic anemia  # Metabolic acidosis w/ anion gap   # Hyponatremia / low eav  # Acute on chronic anemia   # Acute cystitis  # Hx of crohn s/o colostomy    Recommendations:  - non oliguric; urine output better  creat slightly better  - cont  bicarb gtt to 50cc/hr, d/c if Na level down trending  -cont  sodium bicarb po 1300mg q8h   - cont phoslo, replete Mg to 2  - low K diet, dc lokelma  - bumex 2mg iv if hypervolemia w/ respiratory compromise  - f/u cxs, cont ceftriaxone, appreciate ID eval  - CT noted w/o hydronephrosis  - no urgency for RRT  - RBC transfusion as needed to maintain hgb > 7, check iron stores, check serum free light chain ratio, spep/jaya   Will follow

## 2021-10-26 NOTE — PROGRESS NOTE ADULT - SUBJECTIVE AND OBJECTIVE BOX
CECILLE FRANKEL  85y, Male    All available historical data reviewed    OVERNIGHT EVENTS:  no fevers  feels well and has no new complaints   no abdominalpain    ROS:  General: Denies rigors, nightsweats  HEENT: Denies headache, rhinorrhea, sore throat, eye pain  CV: Denies CP, palpitations  PULM: Denies wheezing, hemoptysis  GI: Denies hematemesis, hematochezia, melena  : Denies discharge, hematuria  MSK: Denies arthralgias, myalgias  SKIN: Denies rash, lesions  NEURO: Denies paresthesias, weakness  PSYCH: Denies depression, anxiety    VITALS:  T(F): 96.6, Max: 98 (10-26-21 @ 04:30)  HR: 69  BP: 133/68  RR: 18Vital Signs Last 24 Hrs  T(C): 35.9 (26 Oct 2021 08:15), Max: 36.7 (26 Oct 2021 04:30)  T(F): 96.6 (26 Oct 2021 08:15), Max: 98 (26 Oct 2021 04:30)  HR: 69 (26 Oct 2021 12:00) (60 - 72)  BP: 133/68 (26 Oct 2021 12:00) (117/50 - 133/68)  BP(mean): 95 (26 Oct 2021 12:00) (73 - 95)  RR: 18 (26 Oct 2021 12:00) (15 - 20)  SpO2: 98% (26 Oct 2021 12:00) (97% - 100%)    TESTS & MEASUREMENTS:                        8.3    9.92  )-----------( 150      ( 26 Oct 2021 08:46 )             24.7     10-26    133<L>  |  95<L>  |  82<HH>  ----------------------------<  144<H>  3.8   |  18  |  8.0<HH>    Ca    7.8<L>      26 Oct 2021 08:46  Phos  6.3     10-26  Mg     1.8     10-26    TPro  4.9<L>  /  Alb  3.0<L>  /  TBili  <0.2  /  DBili  x   /  AST  22  /  ALT  15  /  AlkPhos  69  10-26    LIVER FUNCTIONS - ( 26 Oct 2021 08:46 )  Alb: 3.0 g/dL / Pro: 4.9 g/dL / ALK PHOS: 69 U/L / ALT: 15 U/L / AST: 22 U/L / GGT: x             Culture - Urine (collected 10-23-21 @ 16:05)  Source: Clean Catch Clean Catch (Midstream)  Final Report (10-24-21 @ 21:39):    No growth    Culture - Blood (collected 10-23-21 @ 14:18)  Source: .Blood Blood-Peripheral  Preliminary Report (10-24-21 @ 20:01):    No growth to date.            RADIOLOGY & ADDITIONAL TESTS:  Personal review of radiological diagnostics performed  Echo and EKG results noted when applicable.     MEDICATIONS:  aspirin enteric coated 81 milliGRAM(s) Oral daily  atorvastatin Oral Tab/Cap - Peds 20 milliGRAM(s) Oral daily  calcium acetate 667 milliGRAM(s) Oral three times a day with meals  cefTRIAXone   IVPB 1000 milliGRAM(s) IV Intermittent every 24 hours  chlorhexidine 4% Liquid 1 Application(s) Topical daily  heparin  Infusion 700 Unit(s)/Hr IV Continuous <Continuous>  pantoprazole    Tablet 40 milliGRAM(s) Oral before breakfast  sodium bicarbonate 1300 milliGRAM(s) Oral three times a day  sodium bicarbonate  Infusion 0.118 mEq/kG/Hr IV Continuous <Continuous>      ANTIBIOTICS:  cefTRIAXone   IVPB 1000 milliGRAM(s) IV Intermittent every 24 hours

## 2021-10-26 NOTE — PROGRESS NOTE ADULT - SUBJECTIVE AND OBJECTIVE BOX
Nephrology progress note    Patient was seen and examined, events over the last 24 h noted .  Cr slightyl imrpoved     Allergies:  No Known Allergies    Hospital Medications:   MEDICATIONS  (STANDING):  aspirin enteric coated 81 milliGRAM(s) Oral daily  atorvastatin Oral Tab/Cap - Peds 20 milliGRAM(s) Oral daily  calcium acetate 667 milliGRAM(s) Oral three times a day with meals  cefTRIAXone   IVPB 1000 milliGRAM(s) IV Intermittent every 24 hours  chlorhexidine 4% Liquid 1 Application(s) Topical daily  heparin  Infusion 700 Unit(s)/Hr (7 mL/Hr) IV Continuous <Continuous>  pantoprazole    Tablet 40 milliGRAM(s) Oral before breakfast  sodium bicarbonate 1300 milliGRAM(s) Oral three times a day  sodium bicarbonate  Infusion 0.118 mEq/kG/Hr (50 mL/Hr) IV Continuous <Continuous>        VITALS:  T(F): 96.6 (10-26-21 @ 08:15), Max: 98 (10-26-21 @ 04:30)  HR: 69 (10-26-21 @ 12:00)  BP: 133/68 (10-26-21 @ 12:00)  RR: 18 (10-26-21 @ 12:00)  SpO2: 98% (10-26-21 @ 12:00)  Wt(kg): --    10-24 @ :  -  10-25 @ 07:00  --------------------------------------------------------  IN: 1910 mL / OUT: 660 mL / NET: 1250 mL    10-25 @ 07:  -  10-26 @ 07:00  --------------------------------------------------------  IN: 886 mL / OUT: 930 mL / NET: -44 mL    10-26 @ 07:  -  10-26 @ 14:09  --------------------------------------------------------  IN: 724 mL / OUT: 200 mL / NET: 524 mL          PHYSICAL EXAM:  Constitutional: NAD  HEENT: anicteric sclera, oropharynx clear, MMM  Neck: No JVD  Respiratory: CTAB, no wheezes, rales or rhonchi  Cardiovascular: S1, S2, RRR  Gastrointestinal: BS+, soft, NT/ND  Extremities: No cyanosis or clubbing. No peripheral edema  :  No sanford.   Skin: No rashes    LABS:  10-26    133<L>  |  95<L>  |  82<HH>  ----------------------------<  144<H>  3.8   |  18  |  8.0<HH>    Ca    7.8<L>      26 Oct 2021 08:46  Phos  6.3     10-26  Mg     1.8     10-26    TPro  4.9<L>  /  Alb  3.0<L>  /  TBili  <0.2  /  DBili      /  AST  22  /  ALT  15  /  AlkPhos  69  10-26                          8.3    9.92  )-----------( 150      ( 26 Oct 2021 08:46 )             24.7       Urine Studies:  Urinalysis Basic - ( 23 Oct 2021 16:05 )    Color: Light Yellow / Appearance: Clear / S.013 / pH:   Gluc:  / Ketone: Negative  / Bili: Negative / Urobili: <2 mg/dL   Blood:  / Protein: 300 mg/dL / Nitrite: Negative   Leuk Esterase: Small / RBC: 1 /HPF / WBC 16 /HPF   Sq Epi:  / Non Sq Epi: 1 /HPF / Bacteria: Negative      Osmolality, Random Urine: 249 mos/kg (10-24 @ 18:44)  Sodium, Random Urine: 46.0 mmoL/L (10-24 @ 18:44)  Creatinine, Random Urine: 48 mg/dL (10-24 @ 18:44)    RADIOLOGY & ADDITIONAL STUDIES:

## 2021-10-27 NOTE — PROGRESS NOTE ADULT - ASSESSMENT
# JOSH on CKD 4 - likely ATN d/t severe sepsis / acute on chronic anemia  # Metabolic acidosis w/ anion gap   # Hyponatremia / low eav  # Acute on chronic anemia   # Acute pyelonephritis / Abdominal wall cellulitis   # Hx of crohn s/p colostomy    Recommendations:  - non oliguric, urine output improving  - creat stable  - hyponatremia improving  - change bicarb drip to LR at 50cc/hr  - cont sodium bicarb po 1300mg q8h   - cont phoslo, replete Mg to 2 as needed  - low K diet, dc lokelma  - appreciate ID f/u, cont ceftriaxone  - CT noted w/o hydronephrosis  - no urgency for RRT  - RBC transfusion as needed to maintain hgb > 7, iron stores noted, does not need iv iron  - serum flc ratio noted wnl   # JOSH on CKD 4 - likely ATN d/t severe sepsis / acute on chronic anemia  # Metabolic acidosis w/ anion gap   # Hyponatremia / low eav  # Acute on chronic anemia   # Acute pyelonephritis / Abdominal wall cellulitis   # Hx of crohn s/p colostomy    Recommendations:  - non oliguric, urine output improving  - creat stable  - hyponatremia improving  - change bicarb drip to LR at 30cc/hr  - cont sodium bicarb po 1300mg q8h   - cont phoslo, replete Mg to 2 as needed  - low K diet, dc lokelma  - appreciate ID f/u, cont ceftriaxone  - CT noted w/o hydronephrosis  - no urgency for RRT  - RBC transfusion as needed to maintain hgb > 7, iron stores noted, does not need iv iron  - serum flc ratio noted wnl

## 2021-10-27 NOTE — PROGRESS NOTE ADULT - SUBJECTIVE AND OBJECTIVE BOX
CECILLE FRANKEL 85y Male  MRN#: [id number]   CODE STATUS: full code    Hospital Day: 4d    Pt is currently admitted with the primary diagnosis of acute renal failure    SUBJECTIVE  Overnight/Interval Events: No acute overnight events. Patient confused and has been pulling at IV lines. He does not endorse any pain.                                             ----------------------------------------------------------  OBJECTIVE  PAST MEDICAL & SURGICAL HISTORY  History of medical problems  [place] b/l ears, dm, htn, hypercholesteremia, ckd, bowel/bladder fistula, kidney stones    Colostomy present    History of surgery  procedure for kidney stones ( ?lithotripsy)                                              -----------------------------------------------------------  ALLERGIES:  No Known Allergies                                            ------------------------------------------------------------    HOME MEDICATIONS  Home Medications:  amLODIPine 5 mg oral tablet: 1 tab(s) orally 2 times a day (01 Sep 2021 16:54)  Aspir 81 oral delayed release tablet: 1 tab(s) orally once a day (01 Sep 2021 16:54)  atorvastatin 10 mg oral tablet: 2 tab(s) orally once a day (01 Sep 2021 16:54)  famotidine 20 mg oral tablet: 1 tab(s) orally once a day (01 Sep 2021 16:54)  Januvia 50 mg oral tablet: 1 tab(s) orally once a day (01 Sep 2021 16:54)  Lasix 20 mg oral tablet: 1 tab(s) orally every other day (01 Sep 2021 16:54)  pioglitazone 30 mg oral tablet: 1 tab(s) orally once a day (01 Sep 2021 16:54)  ramipril 5 mg oral capsule: 2 cap(s) orally once a day (01 Sep 2021 16:54)                           MEDICATIONS:  STANDING MEDICATIONS  aspirin enteric coated 81 milliGRAM(s) Oral daily  atorvastatin Oral Tab/Cap - Peds 20 milliGRAM(s) Oral daily  calcium acetate 667 milliGRAM(s) Oral three times a day with meals  cefTRIAXone   IVPB 1000 milliGRAM(s) IV Intermittent every 24 hours  chlorhexidine 4% Liquid 1 Application(s) Topical daily  heparin  Infusion 700 Unit(s)/Hr IV Continuous <Continuous>  pantoprazole    Tablet 40 milliGRAM(s) Oral before breakfast  sodium bicarbonate 1300 milliGRAM(s) Oral three times a day    PRN MEDICATIONS                                            ------------------------------------------------------------  VITAL SIGNS: Last 24 Hours  T(C): 35.8 (27 Oct 2021 08:00), Max: 36.3 (26 Oct 2021 20:00)  T(F): 96.5 (27 Oct 2021 08:00), Max: 97.4 (27 Oct 2021 00:00)  HR: 72 (27 Oct 2021 08:00) (68 - 83)  BP: 142/63 (27 Oct 2021 08:00) (133/68 - 156/70)  BP(mean): 90 (27 Oct 2021 08:00) (87 - 100)  RR: 22 (27 Oct 2021 08:00) (17 - 23)  SpO2: 94% (27 Oct 2021 08:00) (94% - 98%)      10-26-21 @ 07:01  -  10-27-21 @ 07:00  --------------------------------------------------------  IN: 1129 mL / OUT: 1500 mL / NET: -371 mL    10-27-21 @ 07:01  -  10-27-21 @ 09:21  --------------------------------------------------------  IN: 10 mL / OUT: 0 mL / NET: 10 mL                                             --------------------------------------------------------------  LABS:                        8.5    12.23 )-----------( 148      ( 27 Oct 2021 06:00 )             26.0     10-27    137  |  98  |  77<HH>  ----------------------------<  126<H>  3.9   |  20  |  8.5<HH>    Ca    7.8<L>      27 Oct 2021 00:43  Phos  5.8     10-27  Mg     2.1     10-27    TPro  4.9<L>  /  Alb  3.0<L>  /  TBili  <0.2  /  DBili  x   /  AST  22  /  ALT  15  /  AlkPhos  69  10-26    PTT - ( 27 Oct 2021 06:00 )  PTT:31.5 sec              CARDIAC MARKERS ( 25 Oct 2021 20:00 )  x     / 0.25 ng/mL / x     / x     / x      CARDIAC MARKERS ( 25 Oct 2021 12:03 )  x     / 0.25 ng/mL / x     / x     / x                                                  -------------------------------------------------------------  RADIOLOGY:    LE duplex US 10/25:  Acute thrombosis of the left posterior tibial and peroneal vein branches  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 .    CTA&P 10/23:  1. Urinary bladder wall is thickened in the setting of under-distention. Intraluminal air is likely secondary to Sanford. If clinically warranted, correlate with UA to rule out acute cystitis.  2. Bowel containing spigelian hernia without evidence of bowel obstruction or pneumoperitoneum.  3. Small bilateral pleural effusions with adjacent atelectasis.  4. Indeterminate bilateral renal lesions. Nonemergent reimaging with renal protocol recommended.                                          --------------------------------------------------------------    PHYSICAL EXAM:  General: ill appearing, lying in bed  HEENT: normocephalic, atraumatic, PERRLA  LUNGS: bilateral BS, no wheezing, normal chest expansion  HEART: normal S1 and S2, regular rate and rhythm  ABDOMEN: soft, nontender, has colostomy bag and sanford  EXT: no leg edema  NEURO: AAO2, responsive to commands                                           --------------------------------------------------------------    ASSESSMENT & PLAN  85 year old M with PMH of nephrolithiasis, DM, Crohn s/p colostomy, CKD 4, HTN, HLD, BPH who presented with weakness. CT a/p showed bladder wall is thickened and patient found to have JOSH with creat 8.3. His electrolytes improved with no need for hemodialysis and he was downgraded from the ICU on 10/26.    altered mental status  likely 2/2 metabolic encephalopathy and delirium    -avoid CNS depressant  -monitor electrolytes q12h  -strict Is and Os    JOSH on CKD4   non-oliguric    creat 8.5, BUN 77  potassium 3.9, sodium 137, phos 5.8, bicarb 20, mag 2.1 in AM    c/w sodium bicarbonate 1300 mg PO TID  c/w phoslo  per nephro, bumex if hypervolemic w/respiratory compromise  elevated serum free light chains: TORRES kappa 4.3, TORRES lambda 3.9    Acute pyelonephritis  Abdominal wall cellulitis around stoma  Blood & Urine cxs NGTD 10/23  per ID, c/w ceftriaxone 1 g IV q24h (7 day course)    Acute on chronic anemia   hgb 8.5 stable; s/p 2U pRBC this admission   maintain hgb >7    hx of DVT  on elliquis at home  c/w heparin drip  monitor aPTT    # DVT prophylaxis: htt drip   # GI prophylaxis: pantoprazole  # Diet: renal diet  # Activity Score (AM-PAC)  # Code status: full code  # Disposition: acute, SDU

## 2021-10-27 NOTE — PROGRESS NOTE ADULT - SUBJECTIVE AND OBJECTIVE BOX
Nephrology Progress Note    CECILLE FRANKEL  MRN-[id number]  85y  Male    S:  Patient is seen and examined, events over the last 24h noted.    O:  Allergies:  No Known Allergies    Hospital Medications:   MEDICATIONS  (STANDING):  aspirin enteric coated 81 milliGRAM(s) Oral daily  atorvastatin Oral Tab/Cap - Peds 20 milliGRAM(s) Oral daily  calcium acetate 667 milliGRAM(s) Oral three times a day with meals  cefTRIAXone   IVPB 1000 milliGRAM(s) IV Intermittent every 24 hours  chlorhexidine 4% Liquid 1 Application(s) Topical daily  heparin  Infusion 700 Unit(s)/Hr (10 mL/Hr) IV Continuous <Continuous>  pantoprazole    Tablet 40 milliGRAM(s) Oral before breakfast  sodium bicarbonate 1300 milliGRAM(s) Oral three times a day    MEDICATIONS  (PRN):    Home Medications:  amLODIPine 5 mg oral tablet: 1 tab(s) orally 2 times a day (01 Sep 2021 16:54)  Aspir 81 oral delayed release tablet: 1 tab(s) orally once a day (01 Sep 2021 16:54)  atorvastatin 10 mg oral tablet: 2 tab(s) orally once a day (01 Sep 2021 16:54)  famotidine 20 mg oral tablet: 1 tab(s) orally once a day (01 Sep 2021 16:54)  Januvia 50 mg oral tablet: 1 tab(s) orally once a day (01 Sep 2021 16:54)  Lasix 20 mg oral tablet: 1 tab(s) orally every other day (01 Sep 2021 16:54)  pioglitazone 30 mg oral tablet: 1 tab(s) orally once a day (01 Sep 2021 16:54)  ramipril 5 mg oral capsule: 2 cap(s) orally once a day (01 Sep 2021 16:54)      VITALS:  T(F): 96.5 (10-27-21 @ 08:00), Max: 97.4 (10-27-21 @ 00:00)  HR: 72 (10-27-21 @ 08:00)  BP: 142/63 (10-27-21 @ 08:00)  RR: 22 (10-27-21 @ 08:00)  SpO2: 94% (10-27-21 @ 08:00)  Wt(kg): --  I&O's Detail    26 Oct 2021 07:01  -  27 Oct 2021 07:00  --------------------------------------------------------  IN:    Heparin: 49 mL    Oral Fluid: 480 mL    Sodium Bicarbonate: 600 mL  Total IN: 1129 mL    OUT:    Colostomy (mL): 450 mL    Indwelling Catheter - Urethral (mL): 1050 mL  Total OUT: 1500 mL    Total NET: -371 mL      27 Oct 2021 07:01  -  27 Oct 2021 11:04  --------------------------------------------------------  IN:    Heparin: 20 mL    Sodium Bicarbonate: 100 mL  Total IN: 120 mL    OUT:  Total OUT: 0 mL    Total NET: 120 mL        I&O's Summary    26 Oct 2021 07:01  -  27 Oct 2021 07:00  --------------------------------------------------------  IN: 1129 mL / OUT: 1500 mL / NET: -371 mL    27 Oct 2021 07:01  -  27 Oct 2021 11:04  --------------------------------------------------------  IN: 120 mL / OUT: 0 mL / NET: 120 mL          PHYSICAL EXAM:  Gen: NAD  Resp:   Card: S1/S2  Abd: soft  Extremities:   Vascular access:       LABS:    Blood Gas Arterial - Sodium: 125 mmol/L (10-24-21 @ 08:48)  Blood Gas Arterial - Calcium, Ionized: 1.16 mmol/L (10-24-21 @ 08:48)  Blood Gas Profile w/Lytes - Venous: Performed in Lab (10-23-21 @ 22:11)    10-27    137  |  98  |  77<HH>  ----------------------------<  126<H>  3.9   |  20  |  8.5<HH>    Ca    7.8<L>      27 Oct 2021 00:43  Phos  5.8     10-27  Mg     2.1     10-27    TPro  4.9<L>  /  Alb  3.0<L>  /  TBili  <0.2  /  DBili      /  AST  22  /  ALT  15  /  AlkPhos  69  10-26    eGFR if Non African American: 5 mL/min/1.73M2 (10-27-21 @ 00:43)  eGFR if : 6 mL/min/1.73M2 (10-27-21 @ 00:43)    Phosphorus Level, Serum: 5.8 mg/dL (10-27-21 @ 06:00)  Phosphorus Level, Serum: 5.9 mg/dL (10-27-21 @ 00:43)    Osmolality, Serum: 298 mos/kg (10-24-21 @ 11:32)  Osmolality, Serum: 288 mos/kg (21 @ 07:44)                          8.5    12.23 )-----------( 148      ( 27 Oct 2021 06:00 )             26.0     Mean Cell Volume: 87.8 fL (10-27-21 @ 06:00)    % Saturation, Iron: 48 % (10-24-21 @ 11:32)  Ferritin, Serum: 326 ng/mL (10-24-21 @ 11:32)      Urine Studies:  Urinalysis Basic - ( 23 Oct 2021 16:05 )    Color: Light Yellow / Appearance: Clear / S.013 / pH:   Gluc:  / Ketone: Negative  / Bili: Negative / Urobili: <2 mg/dL   Blood:  / Protein: 300 mg/dL / Nitrite: Negative   Leuk Esterase: Small / RBC: 1 /HPF / WBC 16 /HPF   Sq Epi:  / Non Sq Epi: 1 /HPF / Bacteria: Negative        Urea Nitrogen,  Random Urine: 247 mg/dL (21 @ 19:33)    Osmolality, Random Urine: 249 mos/kg (10-24 @ 18:44)  Sodium, Random Urine: 46.0 mmoL/L (10-24 @ 18:44)    Creatinine trend:  Creatinine, Serum: 8.5 mg/dL (10-27-21 @ 00:43)  Creatinine, Serum: 8.1 mg/dL (10-26-21 @ 16:20)  Creatinine, Serum: 8.0 mg/dL (10-26-21 @ 08:46)  Creatinine, Serum: 8.5 mg/dL (10-25-21 @ 16:46)  Creatinine, Serum: 8.0 mg/dL (10-25-21 @ 05:00)  Creatinine, Serum: 8.3 mg/dL (10-24-21 @ 21:15)  Creatinine, Serum: 7.9 mg/dL (10-24-21 @ 17:14)  Creatinine, Serum: 7.2 mg/dL (10-24-21 @ 11:32)  Creatinine, Serum: 8.1 mg/dL (10-24-21 @ 04:45)  Creatinine, Serum: 8.0 mg/dL (10-24-21 @ 00:37)  Creatinine, Serum: 8.3 mg/dL (10-23-21 @ 14:18)  Creatinine, Serum: 2.4 mg/dL (21 @ 09:42)    TORRES Kappa: 4.29 mg/dL (10-24-21 @ 11:32)  TORRES Lambda: 3.88 mg/dL (10-24-21 @ 11:32)  [place]/Lambda Free Light Chain Ratio, Serum: 1.11 Ratio (10-24-21 @ 11:32)    US Renal:   EXAM:  US KIDNEY(S)            PROCEDURE DATE:  2021            INTERPRETATION:  CLINICAL INFORMATION: Urinary retention    COMPARISON: None available.    TECHNIQUE: Sonography of the kidneys and bladder.    FINDINGS:    Right kidney: 10.5 cm. 2.9 x 2.6 x 2.9 cm renal cyst in the midpole. Normal echogenicity. No evidence of hydronephrosis or solid mass. Vascular flow demonstrated in the hilum.    Left kidney:  9.8 cm. 2.0 x 1.9 x 1.9 cm renal cyst in the upper pole. Normal echogenicity. No evidence of hydronephrosis or solid mass. Vascular flow demonstrated in the hilum.    Urinary bladder: Cannot be evaluated as it is empty    IMPRESSION:  No hydronephrosis  Bilateral renal cysts.      --- End of Report ---            GERMÁN THAO MD; Resident Radiologist  This document has been electronically signed.  REBECA BARON MD; Attending Radiologist  This document has been electronically signed. Sep  3 2021  9:15AM (21 @ 14:08)       Nephrology Progress Note    CECILLE FRANKEL  MRN-[id number]  85y  Male    S:  Patient is seen and examined, events over the last 24h noted.    O:  Allergies:  No Known Allergies    Hospital Medications:   MEDICATIONS  (STANDING):  aspirin enteric coated 81 milliGRAM(s) Oral daily  atorvastatin Oral Tab/Cap - Peds 20 milliGRAM(s) Oral daily  calcium acetate 667 milliGRAM(s) Oral three times a day with meals  cefTRIAXone   IVPB 1000 milliGRAM(s) IV Intermittent every 24 hours  chlorhexidine 4% Liquid 1 Application(s) Topical daily  heparin  Infusion 700 Unit(s)/Hr (10 mL/Hr) IV Continuous <Continuous>  pantoprazole    Tablet 40 milliGRAM(s) Oral before breakfast  sodium bicarbonate 1300 milliGRAM(s) Oral three times a day    Home Medications:  amLODIPine 5 mg oral tablet: 1 tab(s) orally 2 times a day (01 Sep 2021 16:54)  Aspir 81 oral delayed release tablet: 1 tab(s) orally once a day (01 Sep 2021 16:54)  atorvastatin 10 mg oral tablet: 2 tab(s) orally once a day (01 Sep 2021 16:54)  famotidine 20 mg oral tablet: 1 tab(s) orally once a day (01 Sep 2021 16:54)  Januvia 50 mg oral tablet: 1 tab(s) orally once a day (01 Sep 2021 16:54)  Lasix 20 mg oral tablet: 1 tab(s) orally every other day (01 Sep 2021 16:54)  pioglitazone 30 mg oral tablet: 1 tab(s) orally once a day (01 Sep 2021 16:54)  ramipril 5 mg oral capsule: 2 cap(s) orally once a day (01 Sep 2021 16:54)      VITALS:  T(F): 96.5 (10-27-21 @ 08:00), Max: 97.4 (10-27-21 @ 00:00)  HR: 72 (10-27-21 @ 08:00)  BP: 142/63 (10-27-21 @ 08:00)  RR: 22 (10-27-21 @ 08:00)  SpO2: 94% (10-27-21 @ 08:00)  Wt(kg): --  I&O's Detail    26 Oct 2021 07:01  -  27 Oct 2021 07:00  --------------------------------------------------------  IN:    Heparin: 49 mL    Oral Fluid: 480 mL    Sodium Bicarbonate: 600 mL  Total IN: 1129 mL    OUT:    Colostomy (mL): 450 mL    Indwelling Catheter - Urethral (mL): 1050 mL  Total OUT: 1500 mL    Total NET: -371 mL      27 Oct 2021 07:01  -  27 Oct 2021 11:04  --------------------------------------------------------  IN:    Heparin: 20 mL    Sodium Bicarbonate: 100 mL  Total IN: 120 mL    OUT:  Total OUT: 0 mL    Total NET: 120 mL        I&O's Summary    26 Oct 2021 07:01  -  27 Oct 2021 07:00  --------------------------------------------------------  IN: 1129 mL / OUT: 1500 mL / NET: -371 mL    27 Oct 2021 07:01  -  27 Oct 2021 11:04  --------------------------------------------------------  IN: 120 mL / OUT: 0 mL / NET: 120 mL          PHYSICAL EXAM:  Gen: NAD  Resp: decreased bs at the bases   Card: S1/S2  Abd: soft, colostomy  Extremities: no edema      LABS:    10-27    137  |  98  |  77<HH>  ----------------------------<  126<H>  3.9   |  20  |  8.5<HH>    Ca    7.8<L>      27 Oct 2021 00:43  Phos  5.8     10-27  Mg     2.1     10-27    TPro  4.9<L>  /  Alb  3.0<L>  /  TBili  <0.2  /  DBili      /  AST  22  /  ALT  15  /  AlkPhos  69  10-26    eGFR if Non African American: 5 mL/min/1.73M2 (10-27-21 @ 00:43)  eGFR if : 6 mL/min/1.73M2 (10-27-21 @ 00:43)    Phosphorus Level, Serum: 5.8 mg/dL (10-27-21 @ 06:00)  Phosphorus Level, Serum: 5.9 mg/dL (10-27-21 @ 00:43)    Osmolality, Serum: 298 mos/kg (10-24-21 @ 11:32)  Osmolality, Serum: 288 mos/kg (21 @ 07:44)                          8.5    12.23 )-----------( 148      ( 27 Oct 2021 06:00 )             26.0     Mean Cell Volume: 87.8 fL (10-27-21 @ 06:00)    % Saturation, Iron: 48 % (10-24-21 @ 11:32)  Ferritin, Serum: 326 ng/mL (10-24-21 @ 11:32)      Urine Studies:  Urinalysis Basic - ( 23 Oct 2021 16:05 )    Color: Light Yellow / Appearance: Clear / S.013 / pH:   Gluc:  / Ketone: Negative  / Bili: Negative / Urobili: <2 mg/dL   Blood:  / Protein: 300 mg/dL / Nitrite: Negative   Leuk Esterase: Small / RBC: 1 /HPF / WBC 16 /HPF   Sq Epi:  / Non Sq Epi: 1 /HPF / Bacteria: Negative  Osmolality, Random Urine: 249 mos/kg (10-24 @ 18:44)  Sodium, Random Urine: 46.0 mmoL/L (10-24 @ 18:44)  Protein/Creatinine Ratio Calculation: 4.4 Ratio (21 @ 19:33)    Creatinine trend:  Creatinine, Serum: 8.5 mg/dL (10-27-21 @ 00:43)  Creatinine, Serum: 8.1 mg/dL (10-26-21 @ 16:20)  Creatinine, Serum: 8.0 mg/dL (10-26-21 @ 08:46)  Creatinine, Serum: 8.5 mg/dL (10-25-21 @ 16:46)  Creatinine, Serum: 8.0 mg/dL (10-25-21 @ 05:00)  Creatinine, Serum: 8.3 mg/dL (10-24-21 @ 21:15)  Creatinine, Serum: 7.9 mg/dL (10-24-21 @ 17:14)  Creatinine, Serum: 7.2 mg/dL (10-24-21 @ 11:32)  Creatinine, Serum: 8.1 mg/dL (10-24-21 @ 04:45)  Creatinine, Serum: 8.0 mg/dL (10-24-21 @ 00:37)  Creatinine, Serum: 8.3 mg/dL (10-23-21 @ 14:18)  Creatinine, Serum: 2.4 mg/dL (21 @ 09:42)    TORRES Kappa: 4.29 mg/dL (10-24-21 @ 11:32)  TORRES Lambda: 3.88 mg/dL (10-24-21 @ 11:32)  [place]/Lambda Free Light Chain Ratio, Serum: 1.11 Ratio (10-24-21 @ 11:32)    US Renal:   EXAM:  US KIDNEY(S)        PROCEDURE DATE:  2021    INTERPRETATION:  CLINICAL INFORMATION: Urinary retention    COMPARISON: None available.    TECHNIQUE: Sonography of the kidneys and bladder.    FINDINGS:    Right kidney: 10.5 cm. 2.9 x 2.6 x 2.9 cm renal cyst in the midpole. Normal echogenicity. No evidence of hydronephrosis or solid mass. Vascular flow demonstrated in the hilum.    Left kidney:  9.8 cm. 2.0 x 1.9 x 1.9 cm renal cyst in the upper pole. Normal echogenicity. No evidence of hydronephrosis or solid mass. Vascular flow demonstrated in the hilum.    Urinary bladder: Cannot be evaluated as it is empty    IMPRESSION:  No hydronephrosis  Bilateral renal cysts.    < from: CT Abdomen and Pelvis No Cont (10.23.21 @ 17:23) >    IMPRESSION:    1.  Urinary bladder wall is thickened in the setting of under-distention. Intraluminal air is likely secondary to Luna. If clinically warranted, correlate with UA to rule out acute cystitis.  2.  Bowel containing spigelian hernia without evidence of bowel obstruction or pneumoperitoneum.  3.  Small bilateral pleural effusions with adjacent atelectasis.  4.  Indeterminate bilateral renal lesions. Nonemergent reimaging with renal protocol recommended.      < end of copied text >   Nephrology Progress Note    CECILLE FRANKEL  MRN-[id number]  85y  Male    S:  Patient is seen and examined, events over the last 24h noted.    O:  Allergies:  No Known Allergies    Hospital Medications:   MEDICATIONS  (STANDING):  aspirin enteric coated 81 milliGRAM(s) Oral daily  atorvastatin Oral Tab/Cap - Peds 20 milliGRAM(s) Oral daily  calcium acetate 667 milliGRAM(s) Oral three times a day with meals  cefTRIAXone   IVPB 1000 milliGRAM(s) IV Intermittent every 24 hours  chlorhexidine 4% Liquid 1 Application(s) Topical daily  heparin  Infusion 700 Unit(s)/Hr (10 mL/Hr) IV Continuous <Continuous>  pantoprazole    Tablet 40 milliGRAM(s) Oral before breakfast  sodium bicarbonate 1300 milliGRAM(s) Oral three times a day    Home Medications:  amLODIPine 5 mg oral tablet: 1 tab(s) orally 2 times a day (01 Sep 2021 16:54)  Aspir 81 oral delayed release tablet: 1 tab(s) orally once a day (01 Sep 2021 16:54)  atorvastatin 10 mg oral tablet: 2 tab(s) orally once a day (01 Sep 2021 16:54)  famotidine 20 mg oral tablet: 1 tab(s) orally once a day (01 Sep 2021 16:54)  Januvia 50 mg oral tablet: 1 tab(s) orally once a day (01 Sep 2021 16:54)  Lasix 20 mg oral tablet: 1 tab(s) orally every other day (01 Sep 2021 16:54)  pioglitazone 30 mg oral tablet: 1 tab(s) orally once a day (01 Sep 2021 16:54)  ramipril 5 mg oral capsule: 2 cap(s) orally once a day (01 Sep 2021 16:54)      VITALS:  T(F): 96.5 (10-27-21 @ 08:00), Max: 97.4 (10-27-21 @ 00:00)  HR: 72 (10-27-21 @ 08:00)  BP: 142/63 (10-27-21 @ 08:00)  RR: 22 (10-27-21 @ 08:00)  SpO2: 94% (10-27-21 @ 08:00)  Wt(kg): --  I&O's Detail    26 Oct 2021 07:01  -  27 Oct 2021 07:00  --------------------------------------------------------  IN:    Heparin: 49 mL    Oral Fluid: 480 mL    Sodium Bicarbonate: 600 mL  Total IN: 1129 mL    OUT:    Colostomy (mL): 450 mL    Indwelling Catheter - Urethral (mL): 1050 mL  Total OUT: 1500 mL    Total NET: -371 mL      27 Oct 2021 07:01  -  27 Oct 2021 11:04  --------------------------------------------------------  IN:    Heparin: 20 mL    Sodium Bicarbonate: 100 mL  Total IN: 120 mL    OUT:  Total OUT: 0 mL    Total NET: 120 mL        I&O's Summary    26 Oct 2021 07:01  -  27 Oct 2021 07:00  --------------------------------------------------------  IN: 1129 mL / OUT: 1500 mL / NET: -371 mL    27 Oct 2021 07:01  -  27 Oct 2021 11:04  --------------------------------------------------------  IN: 120 mL / OUT: 0 mL / NET: 120 mL          PHYSICAL EXAM:  Gen: NAD  Resp: decreased bs at the bases   Card: S1/S2  Abd: soft, colostomy  Extremities: +edema      LABS:    10-27    137  |  98  |  77<HH>  ----------------------------<  126<H>  3.9   |  20  |  8.5<HH>    Ca    7.8<L>      27 Oct 2021 00:43  Phos  5.8     10-27  Mg     2.1     10-27    TPro  4.9<L>  /  Alb  3.0<L>  /  TBili  <0.2  /  DBili      /  AST  22  /  ALT  15  /  AlkPhos  69  10-26    eGFR if Non African American: 5 mL/min/1.73M2 (10-27-21 @ 00:43)  eGFR if : 6 mL/min/1.73M2 (10-27-21 @ 00:43)    Phosphorus Level, Serum: 5.8 mg/dL (10-27-21 @ 06:00)  Phosphorus Level, Serum: 5.9 mg/dL (10-27-21 @ 00:43)    Osmolality, Serum: 298 mos/kg (10-24-21 @ 11:32)  Osmolality, Serum: 288 mos/kg (21 @ 07:44)                          8.5    12.23 )-----------( 148      ( 27 Oct 2021 06:00 )             26.0     Mean Cell Volume: 87.8 fL (10-27-21 @ 06:00)    % Saturation, Iron: 48 % (10-24-21 @ 11:32)  Ferritin, Serum: 326 ng/mL (10-24-21 @ 11:32)      Urine Studies:  Urinalysis Basic - ( 23 Oct 2021 16:05 )    Color: Light Yellow / Appearance: Clear / S.013 / pH:   Gluc:  / Ketone: Negative  / Bili: Negative / Urobili: <2 mg/dL   Blood:  / Protein: 300 mg/dL / Nitrite: Negative   Leuk Esterase: Small / RBC: 1 /HPF / WBC 16 /HPF   Sq Epi:  / Non Sq Epi: 1 /HPF / Bacteria: Negative  Osmolality, Random Urine: 249 mos/kg (10-24 @ 18:44)  Sodium, Random Urine: 46.0 mmoL/L (10-24 @ 18:44)  Protein/Creatinine Ratio Calculation: 4.4 Ratio (21 @ 19:33)    Creatinine trend:  Creatinine, Serum: 8.5 mg/dL (10-27-21 @ 00:43)  Creatinine, Serum: 8.1 mg/dL (10-26-21 @ 16:20)  Creatinine, Serum: 8.0 mg/dL (10-26-21 @ 08:46)  Creatinine, Serum: 8.5 mg/dL (10-25-21 @ 16:46)  Creatinine, Serum: 8.0 mg/dL (10-25-21 @ 05:00)  Creatinine, Serum: 8.3 mg/dL (10-24-21 @ 21:15)  Creatinine, Serum: 7.9 mg/dL (10-24-21 @ 17:14)  Creatinine, Serum: 7.2 mg/dL (10-24-21 @ 11:32)  Creatinine, Serum: 8.1 mg/dL (10-24-21 @ 04:45)  Creatinine, Serum: 8.0 mg/dL (10-24-21 @ 00:37)  Creatinine, Serum: 8.3 mg/dL (10-23-21 @ 14:18)  Creatinine, Serum: 2.4 mg/dL (21 @ 09:42)    TORRES Kappa: 4.29 mg/dL (10-24-21 @ 11:32)  TORRES Lambda: 3.88 mg/dL (10-24-21 @ 11:32)  [place]/Lambda Free Light Chain Ratio, Serum: 1.11 Ratio (10-24-21 @ 11:32)    US Renal:   EXAM:  US KIDNEY(S)        PROCEDURE DATE:  2021    INTERPRETATION:  CLINICAL INFORMATION: Urinary retention    COMPARISON: None available.    TECHNIQUE: Sonography of the kidneys and bladder.    FINDINGS:    Right kidney: 10.5 cm. 2.9 x 2.6 x 2.9 cm renal cyst in the midpole. Normal echogenicity. No evidence of hydronephrosis or solid mass. Vascular flow demonstrated in the hilum.    Left kidney:  9.8 cm. 2.0 x 1.9 x 1.9 cm renal cyst in the upper pole. Normal echogenicity. No evidence of hydronephrosis or solid mass. Vascular flow demonstrated in the hilum.    Urinary bladder: Cannot be evaluated as it is empty    IMPRESSION:  No hydronephrosis  Bilateral renal cysts.    < from: CT Abdomen and Pelvis No Cont (10.23.21 @ 17:23) >    IMPRESSION:    1.  Urinary bladder wall is thickened in the setting of under-distention. Intraluminal air is likely secondary to Luna. If clinically warranted, correlate with UA to rule out acute cystitis.  2.  Bowel containing spigelian hernia without evidence of bowel obstruction or pneumoperitoneum.  3.  Small bilateral pleural effusions with adjacent atelectasis.  4.  Indeterminate bilateral renal lesions. Nonemergent reimaging with renal protocol recommended.      < end of copied text >

## 2021-10-27 NOTE — PHYSICAL THERAPY INITIAL EVALUATION ADULT - PERTINENT HX OF CURRENT PROBLEM, REHAB EVAL
85 year old M with PMH of DM, Crohn s/p colostomy, CKD 4, HTN, HLD, BPH presents to ED with complaints of weakness. Pt states 10 days prior was diagnosed with UTI, placed on bactrim, Family states due to size of pill, pt was unable to swallow pill, only took 1/2 dose for 5 days. Family states over past few days pt becoming increasingly lethargic, having chills as well. Denies cough, chest pain, sob, abdominal pain, NVCD.

## 2021-10-27 NOTE — PROGRESS NOTE ADULT - ASSESSMENT
Impression    Acute on chronic renal failure non oliguric  CKD4  Acute on chronic anemia s/p 2U PRBC  HO of covid  HO DVT on eliquis  Crohn s/p colostomy  delirium      PLAN:    CNS: avoid CNS depressant. haldol PRN / seroquel      HEENT:  Oral care    PULMONARY:  HOB @ 45 degrees, aspiration precaution, CXR reviewed.     CARDIOVASCULAR: d5 3 amp 50 cc/h, nephrology f/u     GI: GI prophylaxis / protonix                                         Feeding low k diet    RENAL:  F/u  lytes.  Correct as needed. accurate I/O, improved UO , trend BMP.     INFECTIOUS DISEASE: per ID, UA and cultures     HEMATOLOGICAL:  DVT prophylaxis. LE doppler reviewed  , cbc q 12 , adjust ptt.    ENDOCRINE:  Follow up FS.  Insulin protocol if needed.    DISPOSITION: possible DG in pm    Impression    Acute on chronic renal failure non oliguric  CKD4  Acute on chronic anemia s/p 2U PRBC  HO of covid  HO DVT on eliquis  Crohn s/p colostomy  delirium      PLAN:    CNS: avoid CNS depressant.  seroquel  25 qhs, monitor QT, dc haldol    HEENT:  Oral care    PULMONARY:  HOB @ 45 degrees, aspiration precaution, CXR reviewed.     CARDIOVASCULAR: d5 3 amp 50 cc/h, nephrology f/u     GI: GI prophylaxis / protonix                                         Feeding low k diet    RENAL:  F/u  lytes.  Correct as needed. accurate I/O, improved UO , trend BMP.     INFECTIOUS DISEASE: per ID, UA and cultures     HEMATOLOGICAL:  DVT prophylaxis. LE doppler reviewed  , cbc q 12 , adjust ptt.    ENDOCRINE:  Follow up FS.  Insulin protocol if needed.    DISPOSITION: possible DG in pm

## 2021-10-27 NOTE — PROGRESS NOTE ADULT - SUBJECTIVE AND OBJECTIVE BOX
Patient is a 85y old  Male who presents with a chief complaint of weakness (26 Oct 2021 15:31)        Over Night Events: No overnight events         ROS:     All ROS are negative except HPI         PHYSICAL EXAM    ICU Vital Signs Last 24 Hrs  T(C): 36.1 (27 Oct 2021 03:55), Max: 36.3 (26 Oct 2021 20:00)  T(F): 97 (27 Oct 2021 03:55), Max: 97.4 (27 Oct 2021 00:00)  HR: 83 (27 Oct 2021 03:55) (68 - 83)  BP: 133/68 (27 Oct 2021 03:55) (133/68 - 156/70)  BP(mean): 100 (26 Oct 2021 20:00) (87 - 100)  ABP: --  ABP(mean): --  RR: 18 (27 Oct 2021 03:55) (17 - 23)  SpO2: 96% (27 Oct 2021 03:55) (94% - 98%)      CONSTITUTIONAL:  ill appearing   low bmi     ENT:   Airway patent,   Mouth with normal mucosa.   No thrush    EYES:   Pupils equal,   Round and reactive to light.    CARDIAC:   Normal rate,   Regular rhythm.    LE edema        Vascular:  Normal systolic impulse  No Carotid bruits    RESPIRATORY:   No wheezing  Bilateral BS  Normal chest expansion  Not tachypneic,  No use of accessory muscles    GASTROINTESTINAL:  Abdomen soft,   Non-tender,   No guarding,   + BS    MUSCULOSKELETAL:   Range of motion is not limited,  No clubbing, cyanosis    NEUROLOGICAL:   AAO x 0    SKIN:   Skin normal color for race,   Warm and dry and intact.   No evidence of rash.       10-26-21 @ 07:01  -  10-27-21 @ 07:00  --------------------------------------------------------  IN:    Heparin: 49 mL    Oral Fluid: 480 mL    Sodium Bicarbonate: 600 mL  Total IN: 1129 mL    OUT:    Colostomy (mL): 450 mL    Indwelling Catheter - Urethral (mL): 1050 mL  Total OUT: 1500 mL    Total NET: -371 mL          LABS:                            8.5    12.23 )-----------( 148      ( 27 Oct 2021 06:00 )             26.0                                               10-27    137  |  98  |  77<HH>  ----------------------------<  126<H>  3.9   |  20  |  8.5<HH>    Ca    7.8<L>      27 Oct 2021 00:43  Phos  5.8     10-27  Mg     2.1     10-27    TPro  4.9<L>  /  Alb  3.0<L>  /  TBili  <0.2  /  DBili  x   /  AST  22  /  ALT  15  /  AlkPhos  69  10-26      PTT - ( 27 Oct 2021 06:00 )  PTT:31.5 sec                                           CARDIAC MARKERS ( 25 Oct 2021 20:00 )  x     / 0.25 ng/mL / x     / x     / x      CARDIAC MARKERS ( 25 Oct 2021 12:03 )  x     / 0.25 ng/mL / x     / x     / x                                                LIVER FUNCTIONS - ( 26 Oct 2021 08:46 )  Alb: 3.0 g/dL / Pro: 4.9 g/dL / ALK PHOS: 69 U/L / ALT: 15 U/L / AST: 22 U/L / GGT: x                                                                                                                                       MEDICATIONS  (STANDING):  aspirin enteric coated 81 milliGRAM(s) Oral daily  atorvastatin Oral Tab/Cap - Peds 20 milliGRAM(s) Oral daily  calcium acetate 667 milliGRAM(s) Oral three times a day with meals  cefTRIAXone   IVPB 1000 milliGRAM(s) IV Intermittent every 24 hours  chlorhexidine 4% Liquid 1 Application(s) Topical daily  heparin  Infusion 700 Unit(s)/Hr (10 mL/Hr) IV Continuous <Continuous>  pantoprazole    Tablet 40 milliGRAM(s) Oral before breakfast  sodium bicarbonate 1300 milliGRAM(s) Oral three times a day  sodium bicarbonate  Infusion 0.118 mEq/kG/Hr (50 mL/Hr) IV Continuous <Continuous>    MEDICATIONS  (PRN):           CXR interpreted by me:  Right opacity      Patient is a 85y old  Male who presents with a chief complaint of weakness (26 Oct 2021 15:31)        Over Night Events: No overnight events, confused on RA        PHYSICAL EXAM    ICU Vital Signs Last 24 Hrs  T(C): 36.1 (27 Oct 2021 03:55), Max: 36.3 (26 Oct 2021 20:00)  T(F): 97 (27 Oct 2021 03:55), Max: 97.4 (27 Oct 2021 00:00)  HR: 83 (27 Oct 2021 03:55) (68 - 83)  BP: 133/68 (27 Oct 2021 03:55) (133/68 - 156/70)  BP(mean): 100 (26 Oct 2021 20:00) (87 - 100)  RR: 18 (27 Oct 2021 03:55) (17 - 23)  SpO2: 96% (27 Oct 2021 03:55) (94% - 98%)      CONSTITUTIONAL:  ill appearing        ENT:   Airway patent,   Mouth with normal mucosa.   No thrush    EYES:   Pupils equal,   Round and reactive to light.    CARDIAC:   Normal rate,   Regular rhythm.    LE edema    RESPIRATORY:   Dec bs both bases    GASTROINTESTINAL:  Abdomen soft,   Non-tender,   No guarding,   + BS    MUSCULOSKELETAL:   Range of motion is not limited,  No clubbing, cyanosis    NEUROLOGICAL:   non focal       10-26-21 @ 07:01  -  10-27-21 @ 07:00  --------------------------------------------------------  IN:    Heparin: 49 mL    Oral Fluid: 480 mL    Sodium Bicarbonate: 600 mL  Total IN: 1129 mL    OUT:    Colostomy (mL): 450 mL    Indwelling Catheter - Urethral (mL): 1050 mL  Total OUT: 1500 mL    Total NET: -371 mL          LABS:                            8.5    12.23 )-----------( 148      ( 27 Oct 2021 06:00 )             26.0                                               10-27    137  |  98  |  77<HH>  ----------------------------<  126<H>  3.9   |  20  |  8.5<HH>    Ca    7.8<L>      27 Oct 2021 00:43  Phos  5.8     10-27  Mg     2.1     10-27    TPro  4.9<L>  /  Alb  3.0<L>  /  TBili  <0.2  /  DBili  x   /  AST  22  /  ALT  15  /  AlkPhos  69  10-26      PTT - ( 27 Oct 2021 06:00 )  PTT:31.5 sec                                           CARDIAC MARKERS ( 25 Oct 2021 20:00 )  x     / 0.25 ng/mL / x     / x     / x      CARDIAC MARKERS ( 25 Oct 2021 12:03 )  x     / 0.25 ng/mL / x     / x     / x                                                LIVER FUNCTIONS - ( 26 Oct 2021 08:46 )  Alb: 3.0 g/dL / Pro: 4.9 g/dL / ALK PHOS: 69 U/L / ALT: 15 U/L / AST: 22 U/L / GGT: x                                                                                                                                       MEDICATIONS  (STANDING):  aspirin enteric coated 81 milliGRAM(s) Oral daily  atorvastatin Oral Tab/Cap - Peds 20 milliGRAM(s) Oral daily  calcium acetate 667 milliGRAM(s) Oral three times a day with meals  cefTRIAXone   IVPB 1000 milliGRAM(s) IV Intermittent every 24 hours  chlorhexidine 4% Liquid 1 Application(s) Topical daily  heparin  Infusion 700 Unit(s)/Hr (10 mL/Hr) IV Continuous <Continuous>  pantoprazole    Tablet 40 milliGRAM(s) Oral before breakfast  sodium bicarbonate 1300 milliGRAM(s) Oral three times a day  sodium bicarbonate  Infusion 0.118 mEq/kG/Hr (50 mL/Hr) IV Continuous <Continuous>    MEDICATIONS  (PRN):           CXR interpreted by me:  Right opacity

## 2021-10-27 NOTE — PHYSICAL THERAPY INITIAL EVALUATION ADULT - GENERAL OBSERVATIONS, REHAB EVAL
Pt encoutered in semifowler position in bed in NAD, confused, oriented to self only, +IV, +tele, +[place]. Pt requires Max A in bed mobility, Max A in transfer mobility, and ambulated 2 steps using RWMax A towards the chair. Pt demonstrates limited mobility secondary weakness and poor balance. Pt left seated in chair in NAD.

## 2021-10-28 NOTE — PROGRESS NOTE ADULT - ASSESSMENT
# JOSH on CKD 4 - likely ATN d/t severe sepsis / acute on chronic anemia  # Metabolic acidosis w/ anion gap   # Hyponatremia / low eav  # Acute on chronic anemia   # Acute pyelonephritis / Abdominal wall cellulitis   # Hx of crohn s/p colostomy    Recommendations:  - non oliguric, urine output improving  - creat stable  - hyponatremia improving  - change bicarb drip to LR at 30cc/hr  - cont sodium bicarb po 1300mg q8h   - cont phoslo, replete Mg to 2 as needed  - low K diet, dc lokelma  - appreciate ID f/u, cont ceftriaxone  - CT noted w/o hydronephrosis  - no urgency for RRT  - RBC transfusion as needed to maintain hgb > 7, iron stores noted, does not need iv iron  - serum flc ratio noted wnl   # JOSH on CKD 4 - likely ATN d/t severe sepsis / acute on chronic anemia  # Metabolic acidosis w/ anion gap   # Hyponatremia / low eav  # Acute on chronic anemia   # Acute pyelonephritis / Abdominal wall cellulitis   # Hx of crohn s/p colostomy    Recommendations:  - non oliguric, urine output improving  - creat stable  - hyponatremia improving  - hold IVF today / lasix 40 mg IV one dose   - cont sodium bicarb po 1300mg q8h   - cont phoslo, replete Mg to 2 as needed  - appreciate ID f/u, cont ceftriaxone  - CT noted w/o hydronephrosis  - no urgency for RRT but will follow closely   - RBC transfusion as needed to maintain hgb > 7, iron stores noted, does not need iv iron  - serum flc ratio noted wnl    will follow

## 2021-10-28 NOTE — PROGRESS NOTE ADULT - ATTENDING COMMENTS
events noted, anemia., acute on chronic renal failure, non oliguric, UTI, IV ABX, IVF, seroquel, floor, poor prognosis

## 2021-10-28 NOTE — CHART NOTE - NSCHARTNOTEFT_GEN_A_CORE
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 Pt states 10 days prior was diagnosed with UTI, placed on bactrim, Family states due to size of pill, pt was unable to swallow pill, only took 1/2 dose for 5 days. Family states over past few days pt was becoming increasingly lethargic, having chills as well. Denies cough, chest pain, sob, abdominal pain, NVCD. In the ED, CT a/p was done and showed Urinary bladder wall is thickened in the setting of under-distention. Intraluminal air is likely secondary to Luna.   Patient had JOSH, baseline CKD 4, Cr reaching 8.3. Currently not candidate for dialysis as electrolytes (potassium, bicarb) have improved.. Patient received lokelam tid & sodium bicarb, initially drip 3 amp 100 ml/hr then 50 ml/hr, Also receiving 1300 NaHCO3 tid. Some fluid overload (3+ LE edema and b/l opacities on CXR), not currently in Respiratory distress..  Troponin trending up, 0.17 -> 0.21, repeat pending, ECG showed RBBB, no ST changes. No chest pain or nausea. Transfer from SICU-A7 to general medical floor    HPI:  85 year old M with PMH of DM, Crohn s/p colostomy, CKD 4, HTN, HLD, BPH presents to ED with complaints of weakness. Pt states 10 days prior was diagnosed with UTI, placed on bactrim, Family states due to size of pill, pt was unable to swallow pill, only took 1/2 dose for 5 days. Family states over past few days pt becoming increasingly lethargic, having chills as well. Denies cough, chest pain, sob, abdominal pain, NVCD.  In the ED, CT a/p was done and showed Urinary bladder wall is thickened in the setting of under-distention. Intraluminal air is likely secondary to Luna. If clinically warranted, correlate with UA to rule out acute cystitis. Labs were remarkable for Hb 6.2 (baseline ~8), Na 126, K 6.2, HCO3 8, AG 18, BUN 90, Cr 8.3 (baseline 2.3). UA was negative. He was given rocephin x1, 2L IVF bolus, insulin and dextrose, calcium gluconate x1, sodium bicarb x1, lokelma 10g x1, and lasix x1. He received 1U PRBC.     Hospital Course:    Patient was admitted to MICU for acute on chronic renal failure. Vascular surgery was consulted regarding Uldall placement but patient's JOSH and electrolytes improved on own without need for renal replacement therapy. He was on bicarb drip and his acidosis improved. He was downgraded to the stepdown unit. Per ID, he has been on ceftriaxone for pyelonephritis. He is on heparin drip due to history of DVT. Patient's mental status has been a concern as he seems to be delirious and has been pulling at lines and tubes. He is stable for downgrade to general medical floor.     Follow up:  -f/u recs per nephro  -on heparin drip; keep aPTT within goal 60-80  -c/w abx per ID Transfer from SICU-A7 to general medical floor    HPI:  85 year old M with PMH of DM, Crohn s/p colostomy, CKD 4, HTN, HLD, BPH presents to ED with complaints of weakness. Pt states 10 days prior was diagnosed with UTI, placed on bactrim, Family states due to size of pill, pt was unable to swallow pill, only took 1/2 dose for 5 days. Family states over past few days pt becoming increasingly lethargic, having chills as well. Denies cough, chest pain, sob, abdominal pain, NVCD.  In the ED, CT a/p was done and showed Urinary bladder wall is thickened in the setting of under-distention. Intraluminal air is likely secondary to Luna. If clinically warranted, correlate with UA to rule out acute cystitis. Labs were remarkable for Hb 6.2 (baseline ~8), Na 126, K 6.2, HCO3 8, AG 18, BUN 90, Cr 8.3 (baseline 2.3). UA was negative. He was given rocephin x1, 2L IVF bolus, insulin and dextrose, calcium gluconate x1, sodium bicarb x1, lokelma 10g x1, and lasix x1. He received 1U PRBC.     Hospital Course:    Patient was admitted to MICU for acute on chronic renal failure. Vascular surgery was consulted regarding Uldall placement but patient's JOSH and electrolytes improved on own without need for renal replacement therapy. He was on bicarb drip and his acidosis improved. He was downgraded to the stepdown unit. Per ID, he has been on ceftriaxone for pyelonephritis. He is on heparin drip due to history of DVT. Patient's mental status has been a concern as he seems to be delirious and has been pulling at lines and tubes. He is stable for downgrade to general medical floor.     Follow up:  -f/u recs per nephro  -on heparin drip; keep aPTT within goal 60-80  -c/w abx per ID (7 day course of ceftriaxone ends on 10/29)

## 2021-10-28 NOTE — PROGRESS NOTE ADULT - ASSESSMENT
Impression    Acute on chronic renal failure non oliguric  CKD4  Acute on chronic anemia s/p 2U PRBC  HO of covid  HO DVT on eliquis  Crohn s/p colostomy  delirium      PLAN:    CNS: avoid CNS depressant.  seroquel  25 qhs, monitor QT,   haldol    HEENT:  Oral care    PULMONARY:  HOB @ 45 degrees, aspiration precaution, CXR reviewed.     CARDIOVASCULAR: LR @ 50 ,  nephrology f/u     GI: GI prophylaxis / protonix                                         Feeding low k diet    RENAL:  F/u  lytes.  Correct as needed. accurate I/O, improved UO , trend BMP.     INFECTIOUS DISEASE: per ID, UA and cultures     HEMATOLOGICAL:  DVT prophylaxis. LE doppler reviewed  , cbc q 12 , adjust ptt.    ENDOCRINE:  Follow up FS.  Insulin protocol if needed.    DISPOSITION: downgrade to medicine      Impression    Acute on chronic renal failure non oliguric  CKD4  Acute on chronic anemia s/p 2U PRBC  HO of covid  HO DVT on eliquis  Crohn s/p colostomy  delirium      PLAN:    CNS: avoid CNS depressant.  seroquel  25 qhs, monitor QT    HEENT:  Oral care    PULMONARY:  HOB @ 45 degrees, aspiration precaution, CXR reviewed.     CARDIOVASCULAR: LR @ 50 ,  nephrology f/u     GI: GI prophylaxis / protonix                                         Feeding low k diet    RENAL:  F/u  lytes.  Correct as needed. accurate I/O, improved UO , trend BMP.     INFECTIOUS DISEASE: per ID, UA and cultures     HEMATOLOGICAL:  DVT prophylaxis. LE doppler reviewed  , cbc q 12 , adjust ptt.    ENDOCRINE:  Follow up FS.  Insulin protocol if needed.    DISPOSITION: downgrade to medicine

## 2021-10-28 NOTE — PROGRESS NOTE ADULT - SUBJECTIVE AND OBJECTIVE BOX
CECILLE FRANKEL 85y Male  MRN#: [id number]   CODE STATUS: full code    Hospital Day: 5d    Pt is currently admitted with the primary diagnosis of acute on chronic renal failuer    SUBJECTIVE  Overnight/Interval Events: Overnight patient was delirious and received haldol. aPTT was elevated, went down on heparin drip this morning.                                               ----------------------------------------------------------  OBJECTIVE  PAST MEDICAL & SURGICAL HISTORY  History of medical problems  [place] b/l ears, dm, htn, hypercholesteremia, ckd, bowel/bladder fistula, kidney stones    Colostomy present    History of surgery  procedure for kidney stones ( ?lithotripsy)                                              -----------------------------------------------------------  ALLERGIES:  No Known Allergies                                            ------------------------------------------------------------    HOME MEDICATIONS  Home Medications:  amLODIPine 5 mg oral tablet: 1 tab(s) orally 2 times a day (01 Sep 2021 16:54)  Aspir 81 oral delayed release tablet: 1 tab(s) orally once a day (01 Sep 2021 16:54)  atorvastatin 10 mg oral tablet: 2 tab(s) orally once a day (01 Sep 2021 16:54)  famotidine 20 mg oral tablet: 1 tab(s) orally once a day (01 Sep 2021 16:54)  Januvia 50 mg oral tablet: 1 tab(s) orally once a day (01 Sep 2021 16:54)  Lasix 20 mg oral tablet: 1 tab(s) orally every other day (01 Sep 2021 16:54)  pioglitazone 30 mg oral tablet: 1 tab(s) orally once a day (01 Sep 2021 16:54)  ramipril 5 mg oral capsule: 2 cap(s) orally once a day (01 Sep 2021 16:54)                           MEDICATIONS:  STANDING MEDICATIONS  aspirin enteric coated 81 milliGRAM(s) Oral daily  atorvastatin Oral Tab/Cap - Peds 20 milliGRAM(s) Oral daily  calcium acetate 667 milliGRAM(s) Oral three times a day with meals  chlorhexidine 4% Liquid 1 Application(s) Topical daily  heparin  Infusion 700 Unit(s)/Hr IV Continuous <Continuous>  lactated ringers. 1000 milliLiter(s) IV Continuous <Continuous>  pantoprazole    Tablet 40 milliGRAM(s) Oral before breakfast  sodium bicarbonate 1300 milliGRAM(s) Oral three times a day    PRN MEDICATIONS                                            ------------------------------------------------------------  VITAL SIGNS: Last 24 Hours  T(C): 35.9 (28 Oct 2021 07:39), Max: 36.7 (28 Oct 2021 04:00)  T(F): 96.7 (28 Oct 2021 07:39), Max: 98 (28 Oct 2021 04:00)  HR: 68 (28 Oct 2021 07:39) (68 - 96)  BP: 153/66 (28 Oct 2021 07:39) (132/66 - 153/66)  BP(mean): 95 (28 Oct 2021 07:39) (92 - 113)  RR: 18 (28 Oct 2021 07:39) (17 - 20)  SpO2: 95% (28 Oct 2021 07:39) (95% - 96%)      10-27-21 @ 07:01  -  10-28-21 @ 07:00  --------------------------------------------------------  IN: 220 mL / OUT: 1360 mL / NET: -1140 mL    10-28-21 @ 07:01  -  10-28-21 @ 08:26  --------------------------------------------------------  IN: 9 mL / OUT: 0 mL / NET: 9 mL                                             --------------------------------------------------------------  LABS:                        8.1    12.39 )-----------( 147      ( 28 Oct 2021 06:11 )             24.4     10-28    136  |  97<L>  |  73<HH>  ----------------------------<  93  3.9   |  19  |  8.1<HH>    Ca    8.0<L>      28 Oct 2021 01:15  Phos  6.0     10-28  Mg     2.1     10-28    TPro  4.9<L>  /  Alb  3.0<L>  /  TBili  <0.2  /  DBili  x   /  AST  22  /  ALT  15  /  AlkPhos  69  10-26    PTT - ( 28 Oct 2021 06:11 )  PTT:93.9 sec    RADIOLOGY:    LE duplex US 10/25:  Acute thrombosis of the left posterior tibial and peroneal vein branches  Chronic deep vein thrombosis of the left common femoral and distal femoral veins. .    CTA&P 10/23:  1. Urinary bladder wall is thickened in the setting of under-distention. Intraluminal air is likely secondary to [place]. If clinically warranted, correlate with UA to rule out acute cystitis.  2. Bowel containing spigelian hernia without evidence of bowel obstruction or pneumoperitoneum.  3. Small bilateral pleural effusions with adjacent atelectasis.  4. Indeterminate bilateral renal lesions. Nonemergent reimaging with renal protocol recommended.                                          --------------------------------------------------------------    PHYSICAL EXAM:  General: ill appearing, lying in bed  HEENT: normocephalic, atraumatic, PERRLA  LUNGS: bilateral BS, no wheezing, normal chest expansion  HEART: normal S1 and S2, regular rate and rhythm  ABDOMEN: soft, nontender, has colostomy bag and sanford  EXT: no leg edema  NEURO: AAO2, responsive to commands                                           --------------------------------------------------------------    ASSESSMENT & PLAN  85 year old M with PMH of nephrolithiasis, DM, Crohn s/p colostomy, CKD 4, HTN, HLD, BPH who presented with weakness. CT a/p showed bladder wall is thickened and patient found to have JOSH with creat 8.3. His electrolytes improved with no need for hemodialysis and he was downgraded from the ICU on 10/26.    altered mental status  likely 2/2 metabolic encephalopathy and delirium    -avoid CNS depressant  -monitor electrolytes q12h  -strict Is and Os    JOSH on CKD4   non-oliguric    creat 8.5, BUN 77  potassium 3.9, sodium 137, phos 5.8, bicarb 20, mag 2.1 in AM    c/w sodium bicarbonate 1300 mg PO TID  c/w phoslo  per nephro, bumex if hypervolemic w/respiratory compromise  elevated serum free light chains: TORRES kappa 4.3, TORRES lambda 3.9    Acute pyelonephritis  Abdominal wall cellulitis around stoma  Blood & Urine cxs NGTD 10/23  per ID, c/w ceftriaxone 1 g IV q24h (7 day course)    Acute on chronic anemia   hgb 8.5 stable; s/p 2U pRBC this admission   maintain hgb >7    hx of DVT  on elliquis at home  c/w heparin drip  monitor aPTT    # DVT prophylaxis: htt drip   # GI prophylaxis: pantoprazole  # Diet: renal diet  # Activity Score (AM-PAC)  # Code status: full code  # Disposition: acute, SDU         CECILLE FRANKEL 85y Male  MRN#: [id number]   CODE STATUS: full code    Hospital Day: 5d    Pt is currently admitted with the primary diagnosis of acute on chronic renal failuer    SUBJECTIVE  Overnight/Interval Events: Overnight patient was delirious and received haldol. aPTT was elevated, went down on heparin drip this morning.                                               ----------------------------------------------------------  OBJECTIVE  PAST MEDICAL & SURGICAL HISTORY  History of medical problems  [place] b/l ears, dm, htn, hypercholesteremia, ckd, bowel/bladder fistula, kidney stones    Colostomy present    History of surgery  procedure for kidney stones ( ?lithotripsy)                                              -----------------------------------------------------------  ALLERGIES:  No Known Allergies                                            ------------------------------------------------------------    HOME MEDICATIONS  Home Medications:  amLODIPine 5 mg oral tablet: 1 tab(s) orally 2 times a day (01 Sep 2021 16:54)  Aspir 81 oral delayed release tablet: 1 tab(s) orally once a day (01 Sep 2021 16:54)  atorvastatin 10 mg oral tablet: 2 tab(s) orally once a day (01 Sep 2021 16:54)  famotidine 20 mg oral tablet: 1 tab(s) orally once a day (01 Sep 2021 16:54)  Januvia 50 mg oral tablet: 1 tab(s) orally once a day (01 Sep 2021 16:54)  Lasix 20 mg oral tablet: 1 tab(s) orally every other day (01 Sep 2021 16:54)  pioglitazone 30 mg oral tablet: 1 tab(s) orally once a day (01 Sep 2021 16:54)  ramipril 5 mg oral capsule: 2 cap(s) orally once a day (01 Sep 2021 16:54)                           MEDICATIONS:  STANDING MEDICATIONS  aspirin enteric coated 81 milliGRAM(s) Oral daily  atorvastatin Oral Tab/Cap - Peds 20 milliGRAM(s) Oral daily  calcium acetate 667 milliGRAM(s) Oral three times a day with meals  chlorhexidine 4% Liquid 1 Application(s) Topical daily  heparin  Infusion 700 Unit(s)/Hr IV Continuous <Continuous>  lactated ringers. 1000 milliLiter(s) IV Continuous <Continuous>  pantoprazole    Tablet 40 milliGRAM(s) Oral before breakfast  sodium bicarbonate 1300 milliGRAM(s) Oral three times a day    PRN MEDICATIONS                                            ------------------------------------------------------------  VITAL SIGNS: Last 24 Hours  T(C): 35.9 (28 Oct 2021 07:39), Max: 36.7 (28 Oct 2021 04:00)  T(F): 96.7 (28 Oct 2021 07:39), Max: 98 (28 Oct 2021 04:00)  HR: 68 (28 Oct 2021 07:39) (68 - 96)  BP: 153/66 (28 Oct 2021 07:39) (132/66 - 153/66)  BP(mean): 95 (28 Oct 2021 07:39) (92 - 113)  RR: 18 (28 Oct 2021 07:39) (17 - 20)  SpO2: 95% (28 Oct 2021 07:39) (95% - 96%)      10-27-21 @ 07:01  -  10-28-21 @ 07:00  --------------------------------------------------------  IN: 220 mL / OUT: 1360 mL / NET: -1140 mL    10-28-21 @ 07:01  -  10-28-21 @ 08:26  --------------------------------------------------------  IN: 9 mL / OUT: 0 mL / NET: 9 mL                                             --------------------------------------------------------------  LABS:                        8.1    12.39 )-----------( 147      ( 28 Oct 2021 06:11 )             24.4     10-28    136  |  97<L>  |  73<HH>  ----------------------------<  93  3.9   |  19  |  8.1<HH>    Ca    8.0<L>      28 Oct 2021 01:15  Phos  6.0     10-28  Mg     2.1     10-28    TPro  4.9<L>  /  Alb  3.0<L>  /  TBili  <0.2  /  DBili  x   /  AST  22  /  ALT  15  /  AlkPhos  69  10-26    PTT - ( 28 Oct 2021 06:11 )  PTT:93.9 sec    RADIOLOGY:    LE duplex US 10/25:  Acute thrombosis of the left posterior tibial and peroneal vein branches  Chronic deep vein thrombosis of the left common femoral and distal femoral veins. .    CTA&P 10/23:  1. Urinary bladder wall is thickened in the setting of under-distention. Intraluminal air is likely secondary to [place]. If clinically warranted, correlate with UA to rule out acute cystitis.  2. Bowel containing spigelian hernia without evidence of bowel obstruction or pneumoperitoneum.  3. Small bilateral pleural effusions with adjacent atelectasis.  4. Indeterminate bilateral renal lesions. Nonemergent reimaging with renal protocol recommended.                                          --------------------------------------------------------------    PHYSICAL EXAM:  General: ill appearing, lying in bed  HEENT: normocephalic, atraumatic, PERRLA  LUNGS: bilateral BS, no wheezing, normal chest expansion  HEART: normal S1 and S2, regular rate and rhythm  ABDOMEN: soft, nontender, has colostomy bag and sanford  EXT: no leg edema  NEURO: AAO2, responsive to commands                                           --------------------------------------------------------------    ASSESSMENT & PLAN  85 year old M with PMH of nephrolithiasis, DM, Crohn s/p colostomy, CKD 4, HTN, HLD, BPH who presented with weakness. CT a/p showed bladder wall is thickened and patient found to have JOSH with creat 8.3. His electrolytes improved with no need for hemodialysis and he was downgraded from the ICU on 10/26.    altered mental status  likely 2/2 metabolic encephalopathy and delirium    -avoid CNS depressant  -monitor electrolytes q12h  -strict Is and Os    JOSH on CKD4   non-oliguric    creat 8.5, BUN 77  potassium 3.9, sodium 137, phos 5.8, bicarb 20, mag 2.1 in AM    c/w sodium bicarbonate 1300 mg PO TID  c/w phoslo  per nephro, bumex if hypervolemic w/respiratory compromise  elevated serum free light chains: TORRES kappa 4.3, TORRES lambda 3.9    Acute pyelonephritis  Abdominal wall cellulitis around stoma  Blood & Urine cxs NGTD 10/23  per ID, c/w ceftriaxone 1 g IV q24h (day 6/7)    Acute on chronic anemia   hgb 8.5 stable; s/p 2U pRBC this admission   maintain hgb >7    hx of DVT  on elliquis at home  c/w heparin drip  monitor aPTT    # DVT prophylaxis: htt drip   # GI prophylaxis: pantoprazole  # Diet: renal diet  # Activity Score (AM-PAC)  # Code status: full code  # Disposition: acute, SDU         CECILLE FRANKEL 85y Male  MRN#: [id number]   CODE STATUS: full code    Hospital Day: 5d    Pt is currently admitted with the primary diagnosis of acute on chronic renal failuer    SUBJECTIVE  Overnight/Interval Events: Overnight patient was delirious and received haldol. aPTT was elevated, went down on heparin drip this morning.                                               ----------------------------------------------------------  OBJECTIVE  PAST MEDICAL & SURGICAL HISTORY  History of medical problems  [place] b/l ears, dm, htn, hypercholesteremia, ckd, bowel/bladder fistula, kidney stones    Colostomy present    History of surgery  procedure for kidney stones ( ?lithotripsy)                                              -----------------------------------------------------------  ALLERGIES:  No Known Allergies                                            ------------------------------------------------------------    HOME MEDICATIONS  Home Medications:  amLODIPine 5 mg oral tablet: 1 tab(s) orally 2 times a day (01 Sep 2021 16:54)  Aspir 81 oral delayed release tablet: 1 tab(s) orally once a day (01 Sep 2021 16:54)  atorvastatin 10 mg oral tablet: 2 tab(s) orally once a day (01 Sep 2021 16:54)  famotidine 20 mg oral tablet: 1 tab(s) orally once a day (01 Sep 2021 16:54)  Januvia 50 mg oral tablet: 1 tab(s) orally once a day (01 Sep 2021 16:54)  Lasix 20 mg oral tablet: 1 tab(s) orally every other day (01 Sep 2021 16:54)  pioglitazone 30 mg oral tablet: 1 tab(s) orally once a day (01 Sep 2021 16:54)  ramipril 5 mg oral capsule: 2 cap(s) orally once a day (01 Sep 2021 16:54)                           MEDICATIONS:  STANDING MEDICATIONS  aspirin enteric coated 81 milliGRAM(s) Oral daily  atorvastatin Oral Tab/Cap - Peds 20 milliGRAM(s) Oral daily  calcium acetate 667 milliGRAM(s) Oral three times a day with meals  chlorhexidine 4% Liquid 1 Application(s) Topical daily  heparin  Infusion 700 Unit(s)/Hr IV Continuous <Continuous>  lactated ringers. 1000 milliLiter(s) IV Continuous <Continuous>  pantoprazole    Tablet 40 milliGRAM(s) Oral before breakfast  sodium bicarbonate 1300 milliGRAM(s) Oral three times a day    PRN MEDICATIONS                                            ------------------------------------------------------------  VITAL SIGNS: Last 24 Hours  T(C): 35.9 (28 Oct 2021 07:39), Max: 36.7 (28 Oct 2021 04:00)  T(F): 96.7 (28 Oct 2021 07:39), Max: 98 (28 Oct 2021 04:00)  HR: 68 (28 Oct 2021 07:39) (68 - 96)  BP: 153/66 (28 Oct 2021 07:39) (132/66 - 153/66)  BP(mean): 95 (28 Oct 2021 07:39) (92 - 113)  RR: 18 (28 Oct 2021 07:39) (17 - 20)  SpO2: 95% (28 Oct 2021 07:39) (95% - 96%)      10-27-21 @ 07:01  -  10-28-21 @ 07:00  --------------------------------------------------------  IN: 220 mL / OUT: 1360 mL / NET: -1140 mL    10-28-21 @ 07:01  -  10-28-21 @ 08:26  --------------------------------------------------------  IN: 9 mL / OUT: 0 mL / NET: 9 mL                                             --------------------------------------------------------------  LABS:                        8.1    12.39 )-----------( 147      ( 28 Oct 2021 06:11 )             24.4     10-28    136  |  97<L>  |  73<HH>  ----------------------------<  93  3.9   |  19  |  8.1<HH>    Ca    8.0<L>      28 Oct 2021 01:15  Phos  6.0     10-28  Mg     2.1     10-28    TPro  4.9<L>  /  Alb  3.0<L>  /  TBili  <0.2  /  DBili  x   /  AST  22  /  ALT  15  /  AlkPhos  69  10-26    PTT - ( 28 Oct 2021 06:11 )  PTT:93.9 sec    RADIOLOGY:    LE duplex US 10/25:  Acute thrombosis of the left posterior tibial and peroneal vein branches  Chronic deep vein thrombosis of the left common femoral and distal femoral veins. .    CTA&P 10/23:  1. Urinary bladder wall is thickened in the setting of under-distention. Intraluminal air is likely secondary to [place]. If clinically warranted, correlate with UA to rule out acute cystitis.  2. Bowel containing spigelian hernia without evidence of bowel obstruction or pneumoperitoneum.  3. Small bilateral pleural effusions with adjacent atelectasis.  4. Indeterminate bilateral renal lesions. Nonemergent reimaging with renal protocol recommended.                                          --------------------------------------------------------------    PHYSICAL EXAM:  General: ill appearing, lying in bed  HEENT: normocephalic, atraumatic, PERRLA  LUNGS: bilateral BS, no wheezing, normal chest expansion  HEART: normal S1 and S2, regular rate and rhythm  ABDOMEN: soft, nontender, has colostomy bag and sanford  EXT: no leg edema  NEURO: AAO2, responsive to commands                                           --------------------------------------------------------------    ASSESSMENT & PLAN  85 year old M with PMH of nephrolithiasis, DM, Crohn s/p colostomy, CKD 4, HTN, HLD, BPH who presented with weakness. CT a/p showed bladder wall is thickened and patient found to have JOSH with creat 8.3. His electrolytes improved with no need for hemodialysis and he was downgraded from the ICU on 10/26.    altered mental status  likely 2/2 metabolic encephalopathy and delirium    -avoid CNS depressant  -monitor electrolytes q12h  -strict Is and Os    JOSH on CKD4   non-oliguric    creat 8.1, BUN 73  potassium 3.9, sodium 136, phos 6, bicarb 19    c/w sodium bicarbonate 1300 mg PO TID  c/w phoslo  per nephro, bumex if hypervolemic w/respiratory compromise  elevated serum free light chains: TORRES kappa 4.3, TORRES lambda 3.9    Acute pyelonephritis  Abdominal wall cellulitis around stoma  Blood & Urine cxs NGTD 10/23  per ID, c/w ceftriaxone 1 g IV q24h (day 6/7)    Acute on chronic anemia   hgb 8.1 stable; s/p 2U pRBC this admission   maintain hgb >7    hx of DVT  on elliquis at home  c/w heparin drip  monitor aPTT; goal 60-80    # DVT prophylaxis: htt drip   # GI prophylaxis: pantoprazole  # Diet: renal diet  # Activity Score (AM-PAC)  # Code status: full code  # Disposition: acute, SDU

## 2021-10-28 NOTE — PROGRESS NOTE ADULT - SUBJECTIVE AND OBJECTIVE BOX
Nephrology progress note    THIS IS AN INCOMPLETE NOTE . FULL NOTE TO FOLLOW SHORTLY    Patient is seen and examined, events over the last 24 h noted .    Allergies:  No Known Allergies    Hospital Medications:   MEDICATIONS  (STANDING):  aspirin enteric coated 81 milliGRAM(s) Oral daily  atorvastatin Oral Tab/Cap - Peds 20 milliGRAM(s) Oral daily  calcium acetate 667 milliGRAM(s) Oral three times a day with meals  chlorhexidine 4% Liquid 1 Application(s) Topical daily  heparin  Infusion 700 Unit(s)/Hr (7.5 mL/Hr) IV Continuous <Continuous>  lactated ringers. 1000 milliLiter(s) (50 mL/Hr) IV Continuous <Continuous>  pantoprazole    Tablet 40 milliGRAM(s) Oral before breakfast  sodium bicarbonate 1300 milliGRAM(s) Oral three times a day        VITALS:  T(F): 96.7 (10-28-21 @ 07:39), Max: 98 (10-28-21 @ 04:00)  HR: 68 (10-28-21 @ 07:39)  BP: 153/66 (10-28-21 @ 07:39)  RR: 18 (10-28-21 @ 07:39)  SpO2: 97% (10-28-21 @ 07:39)  Wt(kg): --    10-26 @ :  -  10-27 @ 07:00  --------------------------------------------------------  IN: 1129 mL / OUT: 1500 mL / NET: -371 mL    10-27 @ 07:  -  10-28 @ 07:00  --------------------------------------------------------  IN: 220 mL / OUT: 1360 mL / NET: -1140 mL    10-28 @ 07:01  -  10-28 @ 10:41  --------------------------------------------------------  IN: 74 mL / OUT: 0 mL / NET: 74 mL          PHYSICAL EXAM:  Constitutional: NAD  HEENT: anicteric sclera, oropharynx clear, MMM  Neck: No JVD  Respiratory: CTAB, no wheezes, rales or rhonchi  Cardiovascular: S1, S2, RRR  Gastrointestinal: BS+, soft, NT/ND  Extremities: No cyanosis or clubbing. No peripheral edema  :  No sanford.   Skin: No rashes    LABS:  10-28    136  |  97<L>  |  73<HH>  ----------------------------<  93  3.9   |  19  |  8.1<HH>  Creatinine Trend: 8.1<--, 7.8<--, 8.5<--, 8.1<--, 8.0<--, 8.5<--  Ca    8.0<L>      28 Oct 2021 01:15  Phos  5.8     10-28  Mg     2.1     10-28                            8.1    12.39 )-----------( 147      ( 28 Oct 2021 06:11 )             24.4       Urine Studies:  Urinalysis Basic - ( 23 Oct 2021 16:05 )    Color: Light Yellow / Appearance: Clear / S.013 / pH:   Gluc:  / Ketone: Negative  / Bili: Negative / Urobili: <2 mg/dL   Blood:  / Protein: 300 mg/dL / Nitrite: Negative   Leuk Esterase: Small / RBC: 1 /HPF / WBC 16 /HPF   Sq Epi:  / Non Sq Epi: 1 /HPF / Bacteria: Negative      Osmolality, Random Urine: 249 mos/kg (10-24 @ 18:44)  Sodium, Random Urine: 46.0 mmoL/L (10-24 @ 18:44)  Creatinine, Random Urine: 48 mg/dL (10-24 @ 18:44)    RADIOLOGY & ADDITIONAL STUDIES:   Nephrology progress note  Patient is seen and examined, events over the last 24 h noted .  lying in bed comfortable       Allergies:  No Known Allergies    Hospital Medications:   MEDICATIONS  (STANDING):    aspirin enteric coated 81 milliGRAM(s) Oral daily  atorvastatin Oral Tab/Cap - Peds 20 milliGRAM(s) Oral daily  calcium acetate 667 milliGRAM(s) Oral three times a day with meals  heparin  Infusion 700 Unit(s)/Hr (7.5 mL/Hr) IV Continuous <Continuous>  lactated ringers. 1000 milliLiter(s) (50 mL/Hr) IV Continuous <Continuous>  pantoprazole    Tablet 40 milliGRAM(s) Oral before breakfast  sodium bicarbonate 1300 milliGRAM(s) Oral three times a day        VITALS:  T(F): 96.7 (10-28-21 @ 07:39), Max: 98 (10-28-21 @ 04:00)  HR: 68 (10-28-21 @ 07:39)  BP: 153/66 (10-28-21 @ 07:39)  RR: 18 (10-28-21 @ 07:39)  SpO2: 97% (10-28-21 @ 07:39)  Wt(kg): --    10-26 @ :  -  10-27 @ 07:00  --------------------------------------------------------  IN: 1129 mL / OUT: 1500 mL / NET: -371 mL    10-27 @ :  -  10-28 @ 07:00  --------------------------------------------------------  IN: 220 mL / OUT: 1360 mL / NET: -1140 mL    10-28 @ 07:  -  10-28 @ 10:41  --------------------------------------------------------  IN: 74 mL / OUT: 0 mL / NET: 74 mL          PHYSICAL EXAM:  Constitutional: lethargic   Neck: No JVD  Respiratory: CTAB, no wheezes, rales or rhonchi  Cardiovascular: S1, S2, RRR  Gastrointestinal: BS+, soft, NT/ND  Extremities: No cyanosis or clubbing. plus one pitting edema   Skin: No rashes    LABS:  10-28    136  |  97<L>  |  73<HH>  ----------------------------<  93  3.9   |  19  |  8.1<HH>    Creatinine Trend: 8.1<--, 7.8<--, 8.5<--, 8.1<--, 8.0<--, 8.5<--    Ca    8.0<L>      28 Oct 2021 01:15  Phos  5.8     10-28  Mg     2.1     10-28                            8.1    12.39 )-----------( 147      ( 28 Oct 2021 06:11 )             24.4       Urine Studies:  Urinalysis Basic - ( 23 Oct 2021 16:05 )    Color: Light Yellow / Appearance: Clear / S.013 / pH:   Gluc:  / Ketone: Negative  / Bili: Negative / Urobili: <2 mg/dL   Blood:  / Protein: 300 mg/dL / Nitrite: Negative   Leuk Esterase: Small / RBC: 1 /HPF / WBC 16 /HPF   Sq Epi:  / Non Sq Epi: 1 /HPF / Bacteria: Negative      Osmolality, Random Urine: 249 mos/kg (10-24 @ 18:44)  Sodium, Random Urine: 46.0 mmoL/L (10-24 @ 18:44)  Creatinine, Random Urine: 48 mg/dL (10-24 @ 18:44)    RADIOLOGY & ADDITIONAL STUDIES:

## 2021-10-28 NOTE — PROGRESS NOTE ADULT - SUBJECTIVE AND OBJECTIVE BOX
Patient is a 85y old  Male who presents with a chief complaint of weakness (27 Oct 2021 11:04)        Over Night Events: no overnight events. Delerious              PHYSICAL EXAM    ICU Vital Signs Last 24 Hrs  T(C): 35.9 (28 Oct 2021 07:39), Max: 36.7 (28 Oct 2021 04:00)  T(F): 96.7 (28 Oct 2021 07:39), Max: 98 (28 Oct 2021 04:00)  HR: 68 (28 Oct 2021 07:39) (68 - 96)  BP: 153/66 (28 Oct 2021 07:39) (132/66 - 153/66)  BP(mean): 95 (28 Oct 2021 07:39) (92 - 113  RR: 18 (28 Oct 2021 07:39) (17 - 20)  SpO2: 95% (28 Oct 2021 07:39) (95% - 96%)      CONSTITUTIONAL:  ill appearing      ENT:   on NC       CARDIAC:   Tate 2 / 6        RESPIRATORY:   No wheezing  Bilateral BS  Normal chest expansion  Not tachypneic,  No use of accessory muscles    GASTROINTESTINAL:  Abdomen soft,   Non-tender,   No guarding,   + BS    MUSCULOSKELETAL:   Range of motion is not limited,  No clubbing, cyanosis    NEUROLOGICAL:   AAO 0  Delerious               10-27-21 @ 07:01  -  10-28-21 @ 07:00  --------------------------------------------------------  IN:    Heparin: 120 mL    Sodium Bicarbonate: 100 mL  Total IN: 220 mL    OUT:    Colostomy (mL): 300 mL    Indwelling Catheter - Urethral (mL): 1060 mL  Total OUT: 1360 mL    Total NET: -1140 mL      10-28-21 @ 07:01  -  10-28-21 @ 08:20  --------------------------------------------------------  IN:    Heparin: 9 mL  Total IN: 9 mL    OUT:  Total OUT: 0 mL    Total NET: 9 mL          LABS:                            8.1    12.39 )-----------( 147      ( 28 Oct 2021 06:11 )             24.4                                               10-28    136  |  97<L>  |  73<HH>  ----------------------------<  93  3.9   |  19  |  8.1<HH>    Ca    8.0<L>      28 Oct 2021 01:15  Phos  6.0     10-28  Mg     2.1     10-28    TPro  4.9<L>  /  Alb  3.0<L>  /  TBili  <0.2  /  DBili  x   /  AST  22  /  ALT  15  /  AlkPhos  69  10-26      PTT - ( 28 Oct 2021 06:11 )  PTT:93.9 sec                                                                                     LIVER FUNCTIONS - ( 26 Oct 2021 08:46 )  Alb: 3.0 g/dL / Pro: 4.9 g/dL / ALK PHOS: 69 U/L / ALT: 15 U/L / AST: 22 U/L / GGT: x                                                                                                                                       MEDICATIONS  (STANDING):  aspirin enteric coated 81 milliGRAM(s) Oral daily  atorvastatin Oral Tab/Cap - Peds 20 milliGRAM(s) Oral daily  calcium acetate 667 milliGRAM(s) Oral three times a day with meals  chlorhexidine 4% Liquid 1 Application(s) Topical daily  heparin  Infusion 700 Unit(s)/Hr (9 mL/Hr) IV Continuous <Continuous>  pantoprazole    Tablet 40 milliGRAM(s) Oral before breakfast  sodium bicarbonate 1300 milliGRAM(s) Oral three times a day    MEDICATIONS  (PRN):           CXR interpreted by me:   No xray to review      Patient is a 85y old  Male who presents with a chief complaint of weakness (27 Oct 2021 11:04)        Over Night Events: no overnight events. Delerious              PHYSICAL EXAM    ICU Vital Signs Last 24 Hrs  T(C): 35.9 (28 Oct 2021 07:39), Max: 36.7 (28 Oct 2021 04:00)  T(F): 96.7 (28 Oct 2021 07:39), Max: 98 (28 Oct 2021 04:00)  HR: 68 (28 Oct 2021 07:39) (68 - 96)  BP: 153/66 (28 Oct 2021 07:39) (132/66 - 153/66)  BP(mean): 95 (28 Oct 2021 07:39) (92 - 113  RR: 18 (28 Oct 2021 07:39) (17 - 20)  SpO2: 95% (28 Oct 2021 07:39) (95% - 96%)      CONSTITUTIONAL:  ill appearing      ENT:   on NC       CARDIAC:   Tate 2 / 6        RESPIRATORY:   No wheezing  Bilateral BS  Normal chest expansion  Not tachypneic,  No use of accessory muscles    GASTROINTESTINAL:  Abdomen soft,   Non-tender,   No guarding,   + BS    MUSCULOSKELETAL:   Range of motion is not limited,  No clubbing, cyanosis    NEUROLOGICAL:   non focal              10-27-21 @ 07:01  -  10-28-21 @ 07:00  --------------------------------------------------------  IN:    Heparin: 120 mL    Sodium Bicarbonate: 100 mL  Total IN: 220 mL    OUT:    Colostomy (mL): 300 mL    Indwelling Catheter - Urethral (mL): 1060 mL  Total OUT: 1360 mL    Total NET: -1140 mL      10-28-21 @ 07:01  -  10-28-21 @ 08:20  --------------------------------------------------------  IN:    Heparin: 9 mL  Total IN: 9 mL    OUT:  Total OUT: 0 mL    Total NET: 9 mL          LABS:                            8.1    12.39 )-----------( 147      ( 28 Oct 2021 06:11 )             24.4                                               10-28    136  |  97<L>  |  73<HH>  ----------------------------<  93  3.9   |  19  |  8.1<HH>    Ca    8.0<L>      28 Oct 2021 01:15  Phos  6.0     10-28  Mg     2.1     10-28    TPro  4.9<L>  /  Alb  3.0<L>  /  TBili  <0.2  /  DBili  x   /  AST  22  /  ALT  15  /  AlkPhos  69  10-26      PTT - ( 28 Oct 2021 06:11 )  PTT:93.9 sec                                                                                     LIVER FUNCTIONS - ( 26 Oct 2021 08:46 )  Alb: 3.0 g/dL / Pro: 4.9 g/dL / ALK PHOS: 69 U/L / ALT: 15 U/L / AST: 22 U/L / GGT: x                                                                                                                                       MEDICATIONS  (STANDING):  aspirin enteric coated 81 milliGRAM(s) Oral daily  atorvastatin Oral Tab/Cap - Peds 20 milliGRAM(s) Oral daily  calcium acetate 667 milliGRAM(s) Oral three times a day with meals  chlorhexidine 4% Liquid 1 Application(s) Topical daily  heparin  Infusion 700 Unit(s)/Hr (9 mL/Hr) IV Continuous <Continuous>  pantoprazole    Tablet 40 milliGRAM(s) Oral before breakfast  sodium bicarbonate 1300 milliGRAM(s) Oral three times a day    MEDICATIONS  (PRN):

## 2021-10-29 NOTE — PROGRESS NOTE ADULT - SUBJECTIVE AND OBJECTIVE BOX
Patient is a 85y old  Male who presents with a chief complaint of weakness (28 Oct 2021 10:41)        Over Night Events: delerium improving                PHYSICAL EXAM    ICU Vital Signs Last 24 Hrs  T(C): 35.8 (29 Oct 2021 08:19), Max: 36.2 (28 Oct 2021 16:00)  T(F): 96.5 (29 Oct 2021 08:19), Max: 97.2 (29 Oct 2021 05:51)  HR: 73 (29 Oct 2021 08:19) (67 - 75)  BP: 160/67 (29 Oct 2021 08:19) (140/60 - 160/67)  BP(mean): 97 (29 Oct 2021 08:19) (96 - 97)  RR: 18 (29 Oct 2021 08:19) (18 - 19)  SpO2: 97% (29 Oct 2021 05:51) (97% - 98%)      CONSTITUTIONAL:  ill appearing      ENT:   on NC       CARDIAC:   Tate 2 / 6        RESPIRATORY:   No wheezing  Bilateral BS  Normal chest expansion  Not tachypneic,  No use of accessory muscles    GASTROINTESTINAL:  Abdomen soft,   Non-tender,   No guarding,   + BS    MUSCULOSKELETAL:   Range of motion is not limited,  No clubbing, cyanosis    NEUROLOGICAL:   non focal    10-28-21 @ 07:01  -  10-29-21 @ 07:00  --------------------------------------------------------  IN:    Heparin: 181.5 mL    Lactated Ringers: 1100 mL  Total IN: 1281.5 mL    OUT:    Colostomy (mL): 600 mL    Indwelling Catheter - Urethral (mL): 400 mL  Total OUT: 1000 mL    Total NET: 281.5 mL          LABS:                            7.6    10.91 )-----------( 135      ( 29 Oct 2021 02:07 )             23.7                                               10-29    137  |  99  |  69<HH>  ----------------------------<  80  3.7   |  21  |  7.3<HH>    Ca    8.0<L>      29 Oct 2021 02:07  Phos  5.6     10-29  Mg     1.9     10-29        PTT - ( 29 Oct 2021 02:07 )  PTT:58.3 sec                                       Urinalysis Basic - ( 28 Oct 2021 11:34 )    Color: Yellow / Appearance: Slightly Turbid / S.020 / pH: x  Gluc: x / Ketone: Trace  / Bili: Negative / Urobili: <2 mg/dL   Blood: x / Protein: 300 mg/dL / Nitrite: Negative   Leuk Esterase: Moderate / RBC: 50 /HPF / WBC 75 /HPF   Sq Epi: x / Non Sq Epi: 2 /HPF / Bacteria: Few                                                                                                                                                                                MEDICATIONS  (STANDING):  aspirin enteric coated 81 milliGRAM(s) Oral daily  atorvastatin Oral Tab/Cap - Peds 20 milliGRAM(s) Oral daily  calcium acetate 667 milliGRAM(s) Oral three times a day with meals  chlorhexidine 4% Liquid 1 Application(s) Topical daily  heparin  Infusion 700 Unit(s)/Hr (7.5 mL/Hr) IV Continuous <Continuous>  lactated ringers. 1000 milliLiter(s) (50 mL/Hr) IV Continuous <Continuous>  pantoprazole    Tablet 40 milliGRAM(s) Oral before breakfast  sodium bicarbonate 1300 milliGRAM(s) Oral three times a day    MEDICATIONS  (PRN):         Patient is a 85y old  Male who presents with a chief complaint of weakness (28 Oct 2021 10:41)        Over Night Events: events noted, afebrile, renal reviewed      PHYSICAL EXAM    ICU Vital Signs Last 24 Hrs  T(C): 35.8 (29 Oct 2021 08:19), Max: 36.2 (28 Oct 2021 16:00)  T(F): 96.5 (29 Oct 2021 08:19), Max: 97.2 (29 Oct 2021 05:51)  HR: 73 (29 Oct 2021 08:19) (67 - 75)  BP: 160/67 (29 Oct 2021 08:19) (140/60 - 160/67)  BP(mean): 97 (29 Oct 2021 08:19) (96 - 97)  RR: 18 (29 Oct 2021 08:19) (18 - 19)  SpO2: 97% (29 Oct 2021 05:51) (97% - 98%)      CONSTITUTIONAL:  ill appearing      ENT:   on NC       CARDIAC:   Tate 2 /         RESPIRATORY:   No wheezing  Bilateral BS  Normal chest expansion  Not tachypneic,  No use of accessory muscles    GASTROINTESTINAL:  Abdomen soft,   Non-tender,   No guarding,   + BS    MUSCULOSKELETAL:   Range of motion is not limited,  No clubbing, cyanosis    NEUROLOGICAL:   non focal    10-28-21 @ 07:01  -  10-29-21 @ 07:00  --------------------------------------------------------  IN:    Heparin: 181.5 mL    Lactated Ringers: 1100 mL  Total IN: 1281.5 mL    OUT:    Colostomy (mL): 600 mL    Indwelling Catheter - Urethral (mL): 400 mL  Total OUT: 1000 mL    Total NET: 281.5 mL          LABS:                            7.6    10.91 )-----------( 135      ( 29 Oct 2021 02:07 )             23.7                                               10-29    137  |  99  |  69<HH>  ----------------------------<  80  3.7   |  21  |  7.3<HH>    Ca    8.0<L>      29 Oct 2021 02:07  Phos  5.6     10-29  Mg     1.9     10-29        PTT - ( 29 Oct 2021 02:07 )  PTT:58.3 sec                                       Urinalysis Basic - ( 28 Oct 2021 11:34 )    Color: Yellow / Appearance: Slightly Turbid / S.020 / pH: x  Gluc: x / Ketone: Trace  / Bili: Negative / Urobili: <2 mg/dL   Blood: x / Protein: 300 mg/dL / Nitrite: Negative   Leuk Esterase: Moderate / RBC: 50 /HPF / WBC 75 /HPF   Sq Epi: x / Non Sq Epi: 2 /HPF / Bacteria: Few                                                                                                                                                                                MEDICATIONS  (STANDING):  aspirin enteric coated 81 milliGRAM(s) Oral daily  atorvastatin Oral Tab/Cap - Peds 20 milliGRAM(s) Oral daily  calcium acetate 667 milliGRAM(s) Oral three times a day with meals  chlorhexidine 4% Liquid 1 Application(s) Topical daily  heparin  Infusion 700 Unit(s)/Hr (7.5 mL/Hr) IV Continuous <Continuous>  lactated ringers. 1000 milliLiter(s) (50 mL/Hr) IV Continuous <Continuous>  pantoprazole    Tablet 40 milliGRAM(s) Oral before breakfast  sodium bicarbonate 1300 milliGRAM(s) Oral three times a day    MEDICATIONS  (PRN):

## 2021-10-29 NOTE — PROGRESS NOTE ADULT - SUBJECTIVE AND OBJECTIVE BOX
Nephrology progress note    Patient was seen and examined, events over the last 24 h noted .  Cr imrpoved 8.1 - >7.3    Allergies:  No Known Allergies    Hospital Medications:   MEDICATIONS  (STANDING):  aspirin enteric coated 81 milliGRAM(s) Oral daily  atorvastatin Oral Tab/Cap - Peds 20 milliGRAM(s) Oral daily  calcium acetate 667 milliGRAM(s) Oral three times a day with meals  chlorhexidine 4% Liquid 1 Application(s) Topical daily  heparin  Infusion 700 Unit(s)/Hr (7.5 mL/Hr) IV Continuous <Continuous>  pantoprazole    Tablet 40 milliGRAM(s) Oral before breakfast  sodium bicarbonate 1300 milliGRAM(s) Oral three times a day        VITALS:  T(F): 96.5 (10-29-21 @ 08:19), Max: 97.2 (10-29-21 @ 05:51)  HR: 73 (10-29-21 @ 08:19)  BP: 160/67 (10-29-21 @ 08:19)  RR: 18 (10-29-21 @ 08:19)  SpO2: 97% (10-29-21 @ 05:51)  Wt(kg): --    10-27 @ :  -  10-28 @ 07:00  --------------------------------------------------------  IN: 220 mL / OUT: 1360 mL / NET: -1140 mL    10-28 @ :  -  10-29 @ 07:00  --------------------------------------------------------  IN: 1281.5 mL / OUT: 1000 mL / NET: 281.5 mL    10-29 @ 07:  -  10-29 @ 11:50  --------------------------------------------------------  IN: 0 mL / OUT: 625 mL / NET: -625 mL          PHYSICAL EXAM:  Constitutional: lethargic   Neck: No JVD  Respiratory: CTAB, no wheezes, rales or rhonchi  Cardiovascular: S1, S2, RRR  Gastrointestinal: BS+, soft, NT/ND  Extremities: No cyanosis or clubbing. plus one pitting edema   Skin: No rashes    LABS:  10-29    137  |  99  |  69<HH>  ----------------------------<  80  3.7   |  21  |  7.3<HH>    Ca    8.0<L>      29 Oct 2021 02:07  Phos  5.6     10-29  Mg     1.9     10-29                            7.6    10.91 )-----------( 135      ( 29 Oct 2021 02:07 )             23.7       Urine Studies:  Urinalysis Basic - ( 28 Oct 2021 11:34 )    Color: Yellow / Appearance: Slightly Turbid / S.020 / pH:   Gluc:  / Ketone: Trace  / Bili: Negative / Urobili: <2 mg/dL   Blood:  / Protein: 300 mg/dL / Nitrite: Negative   Leuk Esterase: Moderate / RBC: 50 /HPF / WBC 75 /HPF   Sq Epi:  / Non Sq Epi: 2 /HPF / Bacteria: Few      Osmolality, Random Urine: 249 mos/kg (10-24 @ 18:44)  Sodium, Random Urine: 46.0 mmoL/L (10-24 @ 18:44)  Creatinine, Random Urine: 48 mg/dL (10-24 @ 18:44)    RADIOLOGY & ADDITIONAL STUDIES:

## 2021-10-29 NOTE — PROGRESS NOTE ADULT - ASSESSMENT
# JOSH on CKD 4 - likely ATN d/t severe sepsis / acute on chronic anemia  # Metabolic acidosis w/ anion gap   # Hyponatremia / low eav  # Acute on chronic anemia   # Acute pyelonephritis / Abdominal wall cellulitis   # Hx of crohn s/p colostomy    Recommendations:  - non oliguric, urine output improving  - creat imrpoved   - hyponatremia improving  - Monitor UOOP if drops off can give antoehr dose lasix   - cont sodium bicarb po 1300mg q8h   - cont phoslo, replete Mg to 2 as needed  - appreciate ID f/u, cont ceftriaxone  - CT noted w/o hydronephrosis  - no urgency for RRT but will follow closely   - RBC transfusion as needed to maintain hgb > 7, iron stores noted, does not need iv iron  - serum flc ratio noted wnl    will follow

## 2021-10-29 NOTE — PROGRESS NOTE ADULT - SUBJECTIVE AND OBJECTIVE BOX
CECILLE FRANKEL 85y Male  MRN#: [id number]   CODE STATUS: full code    Hospital Day: 6d    Pt is currently admitted with the primary diagnosis of acute on chronic renal failure    SUBJECTIVE  Overnight/Interval Events: No acute events overnight. Delirium improved as patient slept through the night.                                               ----------------------------------------------------------  OBJECTIVE  PAST MEDICAL & SURGICAL HISTORY  History of medical problems  [place] b/l ears, dm, htn, hypercholesteremia, ckd, bowel/bladder fistula, kidney stones    Colostomy present    History of surgery  procedure for kidney stones ( ?lithotripsy)                                              -----------------------------------------------------------  ALLERGIES:  No Known Allergies                                            ------------------------------------------------------------    HOME MEDICATIONS  Home Medications:  amLODIPine 5 mg oral tablet: 1 tab(s) orally 2 times a day (01 Sep 2021 16:54)  Aspir 81 oral delayed release tablet: 1 tab(s) orally once a day (01 Sep 2021 16:54)  atorvastatin 10 mg oral tablet: 2 tab(s) orally once a day (01 Sep 2021 16:54)  famotidine 20 mg oral tablet: 1 tab(s) orally once a day (01 Sep 2021 16:54)  Januvia 50 mg oral tablet: 1 tab(s) orally once a day (01 Sep 2021 16:54)  Lasix 20 mg oral tablet: 1 tab(s) orally every other day (01 Sep 2021 16:54)  pioglitazone 30 mg oral tablet: 1 tab(s) orally once a day (01 Sep 2021 16:54)  ramipril 5 mg oral capsule: 2 cap(s) orally once a day (01 Sep 2021 16:54)                           MEDICATIONS:  STANDING MEDICATIONS  aspirin enteric coated 81 milliGRAM(s) Oral daily  atorvastatin Oral Tab/Cap - Peds 20 milliGRAM(s) Oral daily  calcium acetate 667 milliGRAM(s) Oral three times a day with meals  chlorhexidine 4% Liquid 1 Application(s) Topical daily  heparin  Infusion 700 Unit(s)/Hr IV Continuous <Continuous>  pantoprazole    Tablet 40 milliGRAM(s) Oral before breakfast  sodium bicarbonate 1300 milliGRAM(s) Oral three times a day    PRN MEDICATIONS                                            ------------------------------------------------------------  VITAL SIGNS: Last 24 Hours  T(C): 35.8 (29 Oct 2021 08:19), Max: 36.2 (28 Oct 2021 16:00)  T(F): 96.5 (29 Oct 2021 08:19), Max: 97.2 (29 Oct 2021 05:51)  HR: 73 (29 Oct 2021 08:19) (67 - 75)  BP: 160/67 (29 Oct 2021 08:19) (140/60 - 160/67)  BP(mean): 97 (29 Oct 2021 08:19) (96 - 97)  RR: 18 (29 Oct 2021 08:19) (18 - 19)  SpO2: 97% (29 Oct 2021 05:51) (97% - 98%)      10-28-21 @ 07:01  -  10-29-21 @ 07:00  --------------------------------------------------------  IN: 1281.5 mL / OUT: 1000 mL / NET: 281.5 mL                                             --------------------------------------------------------------  LABS:                        7.6    10.91 )-----------( 135      ( 29 Oct 2021 02:07 )             23.7     10    137  |  99  |  69<HH>  ----------------------------<  80  3.7   |  21  |  7.3<HH>    Ca    8.0<L>      29 Oct 2021 02:07  Phos  5.6     10-29  Mg     1.9     10-29      PTT - ( 29 Oct 2021 02:07 )  PTT:58.3 sec  Urinalysis Basic - ( 28 Oct 2021 11:34 )    Color: Yellow / Appearance: Slightly Turbid / S.020 / pH: x  Gluc: x / Ketone: Trace  / Bili: Negative / Urobili: <2 mg/dL   Blood: x / Protein: 300 mg/dL / Nitrite: Negative   Leuk Esterase: Moderate / RBC: 50 /HPF / WBC 75 /HPF   Sq Epi: x / Non Sq Epi: 2 /HPF / Bacteria: Few    RADIOLOGY:    LE duplex US 10/25:  Acute thrombosis of the left posterior tibial and peroneal vein branches  Chronic deep vein thrombosis of the left common femoral and distal femoral veins. .    CTA&P 10/23:  1. Urinary bladder wall is thickened in the setting of under-distention. Intraluminal air is likely secondary to San[place]. If clinically warranted, correlate with UA to rule out acute cystitis.  2. Bowel containing spigelian hernia without evidence of bowel obstruction or pneumoperitoneum.  3. Small bilateral pleural effusions with adjacent atelectasis.  4. Indeterminate bilateral renal lesions. Nonemergent reimaging with renal protocol recommended.                                          --------------------------------------------------------------    PHYSICAL EXAM:  General: no acute distress, lying in bed  HEENT: normocephalic, atraumatic, PERRLA  LUNGS: bilateral BS, no wheezing, normal chest expansion  HEART: normal S1 and S2, regular rate and rhythm  ABDOMEN: soft, nontender, has colostomy bag and sanford  EXT: no leg edema  NEURO: AAO2, responsive to commands, confused                                           --------------------------------------------------------------    ASSESSMENT & PLAN  85 year old M with PMH of nephrolithiasis, DM, Crohn s/p colostomy, CKD 4, HTN, HLD, BPH who presented with weakness. CT a/p showed bladder wall is thickened and patient found to have JOSH with creat 8.3. His electrolytes improved with no need for hemodialysis and he was downgraded from the ICU on 10/26.    altered mental status  likely 2/2 metabolic encephalopathy and delirium    -avoid CNS depressant  -monitor electrolytes q12h  -strict Is and Os    JOSH on CKD4   non-oliguric    creat 8.1, BUN 73  potassium 3.9, sodium 136, phos 6, bicarb 19    c/w sodium bicarbonate 1300 mg PO TID  c/w phoslo  per nephro, bumex if hypervolemic w/respiratory compromise  elevated serum free light chains: TORRES kappa 4.3, TORRES lambda 3.9    Acute pyelonephritis  Abdominal wall cellulitis around stoma  Blood & Urine cxs NGTD 10/23  per ID, c/w ceftriaxone 1 g IV q24h (day 6)    Acute on chronic anemia   hgb 8.1 stable; s/p 2U pRBC this admission   maintain hgb >7    hx of DVT  on elliquis at home  c/w heparin drip  monitor aPTT; goal 60-80    # DVT prophylaxis: htt drip   # GI prophylaxis: pantoprazole  # Diet: renal diet  # Activity Score (AM-PAC)  # Code status: full code  # Disposition: acute, SDU                          CECILLE FRANKEL 85y Male  MRN#: [id number]   CODE STATUS: full code    Hospital Day: 6d    Pt is currently admitted with the primary diagnosis of acute on chronic renal failure    SUBJECTIVE  Overnight/Interval Events: No acute events overnight. Delirium improved as patient slept through the night.                                               ----------------------------------------------------------  OBJECTIVE  PAST MEDICAL & SURGICAL HISTORY  History of medical problems  [place] b/l ears, dm, htn, hypercholesteremia, ckd, bowel/bladder fistula, kidney stones    Colostomy present    History of surgery  procedure for kidney stones ( ?lithotripsy)                                              -----------------------------------------------------------  ALLERGIES:  No Known Allergies                                            ------------------------------------------------------------    HOME MEDICATIONS  Home Medications:  amLODIPine 5 mg oral tablet: 1 tab(s) orally 2 times a day (01 Sep 2021 16:54)  Aspir 81 oral delayed release tablet: 1 tab(s) orally once a day (01 Sep 2021 16:54)  atorvastatin 10 mg oral tablet: 2 tab(s) orally once a day (01 Sep 2021 16:54)  famotidine 20 mg oral tablet: 1 tab(s) orally once a day (01 Sep 2021 16:54)  Januvia 50 mg oral tablet: 1 tab(s) orally once a day (01 Sep 2021 16:54)  Lasix 20 mg oral tablet: 1 tab(s) orally every other day (01 Sep 2021 16:54)  pioglitazone 30 mg oral tablet: 1 tab(s) orally once a day (01 Sep 2021 16:54)  ramipril 5 mg oral capsule: 2 cap(s) orally once a day (01 Sep 2021 16:54)                           MEDICATIONS:  STANDING MEDICATIONS  aspirin enteric coated 81 milliGRAM(s) Oral daily  atorvastatin Oral Tab/Cap - Peds 20 milliGRAM(s) Oral daily  calcium acetate 667 milliGRAM(s) Oral three times a day with meals  chlorhexidine 4% Liquid 1 Application(s) Topical daily  heparin  Infusion 700 Unit(s)/Hr IV Continuous <Continuous>  pantoprazole    Tablet 40 milliGRAM(s) Oral before breakfast  sodium bicarbonate 1300 milliGRAM(s) Oral three times a day    PRN MEDICATIONS                                            ------------------------------------------------------------  VITAL SIGNS: Last 24 Hours  T(C): 35.8 (29 Oct 2021 08:19), Max: 36.2 (28 Oct 2021 16:00)  T(F): 96.5 (29 Oct 2021 08:19), Max: 97.2 (29 Oct 2021 05:51)  HR: 73 (29 Oct 2021 08:19) (67 - 75)  BP: 160/67 (29 Oct 2021 08:19) (140/60 - 160/67)  BP(mean): 97 (29 Oct 2021 08:19) (96 - 97)  RR: 18 (29 Oct 2021 08:19) (18 - 19)  SpO2: 97% (29 Oct 2021 05:51) (97% - 98%)      10-28-21 @ 07:01  -  10-29-21 @ 07:00  --------------------------------------------------------  IN: 1281.5 mL / OUT: 1000 mL / NET: 281.5 mL                                             --------------------------------------------------------------  LABS:                        7.6    10.91 )-----------( 135      ( 29 Oct 2021 02:07 )             23.7     10    137  |  99  |  69<HH>  ----------------------------<  80  3.7   |  21  |  7.3<HH>    Ca    8.0<L>      29 Oct 2021 02:07  Phos  5.6     10-29  Mg     1.9     10-29      PTT - ( 29 Oct 2021 02:07 )  PTT:58.3 sec  Urinalysis Basic - ( 28 Oct 2021 11:34 )    Color: Yellow / Appearance: Slightly Turbid / S.020 / pH: x  Gluc: x / Ketone: Trace  / Bili: Negative / Urobili: <2 mg/dL   Blood: x / Protein: 300 mg/dL / Nitrite: Negative   Leuk Esterase: Moderate / RBC: 50 /HPF / WBC 75 /HPF   Sq Epi: x / Non Sq Epi: 2 /HPF / Bacteria: Few    RADIOLOGY:    LE duplex US 10/25:  Acute thrombosis of the left posterior tibial and peroneal vein branches  Chronic deep vein thrombosis of the left common femoral and distal femoral veins. .    CTA&P 10/23:  1. Urinary bladder wall is thickened in the setting of under-distention. Intraluminal air is likely secondary to [place]. If clinically warranted, correlate with UA to rule out acute cystitis.  2. Bowel containing spigelian hernia without evidence of bowel obstruction or pneumoperitoneum.  3. Small bilateral pleural effusions with adjacent atelectasis.  4. Indeterminate bilateral renal lesions. Nonemergent reimaging with renal protocol recommended.                                          --------------------------------------------------------------    PHYSICAL EXAM:  General: no acute distress, lying in bed  HEENT: normocephalic, atraumatic, PERRLA  LUNGS: bilateral BS, no wheezing, normal chest expansion  HEART: normal S1 and S2, regular rate and rhythm  ABDOMEN: soft, nontender, has colostomy bag and sanford  EXT: no leg edema  NEURO: AAO2, responsive to commands, confused                                           --------------------------------------------------------------    ASSESSMENT & PLAN  85 year old M with PMH of nephrolithiasis, DM, Crohn s/p colostomy, CKD 4, HTN, HLD, BPH who presented with weakness. CT a/p showed bladder wall is thickened and patient found to have JOSH with creat 8.3. His electrolytes improved with no need for hemodialysis and he was downgraded from the ICU on 10/26.    altered mental status  likely 2/2 metabolic encephalopathy and delirium    -avoid CNS depressant  -monitor electrolytes q12h  -strict Is and Os    JOSH on CKD4   non-oliguric    creat 7.3, BUN 69  bicarb 21 (goal is >22 in CKD patients)    c/w sodium bicarbonate 1300 mg PO TID  c/w phoslo  per nephro, bumex if hypervolemic w/respiratory compromise  elevated serum free light chains: TORRES kappa 4.3, TORRES lambda 3.9    Acute pyelonephritis  Abdominal wall cellulitis around stoma  Blood & Urine cxs NGTD 10/23  per ID, c/w ceftriaxone 1 g IV q24h (day 7)    Acute on chronic anemia   hgb 7.6 stable; s/p 2U pRBC this admission   maintain hgb >7    hx of DVT  on elliquis at home  c/w heparin drip; monitor aPTT; goal 60-80    # DVT prophylaxis: htt drip   # GI prophylaxis: pantoprazole  # Diet: renal diet  # Activity Score (AM-PAC)  # Code status: full code  # Disposition: acute, SDU                          CECILLE FRANKEL 85y Male  MRN#: [id number]   CODE STATUS: full code    Hospital Day: 6d    Pt is currently admitted with the primary diagnosis of acute on chronic renal failure    SUBJECTIVE  Overnight/Interval Events: No acute events overnight. Delirium improved as patient slept last night and is able to have a full conversation this morning.                                              ----------------------------------------------------------  OBJECTIVE  PAST MEDICAL & SURGICAL HISTORY  History of medical problems  [place] b/l ears, dm, htn, hypercholesteremia, ckd, bowel/bladder fistula, kidney stones    Colostomy present    History of surgery  procedure for kidney stones ( ?lithotripsy)                                              -----------------------------------------------------------  ALLERGIES:  No Known Allergies                                            ------------------------------------------------------------    HOME MEDICATIONS  Home Medications:  amLODIPine 5 mg oral tablet: 1 tab(s) orally 2 times a day (01 Sep 2021 16:54)  Aspir 81 oral delayed release tablet: 1 tab(s) orally once a day (01 Sep 2021 16:54)  atorvastatin 10 mg oral tablet: 2 tab(s) orally once a day (01 Sep 2021 16:54)  famotidine 20 mg oral tablet: 1 tab(s) orally once a day (01 Sep 2021 16:54)  Januvia 50 mg oral tablet: 1 tab(s) orally once a day (01 Sep 2021 16:54)  Lasix 20 mg oral tablet: 1 tab(s) orally every other day (01 Sep 2021 16:54)  pioglitazone 30 mg oral tablet: 1 tab(s) orally once a day (01 Sep 2021 16:54)  ramipril 5 mg oral capsule: 2 cap(s) orally once a day (01 Sep 2021 16:54)                           MEDICATIONS:  STANDING MEDICATIONS  aspirin enteric coated 81 milliGRAM(s) Oral daily  atorvastatin Oral Tab/Cap - Peds 20 milliGRAM(s) Oral daily  calcium acetate 667 milliGRAM(s) Oral three times a day with meals  chlorhexidine 4% Liquid 1 Application(s) Topical daily  heparin  Infusion 700 Unit(s)/Hr IV Continuous <Continuous>  pantoprazole    Tablet 40 milliGRAM(s) Oral before breakfast  sodium bicarbonate 1300 milliGRAM(s) Oral three times a day    PRN MEDICATIONS                                            ------------------------------------------------------------  VITAL SIGNS: Last 24 Hours  T(C): 35.8 (29 Oct 2021 08:19), Max: 36.2 (28 Oct 2021 16:00)  T(F): 96.5 (29 Oct 2021 08:19), Max: 97.2 (29 Oct 2021 05:51)  HR: 73 (29 Oct 2021 08:19) (67 - 75)  BP: 160/67 (29 Oct 2021 08:19) (140/60 - 160/67)  BP(mean): 97 (29 Oct 2021 08:19) (96 - 97)  RR: 18 (29 Oct 2021 08:19) (18 - 19)  SpO2: 97% (29 Oct 2021 05:51) (97% - 98%)      10-28-21 @ 07:01  -  10-29-21 @ 07:00  --------------------------------------------------------  IN: 1281.5 mL / OUT: 1000 mL / NET: 281.5 mL                                             --------------------------------------------------------------  LABS:                        7.6    10.91 )-----------( 135      ( 29 Oct 2021 02:07 )             23.7     10    137  |  99  |  69<HH>  ----------------------------<  80  3.7   |  21  |  7.3<HH>    Ca    8.0<L>      29 Oct 2021 02:07  Phos  5.6     10-29  Mg     1.9     10-29      PTT - ( 29 Oct 2021 02:07 )  PTT:58.3 sec  Urinalysis Basic - ( 28 Oct 2021 11:34 )    Color: Yellow / Appearance: Slightly Turbid / S.020 / pH: x  Gluc: x / Ketone: Trace  / Bili: Negative / Urobili: <2 mg/dL   Blood: x / Protein: 300 mg/dL / Nitrite: Negative   Leuk Esterase: Moderate / RBC: 50 /HPF / WBC 75 /HPF   Sq Epi: x / Non Sq Epi: 2 /HPF / Bacteria: Few    RADIOLOGY:    LE duplex US 10/25:  Acute thrombosis of the left posterior tibial and peroneal vein branches  Chronic deep vein thrombosis of the left common femoral and distal femoral veins. .    CTA&P 10/23:  1. Urinary bladder wall is thickened in the setting of under-distention. Intraluminal air is likely secondary to [place]. If clinically warranted, correlate with UA to rule out acute cystitis.  2. Bowel containing spigelian hernia without evidence of bowel obstruction or pneumoperitoneum.  3. Small bilateral pleural effusions with adjacent atelectasis.  4. Indeterminate bilateral renal lesions. Nonemergent reimaging with renal protocol recommended.                                          --------------------------------------------------------------    PHYSICAL EXAM:  General: no acute distress, lying in bed  HEENT: normocephalic, atraumatic, PERRLA  LUNGS: bilateral BS, no wheezing, normal chest expansion  HEART: normal S1 and S2, regular rate and rhythm  ABDOMEN: soft, nontender, has colostomy bag and sanford  EXT: no leg edema  NEURO: AAO2, responsive to commands, confused                                           --------------------------------------------------------------    ASSESSMENT & PLAN  85 year old M with PMH of nephrolithiasis, DM, Crohn s/p colostomy, CKD 4, HTN, HLD, BPH who presented with weakness. CT a/p showed bladder wall is thickened and patient found to have JOSH with creat 8.3. His electrolytes improved with no need for hemodialysis and he was downgraded from the ICU on 10/26.     altered mental status  likely 2/2 metabolic encephalopathy and delirium    -avoid CNS depressant  -monitor electrolytes q12h  -strict Is and Os    JOSH on CKD4   non-oliguric    creat 7.3, BUN 69 in AM  bicarb 21 (goal is >22 in CKD patients)    c/w sodium bicarbonate 1300 mg PO TID  c/w phoslo  per nephro, bumex if hypervolemic w/respiratory compromise  elevated serum free light chains: TORRES kappa 4.3, TORRES lambda 3.9    Acute pyelonephritis  Abdominal wall cellulitis around stoma  Blood & Urine cxs NGTD 10/23  per ID, c/w ceftriaxone 1 g IV q24h (day 7)    Acute on chronic anemia   hgb 7.6 stable; s/p 2U pRBC this admission   maintain hgb >7    hx of DVT  on elliquis at home  c/w heparin drip; monitor aPTT; goal 60-80    # DVT prophylaxis: htt drip   # GI prophylaxis: pantoprazole  # Diet: renal diet  # Activity Score (AM-PAC)  # Code status: full code  # Disposition: downgrade to general med floor

## 2021-10-29 NOTE — PROGRESS NOTE ADULT - ATTENDING COMMENTS
evens noted, afebrile, renal failure non oliguric, UTI, ABX per ID, renal fup, PT/OT, poor prognosis

## 2021-10-29 NOTE — CHART NOTE - NSCHARTNOTEFT_GEN_A_CORE
Registered Dietitian Follow-Up     Patient Profile Reviewed                           Yes [X]   No []     Nutrition History Previously Obtained        Yes [X]  No []       Pertinent Subjective Information: Met pt at bedside, some confusion noted, however per pt, feeling well, improved appetite/intake, >50% per pt. Does not know if he has been drinking Nepro or taking prosource gelatein.      Pertinent Medical Interventions: Ams, delirium improving per critical care note, pt to be downgraded to medicine, acute on chronic renal failure, continues with edema     Diet order:   Diet, Regular:   For patients receiving Renal Replacement - No Protein Restr, No Conc K, No Conc Phos, Low Sodium  Prosource Gelatein 20 Sugar Free     Qty per Day:  2  Supplement Feeding Modality:  Oral  Nepro Cans or Servings Per Day:  1       Frequency:  Daily (10-25-21 @ 10:57) [Active]      Anthropometrics:  Height (cm): 167.6 (10-25-21 @ 10:55)  Weight (kg): 66.5 (10-23-21 @ 21:00)  RD bedscale today 69.3 kg  BMI (kg/m2): 23.7 (10-25-21 @ 10:55)  IBW: 142 lbs, 64.4 kg    Daily Weight in k.4 (10-25), Weight in k.5 (10-24)  % Weight Change    MEDICATIONS  (STANDING):  atorvastatin Oral Tab/Cap - Peds 20 milliGRAM(s) Oral daily  calcium acetate 667 milliGRAM(s) Oral three times a day with meals  heparin  Infusion 700 Unit(s)/Hr (7.5 mL/Hr) IV Continuous <Continuous>  pantoprazole    Tablet 40 milliGRAM(s) Oral before breakfast  sodium bicarbonate 1300 milliGRAM(s) Oral three times a day    Pertinent Labs: 10-29 @ 02:07: Na 137, BUN 69<HH>, Cr 7.3<HH>, BG 80, K+ 3.7, Phos 5.6<H>, Mg 1.9    Finger Sticks:  POCT Blood Glucose.: 94 mg/dL (10-28 @ 16:56)    Physical Findings:  - Appearance: Alert, oriented x 2-3  - GI function: LBM 10/28- colostomy  - Oral/Mouth cavity: dentures  - Skin: no pressure injuries     Nutrition Requirements:  Weight Used:   est kcal/pro needs based on inc metabolic needs r/t Severe Sepsis, AoCKD4 2/2 UTI; MSJ*1.2-1.3= 1495-1620kcal; fluids per LIP     Nutrient Intake: >50%     [] Previous Nutrition Diagnosis:            [X] Ongoing          [] Resolved  Nutrition Diagnostic Terminology #1 Increased Nutrient Needs...   Increased Nutrient Needs (protein-kcal).   Etiology increased metabolic needs.   Signs/Symptoms active medical diagnoses as per team.     Goal/Expected Outcome pt to meet >/=50% needs in 4 days.     Nutrition Intervention      Indicator/Monitoring:     Recommendation: Registered Dietitian Follow-Up     Patient Profile Reviewed                           Yes [X]   No []     Nutrition History Previously Obtained        Yes [X]  No []       Pertinent Subjective Information: Met pt at bedside, some confusion noted, however per pt, feeling well, improved appetite/intake, >50% per pt. Does not know if he has been drinking Nepro or taking prosource gelatein.      Pertinent Medical Interventions: Ams, delirium improving per critical care note, pt to be downgraded to medicine, acute on chronic renal failure, continues with edema     Diet order:   Diet, Regular:   For patients receiving Renal Replacement - No Protein Restr, No Conc K, No Conc Phos, Low Sodium  Prosource Gelatein 20 Sugar Free     Qty per Day:  2  Supplement Feeding Modality:  Oral  Nepro Cans or Servings Per Day:  1       Frequency:  Daily (10-25-21 @ 10:57) [Active]    Anthropometrics:  Height (cm): 167.6 (10-25-21 @ 10:55)  Weight (kg): 66.5 (10-23-21 @ 21:00)  RD bedscale today 69.3 kg  BMI (kg/m2): 23.7 (10-25-21 @ 10:55)  IBW: 142 lbs, 64.4 kg    Daily Weight in k.4 (10-25), Weight in k.5 (10-24)  % Weight Change: +4% x 4 days, possibly r/t fluid status or bedscale error    MEDICATIONS  (STANDING):  atorvastatin Oral Tab/Cap - Peds 20 milliGRAM(s) Oral daily  calcium acetate 667 milliGRAM(s) Oral three times a day with meals  heparin  Infusion 700 Unit(s)/Hr (7.5 mL/Hr) IV Continuous <Continuous>  pantoprazole    Tablet 40 milliGRAM(s) Oral before breakfast  sodium bicarbonate 1300 milliGRAM(s) Oral three times a day    Pertinent Labs: 10-29 @ 02:07: Na 137, BUN 69<HH>, Cr 7.3<HH>, BG 80, K+ 3.7, Phos 5.6<H>, Mg 1.9    Finger Sticks:  POCT Blood Glucose.: 94 mg/dL (10-28 @ 16:56)  --> remain in good range, recommend continue liberalized diet    Physical Findings:  - Appearance: Alert, oriented x 2-3  - GI function: LBM 10/28- colostomy  - Oral/Mouth cavity: dentures  - Skin: no pressure injuries     Nutrition Requirements: based on lowest r/t edema, 61.2 kg  Weight Used: MSJ*1.2-1.3= 1495-1620kcal; 73-86 g protein based on 1.2-1.4 g/kg; fluids per LIP     Nutrient Intake: >50%     [] Previous Nutrition Diagnosis:            [X] Ongoing          [] Resolved  Nutrition Diagnostic Terminology #1 Increased Nutrient Needs...   Increased Nutrient Needs (protein-kcal).   Etiology increased metabolic needs.   Signs/Symptoms active medical diagnoses as per team.     Goal/Expected Outcome pt to meet >/=50% needs in 4 days.     Nutrition Intervention: meals and snacks, medical food supplements, coordination of care     Indicator/Monitoring: nutrient intake, weight, edema, NFPF    Recommendation: continue with current nutrition POC, including diet and supplements as ordered, will cont to follow pt at high risk, f/u in 2-4 days.

## 2021-10-30 NOTE — PROGRESS NOTE ADULT - ASSESSMENT
85 year old M with PMH of nephrolithiasis, DM, Crohn s/p colostomy, CKD 4, HTN, HLD, BPH who presented with weakness. CT a/p showed bladder wall is thickened and patient found to have JOSH with creat 8.3. His electrolytes improved with no need for hemodialysis and he was downgraded from the ICU on 10/26.     # altered mental status  likely 2/2 metabolic encephalopathy and delirium  -avoid CNS depressant  -monitor electrolytes   -strict Is and Os  - frequent re-orientaion     # JOSH on CKD4   - non-oliguric  - creatinine trending down  - keep Luna for now to monitor strict UO  - nephro on board, recommendations followed   - cont bicarb 1500 TID  - monitor cr daily  - cont phoslo  - per nephro, bumex if hypervolemic w/respiratory compromise    # Acute pyelonephritis  # Abdominal wall cellulitis around colostomy  Blood & Urine cxs NGTD 10/23  - completed Rocephin     # Acute on chronic anemia, most likely ACD   - hgb 7.6 stable; s/p 2U pRBC this admission   - monitor Hb daily    # hx of DVT  on elliquis at home  c/w heparin drip; monitor aPTT; goal 60-80    # DVT prophylaxis: htt drip   # Diet: renal diet  # Code status: full code  # Disposition: TBD, PT eval

## 2021-10-30 NOTE — PROGRESS NOTE ADULT - ASSESSMENT
# JOSH on CKD 4 - likely ATN d/t severe sepsis / acute on chronic anemia  # Metabolic acidosis w/ anion gap   # Hyponatremia / low eav  # Acute on chronic anemia   # Acute pyelonephritis / Abdominal wall cellulitis   # Hx of crohn s/p colostomy    Recommendations:  - non oliguric, urine output improving  - creat imrpoved   - hyponatremia improving  - Monitor UOP  - cont sodium bicarb po 1300mg q8h   - cont phoslo, replete Mg to 2 as needed/ IP within target   - appreciate ID f/u, cont ceftriaxone  - CT noted w/o hydronephrosis  - no urgency for RRT   - RBC transfusion as needed to maintain hgb > 7, iron stores noted, does not need iv iron  - serum flc ratio noted wnl    will follow

## 2021-10-30 NOTE — PROGRESS NOTE ADULT - SUBJECTIVE AND OBJECTIVE BOX
CECILLE FRANKEL    Patient is a 85y old  Male who presents with a chief complaint of weakness (30 Oct 2021 09:16)    HPI:  85 year old M with PMH of DM, Crohn s/p colostomy, CKD 4, HTN, HLD, BPH presents to ED with complaints of weakness. Pt states 10 days prior was diagnosed with UTI, placed on bactrim, Family states due to size of pill, pt was unable to swallow pill, only took 1/2 dose for 5 days. Family states over past few days pt becoming increasingly lethargic, having chills as well. Denies cough, chest pain, sob, abdominal pain, NVCD.  In the ED, CT a/p was done and showed Urinary bladder wall is thickened in the setting of under-distention. Intraluminal air is likely secondary to Luna. If clinically warranted, correlate with UA to rule out acute cystitis. Labs were remarkable for Hb 6.2 (baseline ~8), Na 126, K 6.2, HCO3 8, AG 18, BUN 90, Cr 8.3 (baseline 2.3). UA was negative. He was given rocephin x1, 2L IVF bolus, insulin and dextrose, calcium gluconate x1, sodium bicarb x1, lokelma 10g x1, and lasix x1. He received 1U PRBC.   (23 Oct 2021 19:11)    INTERVAL HPI/OVERNIGHT EVENTS: pt transferred from MICU. Pt is confused, but denies pain anywhere, no SOB, no chest pain.     ROS: All ROS negative     PHYSICAL EXAM:  T(C): 35.8, Max: 36.7 (10-30-21 @ 00:00)  HR: 65 (62 - 65)  BP: 183/74 (157/74 - 183/74)  RR: 18 (18 - 20)  SpO2: 97% (97% - 98%)    GENERAL: NAD, frail  PULMONARY/CHEST: No rales, rhonchi, wheezing  CARDIOVASC: Regular rate and rhythm; No murmurs  GI/ABDOMEN: +colostomy with greenish stool in bag. abd soft, nontender, nondistended; Bowel sounds present  EXTREMITIES: +3 pitting edema b/l. No calf tenderness b/l.  NERVOUS SYSTEM:  Alert & Oriented X1, no gross neurological  deficit   +Luna     chart reviewed    LABS:                        7.9    10.11 )-----------( 138      ( 30 Oct 2021 05:52 )             24.5     10-30    136  |  100  |  63<HH>  ----------------------------<  114<H>  3.5   |  22  |  6.9<HH>    Ca    8.2<L>      30 Oct 2021 05:52  Phos  4.6     10-30  Mg     1.8     10-30    TPro  4.8<L>  /  Alb  2.9<L>  /  TBili  0.2  /  DBili  x   /  AST  16  /  ALT  13  /  AlkPhos  66  10-30    PTT - ( 30 Oct 2021 05:52 )  PTT:60.9 sec    CAPILLARY BLOOD GLUCOSE      POCT Blood Glucose.: 125 mg/dL (30 Oct 2021 11:45)  POCT Blood Glucose.: 155 mg/dL (29 Oct 2021 21:26)      Culture - Urine (collected 28 Oct 2021 11:34)  Source: Clean Catch Clean Catch (Midstream)  Final Report (30 Oct 2021 00:19):    <10,000 CFU/mL Normal Urogenital Beronica      MEDICATIONS  (STANDING):  aspirin enteric coated 81 milliGRAM(s) Oral daily  atorvastatin Oral Tab/Cap - Peds 20 milliGRAM(s) Oral daily  calcium acetate 667 milliGRAM(s) Oral three times a day with meals  chlorhexidine 4% Liquid 1 Application(s) Topical daily  heparin  Infusion. 800 Unit(s)/Hr (8 mL/Hr) IV Continuous <Continuous>  pantoprazole    Tablet 40 milliGRAM(s) Oral before breakfast  sodium bicarbonate 1300 milliGRAM(s) Oral three times a day    MEDICATIONS  (PRN):  heparin   Injectable 5500 Unit(s) IV Push every 6 hours PRN For aPTT less than 40  heparin   Injectable 2500 Unit(s) IV Push every 6 hours PRN For aPTT between 40 - 57

## 2021-10-30 NOTE — PROGRESS NOTE ADULT - SUBJECTIVE AND OBJECTIVE BOX
Nephrology progress note    THIS IS AN INCOMPLETE NOTE . FULL NOTE TO FOLLOW SHORTLY    Patient is seen and examined, events over the last 24 h noted .    Allergies:  No Known Allergies    Hospital Medications:   MEDICATIONS  (STANDING):  aspirin enteric coated 81 milliGRAM(s) Oral daily  atorvastatin Oral Tab/Cap - Peds 20 milliGRAM(s) Oral daily  calcium acetate 667 milliGRAM(s) Oral three times a day with meals  chlorhexidine 4% Liquid 1 Application(s) Topical daily  heparin  Infusion 700 Unit(s)/Hr (8 mL/Hr) IV Continuous <Continuous>  pantoprazole    Tablet 40 milliGRAM(s) Oral before breakfast  sodium bicarbonate 1300 milliGRAM(s) Oral three times a day        VITALS:  T(F): 96.4 (10-30-21 @ 07:43), Max: 98.1 (10-30-21 @ 00:00)  HR: 65 (10-30-21 @ 07:43)  BP: 183/74 (10-30-21 @ 07:43)  RR: 18 (10-30-21 @ 07:43)  SpO2: 97% (10-29-21 @ 21:45)  Wt(kg): --    10-28 @ 07:  -  10-29 @ 07:00  --------------------------------------------------------  IN: 1281.5 mL / OUT: 1000 mL / NET: 281.5 mL    10-29 @ :  -  10-30 @ 07:00  --------------------------------------------------------  IN: 0 mL / OUT: 2200 mL / NET: -2200 mL          PHYSICAL EXAM:  Constitutional: NAD  HEENT: anicteric sclera, oropharynx clear, MMM  Neck: No JVD  Respiratory: CTAB, no wheezes, rales or rhonchi  Cardiovascular: S1, S2, RRR  Gastrointestinal: BS+, soft, NT/ND  Extremities: No cyanosis or clubbing. No peripheral edema  :  No sanford.   Skin: No rashes    LABS:  10-30    136  |  100  |  63<HH>  ----------------------------<  114<H>  3.5   |  22  |  6.9<HH>    Ca    8.2<L>      30 Oct 2021 05:52  Phos  4.6     10-30  Mg     1.8     10-30    TPro  4.8<L>  /  Alb  2.9<L>  /  TBili  0.2  /  DBili      /  AST  16  /  ALT  13  /  AlkPhos  66  10-30                          7.9    10.11 )-----------( 138      ( 30 Oct 2021 05:52 )             24.5       Urine Studies:  Urinalysis Basic - ( 28 Oct 2021 11:34 )    Color: Yellow / Appearance: Slightly Turbid / S.020 / pH:   Gluc:  / Ketone: Trace  / Bili: Negative / Urobili: <2 mg/dL   Blood:  / Protein: 300 mg/dL / Nitrite: Negative   Leuk Esterase: Moderate / RBC: 50 /HPF / WBC 75 /HPF   Sq Epi:  / Non Sq Epi: 2 /HPF / Bacteria: Few      Osmolality, Random Urine: 249 mos/kg (10-24 @ 18:44)  Sodium, Random Urine: 46.0 mmoL/L (10-24 @ 18:44)  Creatinine, Random Urine: 48 mg/dL (10-24 @ 18:44)    RADIOLOGY & ADDITIONAL STUDIES:   Nephrology progress note  Patient is seen and examined, events over the last 24 h noted .  Sitting in a chair outside his room   no chest pain no SOB     Allergies:  No Known Allergies    Hospital Medications:   MEDICATIONS  (STANDING):  aspirin enteric coated 81 milliGRAM(s) Oral daily  atorvastatin Oral Tab/Cap - Peds 20 milliGRAM(s) Oral daily  calcium acetate 667 milliGRAM(s) Oral three times a day with meals  chlorhexidine 4% Liquid 1 Application(s) Topical daily  heparin  Infusion 700 Unit(s)/Hr (8 mL/Hr) IV Continuous <Continuous>  pantoprazole    Tablet 40 milliGRAM(s) Oral before breakfast  sodium bicarbonate 1300 milliGRAM(s) Oral three times a day        VITALS:  T(F): 96.4 (10-30-21 @ 07:43), Max: 98.1 (10-30-21 @ 00:00)  HR: 65 (10-30-21 @ 07:43)  BP: 183/74 (10-30-21 @ 07:43)  RR: 18 (10-30-21 @ 07:43)  SpO2: 97% (10-29-21 @ 21:45)      10-28 @ 07:  -  10-29 @ 07:00  --------------------------------------------------------  IN: 1281.5 mL / OUT: 1000 mL / NET: 281.5 mL    10-29 @ 07:  -  10-30 @ 07:00  --------------------------------------------------------  IN: 0 mL / OUT: 2200 mL / NET: -2200 mL          PHYSICAL EXAM:  Constitutional: NAD  Neck: No JVD  Respiratory: CTAB,  Cardiovascular: S1, S2, RRR  Gastrointestinal: BS+, soft, NT/ND  Extremities: No cyanosis or clubbing. No peripheral edema  :  No sanford.   Skin: No rashes    LABS:  10-30    136  |  100  |  63<HH>  ----------------------------<  114<H>  3.5   |  22  |  6.9<HH>  Creatinine Trend: 6.9<--, 7.3<--, 8.1<--, 7.8<--, 8.5<--, 8.1<--  Ca    8.2<L>      30 Oct 2021 05:52  Phos  4.6     10-30  Mg     1.8     10-30    TPro  4.8<L>  /  Alb  2.9<L>  /  TBili  0.2  /  DBili      /  AST  16  /  ALT  13  /  AlkPhos  66  10-30                          7.9    10.11 )-----------( 138      ( 30 Oct 2021 05:52 )             24.5       Urine Studies:  Urinalysis Basic - ( 28 Oct 2021 11:34 )    Color: Yellow / Appearance: Slightly Turbid / S.020 / pH:   Gluc:  / Ketone: Trace  / Bili: Negative / Urobili: <2 mg/dL   Blood:  / Protein: 300 mg/dL / Nitrite: Negative   Leuk Esterase: Moderate / RBC: 50 /HPF / WBC 75 /HPF   Sq Epi:  / Non Sq Epi: 2 /HPF / Bacteria: Few      Osmolality, Random Urine: 249 mos/kg (10-24 @ 18:44)  Sodium, Random Urine: 46.0 mmoL/L (10-24 @ 18:44)  Creatinine, Random Urine: 48 mg/dL (10-24 @ 18:44)    RADIOLOGY & ADDITIONAL STUDIES:

## 2021-10-31 NOTE — PROGRESS NOTE ADULT - SUBJECTIVE AND OBJECTIVE BOX
CECILLE FRANKEL 85y Male  MRN#: [id number]   CODE STATUS: full code     Hospital Day: 8d    Pt is currently admitted with the primary diagnosis of     SUBJECTIVE    85 year old M with PMH of DM, Crohn s/p colostomy, CKD 4, HTN, HLD, BPH presents to ED with complaints of weakness. Pt states 10 days prior was diagnosed with UTI, placed on bactrim, Family states due to size of pill, pt was unable to swallow pill, only took 1/2 dose for 5 days. Family states over past few days pt becoming increasingly lethargic, having chills as well. Denies cough, chest pain, sob, abdominal pain, NVCD.  In the ED, CT a/p was done and showed Urinary bladder wall is thickened in the setting of under-distention. Intraluminal air is likely secondary to Sanford. If clinically warranted, correlate with UA to rule out acute cystitis. Labs were remarkable for Hb 6.2 (baseline ~8), Na 126, K 6.2, HCO3 8, AG 18, BUN 90, Cr 8.3 (baseline 2.3). UA was negative. He was given rocephin x1, 2L IVF bolus, insulin and dextrose, calcium gluconate x1, sodium bicarb x1, lokelma 10g x1, and lasix x1. He received 1U PRBC.     Hospital Course:    Patient was admitted to MICU for acute on chronic renal failure. Vascular surgery was consulted regarding Uldall placement but patient's JOSH and electrolytes improved on own without need for renal replacement therapy. He was on bicarb drip and his acidosis improved. He was downgraded to the stepdown unit. Per ID, he has been on ceftriaxone for pyelonephritis. He is on heparin drip due to history of DVT. Patient's mental status has been a concern as he seems to be delirious and has been pulling at lines and tubes. He is stable for downgrade to general medical floor.     Follow up:  -f/u recs per nephro  -on heparin drip; keep aPTT within goal 60-80  -c/w abx per ID (7 day course of ceftriaxone ends on 10/29).    now on general medical floor     no conmplaints overnight, remains confused   denying any sob, cp, nvd, no urinary sxs     blood in sanford catheter                                                 ----------------------------------------------------------  OBJECTIVE  PAST MEDICAL & SURGICAL HISTORY  History of medical problems  [place] b/l ears, dm, htn, hypercholesteremia, ckd, bowel/bladder fistula, kidney stones    Colostomy present    History of surgery  procedure for kidney stones ( ?lithotripsy)                                              -----------------------------------------------------------  ALLERGIES:  No Known Allergies                                            ------------------------------------------------------------    HOME MEDICATIONS  Home Medications:  amLODIPine 5 mg oral tablet: 1 tab(s) orally 2 times a day (01 Sep 2021 16:54)  Aspir 81 oral delayed release tablet: 1 tab(s) orally once a day (01 Sep 2021 16:54)  atorvastatin 10 mg oral tablet: 2 tab(s) orally once a day (01 Sep 2021 16:54)  famotidine 20 mg oral tablet: 1 tab(s) orally once a day (01 Sep 2021 16:54)  Januvia 50 mg oral tablet: 1 tab(s) orally once a day (01 Sep 2021 16:54)  Lasix 20 mg oral tablet: 1 tab(s) orally every other day (01 Sep 2021 16:54)  pioglitazone 30 mg oral tablet: 1 tab(s) orally once a day (01 Sep 2021 16:54)  ramipril 5 mg oral capsule: 2 cap(s) orally once a day (01 Sep 2021 16:54)                           MEDICATIONS:  STANDING MEDICATIONS  amLODIPine   Tablet 5 milliGRAM(s) Oral daily  aspirin enteric coated 81 milliGRAM(s) Oral daily  atorvastatin Oral Tab/Cap - Peds 20 milliGRAM(s) Oral daily  calcium acetate 667 milliGRAM(s) Oral three times a day with meals  chlorhexidine 4% Liquid 1 Application(s) Topical daily  heparin  Infusion. 800 Unit(s)/Hr IV Continuous <Continuous>  pantoprazole    Tablet 40 milliGRAM(s) Oral before breakfast  sodium bicarbonate 1300 milliGRAM(s) Oral three times a day    PRN MEDICATIONS  heparin   Injectable 5500 Unit(s) IV Push every 6 hours PRN  heparin   Injectable 2500 Unit(s) IV Push every 6 hours PRN                                            ------------------------------------------------------------  VITAL SIGNS: Last 24 Hours  T(C): 36.4 (31 Oct 2021 05:12), Max: 36.6 (30 Oct 2021 15:35)  T(F): 97.6 (31 Oct 2021 05:12), Max: 97.9 (30 Oct 2021 15:35)  HR: 71 (31 Oct 2021 05:12) (63 - 71)  BP: 163/72 (31 Oct 2021 05:12) (163/72 - 183/74)  BP(mean): --  RR: 18 (31 Oct 2021 05:12) (18 - 19)  SpO2: 92% (31 Oct 2021 05:12) (92% - 92%)      10-30-21 @ 07:01  -  10-31-21 @ 07:00  --------------------------------------------------------  IN: 380 mL / OUT: 1050 mL / NET: -670 mL                                             --------------------------------------------------------------  LABS:                        8.8    10.25 )-----------( 150      ( 30 Oct 2021 23:41 )             27.3     10-30    136  |  100  |  63<HH>  ----------------------------<  114<H>  3.5   |  22  |  6.9<HH>    Ca    8.2<L>      30 Oct 2021 05:52  Phos  4.6     10-30  Mg     1.8     10-30    TPro  4.8<L>  /  Alb  2.9<L>  /  TBili  0.2  /  DBili  x   /  AST  16  /  ALT  13  /  AlkPhos  66  10-30    PTT - ( 31 Oct 2021 00:07 )  PTT:39.9 sec            Culture - Urine (collected 28 Oct 2021 11:34)  Source: Clean Catch Clean Catch (Midstream)  Final Report (30 Oct 2021 00:19):    <10,000 CFU/mL Normal Urogenital Beronica                                                    -------------------------------------------------------------  RADIOLOGY:    EXAM:  XR CHEST PORTABLE URGENT 1V            PROCEDURE DATE:  10/28/2021            INTERPRETATION:  Clinical History / Reason for exam: Shortness of breath    Comparison : Chest radiograph 10/25/2021.    Technique/Positioning: Frontal chest radiograph.    Findings:    Support devices: None.    Cardiac/mediastinum/hilum: Unchanged    Lung parenchyma/Pleura: Persistent bibasilar opacities/effusions. No definite pneumothorax.    Skeleton/soft tissues: Unchanged    Impression:    Persistent bibasilar opacities/effusions.        --- End of Report ---              RAHAT REAL MD; Attending Radiologist  This document has been electronically signed. Oct 28 2021 11:02AM        EXAM:  DUPLEX SCAN EXT VEINS LOWER BI            PROCEDURE DATE:  10/25/2021            INTERPRETATION:  CLINICAL INFORMATION: The patient is a 85-year-old male with leg swelling. A venous duplex examination was performed to evaluate the patient for deep venous thrombosis of the lower extremities. Comparison is made to venous duplex done on 9/2/2021.    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    The common femoral, great saphenous, femoral, popliteal and small saphenous veins were visualized bilaterally with no evidence of deep venous thrombosis    All veins were fully compressible.  There was presence of spontaneous flow, augmentation with distal compression and phasicity.    The anterior tibial veins were  patent    The posterior tibial veins were  patent    The peroneal veins were patent.    Impression:  Acute thrombosis of the left posterior tibial and peroneal vein branches  Chronic deep vein thrombosis of the left common femoral and distalfemoral veins. .    ICD-10:M79.89    --- End of Report ---            CHRISTIN SIMMONS MD; Resident Radiologist  This document has been electronically signed.  DEACON LIN MD; Attending Vascular Surgeon  This document has been electronically signed. Oct 26 2021  3:16PM      EXAM:  CT ABDOMEN AND PELVIS            PROCEDURE DATE:  10/23/2021            INTERPRETATION:  CLINICAL STATEMENT: Abdominal pain, fever    TECHNIQUE: Contiguous axial CT images were obtained from the lower chest to the pubic symphysis without intravenous contrast.  Oral contrast was not administered.  Reformatted images in the coronal and sagittal planes were acquired.    Limited examination secondary to minimal intra-abdominal fat and lack of contrast.    COMPARISON CT: None.    FINDINGS:    LOWER CHEST: Small bilateral pleural effusions with adjacent atelectasis, greater on the right. Bibasilar atelectasis/scarring.    HEPATOBILIARY: Cholelithiasis..    SPLEEN: Unremarkable.    PANCREAS: Unremarkable.    ADRENAL GLANDS: Unremarkable.    KIDNEYS: Nonobstructing right renal calculus. No hydronephrosis bilaterally. Indeterminate bilateral exophytic renal lesions.    ABDOMINOPELVIC NODES: Unremarkable.    PELVIC ORGANS: Urinary bladder wall is collapsed with noted intraluminal Sanford andair. Prostate measures 5.67 m in transverse diameter.    PERITONEUM/MESENTERY/BOWEL: Large left-sided spigelian hernia containing large and small bowel. No evidence of bowel obstruction or pneumoperitoneum. No ascites. Colostomy noted in the left lower quadrant.    BONES/SOFT TISSUES: Sclerotic lesion is noted along bilateral iliac bones, likely bone islands. Degenerative changes of the visualized spine.    OTHER: IVC filter noted. Calcific atherosclerosis of the abdominal aorta.    IMPRESSION:    1.  Urinary bladder wall is thickened in the setting of under-distention. Intraluminal air is likely secondary to Sanford. If clinically warranted, correlate with UA to rule out acute cystitis.  2.  Bowel containing spigelian hernia without evidence of bowel obstruction or pneumoperitoneum.  3.  Small bilateral pleural effusions with adjacent atelectasis.  4.  Indeterminate bilateral renal lesions. Nonemergent reimaging with renal protocol recommended.    --- End of Report ---            MARBELLA CHILDERS DO; Resident Radiologist                                            --------------------------------------------------------------    PHYSICAL EXAM:  GENERAL: in NAD   HEENT: NCAT  LUNGS:  air entry bilaterally   HEART: RRR, +S1,S2, RRR  ABDOMEN: SNTTP, ND x 4 q's  EXT: Warm, well perfused x 4  NEURO: AxOx1, No FND's noted  SKIN: No new breakdown or rashes noted

## 2021-10-31 NOTE — PROGRESS NOTE ADULT - ATTENDING COMMENTS
Reported by nurse that overnight pt pulled out Luna that led to bleeding, which stopped for now. He also pulled colostomy bag.   This morning pt does not have active complaints.     GENERAL: NAD, frail  PULMONARY/CHEST: No rales, rhonchi, wheezing  CARDIOVASC: Regular rate and rhythm; No murmurs  GI/ABDOMEN: +colostomy with greenish stool in bag. abd soft, nontender, nondistended; Bowel sounds present  EXTREMITIES: +3 pitting edema b/l. No calf tenderness b/l.  NERVOUS SYSTEM:  Alert & Oriented X1, no gross neurological  deficit   +Luna, dry blood in meatus     Labs reviewed by me.  Creatinine, Serum: 6.3 (10-31)  Creatinine, Serum: 6.9 (10-30)  Creatinine, Serum: 7.3 (10-29)    85 year old M with PMH of nephrolithiasis, DM, Crohn s/p colostomy, CKD 4, HTN, HLD, BPH who presented with weakness. CT a/p showed bladder wall is thickened and patient found to have JOSH with creat 8.3. His electrolytes improved with no need for hemodialysis and he was downgraded from the ICU on 10/26.     # Altered mental status - mental status still fluctuates  - likely 2/2 metabolic encephalopathy and delirium  - avoid CNS depressant  - frequent re-orientation     # JOSH on CKD4   - non-oliguric  - creatinine trending down  - keep Luna for now to monitor strict UO  - nephro on board, recommendations followed   - cont bicarb 1500 TID  - monitor cr daily  - cont phoslo  - will add Lasix 40 mg PO QD     # Hypokalemia - supplement with Oral 40 meq     # Hypomagnesemia - supplement 2 gr IV.     # Acute pyelonephritis  # Abdominal wall cellulitis around colostomy  Blood & Urine cxs NGTD 10/23  - completed Rocephin     # Acute on chronic anemia, most likely ACD   - hgb 7.6 stable; s/p 2U pRBC this admission   - monitor Hb daily    # hx of DVT  on eliquis at home  c/w heparin drip; monitor aPTT; goal 60-80    # Drug monitoring - PTT supratherapeutic, drip held, repeat PTT and adjust rate accordingly     # DVT prophylaxis: Heparin drip   # Diet: renal diet  # Code status: full code  # Disposition: TBD, PT eval

## 2021-10-31 NOTE — PROGRESS NOTE ADULT - SUBJECTIVE AND OBJECTIVE BOX
Nephrology progress note    THIS IS AN INCOMPLETE NOTE . FULL NOTE TO FOLLOW SHORTLY    Patient is seen and examined, events over the last 24 h noted .    Allergies:  No Known Allergies    Hospital Medications:   MEDICATIONS  (STANDING):  amLODIPine   Tablet 5 milliGRAM(s) Oral daily  aspirin enteric coated 81 milliGRAM(s) Oral daily  atorvastatin Oral Tab/Cap - Peds 20 milliGRAM(s) Oral daily  calcium acetate 667 milliGRAM(s) Oral three times a day with meals  chlorhexidine 4% Liquid 1 Application(s) Topical daily  heparin  Infusion. 800 Unit(s)/Hr (8 mL/Hr) IV Continuous <Continuous>  pantoprazole    Tablet 40 milliGRAM(s) Oral before breakfast  sodium bicarbonate 1300 milliGRAM(s) Oral three times a day        VITALS:  T(F): 97.6 (10-31-21 @ 05:12), Max: 97.9 (10-30-21 @ 15:35)  HR: 71 (10-31-21 @ 05:12)  BP: 163/72 (10-31-21 @ 05:12)  RR: 18 (10-31-21 @ 05:12)  SpO2: 92% (10-31-21 @ 05:12)  Wt(kg): --    10-29 @ :  -  10-30 @ 07:00  --------------------------------------------------------  IN: 0 mL / OUT: 2200 mL / NET: -2200 mL    10-30 @ 07:  -  10-31 @ 07:00  --------------------------------------------------------  IN: 380 mL / OUT: 1050 mL / NET: -670 mL          PHYSICAL EXAM:  Constitutional: NAD  HEENT: anicteric sclera, oropharynx clear, MMM  Neck: No JVD  Respiratory: CTAB, no wheezes, rales or rhonchi  Cardiovascular: S1, S2, RRR  Gastrointestinal: BS+, soft, NT/ND  Extremities: No cyanosis or clubbing. No peripheral edema  :  No sanford.   Skin: No rashes    LABS:  10-30    136  |  100  |  63<HH>  ----------------------------<  114<H>  3.5   |  22  |  6.9<HH>    Ca    8.2<L>      30 Oct 2021 05:52  Phos  4.6     10-30  Mg     1.8     10-30    TPro  4.8<L>  /  Alb  2.9<L>  /  TBili  0.2  /  DBili      /  AST  16  /  ALT  13  /  AlkPhos  66  10-30                          8.8    10.25 )-----------( 150      ( 30 Oct 2021 23:41 )             27.3       Urine Studies:  Urinalysis Basic - ( 28 Oct 2021 11:34 )    Color: Yellow / Appearance: Slightly Turbid / S.020 / pH:   Gluc:  / Ketone: Trace  / Bili: Negative / Urobili: <2 mg/dL   Blood:  / Protein: 300 mg/dL / Nitrite: Negative   Leuk Esterase: Moderate / RBC: 50 /HPF / WBC 75 /HPF   Sq Epi:  / Non Sq Epi: 2 /HPF / Bacteria: Few      Osmolality, Random Urine: 249 mos/kg (10-24 @ 18:44)  Sodium, Random Urine: 46.0 mmoL/L (10-24 @ 18:44)  Creatinine, Random Urine: 48 mg/dL (10-24 @ 18:44)    RADIOLOGY & ADDITIONAL STUDIES:   Nephrology progress note    Patient is seen and examined, events over the last 24 h noted .  sitting in chair outside his room     Allergies:  No Known Allergies    Hospital Medications:   MEDICATIONS  (STANDING):  amLODIPine   Tablet 5 milliGRAM(s) Oral daily  aspirin enteric coated 81 milliGRAM(s) Oral daily  atorvastatin Oral Tab/Cap - Peds 20 milliGRAM(s) Oral daily  calcium acetate 667 milliGRAM(s) Oral three times a day with meals  heparin  Infusion. 800 Unit(s)/Hr (8 mL/Hr) IV Continuous <Continuous>  pantoprazole    Tablet 40 milliGRAM(s) Oral before breakfast  sodium bicarbonate 1300 milliGRAM(s) Oral three times a day        VITALS:  T(F): 97.6 (10-31-21 @ 05:12), Max: 97.9 (10-30-21 @ 15:35)  HR: 71 (10-31-21 @ 05:12)  BP: 163/72 (10-31-21 @ 05:12)  RR: 18 (10-31-21 @ 05:12)  SpO2: 92% (10-31-21 @ 05:12)      10-29 @ :  -  10-30 @ 07:00  --------------------------------------------------------  IN: 0 mL / OUT: 2200 mL / NET: -2200 mL    10-30 @ 07:  -  10-31 @ 07:00  --------------------------------------------------------  IN: 380 mL / OUT: 1050 mL / NET: -670 mL          PHYSICAL EXAM:  Constitutional: NAD  Neck: No JVD  Respiratory: CTAB, no wheezes, rales or rhonchi  Cardiovascular: S1, S2, RRR  Gastrointestinal: BS+, soft, NT/ND  Extremities: No cyanosis or clubbing. plus on eedema   :  positive sanford   Skin: No rashes    LABS:  10-31    135  |  97<L>  |  58<H>  ----------------------------<  259<H>  3.3<L>   |  19  |  6.3<HH>    Ca    8.1<L>      31 Oct 2021 04:30  Phos  3.8     10-31  Mg     1.6     10-31    TPro  5.3<L>  /  Alb  2.9<L>  /  TBili  0.3  /  DBili  x   /  AST  17  /  ALT  12  /  AlkPhos  65  10-31    10    136  |  100  |  63<HH>  ----------------------------<  114<H>  3.5   |  22  |  6.9<HH>    Ca    8.2<L>      30 Oct 2021 05:52  Phos  4.6     10  Mg     1.8     10-30    TPro  4.8<L>  /  Alb  2.9<L>  /  TBili  0.2  /  DBili      /  AST  16  /  ALT  13  /  AlkPhos  66  1030                          8.8    10.25 )-----------( 150      ( 30 Oct 2021 23:41 )             27.3       Urine Studies:  Urinalysis Basic - ( 28 Oct 2021 11:34 )    Color: Yellow / Appearance: Slightly Turbid / S.020 / pH:   Gluc:  / Ketone: Trace  / Bili: Negative / Urobili: <2 mg/dL   Blood:  / Protein: 300 mg/dL / Nitrite: Negative   Leuk Esterase: Moderate / RBC: 50 /HPF / WBC 75 /HPF   Sq Epi:  / Non Sq Epi: 2 /HPF / Bacteria: Few      Osmolality, Random Urine: 249 mos/kg (10-24 @ 18:44)  Sodium, Random Urine: 46.0 mmoL/L (10-24 @ 18:44)  Creatinine, Random Urine: 48 mg/dL (10-24 @ 18:44)    RADIOLOGY & ADDITIONAL STUDIES:

## 2021-10-31 NOTE — PROGRESS NOTE ADULT - ASSESSMENT
# JOSH on CKD 4 - likely ATN d/t severe sepsis / acute on chronic anemia  # Metabolic acidosis w/ anion gap   # Hyponatremia / low eav  # Acute on chronic anemia   # Acute pyelonephritis / Abdominal wall cellulitis   # Hx of crohn s/p colostomy    Recommendations:  - non oliguric, urine output improving  - creat imrpoved   - hyponatremia improving  - Monitor UOP  - cont sodium bicarb po 1300mg q8h   - cont phoslo, replete Mg to 2 as needed/ IP within target   - consider lasix 40 po q24h   - appreciate ID f/u, cont ceftriaxone  - CT noted w/o hydronephrosis  - no urgency for RRT   - RBC transfusion as needed to maintain hgb > 7, iron stores noted, does not need iv iron  - serum flc ratio noted wnl    will follow

## 2021-10-31 NOTE — PROGRESS NOTE ADULT - ASSESSMENT
Assessment     85 year old M with PMH of nephrolithiasis, DM, Crohn s/p colostomy, CKD 4, HTN, HLD, BPH who presented with weakness. CT a/p showed bladder wall is thickened and patient found to have JOSH with creat 8.3. His electrolytes improved with no need for hemodialysis and he was downgraded from the ICU on 10/26.   now with blood in [place] no new complaints     AMS   # altered mental status  likely 2/2 metabolic encephalopathy and delirium  -avoid CNS depressant  -monitor electrolytes   -strict Is and Os  - frequent re-orientaion     JOSH on CKD4   - non-oliguric  - creat improving   - bicarb 17 (goal is >22 in CKD patients)  - c/w sodium bicarbonate 1300 mg PO TID  - c/w phoslo  - nephro consult recs appreciated   - elevated serum free light chains: TORRES kappa 4.3, TORRES lambda 3.9     Acute pyelonephritis  Abdominal wall cellulitis around stoma  - Blood & Urine cxs NGTD 10/23  completed course of rocephin     Acute on chronic anemia   hgb 8.8 stable; s/p 2U pRBC this admission   maintain hgb >7    hx of DVT  on elliquis at home  c/w heparin drip; monitor aPTT; goal 60-80  - held for blood in [place] - restarted after stat cbc showed stable hgb   - fu cbc     # DVT prophylaxis: htt drip   # GI prophylaxis: pantoprazole  # Diet: renal diet  # Activity Score (AM-PAC)  # Code status: full code  # Disposition: acute for now            Assessment     85 year old M with PMH of nephrolithiasis, DM, Crohn s/p colostomy, CKD 4, HTN, HLD, BPH who presented with weakness. CT a/p showed bladder wall is thickened and patient found to have JOSH with creat 8.3. His electrolytes improved with no need for hemodialysis and he was downgraded from the ICU on 10/26.   now with blood in [place] no new complaints     AMS   # altered mental status  likely 2/2 metabolic encephalopathy and delirium  -avoid CNS depressant  -monitor electrolytes   -strict Is and Os  - frequent re-orientaion     JOSH on CKD4   - non-oliguric  - creat improving   - bicarb 17 (goal is >22 in CKD patients)  - c/w sodium bicarbonate 1300 mg PO TID  - c/w phoslo  - nephro consult recs appreciated   - elevated serum free light chains: TORRES kappa 4.3, TORRES lambda 3.9    #hematuria   unclear etiology   possibly from pulling at [place]   - continue hep drip for now   - monitor hgb closely      Acute pyelonephritis  Abdominal wall cellulitis around stoma  - Blood & Urine cxs NGTD 10/23  completed course of rocephin     Acute on chronic anemia   hgb 8.8 stable; s/p 2U pRBC this admission   maintain hgb >7    hx of DVT  on elliquis at home  c/w heparin drip; monitor aPTT; goal 60-80  - held for blood in [place] - restarted after stat cbc showed stable hgb   - fu cbc     # DVT prophylaxis: htt drip   # GI prophylaxis: pantoprazole  # Diet: renal diet  # Activity Score (AM-PAC)  # Code status: full code  # Disposition: acute for now

## 2021-11-01 NOTE — PROGRESS NOTE ADULT - ASSESSMENT
# JOSH on CKD 4 - likely ATN d/t severe sepsis / acute on chronic anemia  # Metabolic acidosis w/ anion gap   # Hyponatremia / low eav  # Acute on chronic anemia   # Acute pyelonephritis / Abdominal wall cellulitis   # Hx of crohn s/p colostomy    Recommendations:  - non oliguric, urine output improving  - creat imrpoved / continue lasix current   - hyponatremia resolved   - Monitor UOP  - cont sodium bicarb po 1300mg q8h   - cont phoslo, replete Mg to 2 as needed/ IP within target noted   - CT noted w/o hydronephrosis  - no urgency for RRT   - RBC transfusion as needed to maintain hgb > 7, iron stores noted, does not need iv iron  - serum flc ratio noted wnl    will follow

## 2021-11-01 NOTE — CHART NOTE - NSCHARTNOTEFT_GEN_A_CORE
10/31/21 11:30PM PTT result is listed under 10/31/21 3:30AM result instead of 11/1/21 3:30AM result.  Value is 41.7.  Lab made aware by MD of this error and asked to correct. 10/31/21 11:30PM PTT result is listed under 10/31/21 3:30AM result instead of 11/1/21 3:30AM result.  Value is 41.7.  Lab made aware by MD of this error and asked to correct.  Rate increased for 8 to 9 cc/hr per protocol.

## 2021-11-01 NOTE — PROGRESS NOTE ADULT - SUBJECTIVE AND OBJECTIVE BOX
CECILLE FRANKEL 85y Male  MRN#: [id number]   CODE STATUS: full code     Hospital Day: 9d    Pt is currently admitted with the primary diagnosis of JOSH     SUBJECTIVE    blood on his sheet underlying rectum.  No other events overnight denies any chest pain, sob, nvd, no abdominal pain.     Present Today:   - Luna:  No [  ], Yes [ x  ] : Indication: acute renal failure                                             ----------------------------------------------------------  OBJECTIVE  PAST MEDICAL & SURGICAL HISTORY  History of medical problems  [place] b/l ears, dm, htn, hypercholesteremia, ckd, bowel/bladder fistula, kidney stones    Colostomy present    History of surgery  procedure for kidney stones ( ?lithotripsy)                                              -----------------------------------------------------------  ALLERGIES:  No Known Allergies                                            ------------------------------------------------------------    HOME MEDICATIONS  Home Medications:  amLODIPine 5 mg oral tablet: 1 tab(s) orally 2 times a day (01 Sep 2021 16:54)  Aspir 81 oral delayed release tablet: 1 tab(s) orally once a day (01 Sep 2021 16:54)  atorvastatin 10 mg oral tablet: 2 tab(s) orally once a day (01 Sep 2021 16:54)  famotidine 20 mg oral tablet: 1 tab(s) orally once a day (01 Sep 2021 16:54)  Januvia 50 mg oral tablet: 1 tab(s) orally once a day (01 Sep 2021 16:54)  Lasix 20 mg oral tablet: 1 tab(s) orally every other day (01 Sep 2021 16:54)  pioglitazone 30 mg oral tablet: 1 tab(s) orally once a day (01 Sep 2021 16:54)  ramipril 5 mg oral capsule: 2 cap(s) orally once a day (01 Sep 2021 16:54)                           MEDICATIONS:  STANDING MEDICATIONS  amLODIPine   Tablet 5 milliGRAM(s) Oral daily  aspirin enteric coated 81 milliGRAM(s) Oral daily  atorvastatin Oral Tab/Cap - Peds 20 milliGRAM(s) Oral daily  calcium acetate 667 milliGRAM(s) Oral three times a day with meals  chlorhexidine 4% Liquid 1 Application(s) Topical daily  furosemide    Tablet 40 milliGRAM(s) Oral daily  heparin  Infusion 600 Unit(s)/Hr IV Continuous <Continuous>  hydrALAZINE 25 milliGRAM(s) Oral three times a day  pantoprazole    Tablet 40 milliGRAM(s) Oral before breakfast  sodium bicarbonate 1300 milliGRAM(s) Oral three times a day    PRN MEDICATIONS  acetaminophen     Tablet .. 650 milliGRAM(s) Oral every 6 hours PRN  heparin   Injectable 5500 Unit(s) IV Push every 6 hours PRN  heparin   Injectable 2500 Unit(s) IV Push every 6 hours PRN                                            ------------------------------------------------------------  VITAL SIGNS: Last 24 Hours  T(C): 35.7 (01 Nov 2021 08:26), Max: 36.1 (01 Nov 2021 04:46)  T(F): 96.2 (01 Nov 2021 08:26), Max: 97 (01 Nov 2021 04:46)  HR: 84 (01 Nov 2021 08:26) (70 - 84)  BP: 140/84 (01 Nov 2021 08:26) (140/84 - 175/76)  BP(mean): --  RR: 18 (01 Nov 2021 08:26) (18 - 18)  SpO2: 95% (01 Nov 2021 08:26) (95% - 95%)      10-31-21 @ 07:01  -  11-01-21 @ 07:00  --------------------------------------------------------  IN: 260 mL / OUT: 600 mL / NET: -340 mL    11-01-21 @ 07:01  -  11-01-21 @ 11:12  --------------------------------------------------------  IN: 27 mL / OUT: 0 mL / NET: 27 mL                                             --------------------------------------------------------------  LABS:                        8.2    8.44  )-----------( 139      ( 01 Nov 2021 04:30 )             25.4     11-01    142  |  103  |  57<H>  ----------------------------<  103<H>  3.5   |  21  |  6.2<HH>    Ca    8.5      01 Nov 2021 04:30  Phos  3.9     11-01  Mg     1.8     11-01    TPro  4.8<L>  /  Alb  2.7<L>  /  TBili  0.3  /  DBili  x   /  AST  17  /  ALT  12  /  AlkPhos  61  11-01    PTT - ( 01 Nov 2021 04:30 )  PTT:35.4 sec                                                          -------------------------------------------------------------  RADIOLOGY:    EXAM:  XR CHEST PORTABLE URGENT 1V            PROCEDURE DATE:  10/28/2021            INTERPRETATION:  Clinical History / Reason for exam: Shortness of breath    Comparison : Chest radiograph 10/25/2021.    Technique/Positioning: Frontal chest radiograph.    Findings:    Support devices: None.    Cardiac/mediastinum/hilum: Unchanged    Lung parenchyma/Pleura: Persistent bibasilar opacities/effusions. No definite pneumothorax.    Skeleton/soft tissues: Unchanged    Impression:    Persistent bibasilar opacities/effusions.        --- End of Report ---              RAHAT REAL MD; Attending Radiologist  This document has been electronically signed. Oct 28 2021 11:02AM      EXAM:  DUPLEX SCAN EXT VEINS LOWER BI            PROCEDURE DATE:  10/25/2021            INTERPRETATION:  CLINICAL INFORMATION: The patient is a 85-year-old male with leg swelling. A venous duplex examination was performed to evaluate the patient for deep venous thrombosis of the lower extremities. Comparison is made to venous duplex done on 9/2/2021.    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    The common femoral, great saphenous, femoral, popliteal and small saphenous veins were visualized bilaterally with no evidence of deep venous thrombosis    All veins were fully compressible.  There was presence of spontaneous flow, augmentation with distal compression and phasicity.    The anterior tibial veins were  patent    The posterior tibial veins were  patent    The peroneal veins were patent.    Impression:  Acute thrombosis of the left posterior tibial and peroneal vein branches  Chronic deep vein thrombosis of the left common femoral and distalfemoral veins. .    ICD-10:M79.89    --- End of Report ---                                                    --------------------------------------------------------------    PHYSICAL EXAM:  GENERAL: confused, in NAD   HEENT: NCAT  LUNGS: CTAB, Good air entry bilaterally   HEART: RRR, +S1,S2, RRR  ABDOMEN: SNTTP, ND x 4 q's, colostomy bag left lower quadrant, no swelling induration erythema, stool in bag  EXT: bilateral lower extremity pitting edema   NEURO: AxOx1, No FND's noted  SKIN: No new breakdown or rashes noted                                          --------------------------------------------------------------

## 2021-11-01 NOTE — PHARMACOTHERAPY INTERVENTION NOTE - COMMENTS
Md was change incorrect rate of Heparin from 0.08ml/hr to 8ml/hr.
Spoke with resident to put INR order; warfarin ordered without an INR
K 4.4-d/w med team, recommended d/c Lokelma

## 2021-11-01 NOTE — PROGRESS NOTE ADULT - ATTENDING COMMENTS
NO events overnight reported. Pt still confused, waxing/waning.   He does not have active complaints.   ROS negative, but pt is confused.     GENERAL: NAD, frail  PULMONARY/CHEST: No rales, rhonchi, wheezing  CARDIOVASC: Regular rate and rhythm; No murmurs  GI/ABDOMEN: +colostomy with greenish stool in bag. abd soft, nontender, nondistended; Bowel sounds present  EXTREMITIES: +3 pitting edema b/l up to midshin (better). No calf tenderness b/l.  NERVOUS SYSTEM:  Alert & Oriented X1, no gross neurological  deficit   +Luna     Labs reviewed by me.  Creatinine, Serum: 6.3 (10-31)  Creatinine, Serum: 6.9 (10-30)  Creatinine, Serum: 7.3 (10-29)    85 year old M with PMH of nephrolithiasis, DM, Crohn s/p colostomy, CKD 4, HTN, HLD, BPH who presented with weakness. CT a/p showed bladder wall is thickened and patient found to have JOSH with creat 8.3. His electrolytes improved with no need for hemodialysis and he was downgraded from the ICU on 10/26.     # Altered mental status - mental status still fluctuates  - likely 2/2 metabolic encephalopathy and delirium  - avoid CNS depressant  - frequent re-orientation     # JOSH on CKD4   - non-oliguric  - creatinine trending down slow  - keep Luna for now to monitor strict UO  - UO ~600 cc in last 24 hrs   - nephro on board, recommendations followed   - cont bicarb 1500 TID  - monitor cr daily  - cont phoslo  - cont Lasix 40 mg PO QD     # Hypokalemia - supplement     # Hypomagnesemia - supplement     # Acute pyelonephritis  # Abdominal wall cellulitis around colostomy  Blood & Urine cxs NGTD 10/23  - completed Rocephin     # Acute on chronic anemia, most likely ACD   - hgb 7.6 stable; s/p 2U pRBC this admission   - monitor Hb daily    # hx of DVT  on eliquis at home  c/w heparin drip; monitor aPTT; goal 60-80    # Drug monitoring - PTT supratherapeutic, drip held, repeat PTT and adjust rate accordingly     # DVT prophylaxis: Heparin drip   # Diet: renal diet  # Code status: full code  # Disposition: TBD, PT eval . NO events overnight reported. Pt still confused, waxing/waning.   He does not have active complaints.   ROS negative, but pt is confused.     GENERAL: NAD, frail  PULMONARY/CHEST: No rales, rhonchi, wheezing  CARDIOVASC: Regular rate and rhythm; No murmurs  GI/ABDOMEN: +colostomy with greenish stool in bag. abd soft, nontender, nondistended; Bowel sounds present  EXTREMITIES: +3 pitting edema b/l up to midshin (better). No calf tenderness b/l.  NERVOUS SYSTEM:  Alert & Oriented X1, no gross neurological  deficit   +Luna     Labs reviewed by me.  Creatinine, Serum: 6.3 (10-31)  Creatinine, Serum: 6.9 (10-30)  Creatinine, Serum: 7.3 (10-29)    85 year old M with PMH of nephrolithiasis, DM, Crohn s/p colostomy, CKD 4, HTN, HLD, BPH who presented with weakness. CT a/p showed bladder wall is thickened and patient found to have JOSH with creat 8.3. His electrolytes improved with no need for hemodialysis and he was downgraded from the ICU on 10/26.     # Altered mental status - mental status still fluctuates  - likely 2/2 metabolic encephalopathy and delirium  - avoid CNS depressant  - frequent re-orientation     # JOSH on CKD4   - non-oliguric  - creatinine trending down slow  - oral hydration encourged  - keep Luna for now to monitor strict UO, UO ~600 cc in last 24 hrs   - nephro on board, recommendations followed   - cont bicarb 1500 TID, cont phoslo  - monitor cr daily  - cont Lasix 40 mg PO QD     # Hypokalemia - supplemented     # Hypomagnesemia - supplemented    # Acute pyelonephritis  # Abdominal wall cellulitis around colostomy  Blood & Urine cxs NGTD 10/23  - completed Rocephin     # Acute on chronic anemia, most likely ACD   - hgb 7.6 stable; s/p 2U pRBC this admission   - monitor Hb daily    # hx of DVT  on eliquis at home  c/w heparin drip; monitor aPTT; goal 60-80  will start Coumadin bridging, doubt his creatinine will improve enough to start DOAC    # Drug monitoring - monitor PTT and INR.      # DVT prophylaxis: Heparin drip   # Diet: renal diet  # Code status: full code  # Disposition: TBD, PT eval .

## 2021-11-01 NOTE — PROGRESS NOTE ADULT - ASSESSMENT
Assessment     85 year old M with PMH of nephrolithiasis, DM, Crohn s/p colostomy, CKD 4, HTN, HLD, BPH who presented with weakness. CT a/p showed bladder wall is thickened and patient found to have JOSH with creat 8.3. His electrolytes improved with no need for hemodialysis and he was downgraded from the ICU on 10/26.   blood on his sheet at rectum ERAN positive for small amount of bright red blood, HD stable, afebrile, kidney function improving     AMS  JOSH / CKD4  chronic anemia   hematuria   BRBPR  acute and chronic DVT   HO DM   HO chrons disease s/p colectomy   BPH       AMS   # altered mental status  likely 2/2 metabolic encephalopathy and delirium  -avoid CNS depressant  -monitor electrolytes   -strict Is and Os  - frequent re-orientaion     JOSH on CKD4   - non-oliguric  - creat improving 6.2 today baseline 2.2   - bicarb 21 (goal is >22 in CKD patients)  - nephro consult recs appreciated   - c/w sodium bicarbonate 1300 mg PO TID  - c/w phoslo 667mg tid   - furosemide 40mg po qd     Acute pyelonephritis - resolved   Abdominal wall cellulitis around stoma- resolved   - Blood & Urine cxs NGTD 10/23  - completed course of rocephin 10 days     #Acute on chronic anemia   hgb 8.8 stable; s/p 2U pRBC this admission   maintain hgb >7    #hematuria   likely from pulling at [facility]   - continue hep drip for now   - monitor hgb closely     #BRBPR  HD stable  - Eran positive for minimal bright red blood   - hold heparin drip for now   - repeat cbc at 11     #acute and chronic DVT  on elliquis at home  c/w heparin drip on hold for now   - fu cbc     # DVT prophylaxis: htt drip   # GI prophylaxis: pantoprazole  # Diet: renal diet  # Activity Score (AM-PAC)  # Code status: full code  # Disposition: acute for now        Assessment     85 year old M with PMH of nephrolithiasis, DM, Crohn s/p colostomy, CKD 4, HTN, HLD, BPH who presented with weakness. CT a/p showed bladder wall is thickened and patient found to have JOSH with creat 8.3. His electrolytes improved with no need for hemodialysis and he was downgraded from the ICU on 10/26.   blood on his sheet at rectum ERAN positive for small amount of bright red blood, HD stable, afebrile, kidney function improving     AMS  JOSH / CKD4  chronic anemia   hematuria   BRBPR  acute and chronic DVT   HO DM   HO chrons disease s/p colectomy   BPH       AMS   # altered mental status  likely 2/2 metabolic encephalopathy and delirium  -avoid CNS depressant  -monitor electrolytes   -strict Is and Os  - frequent re-orientaion     JOSH on CKD4   - non-oliguric  - creat improving 6.2 today baseline 2.2   - bicarb 21 (goal is >22 in CKD patients)  - nephro consult recs appreciated   - c/w sodium bicarbonate 1300 mg PO TID  - c/w phoslo 667mg tid   - furosemide 40mg po qd     Acute pyelonephritis - resolved   Abdominal wall cellulitis around stoma- resolved   - Blood & Urine cxs NGTD 10/23  - completed course of rocephin 10 days     #Acute on chronic anemia   hgb 8.8 stable; s/p 2U pRBC this admission   maintain hgb >7    #hematuria   likely from pulling at [facility]   - continue hep drip for now   - monitor hgb closely     #BRBPR  HD stable  - Eran positive for minimal bright red blood   - hold heparin drip for now   - repeat cbc at 11     #acute and chronic DVT  on elliquis at home  c/w heparin drip on hold for now   - fu cbc     #HLD   - atovastatin 40mg qd   - aspirin 81mg     #HTN   - amlodipine 5mg qd   - hydralazine 25mg tid     # DVT prophylaxis: htt drip   # GI prophylaxis: pantoprazole  # Diet: renal diet  # Activity Score (AM-PAC)  # Code status: full code  # Disposition: acute for now

## 2021-11-01 NOTE — PROGRESS NOTE ADULT - SUBJECTIVE AND OBJECTIVE BOX
Nephrology progress note    THIS IS AN INCOMPLETE NOTE . FULL NOTE TO FOLLOW SHORTLY    Patient is seen and examined, events over the last 24 h noted .    Allergies:  No Known Allergies    Hospital Medications:   MEDICATIONS  (STANDING):  amLODIPine   Tablet 5 milliGRAM(s) Oral daily  aspirin enteric coated 81 milliGRAM(s) Oral daily  atorvastatin Oral Tab/Cap - Peds 20 milliGRAM(s) Oral daily  calcium acetate 667 milliGRAM(s) Oral three times a day with meals  chlorhexidine 4% Liquid 1 Application(s) Topical daily  furosemide    Tablet 40 milliGRAM(s) Oral daily  heparin  Infusion 600 Unit(s)/Hr (9 mL/Hr) IV Continuous <Continuous>  hydrALAZINE 25 milliGRAM(s) Oral three times a day  pantoprazole    Tablet 40 milliGRAM(s) Oral before breakfast  sodium bicarbonate 1300 milliGRAM(s) Oral three times a day        VITALS:  T(F): 96.2 (21 @ 08:26), Max: 97 (21 @ 04:46)  HR: 84 (21 @ 08:26)  BP: 140/84 (21 @ 08:26)  RR: 18 (21 @ 08:26)  SpO2: 95% (21 @ 08:26)  Wt(kg): --    10-30 @ :  -  10-31 @ 07:00  --------------------------------------------------------  IN: 380 mL / OUT: 1050 mL / NET: -670 mL    10-31 @ 07:  -   @ 07:00  --------------------------------------------------------  IN: 260 mL / OUT: 600 mL / NET: -340 mL     @ 07:  -   @ 09:31  --------------------------------------------------------  IN: 27 mL / OUT: 0 mL / NET: 27 mL          PHYSICAL EXAM:  Constitutional: NAD  HEENT: anicteric sclera, oropharynx clear, MMM  Neck: No JVD  Respiratory: CTAB, no wheezes, rales or rhonchi  Cardiovascular: S1, S2, RRR  Gastrointestinal: BS+, soft, NT/ND  Extremities: No cyanosis or clubbing. No peripheral edema  :  No [place].   Skin: No rashes    LABS:      142  |  103  |  57<H>  ----------------------------<  103<H>  3.5   |  21  |  x     Ca    8.5      2021 04:30  Phos  3.9       Mg     1.8         TPro  4.8<L>  /  Alb  2.7<L>  /  TBili  0.3  /  DBili      /  AST  17  /  ALT  12  /  AlkPhos  61                            8.2    8.44  )-----------( 139      ( 2021 04:30 )             25.4       Urine Studies:  Urinalysis Basic - ( 28 Oct 2021 11:34 )    Color: Yellow / Appearance: Slightly Turbid / S.020 / pH:   Gluc:  / Ketone: Trace  / Bili: Negative / Urobili: <2 mg/dL   Blood:  / Protein: 300 mg/dL / Nitrite: Negative   Leuk Esterase: Moderate / RBC: 50 /HPF / WBC 75 /HPF   Sq Epi:  / Non Sq Epi: 2 /HPF / Bacteria: Few        RADIOLOGY & ADDITIONAL STUDIES:   Nephrology progress note    Patient is seen and examined, events over the last 24 h noted .  sitting in chair comfortable     Allergies:  No Known Allergies    Hospital Medications:   MEDICATIONS  (STANDING):  amLODIPine   Tablet 5 milliGRAM(s) Oral daily  aspirin enteric coated 81 milliGRAM(s) Oral daily  atorvastatin Oral Tab/Cap - Peds 20 milliGRAM(s) Oral daily  calcium acetate 667 milliGRAM(s) Oral three times a day with meals  furosemide    Tablet 40 milliGRAM(s) Oral daily  heparin  Infusion 600 Unit(s)/Hr (9 mL/Hr) IV Continuous <Continuous>  hydrALAZINE 25 milliGRAM(s) Oral three times a day  pantoprazole    Tablet 40 milliGRAM(s) Oral before breakfast  sodium bicarbonate 1300 milliGRAM(s) Oral three times a day        VITALS:  T(F): 96.2 (21 @ 08:26), Max: 97 (21 @ 04:46)  HR: 84 (21 @ 08:26)  BP: 140/84 (21 @ 08:26)  RR: 18 (21 @ 08:26)  SpO2: 95% (21 @ 08:26)      10-30 @ :  -  10-31 @ 07:00  --------------------------------------------------------  IN: 380 mL / OUT: 1050 mL / NET: -670 mL    10-31 @ :  -   @ 07:00  --------------------------------------------------------  IN: 260 mL / OUT: 600 mL / NET: -340 mL     @ 07:01  -   @ 09:31  --------------------------------------------------------  IN: 27 mL / OUT: 0 mL / NET: 27 mL          PHYSICAL EXAM:  Constitutional: NAD  Neck: No JVD  Respiratory: CTAB,   Cardiovascular: S1, S2, RRR  Gastrointestinal: BS+, soft, NT/ND  Extremities: No cyanosis or clubbing. plus one edema   :  No [place].   Skin: No rashes    LABS:      142  |  103  |  57<H>  ----------------------------<  103<H>  3.5   |  21  |  6.2<HH>    Creatinine Trend: 6.2<--, 6.3<--, 6.9<--, 7.3<--, 8.1<--, 7.8<--    Ca    8.5      2021 04:30  Phos  3.9       Mg     1.8             TPro  4.8<L>  /  Alb  2.7<L>  /  TBili  0.3  /  DBili      /  AST  17  /  ALT  12  /  AlkPhos  61                            8.2    8.44  )-----------( 139      ( 2021 04:30 )             25.4       Urine Studies:  Urinalysis Basic - ( 28 Oct 2021 11:34 )    Color: Yellow / Appearance: Slightly Turbid / S.020 / pH:   Gluc:  / Ketone: Trace  / Bili: Negative / Urobili: <2 mg/dL   Blood:  / Protein: 300 mg/dL / Nitrite: Negative   Leuk Esterase: Moderate / RBC: 50 /HPF / WBC 75 /HPF   Sq Epi:  / Non Sq Epi: 2 /HPF / Bacteria: Few        RADIOLOGY & ADDITIONAL STUDIES:

## 2021-11-02 NOTE — PROGRESS NOTE ADULT - SUBJECTIVE AND OBJECTIVE BOX
CECILLE FRANKEL 85y Male  MRN#: [id number]   CODE STATUS: full code     Hospital Day: 10d    Pt is currently admitted with the primary diagnosis of arf     SUBJECTIVE    no events overnight, denies any fever chills nausea vomiting diarrhea, no chest pain sob,   Present Today:   - Luna:  No [  ], Yes [x   ] : Indication: retention                                                 ----------------------------------------------------------  OBJECTIVE  PAST MEDICAL & SURGICAL HISTORY  History of medical problems  [place] b/l ears, dm, htn, hypercholesteremia, ckd, bowel/bladder fistula, kidney stones    Colostomy present    History of surgery  procedure for kidney stones ( ?lithotripsy)                                              -----------------------------------------------------------  ALLERGIES:  No Known Allergies                                            ------------------------------------------------------------    HOME MEDICATIONS  Home Medications:  amLODIPine 5 mg oral tablet: 1 tab(s) orally 2 times a day (01 Sep 2021 16:54)  Aspir 81 oral delayed release tablet: 1 tab(s) orally once a day (01 Sep 2021 16:54)  atorvastatin 10 mg oral tablet: 2 tab(s) orally once a day (01 Sep 2021 16:54)  famotidine 20 mg oral tablet: 1 tab(s) orally once a day (01 Sep 2021 16:54)  Januvia 50 mg oral tablet: 1 tab(s) orally once a day (01 Sep 2021 16:54)  Lasix 20 mg oral tablet: 1 tab(s) orally every other day (01 Sep 2021 16:54)  pioglitazone 30 mg oral tablet: 1 tab(s) orally once a day (01 Sep 2021 16:54)  ramipril 5 mg oral capsule: 2 cap(s) orally once a day (01 Sep 2021 16:54)                           MEDICATIONS:  STANDING MEDICATIONS  amLODIPine   Tablet 5 milliGRAM(s) Oral daily  aspirin enteric coated 81 milliGRAM(s) Oral daily  atorvastatin Oral Tab/Cap - Peds 20 milliGRAM(s) Oral daily  calcium acetate 667 milliGRAM(s) Oral three times a day with meals  chlorhexidine 4% Liquid 1 Application(s) Topical daily  furosemide    Tablet 40 milliGRAM(s) Oral daily  heparin  Infusion 600 Unit(s)/Hr IV Continuous <Continuous>  hydrALAZINE 25 milliGRAM(s) Oral three times a day  pantoprazole    Tablet 40 milliGRAM(s) Oral before breakfast  sodium bicarbonate 1300 milliGRAM(s) Oral three times a day  warfarin 5 milliGRAM(s) Oral once    PRN MEDICATIONS  acetaminophen     Tablet .. 650 milliGRAM(s) Oral every 6 hours PRN  heparin   Injectable 5500 Unit(s) IV Push every 6 hours PRN  heparin   Injectable 2500 Unit(s) IV Push every 6 hours PRN                                            ------------------------------------------------------------  VITAL SIGNS: Last 24 Hours  T(C): 35.6 (02 Nov 2021 07:36), Max: 36.6 (02 Nov 2021 00:00)  T(F): 96.1 (02 Nov 2021 07:36), Max: 97.9 (02 Nov 2021 00:00)  HR: 81 (02 Nov 2021 07:36) (68 - 81)  BP: 162/72 (02 Nov 2021 07:36) (159/70 - 170/73)  BP(mean): --  RR: 18 (02 Nov 2021 00:00) (18 - 18)  SpO2: --      11-01-21 @ 07:01  -  11-02-21 @ 07:00  --------------------------------------------------------  IN: 622 mL / OUT: 950 mL / NET: -328 mL                                             --------------------------------------------------------------  LABS:                        9.5    10.59 )-----------( 167      ( 02 Nov 2021 07:01 )             30.0     11-01    142  |  103  |  57<H>  ----------------------------<  103<H>  3.5   |  21  |  6.2<HH>    Ca    8.5      01 Nov 2021 04:30  Phos  3.9     11-01  Mg     1.8     11-01    TPro  4.8<L>  /  Alb  2.7<L>  /  TBili  0.3  /  DBili  x   /  AST  17  /  ALT  12  /  AlkPhos  61  11-01    PT/INR - ( 02 Nov 2021 07:01 )   PT: 12.10 sec;   INR: 1.05 ratio         PTT - ( 02 Nov 2021 07:01 )  PTT:73.7 sec                                                          -------------------------------------------------------------  RADIOLOGY:    EXAM:  XR CHEST PORTABLE URGENT 1V            PROCEDURE DATE:  10/28/2021            INTERPRETATION:  Clinical History / Reason for exam: Shortness of breath    Comparison : Chest radiograph 10/25/2021.    Technique/Positioning: Frontal chest radiograph.    Findings:    Support devices: None.    Cardiac/mediastinum/hilum: Unchanged    Lung parenchyma/Pleura: Persistent bibasilar opacities/effusions. No definite pneumothorax.    Skeleton/soft tissues: Unchanged    Impression:    Persistent bibasilar opacities/effusions.        --- End of Report ---                                                    --------------------------------------------------------------    PHYSICAL EXAM:  GENERAL: , in NAD   HEENT: NCAT  LUNGS: CTAB, Good air entry bilaterally   HEART: RRR, +S1,S2, RRR  ABDOMEN: SNTTP, ND x 4 q's  EXT: Warm, well perfused x 4  NEURO: AxOx1, No FND's noted  SKIN: No new breakdown or rashes noted                                          --------------------------------------------------------------

## 2021-11-02 NOTE — PROGRESS NOTE ADULT - SUBJECTIVE AND OBJECTIVE BOX
Nephrology progress note    Patient is seen and examined, events over the last 24 h noted .  Pt knows his kidney is not doing well, but overall feels ok and he "is not in a rush for HD" His wife was on HD  Allergies:  No Known Allergies    Hospital Medications:   MEDICATIONS  (STANDING):  amLODIPine   Tablet 5 milliGRAM(s) Oral daily  aspirin enteric coated 81 milliGRAM(s) Oral daily  atorvastatin Oral Tab/Cap - Peds 20 milliGRAM(s) Oral daily  calcium acetate 667 milliGRAM(s) Oral three times a day with meals  chlorhexidine 4% Liquid 1 Application(s) Topical daily  furosemide    Tablet 40 milliGRAM(s) Oral daily  heparin  Infusion 600 Unit(s)/Hr (9 mL/Hr) IV Continuous <Continuous>  hydrALAZINE 25 milliGRAM(s) Oral three times a day  pantoprazole    Tablet 40 milliGRAM(s) Oral before breakfast  sodium bicarbonate 1300 milliGRAM(s) Oral three times a day  warfarin 5 milliGRAM(s) Oral once        VITALS:  T(F): 96.1 (21 @ 07:36), Max: 97.9 (21 @ 00:00)  HR: 81 (21 @ 07:36)  BP: 162/72 (21 @ 07:36)  RR: 18 (21 @ 00:00)  SpO2: --  Wt(kg): --    10-31 @ :  -   @ 07:00  --------------------------------------------------------  IN: 260 mL / OUT: 600 mL / NET: -340 mL     @ :  -   @ 07:00  --------------------------------------------------------  IN: 622 mL / OUT: 950 mL / NET: -328 mL     @ 07:  -   @ 10:39  --------------------------------------------------------  IN: 120 mL / OUT: 0 mL / NET: 120 mL          PHYSICAL EXAM:  Constitutional: NAD  HEENT: anicteric sclera  Neck: No JVD  Respiratory: CTA  Cardiovascular: S1, S2, RRR  Gastrointestinal: BS+, soft, NT/ND  Extremities: mild peripheral edema  Neurological: A/O x 3  : has [place].   Skin: No rashes  Vascular Access:    LABS:      140  |  98  |  55<H>  ----------------------------<  128<H>  3.4<L>   |  23  |  6.0<HH>    Ca    8.5      2021 07:01  Phos  3.9       Mg     1.7         TPro  5.2<L>  /  Alb  3.0<L>  /  TBili  0.4  /  DBili      /  AST  16  /  ALT  12  /  AlkPhos  71                            9.5    10.59 )-----------( 167      ( 2021 07:01 )             30.0       Urine Studies:  Urinalysis Basic - ( 28 Oct 2021 11:34 )    Color: Yellow / Appearance: Slightly Turbid / S.020 / pH:   Gluc:  / Ketone: Trace  / Bili: Negative / Urobili: <2 mg/dL   Blood:  / Protein: 300 mg/dL / Nitrite: Negative   Leuk Esterase: Moderate / RBC: 50 /HPF / WBC 75 /HPF   Sq Epi:  / Non Sq Epi: 2 /HPF / Bacteria: Few        RADIOLOGY & ADDITIONAL STUDIES:  < from: Xray Chest 1 View- PORTABLE-Urgent (Xray Chest 1 View- PORTABLE-Urgent .) (10.28.21 @ 10:00) >    Persistent bibasilar opacities/effusions.    < end of copied text >  < from: CT Abdomen and Pelvis No Cont (10.23.21 @ 17:23) >    1.  Urinary bladder wall is thickened in the setting of under-distention. Intraluminal air is likely secondary to [place]. If clinically warranted, correlate with UA to rule out acute cystitis.  2.  Bowel containing spigelian hernia without evidence of bowel obstruction or pneumoperitoneum.  3.  Small bilateral pleural effusions with adjacent atelectasis.  4.  Indeterminate bilateral renal lesions. Nonemergent reimaging with renal protocol recommended.    < end of copied text >

## 2021-11-02 NOTE — PROGRESS NOTE ADULT - ASSESSMENT
Assessment     85 year old M with PMH of nephrolithiasis, DM, Crohn s/p colostomy, CKD 4, HTN, HLD, BPH who presented with weakness. CT a/p showed bladder wall is thickened and patient found to have JOSH with creat 8.3. His electrolytes improved with no need for hemodialysis and he was downgraded from the ICU on 10/26.   kidney function improving     AMS  JOSH / CKD4  chronic anemia   hematuria   acute and chronic DVT   HO DM   HO chrons disease s/p colectomy   BPH       AMS   # altered mental status  likely 2/2 metabolic encephalopathy and delirium  -avoid CNS depressant  -monitor electrolytes   -strict Is and Os  - frequent re-orientaion     JOSH on CKD4   - non-oliguric  - creat improving baseline 2.2   - bicarb 21 (goal is >22 in CKD patients)  - nephro consult recs appreciated   - c/w sodium bicarbonate 1300 mg PO TID  - c/w phoslo 667mg tid   - furosemide 40mg po qd     Acute pyelonephritis - resolved   Abdominal wall cellulitis around stoma- resolved   - Blood & Urine cxs NGTD 10/23  - completed course of rocephin 10 days     #Acute on chronic anemia - resolved   hgb 8.8 stable; s/p 2U pRBC this admission   maintain hgb >7    #hematuria   likely from pulling at [place]   - continue hep drip for now   - monitor hgb closely     #acute and chronic DVT  on elliquis at home  c/w heparin drip on hold for now   - fu cbc     #HLD   - atovastatin 40mg qd   - aspirin 81mg     #HTN   - amlodipine 5mg qd   - hydralazine 25mg tid     # DVT prophylaxis: htt drip   # GI prophylaxis: pantoprazole  # Diet: renal diet  # Activity Score (AM-PAC)  # Code status: full code  # Disposition: acute for now, continue monitoring kidney function,  transitioning to coumadin for DVT,    Assessment     85 year old M with PMH of nephrolithiasis, DM, Crohn s/p colostomy, CKD 4, HTN, HLD, BPH who presented with weakness. CT a/p showed bladder wall is thickened and patient found to have JOSH with creat 8.3. His electrolytes improved with no need for hemodialysis and he was downgraded from the ICU on 10/26.   kidney function improving     AMS  JOSH / CKD4  chronic anemia   hematuria   acute and chronic DVT   HO DM   HO chrons disease s/p colectomy   BPH       AMS   # altered mental status  likely 2/2 metabolic encephalopathy and delirium  -avoid CNS depressant  -monitor electrolytes   -strict Is and Os  - frequent re-orientaion     JOSH on CKD4   - non-oliguric  - creat improving baseline 2.2   - bicarb 21 (goal is >22 in CKD patients)  - nephro consult recs appreciated   - c/w sodium bicarbonate 1300 mg PO TID  - c/w phoslo 667mg tid   - furosemide 40mg po qd     Acute pyelonephritis - resolved   Abdominal wall cellulitis around stoma- resolved   - Blood & Urine cxs NGTD 10/23  - completed course of rocephin 10 days     #Acute on chronic anemia - resolved   hgb 8.8 stable; s/p 2U pRBC this admission   maintain hgb >7    #hematuria   likely from pulling at [place]   - continue hep drip for now   - monitor hgb closely     #acute and chronic DVT  on elliquis at home  c/w heparin drip fu ptt  - continue transitioning to coumadin 5mg tonight   - INR 1.05  - fu cbc     #HLD   - atovastatin 40mg qd   - aspirin 81mg     #HTN   - amlodipine 5mg qd   - hydralazine 25mg tid     # DVT prophylaxis: htt drip   # GI prophylaxis: pantoprazole  # Diet: renal diet  # Activity Score (AM-PAC)  # Code status: full code  # Disposition: acute for now, continue monitoring kidney function,  transitioning to coumadin for DVT,

## 2021-11-02 NOTE — PROGRESS NOTE ADULT - ASSESSMENT
# JOSH on CKD 4 - likely ATN d/t severe sepsis / acute on chronic anemia  # Metabolic acidosis w/ anion gap   # Hyponatremia / low eav  # Acute on chronic anemia   # Acute pyelonephritis / Abdominal wall cellulitis   # Hx of crohn s/p colostomy    Recommendations:  - non oliguric,  cc urine and 300 cc colostomy yesterday  - creat slightly imrpoved / continue lasix current   - repeat CXT to assess for PVC   - pt is close to HD, but does not want to do it unless it is absolutely needed (his wife was on HD)  - hyponatremia resolved   - cont sodium bicarb po 1300mg q8h   - cont phoslo, replete Mg to 2 as needed/ IP within target noted   - CT noted w/o hydronephrosis  - no urgency for RRT   - RBC transfusion as needed to maintain hgb > 7, iron stores noted, does not need iv iron  - serum flc ratio noted wnl    will follow

## 2021-11-02 NOTE — PROGRESS NOTE ADULT - ATTENDING COMMENTS
no events reported. Pt is more lucid and oriented today, asking appropriate questions.     GENERAL: NAD, frail  PULMONARY/CHEST: No rales, rhonchi, wheezing  CARDIOVASC: Regular rate and rhythm; No murmurs  GI/ABDOMEN: +colostomy with greenish stool in bag. abd soft, nontender, nondistended; Bowel sounds present  EXTREMITIES: +3 pitting edema b/l up to midshin (better). No calf tenderness b/l.  NERVOUS SYSTEM:  Alert & Oriented X1, no gross neurological  deficit   +Luna     Labs reviewed by me.  Creatinine, Serum: 6.0 (11-02)  Creatinine, Serum: 6.2 (11-01)  Creatinine, Serum: 6.3 (10-31)    85 year old M with PMH of nephrolithiasis, DM, Crohn s/p colostomy, CKD 4, HTN, HLD, BPH who presented with weakness. CT a/p showed bladder wall is thickened and patient found to have JOSH with creat 8.3. His electrolytes improved with no need for hemodialysis and he was downgraded from the ICU on 10/26.     # Altered mental status - mental status now at baseline   - likely 2/2 metabolic encephalopathy and delirium  - avoid CNS depressant  - frequent re-orientation     # JOSH on CKD4   - non-oliguric  - creatinine trending down slow  - oral hydration encourged  - keep Luna for now to monitor strict UO, UO ~900 cc in last 24 hrs   - nephro on board, recommendations followed   - cont bicarb 1500 TID, cont phoslo  - monitor cr daily  - cont Lasix 40 mg PO QD     # Hypokalemia - supplemented     # Hypomagnesemia - supplemented    # Acute pyelonephritis  # Abdominal wall cellulitis around colostomy  Blood & Urine cxs NGTD 10/23  - completed Rocephin     # Acute on chronic anemia, most likely ACD   - hgb 9.5, stable; s/p 2U pRBC this admission   - monitor Hb daily    # hx of DVT  on eliquis at home  c/w heparin drip; monitor aPTT; goal 60-80  will start Coumadin bridging, doubt his creatinine will improve enough to start DOAC    # Drug monitoring - monitor PTT and INR.      # DVT prophylaxis: Heparin drip   # Diet: renal diet  # Code status: full code  # Disposition: TBD, PT eval .     #Progress Note Handoff  Pending therapeutic INR, monitor PTT (pt is on heparin gtt), monitor Cr and urinary output

## 2021-11-03 NOTE — CHART NOTE - NSCHARTNOTEFT_GEN_A_CORE
Registered Dietitian Follow-Up     Patient Profile Reviewed                           Yes [x]   No []     Nutrition History Previously Obtained        Yes [x]  No []       Pertinent Subjective Information: Unable to see pt despite multiple attempts to visit. EMR PO doc 11/30-11/3: 15-50% -- Po still remains suboptimal. Will f/u on intake of nutritional supplements upon f/u.      Pertinent Medical Interventions:  #blood on his sheet at rectum CARLI positive for small amount of bright red blood, HD stable, afebrile, kidney function improving - GI consult   - will hold heparin for now   # altered mental status - improving   likely 2/2 metabolic encephalopathy and delirium  #JOSH on CKD4   - non-oliguric  - creat improving 6.0  - pt is close to HD, but does not want to do it unless it is absolutely needed (his wife was on HD)  - hyponatremia resolved - no urgency for RRT      Diet order: Diet, Regular:   For patients receiving Renal Replacement - No Protein Restr, No Conc K, No Conc Phos, Low Sodium  Prosource Gelatein 20 Sugar Free     Qty per Day:  2  Supplement Feeding Modality:  Oral  Nepro Cans or Servings Per Day:  1       Frequency:  Daily (10-25-21 @ 10:57) [Active]    Anthropometrics:  Height (cm): 167.6 (10-25-21 @ 10:55)  Weight (kg): 66.5 (10-23-21 @ 21:00)  RD bedscale (10/29) 69.3 kg vs. RD bedscale today 67.6kg -- fluctuations likely d/t edema   BMI (kg/m2): 23.7 (10-25-21 @ 10:55)  IBW: 142 lbs, 64.4 kg    MEDICATIONS  (STANDING):  amLODIPine   Tablet 10 milliGRAM(s) Oral daily  aspirin enteric coated 81 milliGRAM(s) Oral daily  atorvastatin Oral Tab/Cap - Peds 20 milliGRAM(s) Oral daily  calcium acetate 667 milliGRAM(s) Oral three times a day with meals  furosemide    Tablet 40 milliGRAM(s) Oral daily  heparin  Infusion 600 Unit(s)/Hr (9 mL/Hr) IV Continuous <Continuous>  hydrALAZINE 25 milliGRAM(s) Oral three times a day  pantoprazole    Tablet 40 milliGRAM(s) Oral before breakfast  sodium bicarbonate 1300 milliGRAM(s) Oral three times a day    MEDICATIONS  (PRN):  heparin   Injectable 2500 Unit(s) IV Push every 6 hours PRN For aPTT between 40 - 57    Pertinent Labs:   Finger Sticks:  POCT Blood Glucose.: 160 mg/dL (11-02 @ 21:07)  POCT Blood Glucose.: 152 mg/dL (11-02 @ 16:42)    Physical Findings:  - Appearance: WDL/alert, confused at times; 11/2: 1+ R/L edema   - GI function: colostomy, output 11/3  - Tubes: n/a  - Oral/Mouth cavity: on regular diet   - Skin: intact      Nutrition Requirements: per prev RD assessment 10/29   Weight Used: 61.2kg      Estimated Energy Needs    Continue [x]  Adjust []  MSJ*1.2-1.3= 1495-1620kcal     Estimated Protein Needs    Continue [x]  Adjust []  73-86 g protein based on 1.2-1.4 g/kg      Estimated Fluid Needs        Continue []  Adjust []  1mL/kcal or LIP     Nutrient Intake: <50% per EMR      [] Previous Nutrition Diagnosis: Increased Nutrient Needs            [x] Ongoing          [] Resolved     Nutrition Intervention: meals and snacks     Goal/Expected Outcome: Pt to consume and tolerate >75% of meals/snacks and PO supplements in 4 days.       Nutrition Monitoring:diet order,energy intake,body composition,NFPF, renal/glucose/electrolyte profile     Recommendation:  1. d/c Renal restrictions   2. Add 60g PRO restrictions and CHO consistent diet modifier; will monitor electrolytes if K, phos restrictions needed   3. d/c Nepro, order Glucerna BID (Vanilla)   4. Obtain daily wts   5. encourage PO intake     [phone]  RD remains available: Rimma Peralta, RDN [phone] Registered Dietitian Follow-Up     Patient Profile Reviewed                           Yes [x]   No []     Nutrition History Previously Obtained        Yes [x]  No []       Pertinent Subjective Information: Unable to see pt despite multiple attempts to visit. EMR PO doc 11/30-11/3: 15-50% -- Po still remains suboptimal. Will f/u on intake of nutritional supplements upon f/u.      Pertinent Medical Interventions:  #blood on his sheet at rectum CARLI positive for small amount of bright red blood, HD stable, afebrile, kidney function improving - GI consult   - will hold heparin for now   # altered mental status - improving   likely 2/2 metabolic encephalopathy and delirium  #JOSH on CKD4   - non-oliguric  - creat improving 6.0  - pt is close to HD, but does not want to do it unless it is absolutely needed (his wife was on HD)  - hyponatremia resolved - no urgency for RRT      Diet order: Diet, Regular:   For patients receiving Renal Replacement - No Protein Restr, No Conc K, No Conc Phos, Low Sodium  Prosource Gelatein 20 Sugar Free     Qty per Day:  2  Supplement Feeding Modality:  Oral  Nepro Cans or Servings Per Day:  1       Frequency:  Daily (10-25-21 @ 10:57) [Active]    Anthropometrics:  Height (cm): 167.6 (10-25-21 @ 10:55)  Weight (kg): 66.5 (10-23-21 @ 21:00)  RD bedscale (10/29) 69.3 kg vs. RD bedscale today 67.6kg -- fluctuations likely d/t edema   BMI (kg/m2): 23.7 (10-25-21 @ 10:55)  IBW: 142 lbs, 64.4 kg    MEDICATIONS  (STANDING):  amLODIPine   Tablet 10 milliGRAM(s) Oral daily  aspirin enteric coated 81 milliGRAM(s) Oral daily  atorvastatin Oral Tab/Cap - Peds 20 milliGRAM(s) Oral daily  calcium acetate 667 milliGRAM(s) Oral three times a day with meals  furosemide    Tablet 40 milliGRAM(s) Oral daily  heparin  Infusion 600 Unit(s)/Hr (9 mL/Hr) IV Continuous <Continuous>  hydrALAZINE 25 milliGRAM(s) Oral three times a day  pantoprazole    Tablet 40 milliGRAM(s) Oral before breakfast  sodium bicarbonate 1300 milliGRAM(s) Oral three times a day    MEDICATIONS  (PRN):  heparin   Injectable 2500 Unit(s) IV Push every 6 hours PRN For aPTT between 40 - 57    Pertinent Labs:   Finger Sticks:  POCT Blood Glucose.: 160 mg/dL (11-02 @ 21:07)  POCT Blood Glucose.: 152 mg/dL (11-02 @ 16:42)    Physical Findings:  - Appearance: WDL/alert, confused at times; 11/2: 1+ R/L edema   - GI function: colostomy, output 11/3  - Tubes: n/a  - Oral/Mouth cavity: on regular diet   - Skin: intact      Nutrition Requirements: per prev RD assessment 10/29   Weight Used: 61.2kg      Estimated Energy Needs    Continue [x]  Adjust []  MSJ*1.2-1.3= 1495-1620kcal     Estimated Protein Needs    Continue [x]  Adjust []  61-73 g protein based on 1.0-1.2 g/kg -- d/t elevated renal fx -- aim towards lower end     Estimated Fluid Needs        Continue []  Adjust []  1mL/kcal or LIP     Nutrient Intake: <50% per EMR      [] Previous Nutrition Diagnosis: Increased Nutrient Needs            [x] Ongoing          [] Resolved     Nutrition Intervention: meals and snacks     Goal/Expected Outcome: Pt to consume and tolerate >75% of meals/snacks and PO supplements in 4 days.       Nutrition Monitoring:diet order,energy intake,body composition,NFPF, renal/glucose/electrolyte profile     Recommendation:  1. d/c Renal restrictions   2. Add 60g PRO restrictions and CHO consistent diet modifier; will monitor electrolytes if K, phos restrictions needed   3. d/c Nepro, order Glucerna BID (Vanilla)   4. Obtain daily wts   5. encourage PO intake     [phone]  RD remains available: Rimma Peralta, RDN [phone]

## 2021-11-03 NOTE — PROGRESS NOTE ADULT - ASSESSMENT
Assessment     85 year old M with PMH of nephrolithiasis, DM, Crohn s/p colostomy, CKD 4, HTN, HLD, BPH who presented with weakness. CT a/p showed bladder wall is thickened and patient found to have JOSH with creat 8.3. His electrolytes improved with no need for hemodialysis and he was downgraded from the ICU on 10/26.     blood on his sheet at rectum CARLI positive for small amount of bright red blood, HD stable, afebrile, kidney function improving     BRBPR   AMS  JOSH / CKD4  chronic anemia   hematuria   BRBPR  acute and chronic DVT   HO DM   HO chrons disease s/p colectomy   BPH     #Acute on chronic anemia   s/p 2U pRBC this admission   maintain hgb >7    #BRBPR   - small amount of blood noted   - HD stable   - fu CBC at 11am   - GI consult   - will hold heparin for now     #hematuria - resolved   - likely from pulling at [facility]      # altered mental status - improving   likely 2/2 metabolic encephalopathy and delirium  -avoid CNS depressant  -monitor electrolytes   -strict Is and Os  - frequent re-orientaion     #JOSH on CKD4   - non-oliguric  - creat improving 6.0, baseline 2.2   - nephro consult recs appreciated   - c/w sodium bicarbonate 1300 mg PO TID  - c/w phoslo 667mg tid   - furosemide 40mg po qd     Acute pyelonephritis - resolved   Abdominal wall cellulitis around stoma- resolved   - Blood & Urine cxs NGTD 10/23  - completed course of rocephin 10 days     #inderterminate bilateral renal lesions   -outpt fu     #acute and chronic DVT  on elliquis at home  heparin drip on hold for now  - coumadin started 11/2 at night   - fu cbc   - fu INR     #HLD   - atovastatin 20mg qd   - aspirin 81mg     #HTN   - amlodipine 10mg qd   - hydralazine 25mg tid     # DVT prophylaxis: htt drip on hold now    # GI prophylaxis: pantoprazole  # Diet: renal diet  # Activity Score (AM-PAC)  # Code status: full code  # Disposition: acute for now

## 2021-11-03 NOTE — PROGRESS NOTE ADULT - SUBJECTIVE AND OBJECTIVE BOX
Nephrology Progress Note    CECILLE FRANKEL  MRN-[id number]  85y  Male    S:  Patient is seen and examined, events over the last 24h noted.    O:  Allergies:  No Known Allergies    Hospital Medications:   MEDICATIONS  (STANDING):  amLODIPine   Tablet 10 milliGRAM(s) Oral daily  aspirin enteric coated 81 milliGRAM(s) Oral daily  atorvastatin Oral Tab/Cap - Peds 20 milliGRAM(s) Oral daily  calcium acetate 667 milliGRAM(s) Oral three times a day with meals  chlorhexidine 4% Liquid 1 Application(s) Topical daily  furosemide    Tablet 40 milliGRAM(s) Oral daily  heparin  Infusion 600 Unit(s)/Hr (9 mL/Hr) IV Continuous <Continuous>  hydrALAZINE 25 milliGRAM(s) Oral three times a day  magnesium sulfate  IVPB 2 Gram(s) IV Intermittent once  pantoprazole    Tablet 40 milliGRAM(s) Oral before breakfast  potassium chloride    Tablet ER 20 milliEquivalent(s) Oral every 2 hours  sodium bicarbonate 1300 milliGRAM(s) Oral three times a day    MEDICATIONS  (PRN):  acetaminophen     Tablet .. 650 milliGRAM(s) Oral every 6 hours PRN Mild Pain (1 - 3)  heparin   Injectable 5500 Unit(s) IV Push every 6 hours PRN For aPTT less than 40  heparin   Injectable 2500 Unit(s) IV Push every 6 hours PRN For aPTT between 40 - 57    Home Medications:  amLODIPine 5 mg oral tablet: 1 tab(s) orally 2 times a day (01 Sep 2021 16:54)  Aspir 81 oral delayed release tablet: 1 tab(s) orally once a day (01 Sep 2021 16:54)  atorvastatin 10 mg oral tablet: 2 tab(s) orally once a day (01 Sep 2021 16:54)  famotidine 20 mg oral tablet: 1 tab(s) orally once a day (01 Sep 2021 16:54)  Januvia 50 mg oral tablet: 1 tab(s) orally once a day (01 Sep 2021 16:54)  Lasix 20 mg oral tablet: 1 tab(s) orally every other day (01 Sep 2021 16:54)  pioglitazone 30 mg oral tablet: 1 tab(s) orally once a day (01 Sep 2021 16:54)  ramipril 5 mg oral capsule: 2 cap(s) orally once a day (01 Sep 2021 16:54)      VITALS:  Daily     Daily   T(F): 96.7 (21 @ 08:05), Max: 96.7 (21 @ 08:05)  HR: 77 (21 @ 08:05)  BP: 154/70 (21 @ 08:05)  RR: 18 (21 @ 08:05)  SpO2: 95% (21 @ 05:10)  Wt(kg): --  I&O's Detail    2021 07:  -  2021 07:00  --------------------------------------------------------  IN:    Heparin: 81 mL    Oral Fluid: 465 mL  Total IN: 546 mL    OUT:    Colostomy (mL): 500 mL    Indwelling Catheter - Urethral (mL): 1300 mL  Total OUT: 1800 mL    Total NET: -1254 mL      2021 07:  -  2021 15:39  --------------------------------------------------------  IN:    Oral Fluid: 570 mL  Total IN: 570 mL    OUT:    Colostomy (mL): 200 mL  Total OUT: 200 mL    Total NET: 370 mL        I&O's Summary    2021 07:  -  2021 07:00  --------------------------------------------------------  IN: 546 mL / OUT: 1800 mL / NET: -1254 mL    2021 07:  -  2021 15:39  --------------------------------------------------------  IN: 570 mL / OUT: 200 mL / NET: 370 mL          PHYSICAL EXAM:  Gen: NAD  Resp:   Card: S1/S2  Abd: soft  Extremities:   Vascular access:       LABS:    Blood Gas Arterial - Sodium: 125 mmol/L (10-24-21 @ 08:48)  Blood Gas Arterial - Calcium, Ionized: 1.16 mmol/L (10-24-21 @ 08:48)  Blood Gas Profile w/Lytes - Venous: Performed in Lab (10-23-21 @ 22:11)        140  |  98  |  55<H>  ----------------------------<  128<H>  3.4<L>   |  23  |  6.0<HH>    Ca    8.5      2021 07:01  Mg     1.7         TPro  5.2<L>  /  Alb  3.0<L>  /  TBili  0.4  /  DBili      /  AST  16  /  ALT  12  /  AlkPhos  71      eGFR if Non African American: 8 mL/min/1.73M2 (21 @ 07:01)  eGFR if : 9 mL/min/1.73M2 (21 @ 07:01)    Phosphorus Level, Serum: 3.9 mg/dL (21 @ 04:30)  Phosphorus Level, Serum: 3.8 mg/dL (10-31-21 @ 04:30)    Osmolality, Serum: 298 mos/kg (10-24-21 @ 11:32)  Osmolality, Serum: 288 mos/kg (21 @ 07:44)                          9.5    10.59 )-----------( 167      ( 2021 07:01 )             30.0     Mean Cell Volume: 90.4 fL (21 @ 07:01)    % Saturation, Iron: 48 % (10-24-21 @ 11:32)  Ferritin, Serum: 326 ng/mL (10-24-21 @ 11:32)      Urine Studies:  Urinalysis Basic - ( 28 Oct 2021 11:34 )    Color: Yellow / Appearance: Slightly Turbid / S.020 / pH:   Gluc:  / Ketone: Trace  / Bili: Negative / Urobili: <2 mg/dL   Blood:  / Protein: 300 mg/dL / Nitrite: Negative   Leuk Esterase: Moderate / RBC: 50 /HPF / WBC 75 /HPF   Sq Epi:  / Non Sq Epi: 2 /HPF / Bacteria: Few        Urea Nitrogen,  Random Urine: 247 mg/dL (21 @ 19:33)    Culture Results:   <10,000 CFU/mL Normal Urogenital Beronica (10-28 @ 11:34)  Osmolality, Random Urine: 249 mos/kg (10-24 @ 18:44)    Creatinine trend:  Creatinine, Serum: 6.0 mg/dL (21 @ 07:01)  Creatinine, Serum: 6.2 mg/dL (21 @ 04:30)  Creatinine, Serum: 6.3 mg/dL (10-31-21 @ 04:30)  Creatinine, Serum: 6.9 mg/dL (10-30-21 @ 05:52)  Creatinine, Serum: 7.3 mg/dL (10-29-21 @ 02:07)  Creatinine, Serum: 8.1 mg/dL (10-28-21 @ 01:15)  Creatinine, Serum: 7.8 mg/dL (10-27-21 @ 17:01)  Creatinine, Serum: 8.5 mg/dL (10-27-21 @ 00:43)    Urine Electrophoresis Interpretation: Mild Selective (Glomerular) Proteinuria (10-24-21 @ 18:44)  Immunofixation, Urine: Reference Range: None Detected (10-24-21 @ 18:44)  TORRES Kappa: 4.29 mg/dL (10-24-21 @ 11:32)    US Renal:   EXAM:  US KIDNEY(S)            PROCEDURE DATE:  2021            INTERPRETATION:  CLINICAL INFORMATION: Urinary retention    COMPARISON: None available.    TECHNIQUE: Sonography of the kidneys and bladder.    FINDINGS:    Right kidney: 10.5 cm. 2.9 x 2.6 x 2.9 cm renal cyst in the midpole. Normal echogenicity. No evidence of hydronephrosis or solid mass. Vascular flow demonstrated in the hilum.    Left kidney:  9.8 cm. 2.0 x 1.9 x 1.9 cm renal cyst in the upper pole. Normal echogenicity. No evidence of hydronephrosis or solid mass. Vascular flow demonstrated in the hilum.    Urinary bladder: Cannot be evaluated as it is empty    IMPRESSION:  No hydronephrosis  Bilateral renal cysts.      --- End of Report ---            GERMÁN THAO MD; Resident Radiologist  This document has been electronically signed.  REBECA BARON MD; Attending Radiologist  This document has been electronically signed. Sep  3 2021  9:15AM (21 @ 14:08)       Nephrology Progress Note    CECILLE FRANKEL  MRN-[id number]  85y  Male    S:  Patient is seen and examined, events over the last 24h noted.    O:  Allergies:  No Known Allergies    Hospital Medications:   MEDICATIONS  (STANDING):  amLODIPine   Tablet 10 milliGRAM(s) Oral daily  aspirin enteric coated 81 milliGRAM(s) Oral daily  atorvastatin Oral Tab/Cap - Peds 20 milliGRAM(s) Oral daily  calcium acetate 667 milliGRAM(s) Oral three times a day with meals  chlorhexidine 4% Liquid 1 Application(s) Topical daily  furosemide    Tablet 40 milliGRAM(s) Oral daily  heparin  Infusion 600 Unit(s)/Hr (9 mL/Hr) IV Continuous <Continuous>  hydrALAZINE 25 milliGRAM(s) Oral three times a day  magnesium sulfate  IVPB 2 Gram(s) IV Intermittent once  pantoprazole    Tablet 40 milliGRAM(s) Oral before breakfast  potassium chloride    Tablet ER 20 milliEquivalent(s) Oral every 2 hours  sodium bicarbonate 1300 milliGRAM(s) Oral three times a day    MEDICATIONS  (PRN):  acetaminophen     Tablet .. 650 milliGRAM(s) Oral every 6 hours PRN Mild Pain (1 - 3)  heparin   Injectable 5500 Unit(s) IV Push every 6 hours PRN For aPTT less than 40  heparin   Injectable 2500 Unit(s) IV Push every 6 hours PRN For aPTT between 40 - 57    Home Medications:  amLODIPine 5 mg oral tablet: 1 tab(s) orally 2 times a day (01 Sep 2021 16:54)  Aspir 81 oral delayed release tablet: 1 tab(s) orally once a day (01 Sep 2021 16:54)  atorvastatin 10 mg oral tablet: 2 tab(s) orally once a day (01 Sep 2021 16:54)  famotidine 20 mg oral tablet: 1 tab(s) orally once a day (01 Sep 2021 16:54)  Januvia 50 mg oral tablet: 1 tab(s) orally once a day (01 Sep 2021 16:54)  Lasix 20 mg oral tablet: 1 tab(s) orally every other day (01 Sep 2021 16:54)  pioglitazone 30 mg oral tablet: 1 tab(s) orally once a day (01 Sep 2021 16:54)  ramipril 5 mg oral capsule: 2 cap(s) orally once a day (01 Sep 2021 16:54)      VITALS:  Daily     Daily   T(F): 96.7 (21 @ 08:05), Max: 96.7 (21 @ 08:05)  HR: 77 (21 @ 08:05)  BP: 154/70 (21 @ 08:05)  RR: 18 (21 @ 08:05)  SpO2: 95% (21 @ 05:10)  Wt(kg): --  I&O's Detail    2021 07:  -  2021 07:00  --------------------------------------------------------  IN:    Heparin: 81 mL    Oral Fluid: 465 mL  Total IN: 546 mL    OUT:    Colostomy (mL): 500 mL    Indwelling Catheter - Urethral (mL): 1300 mL  Total OUT: 1800 mL    Total NET: -1254 mL      2021 07:  -  2021 15:39  --------------------------------------------------------  IN:    Oral Fluid: 570 mL  Total IN: 570 mL    OUT:    Colostomy (mL): 200 mL  Total OUT: 200 mL    Total NET: 370 mL        I&O's Summary    2021 07:  -  2021 07:00  --------------------------------------------------------  IN: 546 mL / OUT: 1800 mL / NET: -1254 mL    2021 07:  -  2021 15:39  --------------------------------------------------------  IN: 570 mL / OUT: 200 mL / NET: 370 mL          PHYSICAL EXAM:  Gen: NAD  Resp: CTAB  Card: S1/S2  Abd: soft, colostomy  Ext: b/l edema  Luna    LABS:        140  |  98  |  55<H>  ----------------------------<  128<H>  3.4<L>   |  23  |  6.0<HH>    Ca    8.5      2021 07:01  Mg     1.7         TPro  5.2<L>  /  Alb  3.0<L>  /  TBili  0.4  /  DBili      /  AST  16  /  ALT  12  /  AlkPhos  71      eGFR if Non African American: 8 mL/min/1.73M2 (21 @ 07:01)  eGFR if : 9 mL/min/1.73M2 (21 @ 07:01)    Phosphorus Level, Serum: 3.9 mg/dL (21 @ 04:30)  Phosphorus Level, Serum: 3.8 mg/dL (10-31-21 @ 04:30)    Osmolality, Serum: 298 mos/kg (10-24-21 @ 11:32)                          9.5    10.59 )-----------( 167      ( 2021 07:01 )             30.0     Mean Cell Volume: 90.4 fL (21 @ 07:01)    % Saturation, Iron: 48 % (10-24-21 @ 11:32)  Ferritin, Serum: 326 ng/mL (10-24-21 @ 11:32)      Urine Studies:  Urinalysis Basic - ( 28 Oct 2021 11:34 )    Color: Yellow / Appearance: Slightly Turbid / S.020 / pH:   Gluc:  / Ketone: Trace  / Bili: Negative / Urobili: <2 mg/dL   Blood:  / Protein: 300 mg/dL / Nitrite: Negative   Leuk Esterase: Moderate / RBC: 50 /HPF / WBC 75 /HPF   Sq Epi:  / Non Sq Epi: 2 /HPF / Bacteria: Few        Urea Nitrogen,  Random Urine: 247 mg/dL (21 @ 19:33)    Culture Results:   <10,000 CFU/mL Normal Urogenital Beronica (10-28 @ 11:34)  Osmolality, Random Urine: 249 mos/kg (10-24 @ 18:44)    Creatinine trend:  Creatinine, Serum: 6.0 mg/dL (21 @ 07:01)  Creatinine, Serum: 6.2 mg/dL (21 @ 04:30)  Creatinine, Serum: 6.3 mg/dL (10-31-21 @ 04:30)  Creatinine, Serum: 6.9 mg/dL (10-30-21 @ 05:52)  Creatinine, Serum: 7.3 mg/dL (10-29-21 @ 02:07)  Creatinine, Serum: 8.1 mg/dL (10-28-21 @ 01:15)  Creatinine, Serum: 7.8 mg/dL (10-27-21 @ 17:01)  Creatinine, Serum: 8.5 mg/dL (10-27-21 @ 00:43)    Urine Electrophoresis Interpretation: Mild Selective (Glomerular) Proteinuria (10-24-21 @ 18:44)  Immunofixation, Urine: Reference Range: None Detected (10-24-21 @ 18:44)  TORRES Kappa: 4.29 mg/dL (10-24-21 @ 11:32)    US Renal:   EXAM:  US KIDNEY(S)            PROCEDURE DATE:  2021            INTERPRETATION:  CLINICAL INFORMATION: Urinary retention    COMPARISON: None available.    TECHNIQUE: Sonography of the kidneys and bladder.    FINDINGS:    Right kidney: 10.5 cm. 2.9 x 2.6 x 2.9 cm renal cyst in the midpole. Normal echogenicity. No evidence of hydronephrosis or solid mass. Vascular flow demonstrated in the hilum.    Left kidney:  9.8 cm. 2.0 x 1.9 x 1.9 cm renal cyst in the upper pole. Normal echogenicity. No evidence of hydronephrosis or solid mass. Vascular flow demonstrated in the hilum.    Urinary bladder: Cannot be evaluated as it is empty    IMPRESSION:  No hydronephrosis  Bilateral renal cysts.      --- End of Report ---            GERMÁN THAO MD; Resident Radiologist  This document has been electronically signed.  REBECA BARON MD; Attending Radiologist  This document has been electronically signed. Sep  3 2021  9:15AM (21 @ 14:08)

## 2021-11-03 NOTE — CONSULT NOTE ADULT - ASSESSMENT
#)BRBPR  Patient declined any endoscopic intervention at this time 85 year old M with PMH of DM, Crohn s/p colostomy, CKD 4, HTN, HLD, BPH, DVT on eliquis presents to ED with complaints of weakness. Admitted with working diagnosis of pyelonephritis, JOSH on CKD, acute on chronic DVT, altered mental status metabolic encephalopathy delirium. GI was consulted for BRBPR.   He was on eliquis at home in hospital he was on heparin drip and was started on coumadin 11/1 last dose 11/2 PM.     #)Acute on chronic DVT   #)h/o crohn's with cvolostomy bag  #)Hematochezia: DD includes Diverticulosis vs anorectal pathologies including hemorrhoids vs AVM vs Colorectal cancer vs small bowel bleed.  -Baseline Hb 8-9 admitted with 6.2 s/p 2 units on 10/23 and 24 since then his Hb was stable at 8-9  -Hemodynamically stable  -Brown stools in colostomy bag  -CARLI + blood, no stools  -coumadin last dose 11/2 PM  -INR 1.08    Rec:  NPO after midnight  Maintain active type and screen  Trend CBC BID  Please give 2 tap water enema at 5AM for flex sigmoidoscopy tomorrow  Hold heparin drip 4 hours before the procedure  85 year old M with PMH of DM, Crohn s/p colostomy, CKD 4, HTN, HLD, BPH, DVT on eliquis presents to ED with complaints of weakness. Admitted with working diagnosis of pyelonephritis, JOSH on CKD, acute on chronic DVT, altered mental status metabolic encephalopathy delirium. GI was consulted for BRBPR.   He was on eliquis at home in hospital he was on heparin drip and was started on coumadin 11/1 last dose 11/2 PM.     #)Acute on chronic DVT   #)h/o crohn's with colostomy bag  #)Hematochezia: DD includes Diverticulosis vs anorectal pathologies including hemorrhoids vs AVM vs Colorectal cancer vs small bowel bleed.  -Baseline Hb 8-9 admitted with 6.2 s/p 2 units on 10/23 and 24 since then his Hb was stable at 8-9  -Hemodynamically stable  -Brown stools in colostomy bag  -CARLI + blood, no stools  -coumadin last dose 11/2 PM  -INR 1.08    Rec:  NPO after midnight  Maintain active type and screen  Trend CBC BID  Please give 2 tap water enema at 5AM for flex sigmoidoscopy tomorrow  Hold heparin drip 4 hours before the procedure

## 2021-11-03 NOTE — PROGRESS NOTE ADULT - ASSESSMENT
HPI:  85 year old M with PMH of DM, Crohn s/p colostomy, CKD 4, HTN, HLD, BPH presents to ED with complaints of weakness. Pt states 10 days prior was diagnosed with UTI, placed on bactrim, Family states due to size of pill, pt was unable to swallow pill, only took 1/2 dose for 5 days. Family states over past few days pt becoming increasingly lethargic, having chills as well. Denies cough, chest pain, sob, abdominal pain, NVCD.    #Acute on chronic anemia secondary to hematochezia   gi eval     #Mild mitral valve regurgitation.     #. Mild tricuspid regurgitation.    #JOSH on CKD 4 secondary to suspected ATN improving   nephrology following     #DM   CAPILLARY BLOOD GLUCOSE      POCT Blood Glucose.: 160 mg/dL (02 Nov 2021 21:07)  POCT Blood Glucose.: 152 mg/dL (02 Nov 2021 16:42)  controlled    #HTN   BP: 154/70 (03 Nov 2021 08:05) (154/70 - 174/79)  controlled    #BPH     #Pulmonary hypertension     #Hypokalemia replete and check in am     #Hypomagnesemia replete     #Drug monitoring of high risk medication , potential for drug drug reaction , requiring close monitoring monitor ptt and adjust heparin also monitor hemolgobin PTT - ( 02 Nov 2021 20:00 )  PTT:64.4 sec    PROGRESS NOTE HANDOFF    Pending: GI eval , monitor hemoglobin , if no intervention or signs of bleeding , transition to coumadin     Family discussion:    Disposition:

## 2021-11-03 NOTE — CONSULT NOTE ADULT - ATTENDING COMMENTS
Patient with a long standing Hx of CD. Developed BRBPR without evidence of overt bleeding in the ostomy (likely ileostomy). Possibly bleeding form divergence colitis exacerbated by AC. Cannot rule out other causes. plan for flex sig in the AM after enemas

## 2021-11-03 NOTE — PROGRESS NOTE ADULT - ASSESSMENT
# JOSH on CKD 4 - likely ATN d/t severe sepsis / acute on chronic anemia  # Metabolic acidosis w/ anion gap   # Hyponatremia / low eav  # Acute on chronic anemia   # Acute pyelonephritis / Abdominal wall cellulitis   # Hx of crohn s/p colostomy     Recommendations:  - non oliguric, urine output improved  - creat down trending  - cont po lasix  - replete K to 4, Mg to 2  - hyponatremia resolved   - pt is close to HD, but does not want to do it unless it is absolutely needed (his wife was on HD).  Currently w/o acute  indication, cont monitoring off HD  - cont sodium bicarb po 1300mg q8h  - phos level noted, at goal, cont phoslo  - CT noted w/o hydronephrosis  - RBC transfusion as needed to maintain hgb > 7, iron stores noted, does not need iv iron   - appreciate GI eval for hematochezia - plan for flex sig tomorrow  - serum flc ratio noted wnl

## 2021-11-03 NOTE — PROVIDER CONTACT NOTE (MEDICATION) - SITUATION
During frequent rounds it was noticed in pt's bed on pt's incontinent pad dark brownish bloody stool yinka.

## 2021-11-03 NOTE — CONSULT NOTE ADULT - SUBJECTIVE AND OBJECTIVE BOX
Gastroenterology Consultation:    Patient is a 85y old  Male who presents with a chief complaint of weakness (03 Nov 2021 15:49)      Admitted on: 10-23-21  HPI:  85 year old M with PMH of DM, Crohn s/p colostomy, CKD 4, HTN, HLD, BPH, DVT on eliquis presents to ED with complaints of weakness. Admitted with working diagnosis of pyelonephritis, JOSH on CKD, acute on chronic DVT, altered mental status metabolic encephalopathy delirium. GI was consulted for BRBPR.   He was on eliquis at home in hospital he was on heparin drip and was started on coumadin last dose   Vital Signs Last 24 Hrs  T(C): 34.3 (23 Oct 2021 18:10), Max: 35.8 (23 Oct 2021 13:05)  T(F): 93.7 (23 Oct 2021 18:10), Max: 96.4 (23 Oct 2021 13:05)  HR: 9 (23 Oct 2021 18:10) (9 - 89)  BP: 102/49 (23 Oct 2021 18:10) (102/49 - 117/58)  RR: 20 (23 Oct 2021 18:10) (18 - 20)  SpO2: 99% (23 Oct 2021 18:10) (97% - 99%)    admitted to MICU (23 Oct 2021 19:11)      Prior EGD:  Prior Colonoscopy:      PAST MEDICAL & SURGICAL HISTORY:  History of medical problems  [place] b/l ears, dm, htn, hypercholesteremia, ckd, bowel/bladder fistula, kidney stones    Colostomy present    History of surgery  procedure for kidney stones ( ?lithotripsy)        FAMILY HISTORY:      Social History:  Tobacco:  Alcohol:  Drugs:    Home Medications:  amLODIPine 5 mg oral tablet: 1 tab(s) orally 2 times a day (01 Sep 2021 16:54)  Aspir 81 oral delayed release tablet: 1 tab(s) orally once a day (01 Sep 2021 16:54)  atorvastatin 10 mg oral tablet: 2 tab(s) orally once a day (01 Sep 2021 16:54)  famotidine 20 mg oral tablet: 1 tab(s) orally once a day (01 Sep 2021 16:54)  Januvia 50 mg oral tablet: 1 tab(s) orally once a day (01 Sep 2021 16:54)  Lasix 20 mg oral tablet: 1 tab(s) orally every other day (01 Sep 2021 16:54)  pioglitazone 30 mg oral tablet: 1 tab(s) orally once a day (01 Sep 2021 16:54)  ramipril 5 mg oral capsule: 2 cap(s) orally once a day (01 Sep 2021 16:54)    MEDICATIONS  (STANDING):  amLODIPine   Tablet 10 milliGRAM(s) Oral daily  aspirin enteric coated 81 milliGRAM(s) Oral daily  atorvastatin Oral Tab/Cap - Peds 20 milliGRAM(s) Oral daily  calcium acetate 667 milliGRAM(s) Oral three times a day with meals  chlorhexidine 4% Liquid 1 Application(s) Topical daily  furosemide    Tablet 40 milliGRAM(s) Oral daily  heparin  Infusion 600 Unit(s)/Hr (9 mL/Hr) IV Continuous <Continuous>  hydrALAZINE 25 milliGRAM(s) Oral three times a day  magnesium sulfate  IVPB 2 Gram(s) IV Intermittent once  pantoprazole    Tablet 40 milliGRAM(s) Oral before breakfast  potassium chloride    Tablet ER 20 milliEquivalent(s) Oral every 2 hours  sodium bicarbonate 1300 milliGRAM(s) Oral three times a day    MEDICATIONS  (PRN):  acetaminophen     Tablet .. 650 milliGRAM(s) Oral every 6 hours PRN Mild Pain (1 - 3)  heparin   Injectable 5500 Unit(s) IV Push every 6 hours PRN For aPTT less than 40  heparin   Injectable 2500 Unit(s) IV Push every 6 hours PRN For aPTT between 40 - 57      Allergies  No Known Allergies      Review of Systems:   Constitutional:  No Fever, No Chills  ENT/Mouth:  No Hearing Changes,  No Difficulty Swallowing  Eyes:  No Eye Pain, No Vision Changes  Cardiovascular:  No Chest Pain, No Palpitations  Respiratory:  No Cough, No Dyspnea  Gastrointestinal:  As described in HPI  Musculoskeletal:  No Joint Swelling, No Back Pain  Skin:  No Skin Lesions, No Jaundice  Neuro:  No Syncope, No Dizziness  Heme/Lymph:  No Bruising, No Bleeding.          Physical Examination:  T(C): 35.9 (11-03-21 @ 08:05), Max: 35.9 (11-03-21 @ 08:05)  HR: 77 (11-03-21 @ 08:05) (72 - 78)  BP: 154/70 (11-03-21 @ 08:05) (154/70 - 174/79)  RR: 18 (11-03-21 @ 08:05) (18 - 18)  SpO2: 95% (11-03-21 @ 05:10) (95% - 95%)      11-01-21 @ 07:01  -  11-02-21 @ 07:00  --------------------------------------------------------  IN: 622 mL / OUT: 950 mL / NET: -328 mL    11-02-21 @ 07:01  -  11-03-21 @ 07:00  --------------------------------------------------------  IN: 546 mL / OUT: 1800 mL / NET: -1254 mL    11-03-21 @ 07:01  -  11-03-21 @ 15:50  --------------------------------------------------------  IN: 570 mL / OUT: 200 mL / NET: 370 mL        Constitutional: No acute distress.  Eyes:. Conjunctivae are clear, Sclera is non-icteric.  Ears Nose and Throat: The external ears are normal appearing,  Oral mucosa is pink and moist.  Respiratory:  No signs of respiratory distress. Lung sounds are clear bilaterally.  Cardiovascular:  S1 S2, Regular rate and rhythm.  GI: Abdomen is soft, symmetric, and non-tender without distention. There are no visible lesions or scars. Bowel sounds are present and normoactive in all four quadrants. No masses, hepatomegaly, or splenomegaly are noted.   Neuro: No Tremor, No involuntary movements  Skin: No rashes, No Jaundice.          Data:                        9.5    10.59 )-----------( 167      ( 02 Nov 2021 07:01 )             30.0     Hgb Trend:  9.5  11-02-21 @ 07:01  8.6  11-01-21 @ 11:43  8.2  11-01-21 @ 04:30  8.6  10-31-21 @ 18:38        11-02    140  |  98  |  55<H>  ----------------------------<  128<H>  3.4<L>   |  23  |  6.0<HH>    Ca    8.5      02 Nov 2021 07:01  Mg     1.7     11-02    TPro  5.2<L>  /  Alb  3.0<L>  /  TBili  0.4  /  DBili  x   /  AST  16  /  ALT  12  /  AlkPhos  71  11-02    Liver panel trend:  TBili 0.4   /   AST 16   /   ALT 12   /   AlkP 71   /   Tptn 5.2   /   Alb 3.0    /   DBili --      11-02  TBili 0.3   /   AST 17   /   ALT 12   /   AlkP 61   /   Tptn 4.8   /   Alb 2.7    /   DBili --      11-01  TBili 0.3   /   AST 17   /   ALT 12   /   AlkP 65   /   Tptn 5.3   /   Alb 2.9    /   DBili --      10-31  TBili 0.2   /   AST 16   /   ALT 13   /   AlkP 66   /   Tptn 4.8   /   Alb 2.9    /   DBili --      10-30  TBili <0.2   /   AST 22   /   ALT 15   /   AlkP 69   /   Tptn 4.9   /   Alb 3.0    /   DBili --      10-26  TBili <0.2   /   AST 20   /   ALT 14   /   AlkP 65   /   Tptn 4.7   /   Alb 2.9    /   DBili --      10-25  TBili <0.2   /   AST 21   /   ALT 14   /   AlkP 66   /   Tptn 4.6   /   Alb 2.8    /   DBili --      10-24      PT/INR - ( 02 Nov 2021 07:01 )   PT: 12.10 sec;   INR: 1.05 ratio         PTT - ( 02 Nov 2021 20:00 )  PTT:64.4 sec        Radiology:       Gastroenterology Consultation:    Patient is a 85y old  Male who presents with a chief complaint of weakness (03 Nov 2021 15:49)      Admitted on: 10-23-21  HPI:  85 year old M with PMH of DM, Crohn s/p colostomy, CKD 4, HTN, HLD, BPH, DVT on eliquis presents to ED with complaints of weakness. Admitted with working diagnosis of pyelonephritis, JOSH on CKD, acute on chronic DVT, altered mental status metabolic encephalopathy delirium. GI was consulted for BRBPR.   He was on eliquis at home in hospital he was on heparin drip and was started on coumadin 11/1 last dose 11/2 PM.     Prior EGD: 5-6 years ago normal as per patient  Prior Colonoscopy: 5-6 years ago normal as per patient      PAST MEDICAL & SURGICAL HISTORY:  History of medical problems  [place] b/l ears, dm, htn, hypercholesteremia, ckd, bowel/bladder fistula, kidney stones    Colostomy present    History of surgery  procedure for kidney stones ( ?lithotripsy)        FAMILY HISTORY:  No h/o Gi canecrs    Social History:  Tobacco: N  Alcohol: N  Drugs: N    Home Medications:  amLODIPine 5 mg oral tablet: 1 tab(s) orally 2 times a day (01 Sep 2021 16:54)  Aspir 81 oral delayed release tablet: 1 tab(s) orally once a day (01 Sep 2021 16:54)  atorvastatin 10 mg oral tablet: 2 tab(s) orally once a day (01 Sep 2021 16:54)  famotidine 20 mg oral tablet: 1 tab(s) orally once a day (01 Sep 2021 16:54)  Januvia 50 mg oral tablet: 1 tab(s) orally once a day (01 Sep 2021 16:54)  Lasix 20 mg oral tablet: 1 tab(s) orally every other day (01 Sep 2021 16:54)  pioglitazone 30 mg oral tablet: 1 tab(s) orally once a day (01 Sep 2021 16:54)  ramipril 5 mg oral capsule: 2 cap(s) orally once a day (01 Sep 2021 16:54)    MEDICATIONS  (STANDING):  amLODIPine   Tablet 10 milliGRAM(s) Oral daily  aspirin enteric coated 81 milliGRAM(s) Oral daily  atorvastatin Oral Tab/Cap - Peds 20 milliGRAM(s) Oral daily  calcium acetate 667 milliGRAM(s) Oral three times a day with meals  chlorhexidine 4% Liquid 1 Application(s) Topical daily  furosemide    Tablet 40 milliGRAM(s) Oral daily  heparin  Infusion 600 Unit(s)/Hr (9 mL/Hr) IV Continuous <Continuous>  hydrALAZINE 25 milliGRAM(s) Oral three times a day  magnesium sulfate  IVPB 2 Gram(s) IV Intermittent once  pantoprazole    Tablet 40 milliGRAM(s) Oral before breakfast  potassium chloride    Tablet ER 20 milliEquivalent(s) Oral every 2 hours  sodium bicarbonate 1300 milliGRAM(s) Oral three times a day    MEDICATIONS  (PRN):  acetaminophen     Tablet .. 650 milliGRAM(s) Oral every 6 hours PRN Mild Pain (1 - 3)  heparin   Injectable 5500 Unit(s) IV Push every 6 hours PRN For aPTT less than 40  heparin   Injectable 2500 Unit(s) IV Push every 6 hours PRN For aPTT between 40 - 57      Allergies  No Known Allergies      Review of Systems:   Constitutional:  No Fever, No Chills  ENT/Mouth:  No Hearing Changes,  No Difficulty Swallowing  Eyes:  No Eye Pain, No Vision Changes  Cardiovascular:  No Chest Pain, No Palpitations  Respiratory:  No Cough, No Dyspnea  Gastrointestinal:  As described in HPI  Musculoskeletal:  No Joint Swelling, No Back Pain  Skin:  No Skin Lesions, No Jaundice  Neuro:  No Syncope, No Dizziness  Heme/Lymph:  No Bruising, No Bleeding.          Physical Examination:  T(C): 35.9 (11-03-21 @ 08:05), Max: 35.9 (11-03-21 @ 08:05)  HR: 77 (11-03-21 @ 08:05) (72 - 78)  BP: 154/70 (11-03-21 @ 08:05) (154/70 - 174/79)  RR: 18 (11-03-21 @ 08:05) (18 - 18)  SpO2: 95% (11-03-21 @ 05:10) (95% - 95%)      11-01-21 @ 07:01  -  11-02-21 @ 07:00  --------------------------------------------------------  IN: 622 mL / OUT: 950 mL / NET: -328 mL    11-02-21 @ 07:01  -  11-03-21 @ 07:00  --------------------------------------------------------  IN: 546 mL / OUT: 1800 mL / NET: -1254 mL    11-03-21 @ 07:01  -  11-03-21 @ 15:50  --------------------------------------------------------  IN: 570 mL / OUT: 200 mL / NET: 370 mL        Constitutional: No acute distress.  Eyes:. Conjunctivae are clear, Sclera is non-icteric.  Ears Nose and Throat: The external ears are normal appearing,  Oral mucosa is pink and moist.  Respiratory:  No signs of respiratory distress. Lung sounds are clear bilaterally.  Cardiovascular:  S1 S2, Regular rate and rhythm.  GI: Abdomen is soft, symmetric, and non-tender without distention. +Colostomy bag  Neuro: No Tremor, No involuntary movements  Skin: No rashes, No Jaundice.          Data:                        9.5    10.59 )-----------( 167      ( 02 Nov 2021 07:01 )             30.0     Hgb Trend:  9.5  11-02-21 @ 07:01  8.6  11-01-21 @ 11:43  8.2  11-01-21 @ 04:30  8.6  10-31-21 @ 18:38        11-02    140  |  98  |  55<H>  ----------------------------<  128<H>  3.4<L>   |  23  |  6.0<HH>    Ca    8.5      02 Nov 2021 07:01  Mg     1.7     11-02    TPro  5.2<L>  /  Alb  3.0<L>  /  TBili  0.4  /  DBili  x   /  AST  16  /  ALT  12  /  AlkPhos  71  11-02    Liver panel trend:  TBili 0.4   /   AST 16   /   ALT 12   /   AlkP 71   /   Tptn 5.2   /   Alb 3.0    /   DBili --      11-02  TBili 0.3   /   AST 17   /   ALT 12   /   AlkP 61   /   Tptn 4.8   /   Alb 2.7    /   DBili --      11-01  TBili 0.3   /   AST 17   /   ALT 12   /   AlkP 65   /   Tptn 5.3   /   Alb 2.9    /   DBili --      10-31  TBili 0.2   /   AST 16   /   ALT 13   /   AlkP 66   /   Tptn 4.8   /   Alb 2.9    /   DBili --      10-30  TBili <0.2   /   AST 22   /   ALT 15   /   AlkP 69   /   Tptn 4.9   /   Alb 3.0    /   DBili --      10-26  TBili <0.2   /   AST 20   /   ALT 14   /   AlkP 65   /   Tptn 4.7   /   Alb 2.9    /   DBili --      10-25  TBili <0.2   /   AST 21   /   ALT 14   /   AlkP 66   /   Tptn 4.6   /   Alb 2.8    /   DBili --      10-24      PT/INR - ( 02 Nov 2021 07:01 )   PT: 12.10 sec;   INR: 1.05 ratio         PTT - ( 02 Nov 2021 20:00 )  PTT:64.4 sec        Radiology:

## 2021-11-03 NOTE — PROGRESS NOTE ADULT - SUBJECTIVE AND OBJECTIVE BOX
CECILLE FRANKEL 85y Male  MRN#: [id number]   CODE STATUS: full code     Hospital Day: 11d    Pt is currently admitted with the primary diagnosis of AMS     SUBJECTIVE    overnight episode of blood per rectum,     heparin stopped     hemodynamically stable, no complaints, denies sob, cp, dizziness, nvd,     Present Today:   - Luna:  No [  ], Yes [  x ] : Indication: retention                                               ----------------------------------------------------------  OBJECTIVE  PAST MEDICAL & SURGICAL HISTORY  History of medical problems  [place] b/l ears, dm, htn, hypercholesteremia, ckd, bowel/bladder fistula, kidney stones    Colostomy present    History of surgery  procedure for kidney stones ( ?lithotripsy)                                              -----------------------------------------------------------  ALLERGIES:  No Known Allergies                                            ------------------------------------------------------------    HOME MEDICATIONS  Home Medications:  amLODIPine 5 mg oral tablet: 1 tab(s) orally 2 times a day (01 Sep 2021 16:54)  Aspir 81 oral delayed release tablet: 1 tab(s) orally once a day (01 Sep 2021 16:54)  atorvastatin 10 mg oral tablet: 2 tab(s) orally once a day (01 Sep 2021 16:54)  famotidine 20 mg oral tablet: 1 tab(s) orally once a day (01 Sep 2021 16:54)  Januvia 50 mg oral tablet: 1 tab(s) orally once a day (01 Sep 2021 16:54)  Lasix 20 mg oral tablet: 1 tab(s) orally every other day (01 Sep 2021 16:54)  pioglitazone 30 mg oral tablet: 1 tab(s) orally once a day (01 Sep 2021 16:54)  ramipril 5 mg oral capsule: 2 cap(s) orally once a day (01 Sep 2021 16:54)                           MEDICATIONS:  STANDING MEDICATIONS  amLODIPine   Tablet 10 milliGRAM(s) Oral daily  aspirin enteric coated 81 milliGRAM(s) Oral daily  atorvastatin Oral Tab/Cap - Peds 20 milliGRAM(s) Oral daily  calcium acetate 667 milliGRAM(s) Oral three times a day with meals  chlorhexidine 4% Liquid 1 Application(s) Topical daily  furosemide    Tablet 40 milliGRAM(s) Oral daily  heparin  Infusion 600 Unit(s)/Hr IV Continuous <Continuous>  hydrALAZINE 25 milliGRAM(s) Oral three times a day  pantoprazole    Tablet 40 milliGRAM(s) Oral before breakfast  sodium bicarbonate 1300 milliGRAM(s) Oral three times a day    PRN MEDICATIONS  acetaminophen     Tablet .. 650 milliGRAM(s) Oral every 6 hours PRN  heparin   Injectable 5500 Unit(s) IV Push every 6 hours PRN  heparin   Injectable 2500 Unit(s) IV Push every 6 hours PRN                                            ------------------------------------------------------------  VITAL SIGNS: Last 24 Hours  T(C): 35.9 (03 Nov 2021 08:05), Max: 35.9 (03 Nov 2021 08:05)  T(F): 96.7 (03 Nov 2021 08:05), Max: 96.7 (03 Nov 2021 08:05)  HR: 77 (03 Nov 2021 08:05) (72 - 78)  BP: 154/70 (03 Nov 2021 08:05) (154/70 - 174/79)  BP(mean): --  RR: 18 (03 Nov 2021 08:05) (18 - 18)  SpO2: 95% (03 Nov 2021 05:10) (95% - 95%)      11-02-21 @ 07:01  -  11-03-21 @ 07:00  --------------------------------------------------------  IN: 546 mL / OUT: 1800 mL / NET: -1254 mL                                             --------------------------------------------------------------  LABS:                        9.5    10.59 )-----------( 167      ( 02 Nov 2021 07:01 )             30.0     11-02    140  |  98  |  55<H>  ----------------------------<  128<H>  3.4<L>   |  23  |  6.0<HH>    Ca    8.5      02 Nov 2021 07:01  Mg     1.7     11-02    TPro  5.2<L>  /  Alb  3.0<L>  /  TBili  0.4  /  DBili  x   /  AST  16  /  ALT  12  /  AlkPhos  71  11-02    PT/INR - ( 02 Nov 2021 07:01 )   PT: 12.10 sec;   INR: 1.05 ratio         PTT - ( 02 Nov 2021 20:00 )  PTT:64.4 sec                                                          -------------------------------------------------------------  RADIOLOGY:  XAM:  CT ABDOMEN AND PELVIS            PROCEDURE DATE:  10/23/2021            INTERPRETATION:  CLINICAL STATEMENT: Abdominal pain, fever    TECHNIQUE: Contiguous axial CT images were obtained from the lower chest to the pubic symphysis without intravenous contrast.  Oral contrast was not administered.  Reformatted images in the coronal and sagittal planes were acquired.    Limited examination secondary to minimal intra-abdominal fat and lack of contrast.    COMPARISON CT: None.    FINDINGS:    LOWER CHEST: Small bilateral pleural effusions with adjacent atelectasis, greater on the right. Bibasilar atelectasis/scarring.    HEPATOBILIARY: Cholelithiasis..    SPLEEN: Unremarkable.    PANCREAS: Unremarkable.    ADRENAL GLANDS: Unremarkable.    KIDNEYS: Nonobstructing right renal calculus. No hydronephrosis bilaterally. Indeterminate bilateral exophytic renal lesions.    ABDOMINOPELVIC NODES: Unremarkable.    PELVIC ORGANS: Urinary bladder wall is collapsed with noted intraluminal Luna andair. Prostate measures 5.67 m in transverse diameter.    PERITONEUM/MESENTERY/BOWEL: Large left-sided spigelian hernia containing large and small bowel. No evidence of bowel obstruction or pneumoperitoneum. No ascites. Colostomy noted in the left lower quadrant.    BONES/SOFT TISSUES: Sclerotic lesion is noted along bilateral iliac bones, likely bone islands. Degenerative changes of the visualized spine.    OTHER: IVC filter noted. Calcific atherosclerosis of the abdominal aorta.    IMPRESSION:    1.  Urinary bladder wall is thickened in the setting of under-distention. Intraluminal air is likely secondary to Luna. If clinically warranted, correlate with UA to rule out acute cystitis.  2.  Bowel containing spigelian hernia without evidence of bowel obstruction or pneumoperitoneum.  3.  Small bilateral pleural effusions with adjacent atelectasis.  4.  Indeterminate bilateral renal lesions. Nonemergent reimaging with renal protocol recommended.    --- End of Report ---            MARBELLA CHILDERS DO; Resident Radiologist  This document has been electronically signed.  REBECA BARON MD; Attending Radiologist  This document has been electronically signed. Oct 23 2021  7:00PM    EXAM:  XR CHEST PORTABLE ROUTINE 1V            PROCEDURE DATE:  11/02/2021            INTERPRETATION:  Clinical History / Reason for exam: Congestion    Comparison : Chest radiograph 10/28/2021.    Technique/Positioning: Frontal.    Findings:    Support devices: None.    Cardiac/mediastinum/hilum: Stable    Lung parenchyma/Pleura: Increasing bilateral lung opacities    Skeleton/soft tissues: Stable    Impression:    Increasing bilateral lung opacities        --- End of Report ---                                                --------------------------------------------------------------    PHYSICAL EXAM:  GENERAL:   in NAD   HEENT: NCAT  LUNGS: air entry bilaterally, decreased at lower lung bases   HEART: RRR, +S1,S2, RRR  ABDOMEN: SNTTP, ND x 4 q's  EXT: Warm, well perfused x 4  NEURO: AxOx2, No FND's noted  SKIN: No new breakdown or rashes noted                                          --------------------------------------------------------------

## 2021-11-03 NOTE — PROGRESS NOTE ADULT - SUBJECTIVE AND OBJECTIVE BOX
CECILLE FRANKEL  85y  [facility] F3-4B 007 B      Patient is a 85y old  Male who presents with a chief complaint of weakness (03 Nov 2021 10:08)      INTERVAL HPI/OVERNIGHT EVENTS:        REVIEW OF SYSTEMS:  CONSTITUTIONAL: No fever, weight loss, or fatigue  EYES: No eye pain, visual disturbances, or discharge  ENMT:  No difficulty hearing, tinnitus, vertigo; No sinus or throat pain  NECK: No pain or stiffness  BREASTS: No pain, masses, or nipple discharge  RESPIRATORY: No cough, wheezing, chills or hemoptysis; No shortness of breath  CARDIOVASCULAR: No chest pain, palpitations, dizziness, or leg swelling  GASTROINTESTINAL: No abdominal or epigastric pain. No nausea, vomiting, or hematemesis; No diarrhea or constipation. No melena or hematochezia.  GENITOURINARY: No dysuria, frequency, hematuria, or incontinence  NEUROLOGICAL: No headaches, memory loss, loss of strength, numbness, or tremors  SKIN: No itching, burning, rashes, or lesions   LYMPH NODES: No enlarged glands  ENDOCRINE: No heat or cold intolerance; No hair loss  MUSCULOSKELETAL: No joint pain or swelling; No muscle, back, or extremity pain  PSYCHIATRIC: No depression, anxiety, mood swings, or difficulty sleeping  HEME/LYMPH: No easy bruising, or bleeding gums  ALLERY AND IMMUNOLOGIC: No hives or eczema  FAMILY HISTORY:    T(C): 35.9 (11-03-21 @ 08:05), Max: 35.9 (11-03-21 @ 08:05)  HR: 77 (11-03-21 @ 08:05) (72 - 78)  BP: 154/70 (11-03-21 @ 08:05) (154/70 - 174/79)  RR: 18 (11-03-21 @ 08:05) (18 - 18)  SpO2: 95% (11-03-21 @ 05:10) (95% - 95%)  Wt(kg): --Vital Signs Last 24 Hrs  T(C): 35.9 (03 Nov 2021 08:05), Max: 35.9 (03 Nov 2021 08:05)  T(F): 96.7 (03 Nov 2021 08:05), Max: 96.7 (03 Nov 2021 08:05)  HR: 77 (03 Nov 2021 08:05) (72 - 78)  BP: 154/70 (03 Nov 2021 08:05) (154/70 - 174/79)  BP(mean): --  RR: 18 (03 Nov 2021 08:05) (18 - 18)  SpO2: 95% (03 Nov 2021 05:10) (95% - 95%)    PHYSICAL EXAM:  GENERAL: NAD, well-groomed, well-developed  HEAD:  Atraumatic, Normocephalic  EYES: EOMI, PERRLA, conjunctiva and sclera clear  ENMT: No tonsillar erythema, exudates, or enlargement; Moist mucous membranes, Good dentition, No lesions  NECK: Supple, No JVD, Normal thyroid  NERVOUS SYSTEM:  Alert & Oriented X3, Good concentration; Motor Strength 5/5 B/L upper and lower extremities; DTRs 2+ intact and symmetric  PULM: Clear to auscultation bilaterally  CARDIAC: Regular rate and rhythm; No murmurs, rubs, or gallops  GI: Soft, Nontender, Nondistended; Bowel sounds present _+ Colostomy in LLQ  EXTREMITIES:  2+ Peripheral Pulses, No clubbing, cyanosis, or edema  LYMPH: No lymphadenopathy noted  SKIN: No rashes or lesions    Consultant(s) Notes Reviewed:  [x ] YES  [ ] NO  Care Discussed with Consultants/Other Providers [ x] YES  [ ] NO    LABS:                            9.5    10.59 )-----------( 167      ( 02 Nov 2021 07:01 )             30.0   11-02    140  |  98  |  55<H>  ----------------------------<  128<H>  3.4<L>   |  23  |  6.0<HH>    Ca    8.5      02 Nov 2021 07:01  Mg     1.7     11-02    TPro  5.2<L>  /  Alb  3.0<L>  /  TBili  0.4  /  DBili  x   /  AST  16  /  ALT  12  /  AlkPhos  71  11-02            acetaminophen     Tablet .. 650 milliGRAM(s) Oral every 6 hours PRN  amLODIPine   Tablet 10 milliGRAM(s) Oral daily  aspirin enteric coated 81 milliGRAM(s) Oral daily  atorvastatin Oral Tab/Cap - Peds 20 milliGRAM(s) Oral daily  calcium acetate 667 milliGRAM(s) Oral three times a day with meals  chlorhexidine 4% Liquid 1 Application(s) Topical daily  furosemide    Tablet 40 milliGRAM(s) Oral daily  heparin   Injectable 5500 Unit(s) IV Push every 6 hours PRN  heparin   Injectable 2500 Unit(s) IV Push every 6 hours PRN  heparin  Infusion 600 Unit(s)/Hr IV Continuous <Continuous>  hydrALAZINE 25 milliGRAM(s) Oral three times a day  pantoprazole    Tablet 40 milliGRAM(s) Oral before breakfast  sodium bicarbonate 1300 milliGRAM(s) Oral three times a day      HEALTH ISSUES - PROBLEM Dx:          Case Discussed with House Staff   Spectra [phone]

## 2021-11-04 NOTE — CHART NOTE - NSCHARTNOTEFT_GEN_A_CORE
Flexible sigmoidoscopy findings:   The rectal mucosa looked erythematous and friable and was covered with multiple blood clots and mucus. 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      Diverticulosis of the rectosigmoid junction.      Internal hemorrhoids.      The scope was advanced to the rectosigmoid junction but could not be advanced further in the colon due to the presence of blood clots and structuring of the sigmoid in the setting of the diverticular disease.       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     -Await pathology  -Monitor Hb daily  -Restart reversible anticoagulant and monitor for recurrent bleeding  -If the patient re-bleeds, consider CT pelvis w/ rectal enema to rule out any masses in the blind colonic stump  -Avoid NSAIDs  -Rest of management per primary team Flexible sigmoidoscopy findings:   The rectal mucosa looked erythematous and friable and was covered with multiple blood clots and mucus. 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      Diverticulosis of the rectosigmoid junction.      Internal hemorrhoids.      The scope was advanced to the rectosigmoid junction but could not be advanced further in the colon due to the presence of blood clots and structuring of the sigmoid in the setting of the diverticular disease.       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     -Await pathology  -Monitor Hb daily  -Restart reversible anticoagulant and monitor for recurrent bleeding  -If the patient re-bleeds, consider CT pelvis w/ rectal enema to rule out any masses in the blind colonic stump  -Avoid NSAIDs  -Rest of management per primary team  -Spoke to the daughter over the phone and updated about yosef  -Gi will sign off recall as needed

## 2021-11-04 NOTE — PRE-OP CHECKLIST - NS PREOP CHK MONITOR ANESTHESIA CONSENT
Pt c/o abd pain and diarrhea which began on Wednesday after eating at a fast food restaurant and subsided today.   Pt wants a note to return to work at [address] [place] [address],[phone] done

## 2021-11-04 NOTE — PROGRESS NOTE ADULT - ASSESSMENT
Assessment     85 year old M with PMH of nephrolithiasis, DM, Crohn s/p colostomy, CKD 4, HTN, HLD, BPH who presented with weakness. CT a/p showed bladder wall is thickened and patient found to have JOSH with creat 8.3. His electrolytes improved with no need for hemodialysis and he was downgraded from the ICU on 10/26.       Hematochezia   AMS  JOSH / CKD4  chronic anemia   hematuria   BRBPR  acute and chronic DVT   HO DM   HO chrons disease s/p colectomy   BPH     #Acute on chronic anemia   s/p 2U pRBC this admission   maintain hgb >7    #Hematochezia   - small amount of blood noted 11/3  - HD stable   - GI consult- plan for flex sigmoidoscopy 11/4   - heparin drip on hold for procedure     #hematuria - resolved   - likely from pulling at [employer]      # altered mental status - improving   likely 2/2 metabolic encephalopathy and delirium  -avoid CNS depressant  -monitor electrolytes   -strict Is and Os  - frequent re-orientaion     #JOSH on CKD4   - non-oliguric  - creat improving 4.8, baseline 2.2   - nephro consult recs appreciated   - c/w sodium bicarbonate 1300 mg PO TID  - c/w phoslo 667mg tid   - furosemide 40mg po qd     #Acute pyelonephritis - resolved   #Abdominal wall cellulitis around stoma- resolved   - Blood & Urine cxs NGTD 10/23  - completed course of rocephin 10 days     #inderterminate bilateral renal lesions   -outpt fu     #acute and chronic DVT  - on elliquis at home  - heparin drip on hold for flex sigmoidoscopy     #HLD   - atovastatin 20mg qd   - aspirin 81mg     #HTN   - amlodipine 10mg qd   - hydralazine 25mg tid     # DVT prophylaxis: htt drip on hold now    # GI prophylaxis: pantoprazole  # Diet: NPO for felx sigmoidoscopy   # Activity Score (AM-PAC)  # Code status: full code  # Disposition: acute for now, pending flex sigmoidoscopy,

## 2021-11-04 NOTE — PROGRESS NOTE ADULT - SUBJECTIVE AND OBJECTIVE BOX
Nephrology Progress Note    CECILLE FRANKEL  MRN-[id number]  85y  Male    S:  Patient is seen and examined, events over the last 24h noted.    O:  Allergies:  No Known Allergies    Hospital Medications:   MEDICATIONS  (STANDING):  amLODIPine   Tablet 10 milliGRAM(s) Oral daily  aspirin enteric coated 81 milliGRAM(s) Oral daily  atorvastatin 20 milliGRAM(s) Oral at bedtime  calcium acetate 667 milliGRAM(s) Oral three times a day with meals  furosemide    Tablet 40 milliGRAM(s) Oral daily  heparin  Infusion 600 Unit(s)/Hr (9 mL/Hr) IV Continuous <Continuous>  hydrALAZINE 25 milliGRAM(s) Oral three times a day  pantoprazole    Tablet 40 milliGRAM(s) Oral before breakfast  sodium bicarbonate 1300 milliGRAM(s) Oral three times a day    MEDICATIONS  (PRN):  acetaminophen     Tablet .. 650 milliGRAM(s) Oral every 6 hours PRN Mild Pain (1 - 3)  heparin   Injectable 5500 Unit(s) IV Push every 6 hours PRN For aPTT less than 40  heparin   Injectable 2500 Unit(s) IV Push every 6 hours PRN For aPTT between 40 - 57    Home Medications:  amLODIPine 5 mg oral tablet: 1 tab(s) orally 2 times a day (01 Sep 2021 16:54)  Aspir 81 oral delayed release tablet: 1 tab(s) orally once a day (01 Sep 2021 16:54)  atorvastatin 10 mg oral tablet: 2 tab(s) orally once a day (01 Sep 2021 16:54)  famotidine 20 mg oral tablet: 1 tab(s) orally once a day (01 Sep 2021 16:54)  Januvia 50 mg oral tablet: 1 tab(s) orally once a day (01 Sep 2021 16:54)  Lasix 20 mg oral tablet: 1 tab(s) orally every other day (01 Sep 2021 16:54)  pioglitazone 30 mg oral tablet: 1 tab(s) orally once a day (01 Sep 2021 16:54)  ramipril 5 mg oral capsule: 2 cap(s) orally once a day (01 Sep 2021 16:54)      VITALS:  Daily Height in cm: 200.66 (04 Nov 2021 14:20)    T(F): 97.1 (11-04-21 @ 14:06), Max: 97.5 (11-04-21 @ 00:42)  HR: 62 (11-04-21 @ 14:20)  BP: 148/51 (11-04-21 @ 14:20)  RR: 18 (11-04-21 @ 14:20)  SpO2: 91% (11-04-21 @ 05:52)  Wt(kg): --  I&O's Detail    03 Nov 2021 07:01  -  04 Nov 2021 07:00  --------------------------------------------------------  IN:    Oral Fluid: 570 mL  Total IN: 570 mL    OUT:    Colostomy (mL): 700 mL    Indwelling Catheter - Urethral (mL): 200 mL  Total OUT: 900 mL    Total NET: -330 mL        I&O's Summary    03 Nov 2021 07:01  -  04 Nov 2021 07:00  --------------------------------------------------------  IN: 570 mL / OUT: 900 mL / NET: -330 mL      Height (cm): 200.7 (11-04 @ 14:20)  Weight (kg): 66.5 (11-04 @ 14:20)  BMI (kg/m2): 16.5 (11-04 @ 14:20)  BSA (m2): 2 (11-04 @ 14:20)      PHYSICAL EXAM:  Gen: NAD  Resp: CTAB  Card: S1/S2  Abd: soft, colostomy   Extremities: no edema      LABS:    11-04    137  |  98  |  47<H>  ----------------------------<  124<H>  3.6   |  26  |  5.1<HH>    Ca    8.1<L>      04 Nov 2021 07:34  Mg     2.1     11-04    TPro  4.7<L>  /  Alb  2.7<L>  /  TBili  0.3  /  DBili      /  AST  15  /  ALT  12  /  AlkPhos  68  11-04    eGFR if Non African American: 10 mL/min/1.73M2 (11-04-21 @ 07:34)  eGFR if : 11 mL/min/1.73M2 (11-04-21 @ 07:34)    Phosphorus Level, Serum: 3.9 mg/dL (11-01-21 @ 04:30)  Phosphorus Level, Serum: 3.8 mg/dL (10-31-21 @ 04:30)    Osmolality, Serum: 298 mos/kg (10-24-21 @ 11:32)  Osmolality, Serum: 288 mos/kg (09-02-21 @ 07:44)                          8.5    8.32  )-----------( 152      ( 04 Nov 2021 07:34 )             26.4     Mean Cell Volume: 89.5 fL (11-04-21 @ 07:34)    % Saturation, Iron: 48 % (10-24-21 @ 11:32)  Ferritin, Serum: 326 ng/mL (10-24-21 @ 11:32)      Culture Results:   <10,000 CFU/mL Normal Urogenital Beronica (10-28 @ 11:34)  Osmolality, Random Urine: 249 mos/kg (10-24 @ 18:44)    Creatinine trend:  Creatinine, Serum: 5.1 mg/dL (11-04-21 @ 07:34)  Creatinine, Serum: 4.8 mg/dL (11-04-21 @ 01:09)  Creatinine, Serum: 5.2 mg/dL (11-03-21 @ 15:52)  Creatinine, Serum: 6.0 mg/dL (11-02-21 @ 07:01)  Creatinine, Serum: 6.2 mg/dL (11-01-21 @ 04:30)  Creatinine, Serum: 6.3 mg/dL (10-31-21 @ 04:30)  Creatinine, Serum: 6.9 mg/dL (10-30-21 @ 05:52)  Creatinine, Serum: 7.3 mg/dL (10-29-21 @ 02:07)  Creatinine, Serum: 8.1 mg/dL (10-28-21 @ 01:15)

## 2021-11-04 NOTE — PROGRESS NOTE ADULT - ASSESSMENT
HPI:  85 year old M with PMH of DM, Crohn s/p colostomy, CKD 4, HTN, HLD, BPH presents to ED with complaints of weakness. Pt states 10 days prior was diagnosed with UTI, placed on bactrim, Family states due to size of pill, pt was unable to swallow pill, only took 1/2 dose for 5 days. Family states over past few days pt becoming increasingly lethargic, having chills as well. Denies cough, chest pain, sob, abdominal pain, NVCD.    #Acute on chronic anemia secondary to hematochezia   plan for flexible sigmoidoscopy today     #Mild mitral valve regurgitation.     #. Mild tricuspid regurgitation.    #JOSH on CKD 4 secondary to suspected ATN improving   mild worsening today , monitor , nephro following     #DM   Glucose, Serum: 124 mg/dL (11.04.21 @ 07:34)  controlled    #HTN   BP: 140/85 (04 Nov 2021 12:56) (140/85 - 156/70)  controlled     #BPH     #Pulmonary hypertension     #Hypokalemia resolved    #Hypomagnesemia resolved    #Drug monitoring of high risk medication , potential for drug drug reaction , requiring close monitoring monitor ptt and adjust heparin PT/INR - ( 04 Nov 2021 07:34 )   PT: 14.70 sec;   INR: 1.28 ratio    PTT - ( 04 Nov 2021 07:34 )  PTT:31.3 sec , holding heparin until after procedure     PROGRESS NOTE HANDOFF    Pending:  Flex sig today , monitor hemoglobin    Family discussion: patient verbalized understanding and agreeable to plan of care     Disposition: Nursing facility

## 2021-11-04 NOTE — PROGRESS NOTE ADULT - SUBJECTIVE AND OBJECTIVE BOX
CECILLE FRANKEL  85y  [facility] F3-4B 007 B      Patient is a 85y old  Male who presents with a chief complaint of weakness (04 Nov 2021 08:46)      INTERVAL HPI/OVERNIGHT EVENTS:    no acute events overnight     REVIEW OF SYSTEMS:  CONSTITUTIONAL: No fever, weight loss, or fatigue  EYES: No eye pain, visual disturbances, or discharge  ENMT:  No difficulty hearing, tinnitus, vertigo; No sinus or throat pain  NECK: No pain or stiffness  BREASTS: No pain, masses, or nipple discharge  RESPIRATORY: No cough, wheezing, chills or hemoptysis; No shortness of breath  CARDIOVASCULAR: No chest pain, palpitations, dizziness, or leg swelling  GASTROINTESTINAL: No abdominal or epigastric pain. No nausea, vomiting, or hematemesis; No diarrhea or constipation. No melena or hematochezia.  GENITOURINARY: No dysuria, frequency, hematuria, or incontinence  NEUROLOGICAL: No headaches, memory loss, loss of strength, numbness, or tremors  SKIN: No itching, burning, rashes, or lesions   LYMPH NODES: No enlarged glands  ENDOCRINE: No heat or cold intolerance; No hair loss  MUSCULOSKELETAL: No joint pain or swelling; No muscle, back, or extremity pain  PSYCHIATRIC: No depression, anxiety, mood swings, or difficulty sleeping  HEME/LYMPH: No easy bruising, or bleeding gums  ALLERY AND IMMUNOLOGIC: No hives or eczema  FAMILY HISTORY:    T(C): 36.1 (11-04-21 @ 12:56), Max: 36.4 (11-04-21 @ 00:42)  HR: 68 (11-04-21 @ 12:56) (68 - 74)  BP: 140/85 (11-04-21 @ 12:56) (140/85 - 156/70)  RR: 18 (11-04-21 @ 12:56) (18 - 18)  SpO2: 91% (11-04-21 @ 05:52) (91% - 94%)  Wt(kg): --Vital Signs Last 24 Hrs  T(C): 36.1 (04 Nov 2021 12:56), Max: 36.4 (04 Nov 2021 00:42)  T(F): 97 (04 Nov 2021 12:56), Max: 97.5 (04 Nov 2021 00:42)  HR: 68 (04 Nov 2021 12:56) (68 - 74)  BP: 140/85 (04 Nov 2021 12:56) (140/85 - 156/70)  BP(mean): --  RR: 18 (04 Nov 2021 12:56) (18 - 18)  SpO2: 91% (04 Nov 2021 05:52) (91% - 94%)    PHYSICAL EXAM:  GEN Lying in no acute distress  HEENT Pupils equal and reactive to light and accommodationSupple Neck  PULM Clear to auscultation bilaterally  CV s1s2  GI + bowel sounds nontnender + Colostomy with no evidence of bleeding  EXT no cyanosis or edema  PSYCH awake alert and oriented x 3  INTEG No Lesions  NEURO Moves all extremities    Consultant(s) Notes Reviewed:  [x ] YES  [ ] NO  Care Discussed with Consultants/Other Providers [ x] YES  [ ] NO    LABS:                            8.5    8.32  )-----------( 152      ( 04 Nov 2021 07:34 )             26.4   11-04    137  |  98  |  47<H>  ----------------------------<  124<H>  3.6   |  26  |  5.1<HH>    Ca    8.1<L>      04 Nov 2021 07:34  Mg     2.1     11-04    TPro  4.7<L>  /  Alb  2.7<L>  /  TBili  0.3  /  DBili  x   /  AST  15  /  ALT  12  /  AlkPhos  68  11-04            acetaminophen     Tablet .. 650 milliGRAM(s) Oral every 6 hours PRN  amLODIPine   Tablet 10 milliGRAM(s) Oral daily  aspirin enteric coated 81 milliGRAM(s) Oral daily  atorvastatin 20 milliGRAM(s) Oral at bedtime  calcium acetate 667 milliGRAM(s) Oral three times a day with meals  chlorhexidine 4% Liquid 1 Application(s) Topical daily  furosemide    Tablet 40 milliGRAM(s) Oral daily  heparin   Injectable 5500 Unit(s) IV Push every 6 hours PRN  heparin   Injectable 2500 Unit(s) IV Push every 6 hours PRN  heparin  Infusion 600 Unit(s)/Hr IV Continuous <Continuous>  hydrALAZINE 25 milliGRAM(s) Oral three times a day  pantoprazole    Tablet 40 milliGRAM(s) Oral before breakfast  sodium bicarbonate 1300 milliGRAM(s) Oral three times a day      HEALTH ISSUES - PROBLEM Dx:          Case Discussed with House Staff   Spectra [phone]

## 2021-11-04 NOTE — PROGRESS NOTE ADULT - ASSESSMENT
# JOSH on CKD 4 - likely ATN d/t severe sepsis / acute on chronic anemia  # Metabolic acidosis w/ anion gap   # Hyponatremia / low eav  # Acute on chronic anemia   # Acute pyelonephritis / Abdominal wall cellulitis   # Hx of crohn s/p colostomy     Recommendations:  - non oliguric, urine output improved - please document accurately  - creat stable  - hold lasix   - replete K to 3.5-4, Mg to 2  - hyponatremia resolved   - pt is close to HD, but does not want to do it unless it is absolutely needed (his wife was on HD).  Currently w/o acute  indication, cont monitoring off HD  - d/c sodium bicarb   - phos level noted, at goal, cont phoslo  - CT noted w/o hydronephrosis  - RBC transfusion as needed to maintain hgb > 7, iron stores noted, does not need iv iron   - appreciate GI eval for hematochezia - plan for flex sig today   - serum flc ratio noted wnl

## 2021-11-04 NOTE — PROGRESS NOTE ADULT - SUBJECTIVE AND OBJECTIVE BOX
CECILLE FRANKEL 85y Male  MRN#: [id number]   CODE STATUS:________    Hospital Day: 12d    Pt is currently admitted with the primary diagnosis of JOSH     SUBJECTIVE    No events overnight, denies chest pain, sob, nvd, no abdominal pain, no bleeding       OBJECTIVE  PAST MEDICAL & SURGICAL HISTORY  History of medical problems  [place] b/l ears, dm, htn, hypercholesteremia, ckd, bowel/bladder fistula, kidney stones    Colostomy present    History of surgery  procedure for kidney stones ( ?lithotripsy)                                              -----------------------------------------------------------  ALLERGIES:  No Known Allergies                                            ------------------------------------------------------------    HOME MEDICATIONS  Home Medications:  amLODIPine 5 mg oral tablet: 1 tab(s) orally 2 times a day (01 Sep 2021 16:54)  Aspir 81 oral delayed release tablet: 1 tab(s) orally once a day (01 Sep 2021 16:54)  atorvastatin 10 mg oral tablet: 2 tab(s) orally once a day (01 Sep 2021 16:54)  famotidine 20 mg oral tablet: 1 tab(s) orally once a day (01 Sep 2021 16:54)  Januvia 50 mg oral tablet: 1 tab(s) orally once a day (01 Sep 2021 16:54)  Lasix 20 mg oral tablet: 1 tab(s) orally every other day (01 Sep 2021 16:54)  pioglitazone 30 mg oral tablet: 1 tab(s) orally once a day (01 Sep 2021 16:54)  ramipril 5 mg oral capsule: 2 cap(s) orally once a day (01 Sep 2021 16:54)                           MEDICATIONS:  STANDING MEDICATIONS  amLODIPine   Tablet 10 milliGRAM(s) Oral daily  aspirin enteric coated 81 milliGRAM(s) Oral daily  atorvastatin Oral Tab/Cap - Peds 20 milliGRAM(s) Oral daily  calcium acetate 667 milliGRAM(s) Oral three times a day with meals  chlorhexidine 4% Liquid 1 Application(s) Topical daily  furosemide    Tablet 40 milliGRAM(s) Oral daily  heparin  Infusion 600 Unit(s)/Hr IV Continuous <Continuous>  hydrALAZINE 25 milliGRAM(s) Oral three times a day  pantoprazole    Tablet 40 milliGRAM(s) Oral before breakfast  sodium bicarbonate 1300 milliGRAM(s) Oral three times a day    PRN MEDICATIONS  acetaminophen     Tablet .. 650 milliGRAM(s) Oral every 6 hours PRN  heparin   Injectable 5500 Unit(s) IV Push every 6 hours PRN  heparin   Injectable 2500 Unit(s) IV Push every 6 hours PRN                                            ------------------------------------------------------------  VITAL SIGNS: Last 24 Hours  T(C): 36.1 (04 Nov 2021 07:30), Max: 36.4 (04 Nov 2021 00:42)  T(F): 97 (04 Nov 2021 07:30), Max: 97.5 (04 Nov 2021 00:42)  HR: 68 (04 Nov 2021 07:30) (68 - 74)  BP: 140/85 (04 Nov 2021 07:30) (140/85 - 156/70)  BP(mean): --  RR: 18 (04 Nov 2021 07:30) (18 - 18)  SpO2: 91% (04 Nov 2021 05:52) (91% - 94%)      11-03-21 @ 07:01  -  11-04-21 @ 07:00  --------------------------------------------------------  IN: 570 mL / OUT: 900 mL / NET: -330 mL                                             --------------------------------------------------------------  LABS:                        9.3    9.73  )-----------( 164      ( 03 Nov 2021 15:52 )             28.2     11-04    136  |  98  |  49<H>  ----------------------------<  148<H>  3.6   |  24  |  4.8<HH>    Ca    7.9<L>      04 Nov 2021 01:09  Mg     1.8     11-03    TPro  5.1<L>  /  Alb  3.0<L>  /  TBili  0.4  /  DBili  x   /  AST  15  /  ALT  12  /  AlkPhos  81  11-03    PT/INR - ( 03 Nov 2021 15:52 )   PT: 12.40 sec;   INR: 1.08 ratio         PTT - ( 04 Nov 2021 01:09 )  PTT:30.7 sec                                                         --------------------------------------------------------------    PHYSICAL EXAM:  GENERAL:  comfortable, in NAD   HEENT: NCAT  LUNGS: air entry bilaterally   HEART: RRR, +S1,S2, RRR  ABDOMEN: SNTTP, ND x 4 q's, colostomy bag with stool,   EXT: Warm, well perfused x 4  NEURO: AxOx2, No FND's noted  SKIN: No new breakdown or rashes noted

## 2021-11-05 NOTE — PROGRESS NOTE ADULT - SUBJECTIVE AND OBJECTIVE BOX
CECILLE FRANKEL  85y  [facility] F3-4B 007 B      Patient is a 85y old  Male who presents with a chief complaint of weakness (05 Nov 2021 09:02)      INTERVAL HPI/OVERNIGHT EVENTS:    no acute events over night     REVIEW OF SYSTEMS:  CONSTITUTIONAL: No fever, weight loss, or fatigue  EYES: No eye pain, visual disturbances, or discharge  ENMT:  No difficulty hearing, tinnitus, vertigo; No sinus or throat pain  NECK: No pain or stiffness  BREASTS: No pain, masses, or nipple discharge  RESPIRATORY: No cough, wheezing, chills or hemoptysis; No shortness of breath  CARDIOVASCULAR: No chest pain, palpitations, dizziness, or leg swelling  GASTROINTESTINAL: No abdominal or epigastric pain. No nausea, vomiting, or hematemesis; No diarrhea or constipation. No melena or hematochezia.  GENITOURINARY: No dysuria, frequency, hematuria, or incontinence  NEUROLOGICAL: No headaches, memory loss, loss of strength, numbness, or tremors  SKIN: No itching, burning, rashes, or lesions   LYMPH NODES: No enlarged glands  ENDOCRINE: No heat or cold intolerance; No hair loss  MUSCULOSKELETAL: No joint pain or swelling; No muscle, back, or extremity pain  PSYCHIATRIC: No depression, anxiety, mood swings, or difficulty sleeping  HEME/LYMPH: No easy bruising, or bleeding gums  ALLERY AND IMMUNOLOGIC: No hives or eczema  FAMILY HISTORY:    T(C): 35.6 (11-05-21 @ 08:00), Max: 36.2 (11-04-21 @ 14:06)  HR: 77 (11-05-21 @ 08:00) (55 - 77)  BP: 153/66 (11-05-21 @ 08:00) (123/60 - 169/71)  RR: 18 (11-05-21 @ 08:00) (17 - 19)  SpO2: 94% (11-05-21 @ 05:15) (94% - 96%)  Wt(kg): --Vital Signs Last 24 Hrs  T(C): 35.6 (05 Nov 2021 08:00), Max: 36.2 (04 Nov 2021 14:06)  T(F): 96 (05 Nov 2021 08:00), Max: 97.2 (04 Nov 2021 15:13)  HR: 77 (05 Nov 2021 08:00) (55 - 77)  BP: 153/66 (05 Nov 2021 08:00) (123/60 - 169/71)  BP(mean): --  RR: 18 (05 Nov 2021 08:00) (17 - 19)  SpO2: 94% (05 Nov 2021 05:15) (94% - 96%)    PHYSICAL EXAM:  GENERAL: NAD, well-groomed, well-developed  HEAD:  Atraumatic, Normocephalic  EYES: EOMI, PERRLA, conjunctiva and sclera clear  ENMT: No tonsillar erythema, exudates, or enlargement; Moist mucous membranes, Good dentition, No lesions  NECK: Supple, No JVD, Normal thyroid  NERVOUS SYSTEM:  Alert & Oriented X3, Good concentration; Motor Strength 2/5 B/Llower extremities; DTRs 2+ intact and symmetric  PULM: Clear to auscultation bilaterally  CARDIAC: s1s2  GI: Soft, Nontender, Nondistended; Bowel sounds present + Colostomy   EXTREMITIES:  2+ Peripheral Pulses, No clubbing, cyanosis, or edema  LYMPH: No lymphadenopathy noted  SKIN: No rashes or lesions    Consultant(s) Notes Reviewed:  [x ] YES  [ ] NO  Care Discussed with Consultants/Other Providers [ x] YES  [ ] NO    LABS:                            8.8    6.88  )-----------( 157      ( 05 Nov 2021 07:00 )             26.7   11-05    140  |  101  |  46<H>  ----------------------------<  126<H>  3.6   |  22  |  5.0<HH>    Ca    8.0<L>      05 Nov 2021 07:00  Mg     2.0     11-05    TPro  4.6<L>  /  Alb  2.6<L>  /  TBili  0.3  /  DBili  x   /  AST  18  /  ALT  13  /  AlkPhos  68  11-05            acetaminophen     Tablet .. 650 milliGRAM(s) Oral every 6 hours PRN  amLODIPine   Tablet 10 milliGRAM(s) Oral daily  aspirin enteric coated 81 milliGRAM(s) Oral daily  atorvastatin 20 milliGRAM(s) Oral at bedtime  calcium acetate 667 milliGRAM(s) Oral three times a day with meals  chlorhexidine 4% Liquid 1 Application(s) Topical daily  heparin   Injectable 5500 Unit(s) IV Push every 6 hours PRN  heparin   Injectable 2500 Unit(s) IV Push every 6 hours PRN  heparin  Infusion 600 Unit(s)/Hr IV Continuous <Continuous>  hydrALAZINE 25 milliGRAM(s) Oral three times a day  pantoprazole    Tablet 40 milliGRAM(s) Oral before breakfast      HEALTH ISSUES - PROBLEM Dx:          Case Discussed with House Staff     Spectra [phone]

## 2021-11-05 NOTE — GOALS OF CARE CONVERSATION - ADVANCED CARE PLANNING - CONVERSATION DETAILS
spoke with patient and daughter Kerry Hanson at bedside     they were clear that they wish that the pt status to be DNR DNI

## 2021-11-05 NOTE — PROGRESS NOTE ADULT - SUBJECTIVE AND OBJECTIVE BOX
Nephrology progress note    Patient is seen and examined, events over the last 24 h noted .  No SOB  Allergies:  No Known Allergies    Hospital Medications:   MEDICATIONS  (STANDING):  amLODIPine   Tablet 10 milliGRAM(s) Oral daily  aspirin enteric coated 81 milliGRAM(s) Oral daily  atorvastatin 20 milliGRAM(s) Oral at bedtime  calcium acetate 667 milliGRAM(s) Oral three times a day with meals  chlorhexidine 4% Liquid 1 Application(s) Topical daily  heparin  Infusion 600 Unit(s)/Hr (12 mL/Hr) IV Continuous <Continuous>  hydrALAZINE 25 milliGRAM(s) Oral three times a day  pantoprazole    Tablet 40 milliGRAM(s) Oral before breakfast        VITALS:  T(F): 96 (11-05-21 @ 08:00), Max: 97.2 (11-04-21 @ 15:13)  HR: 76 (11-05-21 @ 13:14)  BP: 148/66 (11-05-21 @ 13:14)  RR: 18 (11-05-21 @ 08:00)  SpO2: 94% (11-05-21 @ 05:15)  Wt(kg): --    11-03 @ 07:01  -  11-04 @ 07:00  --------------------------------------------------------  IN: 570 mL / OUT: 900 mL / NET: -330 mL    11-04 @ 07:01  -  11-05 @ 07:00  --------------------------------------------------------  IN: 240 mL / OUT: 1010 mL / NET: -770 mL    11-05 @ 07:01  -  11-05 @ 14:28  --------------------------------------------------------  IN: 120 mL / OUT: 0 mL / NET: 120 mL          PHYSICAL EXAM:  Constitutional: NAD  HEENT: anicteric sclera  Neck: No JVD  Respiratory: CTA  Cardiovascular: S1, S2, RRR  Gastrointestinal: BS+, soft, NT/ND  Extremities: No peripheral edema  Neurological: A/O x 3  : No CVA tenderness. No sanford.   Skin: No rashes  Vascular Access:    LABS:  11-05    140  |  101  |  46<H>  ----------------------------<  126<H>  3.6   |  22  |  5.0<HH>    Ca    8.0<L>      05 Nov 2021 07:00  Mg     2.0     11-05    TPro  4.6<L>  /  Alb  2.6<L>  /  TBili  0.3  /  DBili      /  AST  18  /  ALT  13  /  AlkPhos  68  11-05                          8.8    6.88  )-----------( 157      ( 05 Nov 2021 07:00 )             26.7       Urine Studies:      RADIOLOGY & ADDITIONAL STUDIES:  < from: Xray Chest 1 View- PORTABLE-Routine (Xray Chest 1 View- PORTABLE-Routine .) (11.02.21 @ 12:56) >  Increasing bilateral lung opacities    < end of copied text >

## 2021-11-05 NOTE — PROGRESS NOTE ADULT - ASSESSMENT
HPI:  85 year old M with PMH of DM, Crohn s/p colostomy, CKD 4, HTN, HLD, BPH presents to ED with complaints of weakness. Pt states 10 days prior was diagnosed with UTI, placed on bactrim, Family states due to size of pill, pt was unable to swallow pill, only took 1/2 dose for 5 days. Family states over past few days pt becoming increasingly lethargic, having chills as well. Denies cough, chest pain, sob, abdominal pain, NVCD.    #Acute on chronic anemia secondary to hematochezia  secondary to suspected diverticular bleed in the setting of diverticulosis versus hemorrhoids  sp flexible sigmoidoscopy   restarting ac     #Mild mitral valve regurgitation.     #. Mild tricuspid regurgitation.    #JOSH on CKD 4 secondary to suspected ATN improving   improved today , nephrology following ,  if no evidence of bleeding on heparin and hemoglobin stable , will dw nephrology long term ac plan Coumadin versus low dose NOAC    #DM   Glucose, Serum: 126 mg/dL (11.05.21 @ 07:00)  controlled    #HTN   BP: 153/66 (05 Nov 2021 08:00) (123/60 - 169/71)  elevated last readings , will monitor     #BPH     #Pulmonary hypertension     #Drug monitoring of high risk medication , potential for drug drug reaction , requiring close monitoring monitor ptt and adjust heparin PT/INR - ( 05 Nov 2021 07:00 )   PT: 15.80 sec;   INR: 1.38 ratio     PTT - ( 05 Nov 2021 07:00 )  PTT:34.0 sec , monitor ptt    PROGRESS NOTE HANDOFF    Pending , monitor patient on heparin for signs of bleeding, if stable plan for long term a/c     Family discussion: patient verbalized understanding and agreeable to plan of care     Disposition: Nursing facility

## 2021-11-05 NOTE — PROGRESS NOTE ADULT - SUBJECTIVE AND OBJECTIVE BOX
CECILLE FRANKEL 85y Male  MRN#: [id number]   CODE STATUS: full code     Hospital Day: 13d    Pt is currently admitted with the primary diagnosis of acute renal failure     SUBJECTIVE    No events overnight, denies fever chills, nvd                                              ----------------------------------------------------------  OBJECTIVE  PAST MEDICAL & SURGICAL HISTORY  History of medical problems  [place] b/l ears, dm, htn, hypercholesteremia, ckd, bowel/bladder fistula, kidney stones    Colostomy present    History of surgery  procedure for kidney stones ( ?lithotripsy)                                              -----------------------------------------------------------  ALLERGIES:  No Known Allergies                                            ------------------------------------------------------------    HOME MEDICATIONS  Home Medications:  amLODIPine 5 mg oral tablet: 1 tab(s) orally 2 times a day (01 Sep 2021 16:54)  Aspir 81 oral delayed release tablet: 1 tab(s) orally once a day (01 Sep 2021 16:54)  atorvastatin 10 mg oral tablet: 2 tab(s) orally once a day (01 Sep 2021 16:54)  famotidine 20 mg oral tablet: 1 tab(s) orally once a day (01 Sep 2021 16:54)  Januvia 50 mg oral tablet: 1 tab(s) orally once a day (01 Sep 2021 16:54)  Lasix 20 mg oral tablet: 1 tab(s) orally every other day (01 Sep 2021 16:54)  pioglitazone 30 mg oral tablet: 1 tab(s) orally once a day (01 Sep 2021 16:54)  ramipril 5 mg oral capsule: 2 cap(s) orally once a day (01 Sep 2021 16:54)                           MEDICATIONS:  STANDING MEDICATIONS  amLODIPine   Tablet 10 milliGRAM(s) Oral daily  aspirin enteric coated 81 milliGRAM(s) Oral daily  atorvastatin 20 milliGRAM(s) Oral at bedtime  calcium acetate 667 milliGRAM(s) Oral three times a day with meals  chlorhexidine 4% Liquid 1 Application(s) Topical daily  heparin  Infusion 600 Unit(s)/Hr IV Continuous <Continuous>  hydrALAZINE 25 milliGRAM(s) Oral three times a day  pantoprazole    Tablet 40 milliGRAM(s) Oral before breakfast    PRN MEDICATIONS  acetaminophen     Tablet .. 650 milliGRAM(s) Oral every 6 hours PRN  heparin   Injectable 5500 Unit(s) IV Push every 6 hours PRN  heparin   Injectable 2500 Unit(s) IV Push every 6 hours PRN                                            ------------------------------------------------------------  VITAL SIGNS: Last 24 Hours  T(C): 35.6 (05 Nov 2021 08:00), Max: 36.2 (04 Nov 2021 14:06)  T(F): 96 (05 Nov 2021 08:00), Max: 97.2 (04 Nov 2021 15:13)  HR: 77 (05 Nov 2021 08:00) (55 - 77)  BP: 153/66 (05 Nov 2021 08:00) (123/60 - 169/71)  BP(mean): --  RR: 18 (05 Nov 2021 08:00) (17 - 19)  SpO2: 94% (05 Nov 2021 05:15) (94% - 96%)      11-04-21 @ 07:01  -  11-05-21 @ 07:00  --------------------------------------------------------  IN: 240 mL / OUT: 1010 mL / NET: -770 mL                                             --------------------------------------------------------------  LABS:                        8.8    6.88  )-----------( 157      ( 05 Nov 2021 07:00 )             26.7     11-05    140  |  101  |  46<H>  ----------------------------<  126<H>  3.6   |  22  |  5.0<HH>    Ca    8.0<L>      05 Nov 2021 07:00  Mg     2.0     11-05    TPro  4.6<L>  /  Alb  2.6<L>  /  TBili  0.3  /  DBili  x   /  AST  18  /  ALT  13  /  AlkPhos  68  11-05    PT/INR - ( 05 Nov 2021 07:00 )   PT: 15.80 sec;   INR: 1.38 ratio         PTT - ( 05 Nov 2021 07:00 )  PTT:34.0 sec          PHYSICAL EXAM:  GENERAL: in NAD   HEENT: NCAT  LUNGS: air entry bilaterally   HEART: RRR, +S1,S2, RRR  ABDOMEN: SNTTP, ND x 4 q's  EXT: Warm, well perfused x 4, edema of bilateral lower extremity   NEURO: AxOx3, No FND's noted  SKIN: No new breakdown or rashes noted                                          --------------------------------------------------------------

## 2021-11-06 NOTE — PROGRESS NOTE ADULT - ASSESSMENT
# JOSH on CKD 4 - likely ATN d/t severe sepsis / acute on chronic anemia  # Metabolic acidosis w/ anion gap   # Hyponatremia / low eav  # Acute on chronic anemia   # Acute pyelonephritis / Abdominal wall cellulitis   # Hx of crohn s/p colostomy   Recommendations:  - creat stable   - UO noted   - lasix on hold   - pt is close to HD, but does not want to do it unless it is absolutely needed (his wife was on HD).  Currently w/o acute  indication, cont monitoring off HD  -   last phos level noted, at goal, cont phoslo  - CT noted w/o hydronephrosis  - RBC transfusion as needed to maintain hgb > 7, iron stores noted, does not need iv iron   - patient DNR/DNI   Overall prognosis is poor

## 2021-11-06 NOTE — PROGRESS NOTE ADULT - SUBJECTIVE AND OBJECTIVE BOX
seen and examined  24 h events noted         PAST HISTORY  --------------------------------------------------------------------------------  No significant changes to PMH, PSH, FHx, SHx, unless otherwise noted    ALLERGIES & MEDICATIONS  --------------------------------------------------------------------------------  Allergies    No Known Allergies    Intolerances      Standing Inpatient Medications  amLODIPine   Tablet 10 milliGRAM(s) Oral daily  aspirin enteric coated 81 milliGRAM(s) Oral daily  atorvastatin 20 milliGRAM(s) Oral at bedtime  calcium acetate 667 milliGRAM(s) Oral three times a day with meals  chlorhexidine 4% Liquid 1 Application(s) Topical daily  heparin  Infusion. 800 Unit(s)/Hr IV Continuous <Continuous>  hydrALAZINE 25 milliGRAM(s) Oral three times a day  pantoprazole    Tablet 40 milliGRAM(s) Oral before breakfast    PRN Inpatient Medications  acetaminophen     Tablet .. 650 milliGRAM(s) Oral every 6 hours PRN  heparin   Injectable 5500 Unit(s) IV Push every 6 hours PRN  heparin   Injectable 2500 Unit(s) IV Push every 6 hours PRN        VITALS/PHYSICAL EXAM  --------------------------------------------------------------------------------  T(C): 35.8 (11-06-21 @ 00:00), Max: 36.3 (11-05-21 @ 16:24)  HR: 76 (11-06-21 @ 05:00) (76 - 79)  BP: 159/70 (11-06-21 @ 05:00) (148/66 - 168/74)  RR: 18 (11-06-21 @ 00:00) (18 - 18)  SpO2: --  Wt(kg): --  Height (cm): 200.7 (11-04-21 @ 14:20)  Weight (kg): 66.5 (11-04-21 @ 14:20)  BMI (kg/m2): 16.5 (11-04-21 @ 14:20)  BSA (m2): 2 (11-04-21 @ 14:20)      11-05-21 @ 07:01  -  11-06-21 @ 07:00  --------------------------------------------------------  IN: 348 mL / OUT: 400 mL / NET: -52 mL      Physical Exam:  	Gen: NAD  	Pulm: decrease BS  B/L  	CV:  S1S2; no rub  	Abd: +distended  	    LABS/STUDIES  --------------------------------------------------------------------------------              8.8    6.88  >-----------<  157      [11-05-21 @ 07:00]              26.7     140  |  101  |  46  ----------------------------<  126      [11-05-21 @ 07:00]  3.6   |  22  |  5.0        Ca     8.0     [11-05-21 @ 07:00]      Mg     2.0     [11-05-21 @ 07:00]    TPro  4.6  /  Alb  2.6  /  TBili  0.3  /  DBili  x   /  AST  18  /  ALT  13  /  AlkPhos  68  [11-05-21 @ 07:00]    PT/INR: PT 15.80, INR 1.38       [11-05-21 @ 07:00]  PTT: 197.5      [11-05-21 @ 23:00]      Creatinine Trend:  SCr 5.0 [11-05 @ 07:00]  SCr 5.1 [11-04 @ 07:34]  SCr 4.8 [11-04 @ 01:09]  SCr 5.2 [11-03 @ 15:52]  SCr 6.0 [11-02 @ 07:01]    Urinalysis - [11-06-21 @ 01:05]      Color Yellow / Appearance Turbid / SG 1.015 / pH 7.0      Gluc 100 mg/dL / Ketone Negative  / Bili Negative / Urobili <2 mg/dL       Blood Small / Protein 300 mg/dL / Leuk Est Large / Nitrite Negative      RBC 7 / WBC >720 / Hyaline 3 / Gran  / Sq Epi  / Non Sq Epi 0 / Bacteria Negative      Iron 87, TIBC 183, %sat 48      [10-24-21 @ 11:32]  Ferritin 326      [10-24-21 @ 11:32]  TSH 0.43      [09-02-21 @ 07:44]  Lipid: chol 116, , HDL 39, LDL --      [09-02-21 @ 07:44]      Free Light Chains: kappa 4.29, lambda 3.88, ratio = 1.11      [10-24 @ 11:32]  Immunofixation Serum:   No Monoclonal Band Identified    Reference Range: None Detected      [10-24-21 @ 11:32]  SPEP Interpretation: Hypogammaglobulinemia      [10-24-21 @ 11:32]  Immunofixation Urine: Reference Range: None Detected      [10-24-21 @ 18:44]  UPEP Interpretation: Mild Selective (Glomerular) Proteinuria      [10-24-21 @ 18:44]

## 2021-11-06 NOTE — PROGRESS NOTE ADULT - SUBJECTIVE AND OBJECTIVE BOX
CECILLE FRANKEL  85y  [facility] F3-4B 007 B      Patient is a 85y old  Male who presents with a chief complaint of weakness (06 Nov 2021 07:39)      INTERVAL HPI/OVERNIGHT EVENTS:  no acute events overnight      REVIEW OF SYSTEMS:  CONSTITUTIONAL: No fever, weight loss, or fatigue  EYES: No eye pain, visual disturbances, or discharge  ENMT:  No difficulty hearing, tinnitus, vertigo; No sinus or throat pain  NECK: No pain or stiffness  BREASTS: No pain, masses, or nipple discharge  RESPIRATORY: No cough, wheezing, chills or hemoptysis; No shortness of breath  CARDIOVASCULAR: No chest pain, palpitations, dizziness, or leg swelling  GASTROINTESTINAL: No abdominal or epigastric pain. No nausea, vomiting, or hematemesis; No diarrhea or constipation. No melena or hematochezia.  GENITOURINARY: No dysuria, frequency, hematuria, or incontinence  NEUROLOGICAL: No headaches, memory loss, loss of strength, numbness, or tremors  SKIN: No itching, burning, rashes, or lesions   LYMPH NODES: No enlarged glands  ENDOCRINE: No heat or cold intolerance; No hair loss  MUSCULOSKELETAL: No joint pain or swelling; No muscle, back, or extremity pain  PSYCHIATRIC: No depression, anxiety, mood swings, or difficulty sleeping  HEME/LYMPH: No easy bruising, or bleeding gums  ALLERY AND IMMUNOLOGIC: No hives or eczema  FAMILY HISTORY:    T(C): 35.7 (11-06-21 @ 08:00), Max: 36.3 (11-05-21 @ 16:24)  HR: 85 (11-06-21 @ 08:00) (76 - 85)  BP: 148/65 (11-06-21 @ 08:00) (148/65 - 168/74)  RR: 18 (11-06-21 @ 08:00) (18 - 18)  SpO2: --  Wt(kg): --Vital Signs Last 24 Hrs  T(C): 35.7 (06 Nov 2021 08:00), Max: 36.3 (05 Nov 2021 16:24)  T(F): 96.2 (06 Nov 2021 08:00), Max: 97.3 (05 Nov 2021 16:24)  HR: 85 (06 Nov 2021 08:00) (76 - 85)  BP: 148/65 (06 Nov 2021 08:00) (148/65 - 168/74)  BP(mean): --  RR: 18 (06 Nov 2021 08:00) (18 - 18)  SpO2: --    PHYSICAL EXAM:  GENERAL: NAD, well-groomed, well-developed  HEAD:  Atraumatic, Normocephalic  EYES: EOMI, PERRLA, conjunctiva and sclera clear  ENMT: No tonsillar erythema, exudates, or enlargement; Moist mucous membranes, Good dentition, No lesions  NECK: Supple, No JVD, Normal thyroid  NERVOUS SYSTEM:  Alert & Oriented X3,   PULM: Clear to auscultation bilaterally  CARDIAC:  s1s2   GI: Soft, Nontender, Nondistended; Bowel sounds present + Sigmoidoscopy   EXTREMITIES:  2+ Peripheral Pulses, No clubbing, cyanosis, or edema  LYMPH: No lymphadenopathy noted  SKIN: No rashes or lesions    Consultant(s) Notes Reviewed:  [x ] YES  [ ] NO  Care Discussed with Consultants/Other Providers [ x] YES  [ ] NO    LABS:                            8.3    8.49  )-----------( 162      ( 06 Nov 2021 04:30 )             25.7   11-06    138  |  100  |  47<H>  ----------------------------<  112<H>  3.7   |  22  |  4.9<HH>    Ca    7.7<L>      06 Nov 2021 04:30  Mg     1.9     11-06    TPro  4.7<L>  /  Alb  2.6<L>  /  TBili  0.3  /  DBili  x   /  AST  16  /  ALT  12  /  AlkPhos  72  11-06            acetaminophen     Tablet .. 650 milliGRAM(s) Oral every 6 hours PRN  amLODIPine   Tablet 10 milliGRAM(s) Oral daily  aspirin enteric coated 81 milliGRAM(s) Oral daily  atorvastatin 20 milliGRAM(s) Oral at bedtime  calcium acetate 667 milliGRAM(s) Oral three times a day with meals  chlorhexidine 4% Liquid 1 Application(s) Topical daily  heparin   Injectable 5500 Unit(s) IV Push every 6 hours PRN  heparin   Injectable 2500 Unit(s) IV Push every 6 hours PRN  heparin  Infusion. 700 Unit(s)/Hr IV Continuous <Continuous>  hydrALAZINE 25 milliGRAM(s) Oral three times a day  pantoprazole    Tablet 40 milliGRAM(s) Oral before breakfast      HEALTH ISSUES - PROBLEM Dx:          Case Discussed with House Staff   Spectra [phone]

## 2021-11-06 NOTE — CHART NOTE - NSCHARTNOTEFT_GEN_A_CORE
MD contacted lab as patient's 8PM PTT was still not resulted at 2:30AM and the ordered 11:30PM PTT was cancelled by lab for "duplicate specimen."  MD was informed by  that PTT value was nearly 200.  Per , he had made attempts to reach the  since 12AM but no one could be reached.  No attempt was made to contact the on call resident or MAR to obtain spectra for the on call resident.  Furthermore the lab value was not posted to [facility] because  was waiting to give result over the phone.  As a result, patient remained on current heparin drip of 10cc/hr for an additional 2.5 hours with a PTT of nearly 200 before MD was finally notified and heparin drip could be stopped per protocol.

## 2021-11-06 NOTE — PROGRESS NOTE ADULT - ASSESSMENT
HPI:  85 year old M with PMH of DM, Crohn s/p colostomy, CKD 4, HTN, HLD, BPH presents to ED with complaints of weakness. Pt states 10 days prior was diagnosed with UTI, placed on bactrim, Family states due to size of pill, pt was unable to swallow pill, only took 1/2 dose for 5 days. Family states over past few days pt becoming increasingly lethargic, having chills as well. Denies cough, chest pain, sob, abdominal pain, NVCD.    #Acute on chronic anemia secondary to hematochezia  secondary to suspected diverticular bleed in the setting of diverticulosis versus hemorrhoids  sp flexible sigmoidoscopy   no evidence of bleeding  will start warfarin     #Mild mitral valve regurgitation.     #. Mild tricuspid regurgitation.    #JOSH on CKD 4 secondary to suspected ATN improving   no rrt per nephro    #DM   POCT Blood Glucose.: 197 mg/dL (06 Nov 2021 11:19)  POCT Blood Glucose.: 143 mg/dL (06 Nov 2021 07:52)  POCT Blood Glucose.: 149 mg/dL (05 Nov 2021 22:14)  controlled    #HTN   BP: 148/65 (06 Nov 2021 08:00) (148/65 - 168/74)  controlled    #BPH     #Pulmonary hypertension     #Drug monitoring of high risk medication , potential for drug drug reaction , requiring close monitoring monitor ptt and adjust heparin   PT/INR - ( 06 Nov 2021 04:30 )   PT: 16.20 sec;   INR: 1.41 ratio    PTT - ( 06 Nov 2021 04:30 )  PTT:128.3 sec - hold heparin x 1 hours , then restart at 700 units , monitor ptt , start warfarin tonight     PROGRESS NOTE HANDOFF    Pending , monitor patient on heparin for signs of bleeding, transitioning to warfarin     Family discussion: patient verbalized understanding and agreeable to plan of care     Disposition: Nursing facility

## 2021-11-06 NOTE — CHART NOTE - NSCHARTNOTEFT_GEN_A_CORE
Registered Dietitian Follow-Up     Patient Profile Reviewed                           Yes [x]   No []     Nutrition History Previously Obtained        Yes [x]  No []       Pertinent Subjective Information: I spoke to the RN, who states the patient consumes <50% of meals but consumes >75% of oral supplement.     Pertinent Medical Interventions:  1.Acute on chronic anemia secondary to hematochezia  secondary to suspected diverticular bleed in the setting of diverticulosis versus hemorrhoids  2.blood on his sheet at rectum CARLI positive for small amount of bright red blood, HD stable, afebrile, kidney function improving - GI consult   - will hold heparin for now   3.Altered mental status (improving) likely secondary to metabolic encephalopathy and delirium  4.JOSH on CKD4 secondary to ATN (improving)   - non-oliguric  - creat improving 4.9  - pt is close to HD, but does not want to do it unless it is absolutely needed (his wife was on HD)  - hyponatremia resolved - no urgency for RRT per nephrology     Diet order: (11/3) Consistent Carbohydrate, 60gm protein with evening snacks and Glucerna Shake Q12hrs     Anthropometrics:  - Ht: 5'6"  - Wt: 66.5kg  - %wt change  - BMI: 24 (WNL)   - IBW: 65kg     Pertinent Lab Data: () Na-138, K-3.7, CL-100, BUN-47, Cr-4.9, GFR-10, Glucose-112mg/dL, Corrected Ca-8.8 (WNL), H/H-8.3/25.7, INR-1.41     Pertinent Meds: Heparin, Coumadin, Lipitor, Norvasc, Hydralazine, Phoslo, Protonix      Physical Findings:  - Appearance: Underweight  - GI function: Per RN, the patient has liquid bowel movements  - Tubes: None  - Oral/Mouth cavity: No chewing and swallowing difficulty noted   - Skin: Intact (Shiraz Score-14)      Nutrition Requirements  Weight Used: 61.2kg -Derived from previous nutrition note (11/3)     Estimated Energy Needs    Continue [x]  Adjust []  Adjusted Energy Recommendations: MSJ x AF 1.2-1.3= 1495-1620kcal/day -Derived from nutrition note (11/3)      Estimated Protein Needs    Continue [x]  Adjust []  Adjusted Protein Recommendations: 73-86grams of protein based on 1.2-1.4 g/kg -Derived from nutrition note (11/3)      Estimated Fluid Needs        Continue [x]  Adjust []  Adjusted Fluid Recommendations: Fluids per LIP     Nutrient Intake: PO intake is <50%      [x] Previous Nutrition Diagnosis: Increased Nutrient Needs            [x] Ongoing          [] Resolved    [] No active nutrition diagnosis identified at this time     Nutrition Diagnostic #1  Problem:  Etiology:  Statement:     Nutrition Diagnostic #2  Problem:  Etiology:  Statement:     Nutrition Intervention:  1.Meals and Snacks  2.Medical Food Supplement     Goal/Expected Outcome:  1.Consume/tolerate 25%-50% of meals in 3 days    Indicator/Monitorin.Diet order, energy intake, nutrition focused physical findings, renal and anemia profile    -I spoke to the patient's physician name Dr. Escobar ([facility] #[id number]) regarding my nutritional recommendations.     Recommendations:  1.Recommend change diet to Low sodium  2.Recommend continue to provide Glucerna Shake Q12hrs Registered Dietitian Follow-Up     Patient Profile Reviewed                           Yes [x]   No []     Nutrition History Previously Obtained        Yes [x]  No []       Pertinent Subjective Information: I spoke to the RN, who states the patient consumes <50% of meals but consumes >75% of oral supplement.     Pertinent Medical Interventions:  1.Acute on chronic anemia secondary to hematochezia  secondary to suspected diverticular bleed in the setting of diverticulosis versus hemorrhoids  2.blood on his sheet at rectum CARLI positive for small amount of bright red blood, HD stable, afebrile, kidney function improving - GI consult   - will hold heparin for now   3.Altered mental status (improving) likely secondary to metabolic encephalopathy and delirium  4.JOSH on CKD4 secondary to ATN (improving)   - non-oliguric  - creat improving 4.9  - pt is close to HD, but does not want to do it unless it is absolutely needed (his wife was on HD)  - hyponatremia resolved - no urgency for RRT per nephrology     Diet order: (11/3) Consistent Carbohydrate, 60gm protein with evening snacks and Glucerna Shake Q12hrs     Anthropometrics:  - Ht: 5'6"  - Wt: 66.5kg  - %wt change  - BMI: 24 (WNL)   - IBW: 65kg     Pertinent Lab Data: () Na-138, K-3.7, CL-100, BUN-47, Cr-4.9, GFR-10, Glucose-112mg/dL, Corrected Ca-8.8 (WNL), H/H-8.3/25.7, INR-1.41     Pertinent Meds: Heparin, Coumadin, Lipitor, Norvasc, Hydralazine, Phoslo, Protonix      Physical Findings:  - Appearance: Underweight  - GI function: Per RN, the patient has liquid bowel movements  - Tubes: None  - Oral/Mouth cavity: No chewing and swallowing difficulty noted   - Skin: Intact (Shiraz Score-14)      Nutrition Requirements  Weight Used: 61.2kg -Derived from previous nutrition note (11/3)     Estimated Energy Needs    Continue [x]  Adjust []  Adjusted Energy Recommendations: MSJ x AF 1.2-1.3= 1495-1620kcal/day -Derived from nutrition note (11/3)      Estimated Protein Needs    Continue [x]  Adjust []  Adjusted Protein Recommendations: 73-86grams of protein based on 1.2-1.4 g/kg -Derived from nutrition note (11/3)      Estimated Fluid Needs        Continue [x]  Adjust []  Adjusted Fluid Recommendations: Fluids per LIP     Nutrient Intake: PO intake is <50%      [x] Previous Nutrition Diagnosis: Increased Nutrient Needs            [x] Ongoing          [] Resolved    [] No active nutrition diagnosis identified at this time     Nutrition Diagnostic #1  Problem:  Etiology:  Statement:     Nutrition Diagnostic #2  Problem:  Etiology:  Statement:     Nutrition Intervention:  1.Meals and Snacks  2.Medical Food Supplement     Goal/Expected Outcome:  1.Consume/tolerate 25%-50% of meals in 4 days    Indicator/Monitorin.Diet order, energy intake, nutrition focused physical findings, renal and anemia profile    -I spoke to the patient's physician name Dr. Escobar ([facility] #[id number]) regarding my nutritional recommendations.     Recommendations:  1.Recommend change diet to Low sodium, 60gm protein   2.Recommend continue to provide Glucerna Shake Q12hrs

## 2021-11-06 NOTE — CHART NOTE - NSCHARTNOTEFT_GEN_A_CORE
Notified by RN that patient failed TOV 3 times, retaining 300cc and not urinating. Ordered Luna to be placed for urinary retention

## 2021-11-07 NOTE — PROGRESS NOTE ADULT - ASSESSMENT
HPI:  85 year old M with PMH of DM, Crohn s/p colostomy, CKD 4, HTN, HLD, BPH presents to ED with complaints of weakness. Pt states 10 days prior was diagnosed with UTI, placed on bactrim, Family states due to size of pill, pt was unable to swallow pill, only took 1/2 dose for 5 days. Family states over past few days pt becoming increasingly lethargic, having chills as well. Denies cough, chest pain, sob, abdominal pain, NVCD.    #Acute on chronic anemia secondary to hematochezia  secondary to suspected diverticular bleed in the setting of diverticulosis versus hemorrhoids  sp flexible sigmoidoscopy   ok to start noac,  eliquis 2.5 q12h     #Mild mitral valve regurgitation.     #. Mild tricuspid regurgitation.    #JOSH on CKD 4 secondary to suspected ATN improving   no rrt per nephro    #DM   POCT Blood Glucose.: 147 mg/dL (07 Nov 2021 07:41)  POCT Blood Glucose.: 172 mg/dL (06 Nov 2021 23:00)  POCT Blood Glucose.: 197 mg/dL (06 Nov 2021 11:19)  controlled    #HTN   BP: 134/56 (07 Nov 2021 00:00) (134/56 - 140/63)  controlled    #BPH     #Pulmonary hypertension     PROGRESS NOTE HANDOFF    Pending ,monitor on eliquis overnight , dc in am     Family discussion: patient verbalized understanding and agreeable to plan of care     Disposition: Nursing facility

## 2021-11-07 NOTE — PROGRESS NOTE ADULT - SUBJECTIVE AND OBJECTIVE BOX
Nephrology progress note    Patient was seen and examined, events over the last 24 h noted .  Cr stable    Allergies:  No Known Allergies    Hospital Medications:   MEDICATIONS  (STANDING):  amLODIPine   Tablet 10 milliGRAM(s) Oral daily  aspirin enteric coated 81 milliGRAM(s) Oral daily  atorvastatin 20 milliGRAM(s) Oral at bedtime  calcium acetate 667 milliGRAM(s) Oral three times a day with meals  chlorhexidine 4% Liquid 1 Application(s) Topical daily  hydrALAZINE 25 milliGRAM(s) Oral three times a day  pantoprazole    Tablet 40 milliGRAM(s) Oral before breakfast        VITALS:  T(F): 96.9 (21 @ 08:25), Max: 99.1 (21 @ 00:00)  HR: 74 (21 @ 08:25)  BP: 143/63 (21 @ 08:25)  RR: 18 (21 @ 08:25)  SpO2: 99% (21 @ 00:00)  Wt(kg): --     @ 07:01  -   @ 07:00  --------------------------------------------------------  IN: 348 mL / OUT: 400 mL / NET: -52 mL     @ 08:01  -   @ 07:00  --------------------------------------------------------  IN: 420 mL / OUT: 1300 mL / NET: -880 mL     @ 07:01  -   @ 13:46  --------------------------------------------------------  IN: 100 mL / OUT: 0 mL / NET: 100 mL          PHYSICAL EXAM:  Constitutional: NAD  HEENT: anicteric sclera, oropharynx clear, MMM  Neck: No JVD  Respiratory: CTAB, no wheezes, rales or rhonchi  Cardiovascular: S1, S2, RRR  Gastrointestinal: BS+, soft, NT/ND  Extremities: No cyanosis or clubbing. No peripheral edema  :  No sanford.   Skin: No rashes    LABS:      133<L>  |  95<L>  |  47<H>  ----------------------------<  123<H>  3.6   |  23  |  4.8<HH>    Ca    7.6<L>      2021 04:30  Mg     1.8         TPro  4.4<L>  /  Alb  2.6<L>  /  TBili  0.3  /  DBili      /  AST  13  /  ALT  11  /  AlkPhos  72                            9.2    8.24  )-----------( 197      ( 2021 12:00 )             28.1       Urine Studies:  Urinalysis Basic - ( 2021 01:05 )    Color: Yellow / Appearance: Turbid / S.015 / pH:   Gluc:  / Ketone: Negative  / Bili: Negative / Urobili: <2 mg/dL   Blood:  / Protein: 300 mg/dL / Nitrite: Negative   Leuk Esterase: Large / RBC: 7 /HPF / WBC >720 /HPF   Sq Epi:  / Non Sq Epi: 0 /HPF / Bacteria: Negative        RADIOLOGY & ADDITIONAL STUDIES:

## 2021-11-07 NOTE — PROGRESS NOTE ADULT - ASSESSMENT
85 year old M with PMH of DM, Crohn s/p colostomy, CKD 4, HTN, HLD, BPH, DVT on eliquis presents to ED with complaints of weakness. Admitted with working diagnosis of pyelonephritis, JOSH on CKD, acute on chronic DVT, altered mental status metabolic encephalopathy delirium. GI was consulted initially for BRBPR s/p sigmoidoscopy showed diversion colitis. recalled GI for rectal bleed on 10/7.     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      Diverticulosis of the rectosigmoid junction.      Internal hemorrhoids.      The scope was advanced to the rectosigmoid junction but could not be advanced further in the colon due to the presence of blood clots and structuring of the sigmoid in the setting of the diverticular disease.       - pathology showed inflammation      Rec:  - Monitor CBC  - close monitoring of PTT   - short chain fatty acid enema or coconut oil enema for diversion colitis  - consider CT pelvis w/ rectal enema to rule out any masses in the blind colonic stump  -Avoid NSAIDs

## 2021-11-07 NOTE — PROGRESS NOTE ADULT - SUBJECTIVE AND OBJECTIVE BOX
CECILLE FRANKEL 85y Male  MRN#: [id number]   CODE STATUS: DNR DNI     Hospital Day: 15d    Pt is currently admitted with the primary diagnosis of JOSH     SUBJECTIVE    no events overnight, denies any fever chills,nvd, no abdominal pain, no chest pain   Present Today:   - Luna:  No [  ], Yes [ x  ] : Indication: retention                                             ----------------------------------------------------------  OBJECTIVE  PAST MEDICAL & SURGICAL HISTORY  History of medical problems  [place] b/l ears, dm, htn, hypercholesteremia, ckd, bowel/bladder fistula, kidney stones    Colostomy present    History of surgery  procedure for kidney stones ( ?lithotripsy)                                              -----------------------------------------------------------  ALLERGIES:  No Known Allergies                                            ------------------------------------------------------------    HOME MEDICATIONS  Home Medications:  amLODIPine 5 mg oral tablet: 1 tab(s) orally 2 times a day (01 Sep 2021 16:54)  Aspir 81 oral delayed release tablet: 1 tab(s) orally once a day (01 Sep 2021 16:54)  atorvastatin 10 mg oral tablet: 2 tab(s) orally once a day (01 Sep 2021 16:54)  famotidine 20 mg oral tablet: 1 tab(s) orally once a day (01 Sep 2021 16:54)  Januvia 50 mg oral tablet: 1 tab(s) orally once a day (01 Sep 2021 16:54)  Lasix 20 mg oral tablet: 1 tab(s) orally every other day (01 Sep 2021 16:54)  pioglitazone 30 mg oral tablet: 1 tab(s) orally once a day (01 Sep 2021 16:54)  ramipril 5 mg oral capsule: 2 cap(s) orally once a day (01 Sep 2021 16:54)                           MEDICATIONS:  STANDING MEDICATIONS  amLODIPine   Tablet 10 milliGRAM(s) Oral daily  aspirin enteric coated 81 milliGRAM(s) Oral daily  atorvastatin 20 milliGRAM(s) Oral at bedtime  calcium acetate 667 milliGRAM(s) Oral three times a day with meals  chlorhexidine 4% Liquid 1 Application(s) Topical daily  heparin  Infusion 600 Unit(s)/Hr IV Continuous <Continuous>  hydrALAZINE 25 milliGRAM(s) Oral three times a day  pantoprazole    Tablet 40 milliGRAM(s) Oral before breakfast    PRN MEDICATIONS  acetaminophen     Tablet .. 650 milliGRAM(s) Oral every 6 hours PRN  heparin   Injectable 5500 Unit(s) IV Push every 6 hours PRN  heparin   Injectable 2500 Unit(s) IV Push every 6 hours PRN                                            ------------------------------------------------------------  VITAL SIGNS: Last 24 Hours  T(C): 37.3 (2021 00:00), Max: 37.3 (2021 00:00)  T(F): 99.1 (2021 00:00), Max: 99.1 (2021 00:00)  HR: 68 (2021 00:00) (63 - 85)  BP: 134/56 (2021 00:00) (134/56 - 148/65)  BP(mean): --  RR: 18 (2021 00:00) (18 - 18)  SpO2: 99% (2021 00:00) (95% - 99%)      21 @ 08:01  -  21 @ 07:00  --------------------------------------------------------  IN: 420 mL / OUT: 1300 mL / NET: -880 mL                                             --------------------------------------------------------------  LABS:                        8.3    8.49  )-----------( 162      ( 2021 04:30 )             25.7     11-06    138  |  100  |  47<H>  ----------------------------<  112<H>  3.7   |  22  |  4.9<HH>    Ca    7.7<L>      2021 04:30  Mg     1.9     11    TPro  4.7<L>  /  Alb  2.6<L>  /  TBili  0.3  /  DBili  x   /  AST  16  /  ALT  12  /  AlkPhos  72  11-06    PT/INR - ( 2021 04:30 )   PT: 16.20 sec;   INR: 1.41 ratio         PTT - ( 2021 16:00 )  PTT:124.6 sec  Urinalysis Basic - ( 2021 01:05 )    Color: Yellow / Appearance: Turbid / S.015 / pH: x  Gluc: x / Ketone: Negative  / Bili: Negative / Urobili: <2 mg/dL   Blood: x / Protein: 300 mg/dL / Nitrite: Negative   Leuk Esterase: Large / RBC: 7 /HPF / WBC >720 /HPF   Sq Epi: x / Non Sq Epi: 0 /HPF / Bacteria: Negative                  PHYSICAL EXAM:  General: not in distress   HEENT: NCAT  LUNGS: CTAB, Good air entry bilat  HEART: RRR, +S1,S2, RRR  ABDOMEN: SNTTP, ND x 4 q's, colostomy bag with stool   EXT: Warm, well perfused x 4, le edema   NEURO: AxOx2, No FND's noted  SKIN: No new breakdown or rashes noted                                           --------------------------------------------------------------

## 2021-11-07 NOTE — PHARMACY COMMUNICATION NOTE - COMMENTS
Spoke to Md Vyas [id number]- no orders for any anticoagulation today (coumadin, DOAC or heparin). Md confirmed pt had some hematuria so team is currently holding AC until it is completely resolved. INR is okay and pt's Hgb/Hct is stable as well. When bleeding is over, most likely pt will remain on a heparin drip.

## 2021-11-07 NOTE — PROGRESS NOTE ADULT - SUBJECTIVE AND OBJECTIVE BOX
Gastroenterology progress note:     Patient is a 85y old  Male who presents with a chief complaint of weakness (07 Nov 2021 13:45)       Admitted on: 10-23-21    We are following the patient for: hematochezia     Interval History:  we have been recalled for one episode of hematochezia this morning    PAST MEDICAL & SURGICAL HISTORY:  History of medical problems  [place] b/l ears, dm, htn, hypercholesteremia, ckd, bowel/bladder fistula, kidney stones    Colostomy present    History of surgery  procedure for kidney stones ( ?lithotripsy)        MEDICATIONS  (STANDING):  amLODIPine   Tablet 10 milliGRAM(s) Oral daily  aspirin enteric coated 81 milliGRAM(s) Oral daily  atorvastatin 20 milliGRAM(s) Oral at bedtime  calcium acetate 667 milliGRAM(s) Oral three times a day with meals  chlorhexidine 4% Liquid 1 Application(s) Topical daily  heparin  Infusion. 700 Unit(s)/Hr (7 mL/Hr) IV Continuous <Continuous>  hydrALAZINE 25 milliGRAM(s) Oral three times a day  pantoprazole    Tablet 40 milliGRAM(s) Oral before breakfast    MEDICATIONS  (PRN):  acetaminophen     Tablet .. 650 milliGRAM(s) Oral every 6 hours PRN Mild Pain (1 - 3)      Allergies  No Known Allergies      Review of Systems:   Unable to obtain    Physical Examination:  T(C): 36.1 (11-07-21 @ 08:25), Max: 37.3 (11-07-21 @ 00:00)  HR: 74 (11-07-21 @ 08:25) (68 - 74)  BP: 143/63 (11-07-21 @ 08:25) (134/56 - 143/63)  RR: 18 (11-07-21 @ 08:25) (18 - 18)  SpO2: 99% (11-07-21 @ 00:00) (99% - 99%)      11-06-21 @ 08:01  -  11-07-21 @ 07:00  --------------------------------------------------------  IN: 420 mL / OUT: 1300 mL / NET: -880 mL    11-07-21 @ 07:01  -  11-07-21 @ 18:26  --------------------------------------------------------  IN: 220 mL / OUT: 200 mL / NET: 20 mL      Constitutional: No acute distress.  Respiratory:  No signs of respiratory distress. Lung sounds are clear bilaterally.  Cardiovascular:  S1 S2, Regular rate and rhythm.  Abdominal: Abdomen is soft, symmetric, and non-tender without distention. There are no visible lesions. Bowel sounds are present and normoactive in all four quadrants. No masses, hepatomegaly, or splenomegaly are noted.   Skin: No Jaundice.  Neurology Non-focal  Vascular: No varicose vein, No cyanosis, No edema        Data: (reviewed by attending)                        9.2    8.24  )-----------( 197      ( 07 Nov 2021 12:00 )             28.1     Hgb trend:  9.2  11-07-21 @ 12:00  8.3  11-07-21 @ 04:30  8.3  11-06-21 @ 04:30  8.8  11-05-21 @ 07:00        11-07    133<L>  |  95<L>  |  47<H>  ----------------------------<  123<H>  3.6   |  23  |  4.8<HH>    Ca    7.6<L>      07 Nov 2021 04:30  Mg     1.8     11-07    TPro  4.4<L>  /  Alb  2.6<L>  /  TBili  0.3  /  DBili  x   /  AST  13  /  ALT  11  /  AlkPhos  72  11-07    Liver panel trend:  TBili 0.3   /   AST 13   /   ALT 11   /   AlkP 72   /   Tptn 4.4   /   Alb 2.6    /   DBili --      11-07  TBili 0.3   /   AST 16   /   ALT 12   /   AlkP 72   /   Tptn 4.7   /   Alb 2.6    /   DBili --      11-06  TBili 0.3   /   AST 18   /   ALT 13   /   AlkP 68   /   Tptn 4.6   /   Alb 2.6    /   DBili --      11-05  TBili 0.3   /   AST 15   /   ALT 12   /   AlkP 68   /   Tptn 4.7   /   Alb 2.7    /   DBili --      11-04  TBili 0.4   /   AST 15   /   ALT 12   /   AlkP 81   /   Tptn 5.1   /   Alb 3.0    /   DBili --      11-03  TBili 0.4   /   AST 16   /   ALT 12   /   AlkP 71   /   Tptn 5.2   /   Alb 3.0    /   DBili --      11-02  TBili 0.3   /   AST 17   /   ALT 12   /   AlkP 61   /   Tptn 4.8   /   Alb 2.7    /   DBili --      11-01  TBili 0.3   /   AST 17   /   ALT 12   /   AlkP 65   /   Tptn 5.3   /   Alb 2.9    /   DBili --      10-31  TBili 0.2   /   AST 16   /   ALT 13   /   AlkP 66   /   Tptn 4.8   /   Alb 2.9    /   DBili --      10-30      PT/INR - ( 07 Nov 2021 04:30 )   PT: 16.90 sec;   INR: 1.48 ratio         PTT - ( 07 Nov 2021 04:30 )  PTT:73.8 sec    Culture - Urine (collected 06 Nov 2021 01:05)  Source: Catheterized Catheterized  Final Report (07 Nov 2021 07:16):    <10,000 CFU/mL Normal Urogenital [place]

## 2021-11-07 NOTE — PROGRESS NOTE ADULT - ATTENDING COMMENTS
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 possible treatments are surgery (unfavorable given age, and comorbidities) IR or topical treatment (medium chain FA enemas).

## 2021-11-07 NOTE — PROGRESS NOTE ADULT - SUBJECTIVE AND OBJECTIVE BOX
CECILLE FRANKEL  85y  [facility] [id number] [id number] B      Patient is a 85y old  Male who presents with a chief complaint of weakness (07 Nov 2021 06:59)      INTERVAL HPI/OVERNIGHT EVENTS:    no acute events overnight     REVIEW OF SYSTEMS:  CONSTITUTIONAL: No fever, weight loss, or fatigue  EYES: No eye pain, visual disturbances, or discharge  ENMT:  No difficulty hearing, tinnitus, vertigo; No sinus or throat pain  NECK: No pain or stiffness  BREASTS: No pain, masses, or nipple discharge  RESPIRATORY: No cough, wheezing, chills or hemoptysis; No shortness of breath  CARDIOVASCULAR: No chest pain, palpitations, dizziness, or leg swelling  GASTROINTESTINAL: No abdominal or epigastric pain. No nausea, vomiting, or hematemesis; No diarrhea or constipation. No melena or hematochezia.  GENITOURINARY: No dysuria, frequency, hematuria, or incontinence  NEUROLOGICAL: No headaches, memory loss, loss of strength, numbness, or tremors  SKIN: No itching, burning, rashes, or lesions   LYMPH NODES: No enlarged glands  ENDOCRINE: No heat or cold intolerance; No hair loss  MUSCULOSKELETAL: No joint pain or swelling; No muscle, back, or extremity pain  PSYCHIATRIC: No depression, anxiety, mood swings, or difficulty sleeping  HEME/LYMPH: No easy bruising, or bleeding gums  ALLERY AND IMMUNOLOGIC: No hives or eczema  FAMILY HISTORY:    T(C): 37.3 (11-07-21 @ 00:00), Max: 37.3 (11-07-21 @ 00:00)  HR: 68 (11-07-21 @ 00:00) (63 - 68)  BP: 134/56 (11-07-21 @ 00:00) (134/56 - 140/63)  RR: 18 (11-07-21 @ 00:00) (18 - 18)  SpO2: 99% (11-07-21 @ 00:00) (95% - 99%)  Wt(kg): --Vital Signs Last 24 Hrs  T(C): 37.3 (07 Nov 2021 00:00), Max: 37.3 (07 Nov 2021 00:00)  T(F): 99.1 (07 Nov 2021 00:00), Max: 99.1 (07 Nov 2021 00:00)  HR: 68 (07 Nov 2021 00:00) (63 - 68)  BP: 134/56 (07 Nov 2021 00:00) (134/56 - 140/63)  BP(mean): --  RR: 18 (07 Nov 2021 00:00) (18 - 18)  SpO2: 99% (07 Nov 2021 00:00) (95% - 99%)    PHYSICAL EXAM:  GENERAL: NAD, well-groomed, well-developed  HEAD:  Atraumatic, Normocephalic  EYES: EOMI, PERRLA, conjunctiva and sclera clear  ENMT: No tonsillar erythema, exudates, or enlargement; Moist mucous membranes, Good dentition, No lesions  NECK: Supple, No JVD, Normal thyroid  NERVOUS SYSTEM:  Alert & Oriented X3,   PULM: Clear to auscultation bilaterally  CARDIAC: Regular rate and rhythm; No murmurs, rubs, or gallops  GI: Soft, Nontender, Nondistended; Bowel sounds present + Colostomy   EXTREMITIES:  2+ Peripheral Pulses, No clubbing, cyanosis, or edema  LYMPH: No lymphadenopathy noted  SKIN: No rashes or lesions    Consultant(s) Notes Reviewed:  [x ] YES  [ ] NO  Care Discussed with Consultants/Other Providers [ x] YES  [ ] NO    LABS:                            8.3    8.49  )-----------( 162      ( 06 Nov 2021 04:30 )             25.7   11-06    138  |  100  |  47<H>  ----------------------------<  112<H>  3.7   |  22  |  4.9<HH>    Ca    7.7<L>      06 Nov 2021 04:30  Mg     1.9     11-06    TPro  4.7<L>  /  Alb  2.6<L>  /  TBili  0.3  /  DBili  x   /  AST  16  /  ALT  12  /  AlkPhos  72  11-06            Culture - Urine (collected 06 Nov 2021 01:05)  Source: Catheterized Catheterized  Final Report (07 Nov 2021 07:16):    <10,000 CFU/mL Normal Urogenital Beronica      acetaminophen     Tablet .. 650 milliGRAM(s) Oral every 6 hours PRN  amLODIPine   Tablet 10 milliGRAM(s) Oral daily  apixaban 2.5 milliGRAM(s) Oral every 12 hours  aspirin enteric coated 81 milliGRAM(s) Oral daily  atorvastatin 20 milliGRAM(s) Oral at bedtime  calcium acetate 667 milliGRAM(s) Oral three times a day with meals  chlorhexidine 4% Liquid 1 Application(s) Topical daily  hydrALAZINE 25 milliGRAM(s) Oral three times a day  pantoprazole    Tablet 40 milliGRAM(s) Oral before breakfast      HEALTH ISSUES - PROBLEM Dx:          Case Discussed with House Staff   Spectra [phone]

## 2021-11-07 NOTE — PROGRESS NOTE ADULT - ASSESSMENT
Assessment     85 year old M with PMH of nephrolithiasis, DM, Crohn s/p colostomy, CKD 4, HTN, HLD, BPH who presented with weakness. CT a/p showed bladder wall is thickened and patient found to have JOSH with creat 8.3. His electrolytes improved with no need for hemodialysis and he was downgraded from the ICU on 10/26.       Hematochezia diversion colitis, diverticulosis, internal hemorrhoids   AMS  JOSH / CKD4  chronic anemia   hematuria   BRBPR  acute and chronic DVT   HO DM   HO chrons disease s/p colectomy   BPH     #Acute on chronic anemia   #Hematochezia   s/p 2U pRBC this admission   maintain hgb >7  - small amount of blood noted 11/3  - HD stable   - GI consult- s/p flexible sigmoidoscopy 11/4, Flexible sigmoidoscopy findings: The rectal mucosa looked erythematous and friable and was covered with multiple blood clots and mucus. diversion colitis / Diverticulosis of the rectosigmoid junction. Internal hemorrhoids. The scope was advanced to the rectosigmoid junction but could not be advanced further in the colon due to the presence of blood clots and structuring of the sigmoid in the setting of the diverticular disease.    - heparin drip restarted, monitor CBC q12hr   - monitor PTT      #hematuria - resolved   - likely from pulling at [employer]      # altered mental status - improving   likely 2/2 metabolic encephalopathy and delirium  -avoid CNS depressant  -monitor electrolytes   -strict Is and Os  - frequent re-orientaion     #JOSH on CKD4   - non-oliguric  - creat stable at 5, baseline 2.2   - nephro consult recs appreciated   - c/w phoslo 667mg tid     #Acute pyelonephritis - resolved   #Abdominal wall cellulitis around stoma- resolved   - Blood & Urine cxs NGTD 10/23  - completed course of rocephin 10 days     #inderterminate bilateral renal lesions   -outpt fu     #acute and chronic DVT  - on elliquis at home  - heparin drip - monitor ptt     #HLD   - atovastatin 20mg qd   - aspirin 81mg     #HTN   - amlodipine 10mg qd   - hydralazine 25mg tid     # DVT prophylaxis: heparin drip     # GI prophylaxis: pantoprazole  # Diet: renal   # Activity Score (AM-PAC)  # Code status: full code  # Disposition: acute for now,

## 2021-11-08 NOTE — PROGRESS NOTE ADULT - ASSESSMENT
HPI:  85 year old M with PMH of DM, Crohn s/p colostomy, CKD 4, HTN, HLD, BPH presents to ED with complaints of weakness. Pt states 10 days prior was diagnosed with UTI, placed on bactrim, Family states due to size of pill, pt was unable to swallow pill, only took 1/2 dose for 5 days. Family states over past few days pt becoming increasingly lethargic, having chills as well. Denies cough, chest pain, sob, abdominal pain, NVCD.    #Acute on chronic anemia secondary to hematochezia  secondary to suspected diverticular bleed in the setting of diverticulosis versus hemorrhoids  sp flexible sigmoidoscopy   overnight episode of hematochezia , return to heparin for now  ct with rectal contrast  gi following     #Hyponatremia no intervention     #Mild mitral valve regurgitation.     #. Mild tricuspid regurgitation.    #JOSH on CKD 4 secondary to suspected ATN improving   no rrt per nephro    #DM   CAPILLARY BLOOD GLUCOSE      POCT Blood Glucose.: 135 mg/dL (08 Nov 2021 11:18)  POCT Blood Glucose.: 148 mg/dL (08 Nov 2021 07:35)  POCT Blood Glucose.: 139 mg/dL (07 Nov 2021 21:24)  controlled    #HTN   BP: 142/71 (08 Nov 2021 11:39) (131/64 - 164/68)  controlled    #BPH     #Pulmonary hypertension     #Drug monitoring of high risk medication , potential for drug drug reaction , requiring close monitoring monitor ptt and adjust heparin     #Attempted peer to peer called  no answer left message indicating my name , title , purpose of telephone call, and callback number     PROGRESS NOTE HANDOFF    Pending ,ct with rectal contrast    Family discussion: patient verbalized understanding and agreeable to plan of care     Disposition: Nursing facility

## 2021-11-08 NOTE — CHART NOTE - NSCHARTNOTEFT_GEN_A_CORE
received callback from Dr Lynn - reviewed case, information provided, per dr lynn patient would not benefit from snf

## 2021-11-08 NOTE — PROGRESS NOTE ADULT - ASSESSMENT
# JOSH on CKD 4 - likely ATN d/t severe sepsis / acute on chronic anemia  # Metabolic acidosis w/ anion gap   # Hyponatremia / low eav  # Acute on chronic anemia   # Acute pyelonephritis / Abdominal wall cellulitis   # Hx of crohn s/p colostomy     Recommendations:  - creat stable   - UO noted   - lasix on hold / can resume if oliguric or worsening edema   - pt is close to HD, but does not want to do it unless it is absolutely needed (his wife was on HD).  Currently w/o acute  indication, cont monitoring off HD  -   last phos level noted, at goal, cont phoslo  - CT noted w/o hydronephrosis  - RBC transfusion as needed to maintain hgb > 7, iron stores noted, does not need iv iron   - patient DNR/DNI   Overall prognosis is poor

## 2021-11-08 NOTE — PROGRESS NOTE ADULT - ASSESSMENT
85 year old M with PMH of nephrolithiasis, DM, Crohn s/p colostomy, CKD 4, HTN, HLD, BPH who presented with weakness. CT a/p showed bladder wall is thickened and patient found to have JOSH with creat 8.3. His electrolytes improved with no need for hemodialysis and he was downgraded from the ICU on 10/26.       Hematochezia diversion colitis, diverticulosis, internal hemorrhoids   AMS  JOSH / CKD4  chronic anemia   hematuria   BRBPR  acute and chronic DVT   HO DM   HO Crohns disease s/p colectomy   BPH     Acute on chronic anemia   Hematochezia   s/p 2U pRBC this admission   maintain hgb >7  - small amount of blood noted 11/3  - HD stable   - GI consult- s/p flexible sigmoidoscopy 11/4, Flexible sigmoidoscopy findings: The rectal mucosa looked erythematous and friable and was covered with multiple blood clots and mucus. diversion colitis / Diverticulosis of the rectosigmoid junction. Internal hemorrhoids. The scope was advanced to the rectosigmoid junction but could not be advanced further in the colon due to the presence of blood clots and structuring of the sigmoid in the setting of the diverticular disease.    - heparin drip restarted, monitor CBC q12hr   - monitor PTT      Hematuria - resolved   - likely from pulling at [employer]      Altered mental status - improving   likely 2/2 metabolic encephalopathy and delirium  -avoid CNS depressant  -monitor electrolytes   -strict Is and Os  - frequent reorientation     JOSH on CKD4   - non-oliguric  - creat stable at 4.9, baseline 2.2   - nephro following  - c/w phoslo 667mg tid     Acute pyelonephritis - resolved   Abdominal wall cellulitis around stoma- resolved   - Blood & Urine cxs NGTD 10/23  - completed course of rocephin 10 days     Indeterminate bilateral renal lesions   -outpatient f/u    Acute and chronic DVT  - pt bled yesterday when eliquis restarted; will continue heparin drip for now  - monitor ptt     HLD   - atorvastatin 20mg qd   - aspirin 81mg     HTN   - amlodipine 10mg qd   - hydralazine 25mg tid     # DVT prophylaxis: heparin gtt  # GI prophylaxis: pantoprazole  # Diet: renal   # Activity Score (AM-PAC)  # Code status: full code  # Disposition: acute for now,

## 2021-11-08 NOTE — PROGRESS NOTE ADULT - SUBJECTIVE AND OBJECTIVE BOX
Nephrology progress note    THIS IS AN INCOMPLETE NOTE . FULL NOTE TO FOLLOW SHORTLY    Patient is seen and examined, events over the last 24 h noted .    Allergies:  No Known Allergies    Hospital Medications:   MEDICATIONS  (STANDING):  amLODIPine   Tablet 10 milliGRAM(s) Oral daily  aspirin enteric coated 81 milliGRAM(s) Oral daily  atorvastatin 20 milliGRAM(s) Oral at bedtime  calcium acetate 667 milliGRAM(s) Oral three times a day with meals  chlorhexidine 4% Liquid 1 Application(s) Topical daily  heparin  Infusion. 700 Unit(s)/Hr (7 mL/Hr) IV Continuous <Continuous>  hydrALAZINE 25 milliGRAM(s) Oral three times a day  pantoprazole    Tablet 40 milliGRAM(s) Oral before breakfast        VITALS:  T(F): 96.3 (21 @ 08:00), Max: 97.4 (21 @ 21:25)  HR: 72 (21 @ 08:00)  BP: 137/59 (21 @ 08:00)  RR: 18 (21 @ 08:00)  SpO2: --  Wt(kg): --     @ 08:01  -   @ 07:00  --------------------------------------------------------  IN: 420 mL / OUT: 1300 mL / NET: -880 mL     @ 07:01  -   @ 07:00  --------------------------------------------------------  IN: 311 mL / OUT: 850 mL / NET: -539 mL          PHYSICAL EXAM:  Constitutional: NAD  HEENT: anicteric sclera, oropharynx clear, MMM  Neck: No JVD  Respiratory: CTAB, no wheezes, rales or rhonchi  Cardiovascular: S1, S2, RRR  Gastrointestinal: BS+, soft, NT/ND  Extremities: No cyanosis or clubbing. No peripheral edema  :  No [place].   Skin: No rashes    LABS:      134<L>  |  98  |  46<H>  ----------------------------<  115<H>  3.5   |  22  |  4.9<HH>    Ca    7.9<L>      2021 05:47  Mg     1.8         TPro  4.5<L>  /  Alb  2.5<L>  /  TBili  0.3  /  DBili      /  AST  15  /  ALT  10  /  AlkPhos  74                            8.2    6.42  )-----------( 185      ( 2021 05:47 )             25.5       Urine Studies:  Urinalysis Basic - ( 2021 01:05 )    Color: Yellow / Appearance: Turbid / S.015 / pH:   Gluc:  / Ketone: Negative  / Bili: Negative / Urobili: <2 mg/dL   Blood:  / Protein: 300 mg/dL / Nitrite: Negative   Leuk Esterase: Large / RBC: 7 /HPF / WBC >720 /HPF   Sq Epi:  / Non Sq Epi: 0 /HPF / Bacteria: Negative        RADIOLOGY & ADDITIONAL STUDIES:   Nephrology progress note  Patient is seen and examined, events over the last 24 h noted .  lying in bed   complained of back pain     Allergies:  No Known Allergies    Hospital Medications:   MEDICATIONS  (STANDING):    amLODIPine   Tablet 10 milliGRAM(s) Oral daily  aspirin enteric coated 81 milliGRAM(s) Oral daily  atorvastatin 20 milliGRAM(s) Oral at bedtime  calcium acetate 667 milliGRAM(s) Oral three times a day with meals  heparin  Infusion. 700 Unit(s)/Hr (7 mL/Hr) IV Continuous <Continuous>  hydrALAZINE 25 milliGRAM(s) Oral three times a day  pantoprazole    Tablet 40 milliGRAM(s) Oral before breakfast        VITALS:  T(F): 96.3 (21 @ 08:00), Max: 97.4 (21 @ 21:25)  HR: 72 (21 @ 08:00)  BP: 137/59 (21 @ 08:00)  RR: 18 (21 @ 08:00)     @ 08:01  -   @ 07:00  --------------------------------------------------------  IN: 420 mL / OUT: 1300 mL / NET: -880 mL     @ 07:01  -   @ 07:00  --------------------------------------------------------  IN: 311 mL / OUT: 850 mL / NET: -539 mL          PHYSICAL EXAM:  Constitutional: NAD  Neck: No JVD  Respiratory: CTAB,  Cardiovascular: S1, S2, RRR  Gastrointestinal: BS+, soft, NT/ND  Extremities: No cyanosis or clubbing. plus one edema   :  No [place].   Skin: No rashes    LABS:      134<L>  |  98  |  46<H>  ----------------------------<  115<H>  3.5   |  22  |  4.9<HH>    Creatinine Trend: 4.9<--, 4.8<--, 4.9<--, 5.0<--, 5.1<--, 4.8<--    Ca    7.9<L>      2021 05:47  Mg     1.8         TPro  4.5<L>  /  Alb  2.5<L>  /  TBili  0.3  /  DBili      /  AST  15  /  ALT  10  /  AlkPhos  74                            8.2    6.42  )-----------( 185      ( 2021 05:47 )             25.5       Urine Studies:  Urinalysis Basic - ( 2021 01:05 )    Color: Yellow / Appearance: Turbid / S.015 / pH:   Gluc:  / Ketone: Negative  / Bili: Negative / Urobili: <2 mg/dL   Blood:  / Protein: 300 mg/dL / Nitrite: Negative   Leuk Esterase: Large / RBC: 7 /HPF / WBC >720 /HPF   Sq Epi:  / Non Sq Epi: 0 /HPF / Bacteria: Negative        RADIOLOGY & ADDITIONAL STUDIES:

## 2021-11-08 NOTE — PROGRESS NOTE ADULT - SUBJECTIVE AND OBJECTIVE BOX
----------Daily Progress Note----------    HISTORY OF PRESENT ILLNESS:  Patient is a 85y old Male who presents with a chief complaint of weakness (08 Nov 2021 09:15)    Currently admitted to medicine with the primary diagnosis of Acute cystitis       Today is hospital day 16d.     INTERVAL HOSPITAL COURSE / OVERNIGHT EVENTS:    Patient was examined and seen at bedside. This morning he is complaining of left lower back pain. Denies chest pain, SOB, nausea, vomiting. No other complaints at this time.     Review of Systems: Otherwise unremarkable     <<<<<PAST MEDICAL & SURGICAL HISTORY>>>>>  History of medical problems  [place] b/l ears, dm, htn, hypercholesteremia, ckd, bowel/bladder fistula, kidney stones    Colostomy present    History of surgery  procedure for kidney stones ( ?lithotripsy)      ALLERGIES  No Known Allergies      Home Medications:  amLODIPine 5 mg oral tablet: 1 tab(s) orally 2 times a day (01 Sep 2021 16:54)  Aspir 81 oral delayed release tablet: 1 tab(s) orally once a day (01 Sep 2021 16:54)  atorvastatin 10 mg oral tablet: 2 tab(s) orally once a day (01 Sep 2021 16:54)  famotidine 20 mg oral tablet: 1 tab(s) orally once a day (01 Sep 2021 16:54)  Januvia 50 mg oral tablet: 1 tab(s) orally once a day (01 Sep 2021 16:54)  Lasix 20 mg oral tablet: 1 tab(s) orally every other day (01 Sep 2021 16:54)  pioglitazone 30 mg oral tablet: 1 tab(s) orally once a day (01 Sep 2021 16:54)  ramipril 5 mg oral capsule: 2 cap(s) orally once a day (01 Sep 2021 16:54)        MEDICATIONS  STANDING MEDICATIONS  amLODIPine   Tablet 10 milliGRAM(s) Oral daily  aspirin enteric coated 81 milliGRAM(s) Oral daily  atorvastatin 20 milliGRAM(s) Oral at bedtime  calcium acetate 667 milliGRAM(s) Oral three times a day with meals  chlorhexidine 4% Liquid 1 Application(s) Topical daily  heparin  Infusion. 700 Unit(s)/Hr IV Continuous <Continuous>  hydrALAZINE 25 milliGRAM(s) Oral three times a day  pantoprazole    Tablet 40 milliGRAM(s) Oral before breakfast    PRN MEDICATIONS  acetaminophen     Tablet .. 650 milliGRAM(s) Oral every 6 hours PRN    VITALS:  T(F): 96.3  HR: 72  BP: 137/59  RR: 18  SpO2: --    <<<<<LABS>>>>>                        8.2    6.42  )-----------( 185      ( 08 Nov 2021 05:47 )             25.5     11-08    134<L>  |  98  |  46<H>  ----------------------------<  115<H>  3.5   |  22  |  4.9<HH>    Ca    7.9<L>      08 Nov 2021 05:47  Mg     1.8     11-08    TPro  4.5<L>  /  Alb  2.5<L>  /  TBili  0.3  /  DBili  x   /  AST  15  /  ALT  10  /  AlkPhos  74  11-08    PT/INR - ( 07 Nov 2021 04:30 )   PT: 16.90 sec;   INR: 1.48 ratio         PTT - ( 08 Nov 2021 05:47 )  PTT:67.4 sec          Culture - Urine (collected 06 Nov 2021 01:05)  Source: Catheterized Catheterized  Final Report (07 Nov 2021 07:16):    <10,000 CFU/mL Normal Urogenital Beronica    [id number]        <<<<<RADIOLOGY>>>>>    < from: Xray Chest 1 View- PORTABLE-Routine (Xray Chest 1 View- PORTABLE-Routine .) (11.02.21 @ 12:56) >    EXAM:  XR CHEST PORTABLE ROUTINE 1V            PROCEDURE DATE:  11/02/2021            INTERPRETATION:  Clinical History / Reason for exam: Congestion    Comparison : Chest radiograph 10/28/2021.    Technique/Positioning: Frontal.    Findings:    Support devices: None.    Cardiac/mediastinum/hilum: Stable    Lung parenchyma/Pleura: Increasing bilateral lung opacities    Skeleton/soft tissues: Stable    Impression:    Increasing bilateral lung opacities    < end of copied text >      <<<<<PHYSICAL EXAM>>>>>  GENERAL: NAD, resting in bed  PULMONARY: Clear to auscultation bilaterally. No rales, rhonchi, or wheezing.  CARDIOVASCULAR: Regular rate and rhythm, S1-S2, no murmurs  GASTROINTESTINAL: colostomy bag draining fecal contents  SKIN/EXTREMITIES: No LE edema b/l  NEUROLOGIC: AAOX2      -----------------------------------------------------------------------------------------------------------------------------------------------------------------------------------------------

## 2021-11-08 NOTE — PROGRESS NOTE ADULT - SUBJECTIVE AND OBJECTIVE BOX
CECILLE FRANKEL  85y  [facility] [id number] 007 B      Patient is a 85y old  Male who presents with a chief complaint of weakness (08 Nov 2021 10:08)      INTERVAL HPI/OVERNIGHT EVENTS:        REVIEW OF SYSTEMS:  CONSTITUTIONAL: No fever, weight loss, or fatigue  EYES: No eye pain, visual disturbances, or discharge  ENMT:  No difficulty hearing, tinnitus, vertigo; No sinus or throat pain  NECK: No pain or stiffness  BREASTS: No pain, masses, or nipple discharge  RESPIRATORY: No cough, wheezing, chills or hemoptysis; No shortness of breath  CARDIOVASCULAR: No chest pain, palpitations, dizziness, or leg swelling  GASTROINTESTINAL: No abdominal or epigastric pain. No nausea, vomiting, or hematemesis; No diarrhea or constipation. No melena or hematochezia.  GENITOURINARY: No dysuria, frequency, hematuria, or incontinence  NEUROLOGICAL: No headaches, memory loss, loss of strength, numbness, or tremors  SKIN: No itching, burning, rashes, or lesions   LYMPH NODES: No enlarged glands  ENDOCRINE: No heat or cold intolerance; No hair loss  MUSCULOSKELETAL: No joint pain or swelling; No muscle, back, or extremity pain  PSYCHIATRIC: No depression, anxiety, mood swings, or difficulty sleeping  HEME/LYMPH: No easy bruising, or bleeding gums  ALLERY AND IMMUNOLOGIC: No hives or eczema  FAMILY HISTORY:    T(C): 35.7 (11-08-21 @ 08:00), Max: 36.3 (11-07-21 @ 21:25)  HR: 74 (11-08-21 @ 11:39) (68 - 74)  BP: 142/71 (11-08-21 @ 11:39) (131/64 - 164/68)  RR: 18 (11-08-21 @ 08:00) (18 - 18)  SpO2: --  Wt(kg): --Vital Signs Last 24 Hrs  T(C): 35.7 (08 Nov 2021 08:00), Max: 36.3 (07 Nov 2021 21:25)  T(F): 96.3 (08 Nov 2021 08:00), Max: 97.4 (07 Nov 2021 21:25)  HR: 74 (08 Nov 2021 11:39) (68 - 74)  BP: 142/71 (08 Nov 2021 11:39) (131/64 - 164/68)  BP(mean): --  RR: 18 (08 Nov 2021 08:00) (18 - 18)  SpO2: --    PHYSICAL EXAM:  GENERAL: NAD, well-groomed, well-developed  HEAD:  Atraumatic, Normocephalic  EYES: EOMI, PERRLA, conjunctiva and sclera clear  ENMT: No tonsillar erythema, exudates, or enlargement; Moist mucous membranes, Good dentition, No lesions  NECK: Supple, No JVD, Normal thyroid  NERVOUS SYSTEM:  Alert & Oriented X3, Good concentration; Motor Strength 5/5 B/L upper and lower extremities; DTRs 2+ intact and symmetric  PULM: Clear to auscultation bilaterally  CARDIAC: Regular rate and rhythm; No murmurs, rubs, or gallops  GI: Soft, Nontender, Nondistended; Bowel sounds present  EXTREMITIES:  2+ Peripheral Pulses, No clubbing, cyanosis, or edema  LYMPH: No lymphadenopathy noted  SKIN: No rashes or lesions    Consultant(s) Notes Reviewed:  [x ] YES  [ ] NO  Care Discussed with Consultants/Other Providers [ x] YES  [ ] NO    LABS:                            8.2    6.42  )-----------( 185      ( 08 Nov 2021 05:47 )             25.5   11-08    134<L>  |  98  |  46<H>  ----------------------------<  115<H>  3.5   |  22  |  4.9<HH>    Ca    7.9<L>      08 Nov 2021 05:47  Mg     1.8     11-08    TPro  4.5<L>  /  Alb  2.5<L>  /  TBili  0.3  /  DBili  x   /  AST  15  /  ALT  10  /  AlkPhos  74  11-08            Culture - Urine (collected 06 Nov 2021 01:05)  Source: Catheterized Catheterized  Final Report (07 Nov 2021 07:16):    <10,000 CFU/mL Normal Urogenital Beronica      acetaminophen     Tablet .. 650 milliGRAM(s) Oral every 6 hours PRN  amLODIPine   Tablet 10 milliGRAM(s) Oral daily  aspirin enteric coated 81 milliGRAM(s) Oral daily  atorvastatin 20 milliGRAM(s) Oral at bedtime  calcium acetate 667 milliGRAM(s) Oral three times a day with meals  chlorhexidine 4% Liquid 1 Application(s) Topical daily  heparin  Infusion. 700 Unit(s)/Hr IV Continuous <Continuous>  hydrALAZINE 25 milliGRAM(s) Oral three times a day  pantoprazole    Tablet 40 milliGRAM(s) Oral before breakfast      HEALTH ISSUES - PROBLEM Dx:          Case Discussed with House Staff   Spectra [phone]

## 2021-11-09 NOTE — PROGRESS NOTE ADULT - ASSESSMENT
HPI:  85 year old M with PMH of DM, Crohn s/p colostomy, CKD 4, HTN, HLD, BPH presents to ED with complaints of weakness. Pt states 10 days prior was diagnosed with UTI, placed on bactrim, Family states due to size of pill, pt was unable to swallow pill, only took 1/2 dose for 5 days. Family states over past few days pt becoming increasingly lethargic, having chills as well. Denies cough, chest pain, sob, abdominal pain, NVCD.    #Acute on chronic anemia secondary to hematochezia  secondary to suspected diverticular bleed in the setting of diverticulosis versus hemorrhoids  sp flexible sigmoidoscopy   on heparin gtt  per gi recommendation - awaiting ct with rectal contrast  hemoglobin stable , if remains stable and ct shows no acute abnormalities restart eliquis     #Hyponatremia no intervention     #Mild mitral valve regurgitation.     #. Mild tricuspid regurgitation.    #JOSH on CKD 4 secondary to suspected ATN improving   no rrt  for now   appreciate nephro follow up     #DM   POCT Blood Glucose.: 139 mg/dL (09 Nov 2021 11:41)  POCT Blood Glucose.: 136 mg/dL (09 Nov 2021 07:26)  POCT Blood Glucose.: 186 mg/dL (08 Nov 2021 20:55)  POCT Blood Glucose.: 191 mg/dL (08 Nov 2021 16:47)  controlled    #HTN   BP: 146/68 (09 Nov 2021 08:36) (145/67 - 157/67)  controlled    #BPH     #Pulmonary hypertension     #Drug monitoring of high risk medication , potential for drug drug reaction , requiring close monitoring monitor ptt and adjust hepari  PTT - ( 09 Nov 2021 11:31 )  PTT:58.1 sec    PROGRESS NOTE HANDOFF    Pending ,ct with rectal contrast    Family discussion: patient verbalized understanding and agreeable to plan of care , family also aware of plan of care    Disposition: Nursing facility

## 2021-11-09 NOTE — PROGRESS NOTE ADULT - SUBJECTIVE AND OBJECTIVE BOX
CECILLE FRANKEL  85y  [facility] [id number]      Patient is a 85y old  Male who presents with a chief complaint of weakness (09 Nov 2021 09:56)      INTERVAL HPI/OVERNIGHT EVENTS:    no acute events overnight     REVIEW OF SYSTEMS:  CONSTITUTIONAL: No fever, weight loss, or fatigue  EYES: No eye pain, visual disturbances, or discharge  ENMT:  No difficulty hearing, tinnitus, vertigo; No sinus or throat pain  NECK: No pain or stiffness  BREASTS: No pain, masses, or nipple discharge  RESPIRATORY: No cough, wheezing, chills or hemoptysis; No shortness of breath  CARDIOVASCULAR: No chest pain, palpitations, dizziness, or leg swelling  GASTROINTESTINAL: No abdominal or epigastric pain. No nausea, vomiting, or hematemesis; No diarrhea or constipation. No melena or hematochezia.  GENITOURINARY: No dysuria, frequency, hematuria, or incontinence  NEUROLOGICAL: No headaches, memory loss, loss of strength, numbness, or tremors  SKIN: No itching, burning, rashes, or lesions   LYMPH NODES: No enlarged glands  ENDOCRINE: No heat or cold intolerance; No hair loss  MUSCULOSKELETAL: No joint pain or swelling; No muscle, back, or extremity pain  PSYCHIATRIC: No depression, anxiety, mood swings, or difficulty sleeping  HEME/LYMPH: No easy bruising, or bleeding gums  ALLERY AND IMMUNOLOGIC: No hives or eczema  FAMILY HISTORY:    T(C): 36.3 (11-09-21 @ 08:00), Max: 36.3 (11-09-21 @ 08:00)  HR: 79 (11-09-21 @ 08:00) (65 - 79)  BP: 146/68 (11-09-21 @ 08:36) (145/67 - 157/67)  RR: 18 (11-09-21 @ 08:00) (18 - 18)  SpO2: --  Wt(kg): --Vital Signs Last 24 Hrs  T(C): 36.3 (09 Nov 2021 08:00), Max: 36.3 (09 Nov 2021 08:00)  T(F): 97.3 (09 Nov 2021 08:00), Max: 97.3 (09 Nov 2021 08:00)  HR: 79 (09 Nov 2021 08:00) (65 - 79)  BP: 146/68 (09 Nov 2021 08:36) (145/67 - 157/67)  BP(mean): --  RR: 18 (09 Nov 2021 08:00) (18 - 18)  SpO2: --    PHYSICAL EXAM:  GENERAL: NAD,  HEAD:  Atraumatic, Normocephalic  EYES: EOMI, PERRLA, conjunctiva and sclera clear  ENMT: No tonsillar erythema, exudates, or enlargement; Moist mucous membranes, Good dentition, No lesions  NECK: Supple, No JVD, Normal thyroid  NERVOUS SYSTEM:  Alert & Oriented X3,  PULM: Clear to auscultation bilaterally  CARDIAC: Regular rate and rhythm; No murmurs, rubs, or gallops  GI: Soft, Nontender, Nondistended; Bowel sounds present + Colostomy   EXTREMITIES:  2+ Peripheral Pulses, No clubbing, cyanosis, or edema  LYMPH: No lymphadenopathy noted  SKIN: No rashes or lesions    Consultant(s) Notes Reviewed:  [x ] YES  [ ] NO  Care Discussed with Consultants/Other Providers [ x] YES  [ ] NO    LABS:                            7.8    7.49  )-----------( 188      ( 09 Nov 2021 05:17 )             23.9   11-09    133<L>  |  97<L>  |  46<H>  ----------------------------<  122<H>  3.6   |  21  |  4.7<HH>    Ca    7.8<L>      09 Nov 2021 05:17  Mg     1.8     11-09    TPro  4.3<L>  /  Alb  2.4<L>  /  TBili  0.2  /  DBili  x   /  AST  13  /  ALT  10  /  AlkPhos  74  11-09            acetaminophen     Tablet .. 650 milliGRAM(s) Oral every 6 hours PRN  amLODIPine   Tablet 10 milliGRAM(s) Oral daily  aspirin enteric coated 81 milliGRAM(s) Oral daily  atorvastatin 20 milliGRAM(s) Oral at bedtime  calcium acetate 667 milliGRAM(s) Oral three times a day with meals  chlorhexidine 4% Liquid 1 Application(s) Topical daily  heparin  Infusion. 700 Unit(s)/Hr IV Continuous <Continuous>  hydrALAZINE 25 milliGRAM(s) Oral three times a day  pantoprazole    Tablet 40 milliGRAM(s) Oral before breakfast      HEALTH ISSUES - PROBLEM Dx:          Case Discussed with House Staff   Spectra [phone]

## 2021-11-09 NOTE — PROGRESS NOTE ADULT - ASSESSMENT
85 year old M with PMH of nephrolithiasis, DM, Crohn s/p colostomy, CKD 4, HTN, HLD, BPH who presented with weakness. CT a/p showed bladder wall is thickened and patient found to have JOSH with creat 8.3. His electrolytes improved with no need for hemodialysis and he was downgraded from the ICU on 10/26.       Hematochezia diversion colitis, diverticulosis, internal hemorrhoids   AMS  JOSH / CKD4  chronic anemia   hematuria   BRBPR  acute and chronic DVT   HO DM   HO Crohns disease s/p colectomy   BPH     Acute on chronic anemia   Hematochezia   s/p 2U pRBC this admission   maintain hgb >7  - small amount of blood noted 11/3  - Hb 7.8 this morning   - GI consult- s/p flexible sigmoidoscopy 11/4, Flexible sigmoidoscopy findings: The rectal mucosa looked erythematous and friable and was covered with multiple blood clots and mucus. diversion colitis / Diverticulosis of the rectosigmoid junction. Internal hemorrhoids. The scope was advanced to the rectosigmoid junction but could not be advanced further in the colon due to the presence of blood clots and structuring of the sigmoid in the setting of the diverticular disease.    - pt bled when eliquis restarted over weekend, currently on heparin gtt  - monitor PTT    -pending CT rectal with contrast    Hematuria - resolved   - likely from pulling at [place]      Altered mental status - improving   likely 2/2 metabolic encephalopathy and delirium  -avoid CNS depressant  -monitor electrolytes   -strict Is and Os  - frequent reorientation     JOSH on CKD4   - non-oliguric  - creat stable at 4.9, baseline 2.2   - nephro following  - c/w phoslo 667mg tid     Acute pyelonephritis - resolved   Abdominal wall cellulitis around stoma- resolved   - Blood & Urine cxs NGTD 10/23  - completed course of rocephin 10 days     Indeterminate bilateral renal lesions   -outpatient f/u    Acute and chronic DVT  - pt bled when eliquis restarted over the weekend; will continue heparin gtt for now  -PTT therapeutic    HLD   - atorvastatin 20mg qd   - aspirin 81mg     HTN   - amlodipine 10mg qd   - hydralazine 25mg tid     # DVT prophylaxis: heparin gtt  # GI prophylaxis: pantoprazole  # Diet: renal   # Activity Score (AM-PAC)  # Code status: full code  # Disposition: acute for now,

## 2021-11-09 NOTE — PROGRESS NOTE ADULT - ASSESSMENT
# JOSH on CKD 4 - likely ATN d/t severe sepsis / acute on chronic anemia  # Metabolic acidosis w/ anion gap   # Hyponatremia / low eav  # Acute on chronic anemia   # Acute pyelonephritis / Abdominal wall cellulitis   # Hx of crohn s/p colostomy   Recommendations:  - creat stable   - non oliguric   - lasix on hold / can resume if oliguric or worsening edema   - pt is close to HD, but does not want to do it unless it is absolutely needed (his wife was on HD).  Currently w/o acute  indication, cont monitoring off HD  -   last phos level noted, at goal, cont phoslo/ check repeat   - CT noted w/o hydronephrosis  - RBC transfusion as needed to maintain hgb > 7, iron stores noted, does not need iv iron   - patient DNR/DNI   Overall prognosis is poor

## 2021-11-09 NOTE — PROGRESS NOTE ADULT - SUBJECTIVE AND OBJECTIVE BOX
seen and examined  no distress   lying comfortable         PAST HISTORY  --------------------------------------------------------------------------------  No significant changes to PMH, PSH, FHx, SHx, unless otherwise noted    ALLERGIES & MEDICATIONS  --------------------------------------------------------------------------------  Allergies    No Known Allergies    Intolerances      Standing Inpatient Medications  amLODIPine   Tablet 10 milliGRAM(s) Oral daily  aspirin enteric coated 81 milliGRAM(s) Oral daily  atorvastatin 20 milliGRAM(s) Oral at bedtime  calcium acetate 667 milliGRAM(s) Oral three times a day with meals  chlorhexidine 4% Liquid 1 Application(s) Topical daily  heparin  Infusion. 700 Unit(s)/Hr IV Continuous <Continuous>  hydrALAZINE 25 milliGRAM(s) Oral three times a day  pantoprazole    Tablet 40 milliGRAM(s) Oral before breakfast    PRN Inpatient Medications  acetaminophen     Tablet .. 650 milliGRAM(s) Oral every 6 hours PRN        VITALS/PHYSICAL EXAM  --------------------------------------------------------------------------------  T(C): 36.2 (11-08-21 @ 23:45), Max: 36.2 (11-08-21 @ 23:45)  HR: 70 (11-09-21 @ 04:36) (58 - 74)  BP: 157/67 (11-09-21 @ 04:36) (114/59 - 157/67)  RR: 18 (11-08-21 @ 23:45) (18 - 18)  SpO2: --  Wt(kg): --        11-08-21 @ 07:01  -  11-09-21 @ 07:00  --------------------------------------------------------  IN: 0 mL / OUT: 1650 mL / NET: -1650 mL      Physical Exam:  	Gen: NAD  	Pulm: decrease BS B/L  	CV: S1S2; no rub  	Abd: +distended  	    LABS/STUDIES  --------------------------------------------------------------------------------              7.8    7.49  >-----------<  188      [11-09-21 @ 05:17]              23.9     133  |  97  |  46  ----------------------------<  122      [11-09-21 @ 05:17]  3.6   |  21  |  4.7        Ca     7.8     [11-09-21 @ 05:17]      Mg     1.8     [11-09-21 @ 05:17]    TPro  4.3  /  Alb  2.4  /  TBili  0.2  /  DBili  x   /  AST  13  /  ALT  10  /  AlkPhos  74  [11-09-21 @ 05:17]      PTT: 61.8       [11-09-21 @ 05:17]      Creatinine Trend:  SCr 4.7 [11-09 @ 05:17]  SCr 4.9 [11-08 @ 05:47]  SCr 4.8 [11-07 @ 04:30]  SCr 4.9 [11-06 @ 04:30]  SCr 5.0 [11-05 @ 07:00]    Urinalysis - [11-06-21 @ 01:05]      Color Yellow / Appearance Turbid / SG 1.015 / pH 7.0      Gluc 100 mg/dL / Ketone Negative  / Bili Negative / Urobili <2 mg/dL       Blood Small / Protein 300 mg/dL / Leuk Est Large / Nitrite Negative      RBC 7 / WBC >720 / Hyaline 3 / Gran  / Sq Epi  / Non Sq Epi 0 / Bacteria Negative      Iron 87, TIBC 183, %sat 48      [10-24-21 @ 11:32]  Ferritin 326      [10-24-21 @ 11:32]  TSH 0.43      [09-02-21 @ 07:44]  Lipid: chol 116, , HDL 39, LDL --      [09-02-21 @ 07:44]      Free Light Chains: kappa 4.29, lambda 3.88, ratio = 1.11      [10-24 @ 11:32]  Immunofixation Serum:   No Monoclonal Band Identified    Reference Range: None Detected      [10-24-21 @ 11:32]  SPEP Interpretation: Hypogammaglobulinemia      [10-24-21 @ 11:32]  Immunofixation Urine: Reference Range: None Detected      [10-24-21 @ 18:44]  UPEP Interpretation: Mild Selective (Glomerular) Proteinuria      [10-24-21 @ 18:44]

## 2021-11-09 NOTE — PROGRESS NOTE ADULT - SUBJECTIVE AND OBJECTIVE BOX
----------Daily Progress Note----------    HISTORY OF PRESENT ILLNESS:  Patient is a 85y old Male who presents with a chief complaint of weakness (09 Nov 2021 07:39)    Currently admitted to medicine with the primary diagnosis of Acute cystitis       Today is hospital day 17d.     INTERVAL HOSPITAL COURSE / OVERNIGHT EVENTS:    Patient was examined and seen at bedside. This morning pt complaining of left lower back pain, not much improved from day prior.     Review of Systems: Otherwise unremarkable     <<<<<PAST MEDICAL & SURGICAL HISTORY>>>>>  History of medical problems  [place] b/l ears, dm, htn, hypercholesteremia, ckd, bowel/bladder fistula, kidney stones    Colostomy present    History of surgery  procedure for kidney stones ( ?lithotripsy)      ALLERGIES  No Known Allergies      Home Medications:  amLODIPine 5 mg oral tablet: 1 tab(s) orally 2 times a day (01 Sep 2021 16:54)  Aspir 81 oral delayed release tablet: 1 tab(s) orally once a day (01 Sep 2021 16:54)  atorvastatin 10 mg oral tablet: 2 tab(s) orally once a day (01 Sep 2021 16:54)  famotidine 20 mg oral tablet: 1 tab(s) orally once a day (01 Sep 2021 16:54)  Januvia 50 mg oral tablet: 1 tab(s) orally once a day (01 Sep 2021 16:54)  Lasix 20 mg oral tablet: 1 tab(s) orally every other day (01 Sep 2021 16:54)  pioglitazone 30 mg oral tablet: 1 tab(s) orally once a day (01 Sep 2021 16:54)  ramipril 5 mg oral capsule: 2 cap(s) orally once a day (01 Sep 2021 16:54)        MEDICATIONS  STANDING MEDICATIONS  amLODIPine   Tablet 10 milliGRAM(s) Oral daily  aspirin enteric coated 81 milliGRAM(s) Oral daily  atorvastatin 20 milliGRAM(s) Oral at bedtime  calcium acetate 667 milliGRAM(s) Oral three times a day with meals  chlorhexidine 4% Liquid 1 Application(s) Topical daily  heparin  Infusion. 700 Unit(s)/Hr IV Continuous <Continuous>  hydrALAZINE 25 milliGRAM(s) Oral three times a day  pantoprazole    Tablet 40 milliGRAM(s) Oral before breakfast    PRN MEDICATIONS  acetaminophen     Tablet .. 650 milliGRAM(s) Oral every 6 hours PRN    VITALS:  T(F): 97.3  HR: 79  BP: 146/68  RR: 18  SpO2: --    <<<<<LABS>>>>>                        7.8    7.49  )-----------( 188      ( 09 Nov 2021 05:17 )             23.9     11-09    133<L>  |  97<L>  |  46<H>  ----------------------------<  122<H>  3.6   |  21  |  4.7<HH>    Ca    7.8<L>      09 Nov 2021 05:17  Mg     1.8     11-09    TPro  4.3<L>  /  Alb  2.4<L>  /  TBili  0.2  /  DBili  x   /  AST  13  /  ALT  10  /  AlkPhos  74  11-09    PTT - ( 09 Nov 2021 05:17 )  PTT:61.8 sec        [id number]        <<<<<RADIOLOGY>>>>>    < from: Xray Chest 1 View- PORTABLE-Routine (Xray Chest 1 View- PORTABLE-Routine .) (11.02.21 @ 12:56) >    EXAM:  XR CHEST PORTABLE ROUTINE 1V            PROCEDURE DATE:  11/02/2021            INTERPRETATION:  Clinical History / Reason for exam: Congestion    Comparison : Chest radiograph 10/28/2021.    Technique/Positioning: Frontal.    Findings:    Support devices: None.    Cardiac/mediastinum/hilum: Stable    Lung parenchyma/Pleura: Increasing bilateral lung opacities    Skeleton/soft tissues: Stable    Impression:    Increasing bilateral lung opacities    < end of copied text >      <<<<PHYSICAL EXAM>>>>>  GENERAL: NAD, resting in bed  PULMONARY: Clear to auscultation bilaterally. No rales, rhonchi, or wheezing.  CARDIOVASCULAR: Regular rate and rhythm, S1-S2, no murmurs  GASTROINTESTINAL: colostomy bag draining fecal contents  SKIN/EXTREMITIES: No LE edema b/l  NEUROLOGIC: AAOX2        -----------------------------------------------------------------------------------------------------------------------------------------------------------------------------------------------

## 2021-11-10 NOTE — PROGRESS NOTE ADULT - ASSESSMENT
85 year old M with PMH of nephrolithiasis, DM, Crohn s/p colostomy, CKD 4, HTN, HLD, BPH who presented with weakness. CT a/p showed bladder wall is thickened and patient found to have JOSH with creat 8.3. His electrolytes improved with no need for hemodialysis and he was downgraded from the ICU on 10/26.           Acute on chronic anemia   Hematochezia  on admission  -s/p 2U pRBC this admission   -maintain hgb >7  - Hb 7.7 this morning   - GI consult- s/p flexible sigmoidoscopy 11/4, Flexible sigmoidoscopy findings: The rectal mucosa looked erythematous and friable and was covered with multiple blood clots and mucus. diversion colitis / Diverticulosis of the rectosigmoid junction. Internal hemorrhoids. The scope was advanced to the rectosigmoid junction but could not be advanced further in the colon due to the presence of blood clots and structuring of the sigmoid in the setting of the diverticular disease.    - continue heparin gtt, monitor PTT  -received call from radiology yesterday informing me that pt at risk for perforation with rectal contrast; will speak to GI to see if CT rectal contrast is necessary and proceed from there      Altered mental status - improving   likely 2/2 metabolic encephalopathy and delirium  -avoid CNS depressant  -monitor electrolytes   -strict Is and Os  - frequent reorientation     JOSH on CKD4   - non-oliguric  - creat stable at 4.7, baseline 2.2   - nephro following  - c/w phoslo 667mg tid       Indeterminate bilateral renal lesions   -outpatient f/u    Acute and chronic DVT  - pt bled when eliquis restarted over the weekend; will continue heparin gtt for now  -heparin adjusted due to subtherapeutic PTT this morning    HLD   - atorvastatin 20mg qd   - aspirin 81mg     HTN   - amlodipine 10mg qd   - hydralazine 25mg tid      DVT prophylaxis: heparin gtt   GI prophylaxis: pantoprazole   Diet: carb consistent   Activity Score (AM-PAC)   Code status: DNR/DNI   Disposition: acute for now

## 2021-11-10 NOTE — PROGRESS NOTE ADULT - SUBJECTIVE AND OBJECTIVE BOX
Nephrology Progress Note    CECILLE FRANKEL  MRN-[id number]  85y  Male    S:  Patient is seen and examined, events over the last 24h noted.    O:  Allergies:  No Known Allergies    Hospital Medications:   MEDICATIONS  (STANDING):  amLODIPine   Tablet 10 milliGRAM(s) Oral daily  aspirin enteric coated 81 milliGRAM(s) Oral daily  atorvastatin 20 milliGRAM(s) Oral at bedtime  calcium acetate 667 milliGRAM(s) Oral three times a day with meals  chlorhexidine 4% Liquid 1 Application(s) Topical daily  heparin  Infusion 800 Unit(s)/Hr (8 mL/Hr) IV Continuous <Continuous>  hydrALAZINE 25 milliGRAM(s) Oral three times a day  magnesium sulfate  IVPB 2 Gram(s) IV Intermittent once  pantoprazole    Tablet 40 milliGRAM(s) Oral before breakfast    MEDICATIONS  (PRN):  acetaminophen     Tablet .. 650 milliGRAM(s) Oral every 6 hours PRN Mild Pain (1 - 3)    Home Medications:  amLODIPine 5 mg oral tablet: 1 tab(s) orally 2 times a day (01 Sep 2021 16:54)  Aspir 81 oral delayed release tablet: 1 tab(s) orally once a day (01 Sep 2021 16:54)  atorvastatin 10 mg oral tablet: 2 tab(s) orally once a day (01 Sep 2021 16:54)  famotidine 20 mg oral tablet: 1 tab(s) orally once a day (01 Sep 2021 16:54)  Januvia 50 mg oral tablet: 1 tab(s) orally once a day (01 Sep 2021 16:54)  Lasix 20 mg oral tablet: 1 tab(s) orally every other day (01 Sep 2021 16:54)  pioglitazone 30 mg oral tablet: 1 tab(s) orally once a day (01 Sep 2021 16:54)  ramipril 5 mg oral capsule: 2 cap(s) orally once a day (01 Sep 2021 16:54)      VITALS:  T(F): 97.1 (11-10-21 @ 07:20), Max: 98.6 (21 @ 15:10)  HR: 78 (11-10-21 @ 07:20)  BP: 150/74 (11-10-21 @ 07:20)  RR: 17 (11-10-21 @ 07:20)  SpO2: 94% (11-10-21 @ 00:00)  Wt(kg): --  I&O's Detail    2021 07:01  -  10 Nov 2021 07:00  --------------------------------------------------------  IN:  Total IN: 0 mL    OUT:    Colostomy (mL): 250 mL    Indwelling Catheter - Urethral (mL): 900 mL  Total OUT: 1150 mL    Total NET: -1150 mL        I&O's Summary    2021 07:01  -  10 Nov 2021 07:00  --------------------------------------------------------  IN: 0 mL / OUT: 1150 mL / NET: -1150 mL          PHYSICAL EXAM:  Gen: NAD  Resp: CTAB  Card: S1/S2  Abd: soft, colostomy  Extremities: no edema      LABS:    11-10    134<L>  |  98  |  46<H>  ----------------------------<  111<H>  3.7   |  18  |  4.7<HH>    Ca    7.7<L>      10 Nov 2021 06:14  Mg     1.7     11-10    TPro  4.4<L>  /  Alb  2.3<L>  /  TBili  0.2  /  DBili      /  AST  13  /  ALT  9   /  AlkPhos  80  10    eGFR if Non African American: 11 mL/min/1.73M2 (11-10-21 @ 06:14)  eGFR if African American: 12 mL/min/1.73M2 (11-10-21 @ 06:14)    Phosphorus Level, Serum: 3.9 mg/dL (21 @ 04:30)  Phosphorus Level, Serum: 3.8 mg/dL (10-31-21 @ 04:30)    Osmolality, Serum: 298 mos/kg (10-24-21 @ 11:32)  Osmolality, Serum: 288 mos/kg (21 @ 07:44)                          7.7    7.52  )-----------( 202      ( 10 Nov 2021 06:14 )             23.0     Mean Cell Volume: 87.8 fL (11-10-21 @ 06:14)    % Saturation, Iron: 48 % (10-24-21 @ 11:32)  Ferritin, Serum: 326 ng/mL (10-24-21 @ 11:32)      Urine Studies:  Urinalysis Basic - ( 2021 01:05 )    Color: Yellow / Appearance: Turbid / S.015 / pH:   Gluc:  / Ketone: Negative  / Bili: Negative / Urobili: <2 mg/dL   Blood:  / Protein: 300 mg/dL / Nitrite: Negative   Leuk Esterase: Large / RBC: 7 /HPF / WBC >720 /HPF   Sq Epi:  / Non Sq Epi: 0 /HPF / Bacteria: Negative        Urea Nitrogen,  Random Urine: 247 mg/dL (21 @ 19:33)    Culture Results:   <10,000 CFU/mL Normal Urogenital Ber[place] ( @ 01:05)  Culture Results:   <10,000 CFU/mL Normal Urogenital [place] (10-28 @ 11:34)    Creatinine trend:  Creatinine, Serum: 4.7 mg/dL (11-10-21 @ 06:14)  Creatinine, Serum: 4.7 mg/dL (21 @ 05:17)  Creatinine, Serum: 4.9 mg/dL (21 @ 05:47)  Creatinine, Serum: 4.8 mg/dL (21 @ 04:30)  Creatinine, Serum: 4.9 mg/dL (21 @ 04:30)  Creatinine, Serum: 5.0 mg/dL (21 @ 07:00)  Creatinine, Serum: 5.1 mg/dL (21 @ 07:34)  Creatinine, Serum: 4.8 mg/dL (21 @ 01:09)

## 2021-11-10 NOTE — PROGRESS NOTE ADULT - ASSESSMENT
85 year old M with PMH of DM, Crohn s/p colostomy, CKD 4, HTN, HLD, BPH presents to ED with complaints of weakness. Pt states 10 days prior was diagnosed with UTI, placed on bactrim, Family states due to size of pill, pt was unable to swallow pill, only took 1/2 dose for 5 days. Family states over past few days pt becoming increasingly lethargic, having chills as well.   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 Intraluminal air is likely secondary to [place]. Bowel containing spigelian hernia without evidence of bowel obstruction or pneumoperitoneum. Small bilateral pleural effusions with adjacent atelectasis.  Indeterminate bilateral renal lesions. Nonemergent reimaging with renal protocol recommended.  -s/p 2U  packed RBC  - s/p Flexible sigmoidoscopy :  The rectal mucosa looked erythematous and friable and was covered with multiple blood clots and mucus. 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 Diverticulosis of the rectosigmoid junction Internal hemorrhoids. The scope was advanced to the rectosigmoid junction but could not be advanced further in the colon due to the presence of blood clots and structuring of the sigmoid in the setting of the diverticular disease.     - pathology  revealed  inflammation  GI F/u: short chain fatty acid enema or coconut oil enema for diversion colitis, consider CT pelvis w/ rectal enema to rule out any masses in the blind colonic stump  -As per IR:  pt at risk for perforation with rectal contrast  - GI F/u  - monitor HB/HCT    # Metabolic encephalopathy -resolved    # Acute kidney injury on CKD stage4  # Hyperphosphatemia  # Metabolic acidosis  # Hyponatremia  - non-oliguric  - hold lasix, ramipril  - c/w phoslo   - c/w sodium bicarb  - monitor BMP  - nephrology following    # DVT  -  VA Duplex Lower Ext Vein Scan, Bilat (10.25.21 @ 15:36) >Acute thrombosis of the left posterior tibial and peroneal vein branches  Chronic deep vein thrombosis of the left common femoral and distalfemoral veins. .  - c/w IV hweparin  - monitor PTT    # Hypomagnesemia  - replete mg    # Hypertension  - c/w norvasc, hydralazine    # Dyslipidemia  - c/w statin    # DNR/DNI    #Pending: GI F/u, monitor PTT, monitor BMP, cbc  # Disposition: STR when stable

## 2021-11-10 NOTE — PROGRESS NOTE ADULT - SUBJECTIVE AND OBJECTIVE BOX
Patient is a 85y old  Male who presents with a chief complaint of weakness (10 Nov 2021 10:45)    Patient was seen and examined.  Pt on IV heparin  no new events    PAST MEDICAL & SURGICAL HISTORY:  History of medical problems  [place] b/l ears, dm, htn, hypercholesteremia, ckd, bowel/bladder fistula, kidney stones  Colostomy present    History of surgery  procedure for kidney stones ( ?lithotripsy)    Allergies  No Known Allergies      Home Medications:  amLODIPine 5 mg oral tablet: 1 tab(s) orally 2 times a day (01 Sep 2021 16:54)  Aspir 81 oral delayed release tablet: 1 tab(s) orally once a day (01 Sep 2021 16:54)  atorvastatin 10 mg oral tablet: 2 tab(s) orally once a day (01 Sep 2021 16:54)  famotidine 20 mg oral tablet: 1 tab(s) orally once a day (01 Sep 2021 16:54)  Januvia 50 mg oral tablet: 1 tab(s) orally once a day (01 Sep 2021 16:54)  Lasix 20 mg oral tablet: 1 tab(s) orally every other day (01 Sep 2021 16:54)  pioglitazone 30 mg oral tablet: 1 tab(s) orally once a day (01 Sep 2021 16:54)  ramipril 5 mg oral capsule: 2 cap(s) orally once a day (01 Sep 2021 16:54)    MEDICATIONS  (STANDING):  amLODIPine   Tablet 10 milliGRAM(s) Oral daily  aspirin enteric coated 81 milliGRAM(s) Oral daily  atorvastatin 20 milliGRAM(s) Oral at bedtime  calcium acetate 667 milliGRAM(s) Oral three times a day with meals  chlorhexidine 4% Liquid 1 Application(s) Topical daily  heparin  Infusion 800 Unit(s)/Hr (8 mL/Hr) IV Continuous <Continuous>  hydrALAZINE 25 milliGRAM(s) Oral three times a day  pantoprazole    Tablet 40 milliGRAM(s) Oral before breakfast    MEDICATIONS  (PRN):  acetaminophen     Tablet .. 650 milliGRAM(s) Oral every 6 hours PRN Mild Pain (1 - 3)    Vital Signs Last 24 Hrs  T(C): 36.2  T(F): 97.1  HR: 78  BP: 150/74  BP(mean): --  RR: 17  SpO2: 94%    O/E:  Awake, alert, not in distress.  HEENT: atraumatic, EOMI.  Chest: decreased breath sounds at bases  CVS: SIS2 +, no murmur.  P/A: Soft, BS+, colostomy+  CNS: awake, alert , not oriented  Ext: no edema feet.  Skin: no rash, no ulcers.  All systems reviewed positive findings as above.    POCT Blood Glucose.: 138 mg/dL (10 Nov 2021 11:21)  POCT Blood Glucose.: 197 mg/dL (09 Nov 2021 16:40)                        8.8<L>  10.33 )-----------( 250      ( 10 Nov 2021 11:00 )             26.2<L>                        7.7<L>  7.52  )-----------( 202      ( 10 Nov 2021 06:14 )             23.0<L>    11-10    134<L>  |  97<L>  |  46<H>  ----------------------------<  143<H>  3.8   |  19  |  4.5<HH>  11-10    134<L>  |  98  |  46<H>  ----------------------------<  111<H>  3.7   |  18  |  4.7<HH>    Ca    7.9<L>      10 Nov 2021 11:00  Ca    7.7<L>      10 Nov 2021 06:14  Ca    7.8<L>      09 Nov 2021 05:17  Mg     1.7     11-10    TPro  4.8<L>  /  Alb  2.8<L>  /  TBili  0.3  /  DBili  x   /  AST  14  /  ALT  10  /  AlkPhos  90  11-10  TPro  4.4<L>  /  Alb  2.3<L>  /  TBili  0.2  /  DBili  x   /  AST  13  /  ALT  9   /  AlkPhos  80  11-10  TPro  4.3<L>  /  Alb  2.4<L>  /  TBili  0.2  /  DBili  x   /  AST  13  /  ALT  10  /  AlkPhos  74  11-09          PTT - ( 10 Nov 2021 11:00 )  PTT:56.2 sec

## 2021-11-10 NOTE — CHART NOTE - NSCHARTNOTEFT_GEN_A_CORE
Registered Dietitian Follow-Up     Patient Profile Reviewed                           Yes [x]   No []     Nutrition History Previously Obtained        Yes [x]  No []       Pertinent Subjective Information: Pt himself very confused. Spoke w/ RN, unsure of PO intake for breakfast today or supplement intake. Pt himself reports that he drinks Glucerna sometimes and he'll try to eat more. Asked RN to monitor supplement intake. EMR Po doc 11/5-11/10: 20-75% PO improving?? Per prev Rd assessment 11/6 -- RN reported pt drinking >75% of supplements and 50% of meals? -- will monitor.      Pertinent Medical Interventions:  #Acute on chronic anemia   #Hematochezia  on admission  - GI consult- s/p flexible sigmoidoscopy 11/4  #Altered mental status - improving   likely 2/2 metabolic encephalopathy and delirium  # JOSH on CKD 4 - likely ATN d/t severe sepsis / acute on chronic anemia- pt is close to HD, but does not want to do it unless it is absolutely needed (his wife was on HD).  Currently w/o acute indication, cont monitoring off HD     Diet order: Diet, Regular:   Diet, Consistent Carbohydrate w/Evening Snack:   60 gm Protein (PRO60G)     Qty per Day:  Vanilla flavor  Supplement Feeding Modality:  Oral  Glucerna Shake Cans or Servings Per Day:  1       Frequency:  Two Times a day (11-03-21 @ 14:09) [Active]    Anthropometrics:  Height (cm): 167.6cm   Weight (kg): 66.5 (11-04-21 @ 14:20) -- other wts noted, prev edema noted   BMI (kg/m2): 16.5 (11-04-21 @ 14:20)  IBW: 64.4kg     MEDICATIONS  (STANDING):  amLODIPine   Tablet 10 milliGRAM(s) Oral daily  aspirin enteric coated 81 milliGRAM(s) Oral daily  atorvastatin 20 milliGRAM(s) Oral at bedtime  calcium acetate 667 milliGRAM(s) Oral three times a day with meals  heparin  Infusion 800 Unit(s)/Hr (8 mL/Hr) IV Continuous <Continuous>  hydrALAZINE 25 milliGRAM(s) Oral three times a day  magnesium sulfate  IVPB 2 Gram(s) IV Intermittent once  pantoprazole    Tablet 40 milliGRAM(s) Oral before breakfast    Pertinent Labs: 11-10 @ 06:14: Na 134<L>, BUN 46<H>, Cr 4.7<HH>, <H>, K+ 3.7, Phos --, Mg 1.7<L>, Alk Phos 80, ALT/SGPT 9, AST/SGOT 13, HbA1c --    Finger Sticks:  POCT Blood Glucose.: 197 mg/dL (11-09 @ 16:40)  POCT Blood Glucose.: 139 mg/dL (11-09 @ 11:41)    Physical Findings:  - Appearance: confused/ disoriented to place, time, situation; 11/4: 2+ l/R foot edema    - GI function: colostomy, LBM 11/10   - Tubes: n/a   - Oral/Mouth cavity: no chewing/swallowing   - Skin: ecchymosis      Nutrition Requirements: per prev RD assessment 11/6  Weight Used: 61.2kg      Estimated Energy Needs    Continue [x]  Adjust []  MSJ*1.2-1.3= 1495-1620kcal     Estimated Protein Needs    Continue [x]  Adjust []  73-86 g protein based on 1.2-1.4 g/kg      Estimated Fluid Needs        Continue [x]  Adjust []  1mL/kcal or LIP     Nutrient Intake: improving per EMR??      [] Previous Nutrition Diagnosis: Increased Nutrient Needs            [x] Ongoing          [] Resolved      Nutrition Intervention: meals and snacks     Goal/Expected Outcome: Pt to consume and tolerate >75% of meals/snacks and PO supplements in 4-6 days.       Nutrition Monitoring:diet order,energy intake,body composition,NFPF, renal/glucose/electrolyte profile     Recommendation:  1. Add low fiber/low residue diet modifier to current diet order   2. Monitor PO/supplement intake -- discussed w/ RN   3. obtain daily wts   4. encourage po intake     [id number]  RD remains available: Rimma Peralta, RDN [phone] Registered Dietitian Follow-Up     Patient Profile Reviewed                           Yes [x]   No []     Nutrition History Previously Obtained        Yes [x]  No []       Pertinent Subjective Information: Pt himself very confused. Spoke w/ RN, unsure of PO intake for breakfast today or supplement intake. Pt himself reports that he drinks Glucerna sometimes and he'll try to eat more. Asked RN to monitor supplement intake. EMR Po doc 11/5-11/10: 20-75% PO improving?? Per prev Rd assessment 11/6 -- RN reported pt drinking >75% of supplements and 50% of meals? -- will monitor.      Pertinent Medical Interventions:  #Acute on chronic anemia   #Hematochezia  on admission  - GI consult- s/p flexible sigmoidoscopy 11/4  #Altered mental status - improving   likely 2/2 metabolic encephalopathy and delirium  # JOSH on CKD 4 - likely ATN d/t severe sepsis / acute on chronic anemia- pt is close to HD, but does not want to do it unless it is absolutely needed (his wife was on HD).  Currently w/o acute indication, cont monitoring off HD     Diet order: Diet, Regular:   Diet, Consistent Carbohydrate w/Evening Snack:   60 gm Protein (PRO60G)     Qty per Day:  Vanilla flavor  Supplement Feeding Modality:  Oral  Glucerna Shake Cans or Servings Per Day:  1       Frequency:  Two Times a day (11-03-21 @ 14:09) [Active]    Anthropometrics:  Height (cm): 167.6cm   Weight (kg): 66.5 (11-04-21 @ 14:20) -- other wts noted, prev edema noted   BMI (kg/m2): 16.5 (11-04-21 @ 14:20)  IBW: 64.4kg     MEDICATIONS  (STANDING):  amLODIPine   Tablet 10 milliGRAM(s) Oral daily  aspirin enteric coated 81 milliGRAM(s) Oral daily  atorvastatin 20 milliGRAM(s) Oral at bedtime  calcium acetate 667 milliGRAM(s) Oral three times a day with meals  heparin  Infusion 800 Unit(s)/Hr (8 mL/Hr) IV Continuous <Continuous>  hydrALAZINE 25 milliGRAM(s) Oral three times a day  magnesium sulfate  IVPB 2 Gram(s) IV Intermittent once  pantoprazole    Tablet 40 milliGRAM(s) Oral before breakfast    Pertinent Labs: 11-10 @ 06:14: Na 134<L>, BUN 46<H>, Cr 4.7<HH>, <H>, K+ 3.7, Phos --, Mg 1.7<L>, Alk Phos 80, ALT/SGPT 9, AST/SGOT 13, HbA1c --    Finger Sticks:  POCT Blood Glucose.: 197 mg/dL (11-09 @ 16:40)  POCT Blood Glucose.: 139 mg/dL (11-09 @ 11:41)    Physical Findings:  - Appearance: confused/ disoriented to place, time, situation; 11/4: 2+ l/R foot edema    - GI function: colostomy, LBM 11/10   - Tubes: n/a   - Oral/Mouth cavity: no chewing/swallowing   - Skin: ecchymosis      Nutrition Requirements: per prev RD assessment 11/6  Weight Used: 61.2kg      Estimated Energy Needs    Continue [x]  Adjust []  MSJ*1.2-1.3= 1495-1620kcal     Estimated Protein Needs    Continue [x]  Adjust []  61-73 g protein based on 1.0-1.2 g/kg -- d/t elevated renal fx -- aim towards lower end     Estimated Fluid Needs        Continue [x]  Adjust []  1mL/kcal or LIP     Nutrient Intake: improving per EMR??      [] Previous Nutrition Diagnosis: Increased Nutrient Needs            [x] Ongoing          [] Resolved      Nutrition Intervention: meals and snacks     Goal/Expected Outcome: Pt to consume and tolerate >75% of meals/snacks and PO supplements in 4-6 days.       Nutrition Monitoring:diet order,energy intake,body composition,NFPF, renal/glucose/electrolyte profile     Recommendation:  1. Add low fiber/low residue diet modifier to current diet order   2. Monitor PO/supplement intake -- discussed w/ RN   3. obtain daily wts   4. encourage po intake     [id number]  RD remains available: Rimma Peralta, RDN [phone]

## 2021-11-10 NOTE — PROGRESS NOTE ADULT - ASSESSMENT
# JOSH on CKD 4 - likely ATN d/t severe sepsis / acute on chronic anemia  - Non oliguric, creatinine stable  # Metabolic acidosis w/ anion gap   # Hyponatremia / low eav  # Acute on chronic anemia   # Acute pyelonephritis / Abdominal wall cellulitis   # Hx of crohn s/p colostomy       Recommendations:  - lasix on hold / can resume if oliguric or worsening edema   - start sodium bicarb 1300mg q8h   - last phos level noted, at goal, cont phoslo/ check repeat   - CT noted w/o hydronephrosis  - RBC transfusion as needed to maintain hgb > 7, iron stores noted, does not need iv iron   - pt is close to HD, but does not want to do it unless it is absolutely needed (his wife was on HD).  Currently w/o acute indication, cont monitoring off HD  - DNR/DNI

## 2021-11-10 NOTE — PROGRESS NOTE ADULT - SUBJECTIVE AND OBJECTIVE BOX
----------Daily Progress Note----------    HISTORY OF PRESENT ILLNESS:  Patient is a 85y old Male who presents with a chief complaint of weakness (09 Nov 2021 15:47)    Currently admitted to medicine with the primary diagnosis of Acute cystitis       Today is hospital day 18d.     INTERVAL HOSPITAL COURSE / OVERNIGHT EVENTS:    Patient was examined and seen at bedside. This morning he is resting comfortably in bed. Overnight pt experienced mild rectal bleeding approximately 5cc. Denies chest pain, SOB, nausea, vomiting.     Review of Systems: Otherwise unremarkable     <<<<<PAST MEDICAL & SURGICAL HISTORY>>>>>  History of medical problems  [place] b/l ears, dm, htn, hypercholesteremia, ckd, bowel/bladder fistula, kidney stones    Colostomy present    History of surgery  procedure for kidney stones ( ?lithotripsy)      ALLERGIES  No Known Allergies      Home Medications:  amLODIPine 5 mg oral tablet: 1 tab(s) orally 2 times a day (01 Sep 2021 16:54)  Aspir 81 oral delayed release tablet: 1 tab(s) orally once a day (01 Sep 2021 16:54)  atorvastatin 10 mg oral tablet: 2 tab(s) orally once a day (01 Sep 2021 16:54)  famotidine 20 mg oral tablet: 1 tab(s) orally once a day (01 Sep 2021 16:54)  Januvia 50 mg oral tablet: 1 tab(s) orally once a day (01 Sep 2021 16:54)  Lasix 20 mg oral tablet: 1 tab(s) orally every other day (01 Sep 2021 16:54)  pioglitazone 30 mg oral tablet: 1 tab(s) orally once a day (01 Sep 2021 16:54)  ramipril 5 mg oral capsule: 2 cap(s) orally once a day (01 Sep 2021 16:54)        MEDICATIONS  STANDING MEDICATIONS  amLODIPine   Tablet 10 milliGRAM(s) Oral daily  aspirin enteric coated 81 milliGRAM(s) Oral daily  atorvastatin 20 milliGRAM(s) Oral at bedtime  calcium acetate 667 milliGRAM(s) Oral three times a day with meals  chlorhexidine 4% Liquid 1 Application(s) Topical daily  heparin  Infusion 800 Unit(s)/Hr IV Continuous <Continuous>  hydrALAZINE 25 milliGRAM(s) Oral three times a day  magnesium sulfate  IVPB 2 Gram(s) IV Intermittent once  pantoprazole    Tablet 40 milliGRAM(s) Oral before breakfast    PRN MEDICATIONS  acetaminophen     Tablet .. 650 milliGRAM(s) Oral every 6 hours PRN    VITALS:  T(F): 97.1  HR: 78  BP: 150/74  RR: 17  SpO2: 94%    <<<<<LABS>>>>>                        7.7    7.52  )-----------( 202      ( 10 Nov 2021 06:14 )             23.0     11-10    134<L>  |  98  |  46<H>  ----------------------------<  111<H>  3.7   |  18  |  4.7<HH>    Ca    7.7<L>      10 Nov 2021 06:14  Mg     1.7     11-10    TPro  4.4<L>  /  Alb  2.3<L>  /  TBili  0.2  /  DBili  x   /  AST  13  /  ALT  9   /  AlkPhos  80  11-10    PTT - ( 10 Nov 2021 06:14 )  PTT:65.6 sec        [id number]        <<<<<RADIOLOGY>>>>>    < from: Xray Chest 1 View- PORTABLE-Routine (Xray Chest 1 View- PORTABLE-Routine .) (11.02.21 @ 12:56) >  EXAM:  XR CHEST PORTABLE ROUTINE 1V            PROCEDURE DATE:  11/02/2021            INTERPRETATION:  Clinical History / Reason for exam: Congestion    Comparison : Chest radiograph 10/28/2021.    Technique/Positioning: Frontal.    Findings:    Support devices: None.    Cardiac/mediastinum/hilum: Stable    Lung parenchyma/Pleura: Increasing bilateral lung opacities    Skeleton/soft tissues: Stable    Impression:    Increasing bilateral lung opacities    < end of copied text >      <<<<<PHYSICAL EXAM>>>>>  GENERAL: NAD, resting comfortably in bed.  PULMONARY: Clear to auscultation bilaterally. No rales, rhonchi, or wheezing.  CARDIOVASCULAR: Regular rate and rhythm, S1-S2, no murmurs  GASTROINTESTINAL: colostomy bag draining fecal contents  SKIN/EXTREMITIES: 3+ pitting edema b/l noted  NEUROLOGIC: AAOX3      -----------------------------------------------------------------------------------------------------------------------------------------------------------------------------------------------

## 2021-11-11 NOTE — PROGRESS NOTE ADULT - SUBJECTIVE AND OBJECTIVE BOX
----------Daily Progress Note----------    HISTORY OF PRESENT ILLNESS:  Patient is a 85y old Male who presents with a chief complaint of weakness (11 Nov 2021 06:57)    Currently admitted to medicine with the primary diagnosis of Acute cystitis       Today is hospital day 19d.     INTERVAL HOSPITAL COURSE / OVERNIGHT EVENTS:    Patient was examined and seen at bedside. This morning he is resting comfortably in bed and reports no new issues or overnight events.     Review of Systems: Otherwise unremarkable     <<<<<PAST MEDICAL & SURGICAL HISTORY>>>>>  History of medical problems  [place] [employer] b/l ears, dm, htn, hypercholesteremia, ckd, bowel/bladder fistula, kidney stones    Colostomy present    History of surgery  procedure for kidney stones ( ?lithotripsy)      ALLERGIES  No Known Allergies      Home Medications:  amLODIPine 5 mg oral tablet: 1 tab(s) orally 2 times a day (01 Sep 2021 16:54)  Aspir 81 oral delayed release tablet: 1 tab(s) orally once a day (01 Sep 2021 16:54)  atorvastatin 10 mg oral tablet: 2 tab(s) orally once a day (01 Sep 2021 16:54)  famotidine 20 mg oral tablet: 1 tab(s) orally once a day (01 Sep 2021 16:54)  Januvia 50 mg oral tablet: 1 tab(s) orally once a day (01 Sep 2021 16:54)  Lasix 20 mg oral tablet: 1 tab(s) orally every other day (01 Sep 2021 16:54)  pioglitazone 30 mg oral tablet: 1 tab(s) orally once a day (01 Sep 2021 16:54)  ramipril 5 mg oral capsule: 2 cap(s) orally once a day (01 Sep 2021 16:54)        MEDICATIONS  STANDING MEDICATIONS  amLODIPine   Tablet 10 milliGRAM(s) Oral daily  apixaban 2.5 milliGRAM(s) Oral every 12 hours  aspirin enteric coated 81 milliGRAM(s) Oral daily  atorvastatin 20 milliGRAM(s) Oral at bedtime  calcium acetate 667 milliGRAM(s) Oral three times a day with meals  chlorhexidine 4% Liquid 1 Application(s) Topical daily  furosemide    Tablet 20 milliGRAM(s) Oral daily  hydrALAZINE 25 milliGRAM(s) Oral three times a day  pantoprazole    Tablet 40 milliGRAM(s) Oral before breakfast  sodium bicarbonate 1300 milliGRAM(s) Oral every 8 hours    PRN MEDICATIONS  acetaminophen     Tablet .. 650 milliGRAM(s) Oral every 6 hours PRN    VITALS:  T(F): 96.8  HR: 75  BP: 154/70  RR: 18  SpO2: 96%    <<<<<LABS>>>>>                        8.6    9.10  )-----------( 262      ( 11 Nov 2021 08:13 )             25.9     11-11    136  |  98  |  44<H>  ----------------------------<  182<H>  4.0   |  20  |  4.7<HH>    Ca    8.0<L>      11 Nov 2021 08:13  Mg     2.2     11-11    TPro  4.8<L>  /  Alb  2.8<L>  /  TBili  0.2  /  DBili  x   /  AST  12  /  ALT  9   /  AlkPhos  92  11-11    PTT - ( 11 Nov 2021 08:13 )  PTT:100.4 sec        [id number]        <<<<<RADIOLOGY>>>>>    < from: Xray Chest 1 View- PORTABLE-Routine (Xray Chest 1 View- PORTABLE-Routine .) (11.02.21 @ 12:56) >  EXAM:  XR CHEST PORTABLE ROUTINE 1V            PROCEDURE DATE:  11/02/2021            INTERPRETATION:  Clinical History / Reason for exam: Congestion    Comparison : Chest radiograph 10/28/2021.    Technique/Positioning: Frontal.    Findings:    Support devices: None.    Cardiac/mediastinum/hilum: Stable    Lung parenchyma/Pleura: Increasing bilateral lung opacities    Skeleton/soft tissues: Stable    Impression:    Increasing bilateral lung opacities    < end of copied text >    < from: [facility] Duplex Lower Ext Vein Scan, Bilat (10.25.21 @ 15:36) >  EXAM:  DUPLEX SCAN EXT VEINS LOWER BI            PROCEDURE DATE:  10/25/2021            INTERPRETATION:  CLINICAL INFORMATION: The patient is a 85-year-old male with leg swelling. A venous duplex examination was performed to evaluate the patient for deep venous thrombosis of the lower extremities. Comparison is made to venous duplex done on 9/2/2021.    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    The common femoral, great saphenous, femoral, popliteal and small saphenous veins were visualized bilaterally with no evidence of deep venous thrombosis    All veins were fully compressible.  There was presence of spontaneous flow, augmentation with distal compression and phasicity.    The anterior tibial veins were  patent    The posterior tibial veins were  patent    The peroneal veins were patent.    Impression:  Acute thrombosis of the left posterior tibial and peroneal vein branches  Chronic deep vein thrombosis of the left common femoral and distalfemoral veins. .    < end of copied text >      <<<<<PHYSICAL EXAM>>>>>  GENERAL: NAD, resting comfortably in bed  PULMONARY: Clear to auscultation bilaterally. No rales, rhonchi, or wheezing.  CARDIOVASCULAR: Regular rate and rhythm, S1-S2, no murmurs  GASTROINTESTINAL: Colostomy bag with feces and gaseous contents  SKIN/EXTREMITIES: Mild improvement in LE edema b/l  NEUROLOGIC: AAOX3      -----------------------------------------------------------------------------------------------------------------------------------------------------------------------------------------------

## 2021-11-11 NOTE — PROGRESS NOTE ADULT - SUBJECTIVE AND OBJECTIVE BOX
Patient is a 85y old  Male who presents with a chief complaint of weakness (10 Nov 2021 10:45)    Patient was seen and examined.  no new events    PAST MEDICAL & SURGICAL HISTORY:  History of medical problems  [place] b/l ears, dm, htn, hypercholesteremia, ckd, bowel/bladder fistula, kidney stones  Colostomy present    History of surgery  procedure for kidney stones ( ?lithotripsy)    Allergies  No Known Allergies      Home Medications:  amLODIPine 5 mg oral tablet: 1 tab(s) orally 2 times a day (01 Sep 2021 16:54)  Aspir 81 oral delayed release tablet: 1 tab(s) orally once a day (01 Sep 2021 16:54)  atorvastatin 10 mg oral tablet: 2 tab(s) orally once a day (01 Sep 2021 16:54)  famotidine 20 mg oral tablet: 1 tab(s) orally once a day (01 Sep 2021 16:54)  Januvia 50 mg oral tablet: 1 tab(s) orally once a day (01 Sep 2021 16:54)  Lasix 20 mg oral tablet: 1 tab(s) orally every other day (01 Sep 2021 16:54)  pioglitazone 30 mg oral tablet: 1 tab(s) orally once a day (01 Sep 2021 16:54)  ramipril 5 mg oral capsule: 2 cap(s) orally once a day (01 Sep 2021 16:54)    MEDICATIONS  (STANDING):  amLODIPine   Tablet 10 milliGRAM(s) Oral daily  apixaban 2.5 milliGRAM(s) Oral every 12 hours  aspirin  chewable 81 milliGRAM(s) Oral daily  atorvastatin 20 milliGRAM(s) Oral at bedtime  calcium acetate 667 milliGRAM(s) Oral three times a day with meals  chlorhexidine 4% Liquid 1 Application(s) Topical daily  furosemide    Tablet 20 milliGRAM(s) Oral daily  hydrALAZINE 25 milliGRAM(s) Oral three times a day  pantoprazole    Tablet 40 milliGRAM(s) Oral before breakfast  sodium bicarbonate 1300 milliGRAM(s) Oral every 8 hours    MEDICATIONS  (PRN):  acetaminophen     Tablet .. 650 milliGRAM(s) Oral every 6 hours PRN Mild Pain (1 - 3)    T(C): 36 (11-11-21 @ 08:00), Max: 36.1 (11-11-21 @ 00:00)  HR: 75 (11-11-21 @ 08:00) (63 - 75)  BP: 154/70 (11-11-21 @ 08:00) (145/65 - 154/70)  RR: 18 (11-11-21 @ 08:00) (18 - 18)  SpO2: 96% (11-11-21 @ 00:00) (96% - 96%)    O/E:  Awake, alert, not in distress.  HEENT: atraumatic, EOMI.  Chest: decreased breath sounds at bases  CVS: SIS2 +, no murmur.  P/A: Soft, BS+, colostomy+  CNS: awake, alert , not oriented  Ext: no edema feet.  Skin: no rash, no ulcers.  All systems reviewed positive findings as above.                          8.6<L>  9.10  )-----------( 262      ( 11 Nov 2021 08:13 )             25.9<L>                        8.8<L>  10.33 )-----------( 250      ( 10 Nov 2021 11:00 )             26.2<L>  11-11    136  |  98  |  44<H>  ----------------------------<  182<H>  4.0   |  20  |  4.7<HH>  11-10    134<L>  |  97<L>  |  46<H>  ----------------------------<  143<H>  3.8   |  19  |  4.5<HH>    Ca    8.0<L>      11 Nov 2021 08:13  Ca    7.9<L>      10 Nov 2021 11:00  Ca    7.7<L>      10 Nov 2021 06:14  Mg     2.2     11-11    TPro  4.8<L>  /  Alb  2.8<L>  /  TBili  0.2  /  DBili  x   /  AST  12  /  ALT  9   /  AlkPhos  92  11-11  TPro  4.8<L>  /  Alb  2.8<L>  /  TBili  0.3  /  DBili  x   /  AST  14  /  ALT  10  /  AlkPhos  90  11-10  TPro  4.4<L>  /  Alb  2.3<L>  /  TBili  0.2  /  DBili  x   /  AST  13  /  ALT  9   /  AlkPhos  80  11-10

## 2021-11-11 NOTE — PROGRESS NOTE ADULT - SUBJECTIVE AND OBJECTIVE BOX
seen and examined  no distress  lying comfortable      PAST HISTORY  --------------------------------------------------------------------------------  No significant changes to PMH, PSH, FHx, SHx, unless otherwise noted    ALLERGIES & MEDICATIONS  --------------------------------------------------------------------------------  Allergies    No Known Allergies    Intolerances      Standing Inpatient Medications  amLODIPine   Tablet 10 milliGRAM(s) Oral daily  aspirin enteric coated 81 milliGRAM(s) Oral daily  atorvastatin 20 milliGRAM(s) Oral at bedtime  calcium acetate 667 milliGRAM(s) Oral three times a day with meals  chlorhexidine 4% Liquid 1 Application(s) Topical daily  heparin  Infusion 950 Unit(s)/Hr IV Continuous <Continuous>  hydrALAZINE 25 milliGRAM(s) Oral three times a day  pantoprazole    Tablet 40 milliGRAM(s) Oral before breakfast  sodium bicarbonate 1300 milliGRAM(s) Oral every 8 hours    PRN Inpatient Medications  acetaminophen     Tablet .. 650 milliGRAM(s) Oral every 6 hours PRN          VITALS/PHYSICAL EXAM  --------------------------------------------------------------------------------  T(C): 36.1 (11-11-21 @ 00:00), Max: 37 (11-10-21 @ 15:59)  HR: 68 (11-11-21 @ 05:32) (63 - 78)  BP: 150/74 (11-11-21 @ 05:32) (123/60 - 150/74)  RR: 18 (11-11-21 @ 00:00) (17 - 18)  SpO2: 96% (11-11-21 @ 00:00) (96% - 96%)  Wt(kg): --        11-09-21 @ 07:01  -  11-10-21 @ 07:00  --------------------------------------------------------  IN: 0 mL / OUT: 1150 mL / NET: -1150 mL    11-10-21 @ 07:01  -  11-11-21 @ 06:58  --------------------------------------------------------  IN: 500 mL / OUT: 350 mL / NET: 150 mL      Physical Exam:  	Gen: NAD  	Pulm: decrease BS  B/L  	CV:  S1S2; no rub  	Abd: +distended      LABS/STUDIES  --------------------------------------------------------------------------------              8.8    10.33 >-----------<  250      [11-10-21 @ 11:00]              26.2     134  |  97  |  46  ----------------------------<  143      [11-10-21 @ 11:00]  3.8   |  19  |  4.5        Ca     7.9     [11-10-21 @ 11:00]      Mg     1.7     [11-10-21 @ 06:14]    TPro  4.8  /  Alb  2.8  /  TBili  0.3  /  DBili  x   /  AST  14  /  ALT  10  /  AlkPhos  90  [11-10-21 @ 11:00]      PTT: 50.9       [11-10-21 @ 23:39]      Creatinine Trend:  SCr 4.5 [11-10 @ 11:00]  SCr 4.7 [11-10 @ 06:14]  SCr 4.7 [11-09 @ 05:17]  SCr 4.9 [11-08 @ 05:47]  SCr 4.8 [11-07 @ 04:30]    Urinalysis - [11-06-21 @ 01:05]      Color Yellow / Appearance Turbid / SG 1.015 / pH 7.0      Gluc 100 mg/dL / Ketone Negative  / Bili Negative / Urobili <2 mg/dL       Blood Small / Protein 300 mg/dL / Leuk Est Large / Nitrite Negative      RBC 7 / WBC >720 / Hyaline 3 / Gran  / Sq Epi  / Non Sq Epi 0 / Bacteria Negative      Iron 87, TIBC 183, %sat 48      [10-24-21 @ 11:32]  Ferritin 326      [10-24-21 @ 11:32]  TSH 0.43      [09-02-21 @ 07:44]  Lipid: chol 116, , HDL 39, LDL --      [09-02-21 @ 07:44]      Free Light Chains: kappa 4.29, lambda 3.88, ratio = 1.11      [10-24 @ 11:32]  Immunofixation Serum:   No Monoclonal Band Identified    Reference Range: None Detected      [10-24-21 @ 11:32]  SPEP Interpretation: Hypogammaglobulinemia      [10-24-21 @ 11:32]  Immunofixation Urine: Reference Range: None Detected      [10-24-21 @ 18:44]  UPEP Interpretation: Mild Selective (Glomerular) Proteinuria      [10-24-21 @ 18:44]

## 2021-11-11 NOTE — PROGRESS NOTE ADULT - ASSESSMENT
# JOSH on CKD 4 - likely ATN d/t severe sepsis / acute on chronic anemia  # Metabolic acidosis w/ anion gap   # Hyponatremia / low eav  # Acute on chronic anemia   # Acute pyelonephritis / Abdominal wall cellulitis   # Hx of crohn s/p colostomy     Recommendations:  - lasix on hold / can resume if oliguric or worsening edema   - Non oliguric, creatinine stable  - continue sodium bicarb 1300mg q8h   - last phos level noted, at goal, cont phoslo/ check repeat   - BP well controlled  - CT noted w/o hydronephrosis  - RBC transfusion as needed to maintain hgb > 7, iron stores noted, does not need iv iron   - pt is close to HD, but does not want to do it unless it is absolutely needed (his wife was on HD).  Currently w/o acute indication, cont monitoring off HD  - DNR/DNI  - will follow

## 2021-11-11 NOTE — PROGRESS NOTE ADULT - ASSESSMENT
85 year old M with PMH of nephrolithiasis, DM, Crohn s/p colostomy, CKD 4, HTN, HLD, BPH who presented with weakness. CT a/p showed bladder wall is thickened and patient found to have JOSH with creat 8.3. His electrolytes improved with no need for hemodialysis and he was downgraded from the ICU on 10/26.       Acute on chronic anemia   Hematochezia  on admission  -s/p 2U pRBC this admission   -maintain hgb >7  - Hb 8.6 this morning   - GI consult- s/p flexible sigmoidoscopy 11/4, Flexible sigmoidoscopy findings: The rectal mucosa looked erythematous and friable and was covered with multiple blood clots and mucus. diversion colitis / Diverticulosis of the rectosigmoid junction. Internal hemorrhoids. The scope was advanced to the rectosigmoid junction but could not be advanced further in the colon due to the presence of blood clots and structuring of the sigmoid in the setting of the diverticular disease.    - heparin discontinued today, eliquis started  -received call from radiology informing me that pt at risk for perforation with rectal contrast; will speak to GI to see if CT rectal contrast is necessary and proceed from there      Altered mental status - improving   likely 2/2 metabolic encephalopathy and delirium  -avoid CNS depressant  -monitor electrolytes   -strict Is and Os  - frequent reorientation     JOSH on CKD4   -as per nephro, lasix restarted; sodium bicarb 1300mg q8  - creat stable at 4.7, baseline 2.2   - nephro following  - c/w phoslo 667mg tid       Indeterminate bilateral renal lesions   -outpatient f/u    Acute and chronic DVT  -heparin discontinued, eliquis restarted    HLD   - atorvastatin 20mg qd   - aspirin 81mg     HTN   - amlodipine 10mg qd   - hydralazine 25mg tid      DVT prophylaxis: lovenox   GI prophylaxis: pantoprazole   Diet: carb consistent   Activity Score (AM-PAC)   Code status: DNR/DNI   Disposition: acute for now

## 2021-11-11 NOTE — PROGRESS NOTE ADULT - ASSESSMENT
85 year old M with PMH of DM, Crohn s/p colostomy, CKD 4, HTN, HLD, BPH presents to ED with complaints of weakness. Pt states 10 days prior was diagnosed with UTI, placed on bactrim, Family states due to size of pill, pt was unable to swallow pill, only took 1/2 dose for 5 days. Family states over past few days pt becoming increasingly lethargic, having chills as well.   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 Intraluminal air is likely secondary to [place]. Bowel containing spigelian hernia without evidence of bowel obstruction or pneumoperitoneum. Small bilateral pleural effusions with adjacent atelectasis.  Indeterminate bilateral renal lesions. Nonemergent reimaging with renal protocol recommended.  -s/p 2U  packed RBC  - s/p Flexible sigmoidoscopy :  The rectal mucosa looked erythematous and friable and was covered with multiple blood clots and mucus. 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 Diverticulosis of the rectosigmoid junction Internal hemorrhoids. The scope was advanced to the rectosigmoid junction but could not be advanced further in the colon due to the presence of blood clots and structuring of the sigmoid in the setting of the diverticular disease.     - pathology  revealed  inflammation  GI F/u: short chain fatty acid enema or coconut oil enema for diversion colitis, consider CT pelvis w/ rectal enema to rule out any masses in the blind colonic stump  -As per IR:  pt at risk for perforation with rectal contrast  -  HB/HCT stable    # Metabolic encephalopathy -resolved    # Acute kidney injury on CKD stage4  # Hyperphosphatemia  # Metabolic acidosis  # Hyponatremia-resolved  - non-oliguric  - hold  ramipril  - Start lasix  - c/w phoslo   - c/w sodium bicarb  - monitor BMP  - nephrology following    # DVT  -  VA Duplex Lower Ext Vein Scan, Bilat (10.25.21 @ 15:36) >Acute thrombosis of the left posterior tibial and peroneal vein branches. 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 .  - DC heparin, start eliquis    # Hypomagnesemia- resolved    # Hypertension  - c/w norvasc, hydralazine, lasix    # Dyslipidemia  - c/w statin    # DNR/DNI    # Pending: monitor BMP, cbc  # Disposition: STR when stable

## 2021-11-12 NOTE — PROGRESS NOTE ADULT - ASSESSMENT
85 year old M with PMH of nephrolithiasis, DM, Crohn s/p colostomy, CKD 4, HTN, HLD, BPH who presented with weakness. CT a/p showed bladder wall is thickened and patient found to have JOSH with creat 8.3. His electrolytes improved with no need for hemodialysis and he was downgraded from the ICU on 10/26.       Acute on chronic anemia   Hematochezia  on admission  -s/p 2U pRBC this admission   -maintain hgb >7  - Hb 6.6 this morning, STAT type and screen ordered   - GI consult- s/p flexible sigmoidoscopy 11/4, Flexible sigmoidoscopy findings: The rectal mucosa looked erythematous and friable and was covered with multiple blood clots and mucus. diversion colitis / Diverticulosis of the rectosigmoid junction. Internal hemorrhoids. The scope was advanced to the rectosigmoid junction but could not be advanced further in the colon due to the presence of blood clots and structuring of the sigmoid in the setting of the diverticular disease.    - heparin discontinued today, eliquis started  -as per GI, CT with rectal contrast not necessary for now  -due to drop in Hb, vascular consulted for possible IVC filter due to history of acute on chronic DVT    Altered mental status - improving   likely 2/2 metabolic encephalopathy and delirium  -avoid CNS depressant  -monitor electrolytes   -strict Is and Os  - frequent reorientation     JOSH on CKD4   -as per nephro, lasix restarted; sodium bicarb 1300mg q8  - creat 4.5 today  - nephro following  - c/w phoslo 667mg tid       Indeterminate bilateral renal lesions   -outpatient f/u    Acute and chronic DVT  -eliquis held in light of drop in Hb    HLD   - atorvastatin 20mg qd   - aspirin 81mg     HTN   - amlodipine 10mg qd   - hydralazine 25mg tid      DVT prophylaxis: lovenox   GI prophylaxis: pantoprazole   Diet: carb consistent   Activity Score (AM-PAC)   Code status: DNR/DNI   Disposition: acute for now

## 2021-11-12 NOTE — PROGRESS NOTE ADULT - SUBJECTIVE AND OBJECTIVE BOX
Nephrology progress note    Patient is seen and examined, events over the last 24 h noted .  Hb dropped to 6.6  Denies SOB at rest  +URINARY RETENTION 500 cc on straight cath last night  Allergies:  No Known Allergies    Hospital Medications:   MEDICATIONS  (STANDING):  amLODIPine   Tablet 10 milliGRAM(s) Oral daily  aspirin  chewable 81 milliGRAM(s) Oral daily  atorvastatin 20 milliGRAM(s) Oral at bedtime  calcium acetate 667 milliGRAM(s) Oral three times a day with meals  chlorhexidine 4% Liquid 1 Application(s) Topical daily  furosemide    Tablet 20 milliGRAM(s) Oral daily  hydrALAZINE 25 milliGRAM(s) Oral three times a day  pantoprazole    Tablet 40 milliGRAM(s) Oral before breakfast  sodium bicarbonate 1300 milliGRAM(s) Oral every 8 hours  tamsulosin 0.8 milliGRAM(s) Oral at bedtime        VITALS:  T(F): 97.4 (21 @ 08:17), Max: 98 (21 @ 15:31)  HR: 80 (21 @ 08:17)  BP: 115/58 (21 @ 08:17)  RR: 18 (21 @ 08:17)  SpO2: 91% (21 @ 08:17)  Wt(kg): --    11-10 @ :  -   @ 07:00  --------------------------------------------------------  IN: 500 mL / OUT: 850 mL / NET: -350 mL     @ 07:01  -   @ 07:00  --------------------------------------------------------  IN: 0 mL / OUT: 750 mL / NET: -750 mL          PHYSICAL EXAM:  Constitutional: NAD  HEENT: anicteric sclera,   Neck: No JVD  Respiratory: CTA  Cardiovascular: S1, S2, RRR  Gastrointestinal: BS+, soft, NT/ND + colostomy,   Extremities: No peripheral edema  Neurological: Awake alert  : No CVA tenderness sanford.   Skin: No rashes  Vascular Access:    LABS:      131<L>  |  96<L>  |  48<H>  ----------------------------<  113<H>  4.2   |  20  |  4.5<HH>    Ca    7.5<L>      2021 07:58  Mg     1.9         TPro  4.2<L>  /  Alb  2.3<L>  /  TBili  0.2  /  DBili      /  AST  12  /  ALT  8   /  AlkPhos  77                            6.6    6.67  )-----------( 188      ( 2021 07:58 )             19.7       Urine Studies:  Urinalysis Basic - ( 2021 02:32 )    Color: Light Orange / Appearance: Turbid / S.011 / pH:   Gluc:  / Ketone: Negative  / Bili: Negative / Urobili: <2 mg/dL   Blood:  / Protein: 300 mg/dL / Nitrite: Negative   Leuk Esterase: Large / RBC: 2 /HPF / WBC >720 /HPF   Sq Epi:  / Non Sq Epi: 2 /HPF / Bacteria: Negative        RADIOLOGY & ADDITIONAL STUDIES:

## 2021-11-12 NOTE — PROGRESS NOTE ADULT - ASSESSMENT
85 year old M with PMH of DM, Crohn s/p colostomy, CKD 4, HTN, HLD, BPH presents to ED with complaints of weakness. Pt states 10 days prior was diagnosed with UTI, placed on bactrim, Family states due to size of pill, pt was unable to swallow pill, only took 1/2 dose for 5 days. Family states over past few days pt becoming increasingly lethargic, having chills as well.   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 Intraluminal air is likely secondary to [place]. Bowel containing spigelian hernia without evidence of bowel obstruction or pneumoperitoneum. Small bilateral pleural effusions with adjacent atelectasis.  Indeterminate bilateral renal lesions. Nonemergent reimaging with renal protocol recommended.  -s/p 2U  packed RBC  - s/p Flexible sigmoidoscopy :  The rectal mucosa looked erythematous and friable and was covered with multiple blood clots and mucus. 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 Diverticulosis of the rectosigmoid junction Internal hemorrhoids. The scope was advanced to the rectosigmoid junction but could not be advanced further in the colon due to the presence of blood clots and structuring of the sigmoid in the setting of the diverticular disease.     - pathology  revealed  inflammation  - GI F/u: short chain fatty acid enema or coconut oil enema for diversion colitis, consider CT pelvis w/ rectal enema to rule out any masses in the blind colonic stump  -As per IR:  pt at risk for perforation with rectal contrast  -  will transfuse with 1 unit PRBC  - monitor HB/HCT  - GI f/u     # Oral Dysphagia  - Speech and swallow eval: Soft and bite sized, thin liquids    # Metabolic encephalopathy -resolved    # Acute kidney injury on CKD stage4  # Hyperphosphatemia  # Metabolic acidosis  # Hyponatremia  # urinary retention, H/o BPH  - : US Renal (09.02.21 @ 14:08) >No hydronephrosisBilateral renal cysts.  - failed TOV  - c/w sanford, c/w flomax  - non-oliguric  - c/w  lasix  - c/w phoslo   - c/w sodium bicarb  - monitor BMP  - nephrology following    # DVT  -  VA Duplex Lower Ext Vein Scan, Bilat (10.25.21 @ 15:36) >Acute thrombosis of the left posterior tibial and peroneal vein branches. 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 .  - Vascular surgery eval    # Hypomagnesemia- resolved    # Hypertension  - c/w norvasc, hydralazine, lasix    # Dyslipidemia  - c/w statin    # DNR/DNI    # Pending: monitor BMP, cbc, vascular surgery eval, GI F/u  # Disposition: STR when stable

## 2021-11-12 NOTE — SWALLOW BEDSIDE ASSESSMENT ADULT - SLP PERTINENT HISTORY OF CURRENT PROBLEM
Pt admitted with weakness, increased lethargy. +UTI, acute cystitis. AMS, now improving. Metabolic encephalopathy, delirium. PMHx: COVID, pt lives at home with son

## 2021-11-12 NOTE — PROGRESS NOTE ADULT - SUBJECTIVE AND OBJECTIVE BOX
----------Daily Progress Note----------    HISTORY OF PRESENT ILLNESS:  Patient is a 85y old Male who presents with a chief complaint of weakness (2021 17:12)    Currently admitted to medicine with the primary diagnosis of Acute cystitis       Today is hospital day 20d.     INTERVAL HOSPITAL COURSE / OVERNIGHT EVENTS:    Patient was examined and seen at bedside. Hb dropped from 8.6 to 6.6, type and screen ordered.     Review of Systems: Otherwise unremarkable     <<<<<PAST MEDICAL & SURGICAL HISTORY>>>>>  History of medical problems  [place] b/l ears, dm, htn, hypercholesteremia, ckd, bowel/bladder fistula, kidney stones    Colostomy present    History of surgery  procedure for kidney stones ( ?lithotripsy)      ALLERGIES  No Known Allergies      Home Medications:  amLODIPine 5 mg oral tablet: 1 tab(s) orally 2 times a day (01 Sep 2021 16:54)  Aspir 81 oral delayed release tablet: 1 tab(s) orally once a day (01 Sep 2021 16:54)  atorvastatin 10 mg oral tablet: 2 tab(s) orally once a day (01 Sep 2021 16:54)  famotidine 20 mg oral tablet: 1 tab(s) orally once a day (01 Sep 2021 16:54)  Januvia 50 mg oral tablet: 1 tab(s) orally once a day (01 Sep 2021 16:54)  Lasix 20 mg oral tablet: 1 tab(s) orally every other day (01 Sep 2021 16:54)  pioglitazone 30 mg oral tablet: 1 tab(s) orally once a day (01 Sep 2021 16:54)  ramipril 5 mg oral capsule: 2 cap(s) orally once a day (01 Sep 2021 16:54)        MEDICATIONS  STANDING MEDICATIONS  amLODIPine   Tablet 10 milliGRAM(s) Oral daily  aspirin  chewable 81 milliGRAM(s) Oral daily  atorvastatin 20 milliGRAM(s) Oral at bedtime  calcium acetate 667 milliGRAM(s) Oral three times a day with meals  chlorhexidine 4% Liquid 1 Application(s) Topical daily  furosemide    Tablet 20 milliGRAM(s) Oral daily  hydrALAZINE 25 milliGRAM(s) Oral three times a day  pantoprazole    Tablet 40 milliGRAM(s) Oral before breakfast  sodium bicarbonate 1300 milliGRAM(s) Oral every 8 hours  tamsulosin 0.8 milliGRAM(s) Oral at bedtime    PRN MEDICATIONS  acetaminophen     Tablet .. 650 milliGRAM(s) Oral every 6 hours PRN    VITALS:  T(F): 97.4  HR: 80  BP: 115/58  RR: 18  SpO2: 91%    <<<<<LABS>>>>>                        6.6    6.67  )-----------( 188      ( 2021 07:58 )             19.7     -12    131<L>  |  96<L>  |  48<H>  ----------------------------<  113<H>  4.2   |  20  |  4.5<HH>    Ca    7.5<L>      2021 07:58  Mg     1.9         TPro  4.2<L>  /  Alb  2.3<L>  /  TBili  0.2  /  DBili  x   /  AST  12  /  ALT  8   /  AlkPhos  77  -12    PTT - ( 2021 07:58 )  PTT:32.4 sec  Urinalysis Basic - ( 2021 02:32 )    Color: Light Orange / Appearance: Turbid / S.011 / pH: x  Gluc: x / Ketone: Negative  / Bili: Negative / Urobili: <2 mg/dL   Blood: x / Protein: 300 mg/dL / Nitrite: Negative   Leuk Esterase: Large / RBC: 2 /HPF / WBC >720 /HPF   Sq Epi: x / Non Sq Epi: 2 /HPF / Bacteria: Negative          [id number]        <<<<<RADIOLOGY>>>>>    < from: Xray Chest 1 View- PORTABLE-Routine (Xray Chest 1 View- PORTABLE-Routine .) (21 @ 12:56) >    EXAM:  XR CHEST PORTABLE ROUTINE 1V            PROCEDURE DATE:  2021            INTERPRETATION:  Clinical History / Reason for exam: Congestion    Comparison : Chest radiograph 10/28/2021.    Technique/Positioning: Frontal.    Findings:    Support devices: None.    Cardiac/mediastinum/hilum: Stable    Lung parenchyma/Pleura: Increasing bilateral lung opacities    Skeleton/soft tissues: Stable    Impression:    Increasing bilateral lung opacities    < end of copied text >      <<<<<PHYSICAL EXAM>>>>>  GENERAL: NAD, resting comfortably in bed  PULMONARY: Clear to auscultation bilaterally. No rales, rhonchi, or wheezing.  CARDIOVASCULAR: Regular rate and rhythm, S1-S2, no murmurs  GASTROINTESTINAL: Colostomy bag with feces and gaseous contents  SKIN/EXTREMITIES: b/l LE edema  NEUROLOGIC: AAOX3        -----------------------------------------------------------------------------------------------------------------------------------------------------------------------------------------------

## 2021-11-12 NOTE — PROGRESS NOTE ADULT - SUBJECTIVE AND OBJECTIVE BOX
Patient is a 85y old  Male who presents with a chief complaint of weakness (10 Nov 2021 10:45)    Patient was seen and examined.  Pt c/o difficulty swallowing pills    PAST MEDICAL & SURGICAL HISTORY:  History of medical problems  [place] b/l ears, dm, htn, hypercholesteremia, ckd, bowel/bladder fistula, kidney stones  Colostomy present    History of surgery  procedure for kidney stones ( ?lithotripsy)    Allergies  No Known Allergies      Home Medications:  amLODIPine 5 mg oral tablet: 1 tab(s) orally 2 times a day (01 Sep 2021 16:54)  Aspir 81 oral delayed release tablet: 1 tab(s) orally once a day (01 Sep 2021 16:54)  atorvastatin 10 mg oral tablet: 2 tab(s) orally once a day (01 Sep 2021 16:54)  famotidine 20 mg oral tablet: 1 tab(s) orally once a day (01 Sep 2021 16:54)  Januvia 50 mg oral tablet: 1 tab(s) orally once a day (01 Sep 2021 16:54)  Lasix 20 mg oral tablet: 1 tab(s) orally every other day (01 Sep 2021 16:54)  pioglitazone 30 mg oral tablet: 1 tab(s) orally once a day (01 Sep 2021 16:54)  ramipril 5 mg oral capsule: 2 cap(s) orally once a day (01 Sep 2021 16:54)    MEDICATIONS  (STANDING):  amLODIPine   Tablet 10 milliGRAM(s) Oral daily  aspirin  chewable 81 milliGRAM(s) Oral daily  atorvastatin 20 milliGRAM(s) Oral at bedtime  calcium acetate 667 milliGRAM(s) Oral three times a day with meals  chlorhexidine 4% Liquid 1 Application(s) Topical daily  furosemide    Tablet 20 milliGRAM(s) Oral daily  hydrALAZINE 25 milliGRAM(s) Oral three times a day  pantoprazole    Tablet 40 milliGRAM(s) Oral before breakfast  sodium bicarbonate 1300 milliGRAM(s) Oral every 8 hours  tamsulosin 0.8 milliGRAM(s) Oral at bedtime    MEDICATIONS  (PRN):  acetaminophen     Tablet .. 650 milliGRAM(s) Oral every 6 hours PRN Mild Pain (1 - 3)    T(C): 36.3 (11-12-21 @ 16:00), Max: 36.4 (11-12-21 @ 00:00)  HR: 70 (11-12-21 @ 16:00) (70 - 80)  BP: 113/55 (11-12-21 @ 16:00) (110/56 - 115/58)  RR: 18 (11-12-21 @ 16:00) (18 - 18)  SpO2: 91% (11-12-21 @ 08:17) (91% - 95%)      O/E:  Awake, alert, not in distress.  HEENT: atraumatic, EOMI.  Chest: decreased breath sounds at bases  CVS: SIS2 +, no murmur.  P/A: Soft, BS+, colostomy+, sanford+  CNS: awake, alert , not oriented  Ext: no edema feet.  Skin: no rash, no ulcers.  All systems reviewed positive findings as above.                              6.6<LL>  6.67  )-----------( 188      ( 12 Nov 2021 07:58 )             19.7<L>                        8.6<L>  9.10  )-----------( 262      ( 11 Nov 2021 08:13 )             25.9<L>  11-12    131<L>  |  96<L>  |  48<H>  ----------------------------<  113<H>  4.2   |  20  |  4.5<HH>  11-11    136  |  98  |  44<H>  ----------------------------<  182<H>  4.0   |  20  |  4.7<HH>    Ca    7.5<L>      12 Nov 2021 07:58  Ca    8.0<L>      11 Nov 2021 08:13  Mg     1.9     11-12    TPro  4.2<L>  /  Alb  2.3<L>  /  TBili  0.2  /  DBili  x   /  AST  12  /  ALT  8   /  AlkPhos  77  11-12  TPro  4.8<L>  /  Alb  2.8<L>  /  TBili  0.2  /  DBili  x   /  AST  12  /  ALT  9   /  AlkPhos  92  11-11

## 2021-11-12 NOTE — PROGRESS NOTE ADULT - ASSESSMENT
# JOSH on CKD 4 - likely ATN d/t severe sepsis / acute on chronic anemia  # Metabolic acidosis w/ anion gap   # Hyponatremia / low eav  # Acute on chronic anemia   # Acute pyelonephritis / Abdominal wall cellulitis   # Hx of crohn s/p colostomy     Recommendations:  - urinary retention - cont to monitor daily with Bladder scan, straight cath if PVR>200  - worsening anemia - tx 1 unit with lasix 40 IV after  , iron stores noted, does not need iv iron   - continue sodium bicarb 1300mg q8h   - last phos level noted, at goal, cont phoslo/ check repeat   - BP well controlled  - CT noted w/o hydronephrosis  - pt is close to HD, but does not want to do it unless it is absolutely needed (his wife was on HD).  Currently w/o acute indication, cont monitoring off HD  - DNR/DNI  - will follow

## 2021-11-13 NOTE — PROGRESS NOTE ADULT - SUBJECTIVE AND OBJECTIVE BOX
----------Daily Progress Note----------    HISTORY OF PRESENT ILLNESS:  Patient is a 85y old Male who presents with a chief complaint of weakness (2021 07:00)    Currently admitted to medicine with the primary diagnosis of Acute cystitis       Today is hospital day 21d.     INTERVAL HOSPITAL COURSE / OVERNIGHT EVENTS:    Patient was examined and seen at bedside. This morning he is resting comfortably in bed and reports no new issues or overnight events.     Review of Systems: Otherwise unremarkable     <<<<<PAST MEDICAL & SURGICAL HISTORY>>>>>  History of medical problems  [place] b/l ears, dm, htn, hypercholesteremia, ckd, bowel/bladder fistula, kidney stones    Colostomy present    History of surgery  procedure for kidney stones ( ?lithotripsy)      ALLERGIES  No Known Allergies      Home Medications:  amLODIPine 5 mg oral tablet: 1 tab(s) orally 2 times a day (01 Sep 2021 16:54)  Aspir 81 oral delayed release tablet: 1 tab(s) orally once a day (01 Sep 2021 16:54)  atorvastatin 10 mg oral tablet: 2 tab(s) orally once a day (01 Sep 2021 16:54)  famotidine 20 mg oral tablet: 1 tab(s) orally once a day (01 Sep 2021 16:54)  Januvia 50 mg oral tablet: 1 tab(s) orally once a day (01 Sep 2021 16:54)  Lasix 20 mg oral tablet: 1 tab(s) orally every other day (01 Sep 2021 16:54)  pioglitazone 30 mg oral tablet: 1 tab(s) orally once a day (01 Sep 2021 16:54)  ramipril 5 mg oral capsule: 2 cap(s) orally once a day (01 Sep 2021 16:54)        MEDICATIONS  STANDING MEDICATIONS  amLODIPine   Tablet 10 milliGRAM(s) Oral daily  aspirin  chewable 81 milliGRAM(s) Oral daily  atorvastatin 20 milliGRAM(s) Oral at bedtime  calcium acetate 667 milliGRAM(s) Oral three times a day with meals  chlorhexidine 4% Liquid 1 Application(s) Topical daily  furosemide    Tablet 20 milliGRAM(s) Oral daily  hydrALAZINE 25 milliGRAM(s) Oral three times a day  pantoprazole    Tablet 40 milliGRAM(s) Oral before breakfast  sodium bicarbonate 1300 milliGRAM(s) Oral every 8 hours  tamsulosin 0.8 milliGRAM(s) Oral at bedtime    PRN MEDICATIONS  acetaminophen     Tablet .. 650 milliGRAM(s) Oral every 6 hours PRN    VITALS:  T(F): 96.7  HR: 77  BP: 130/59  RR: 18  SpO2: --    <<<<<LABS>>>>>                        8.0    7.51  )-----------( 219      ( 2021 09:45 )             24.0         131<L>  |  96<L>  |  48<H>  ----------------------------<  113<H>  4.2   |  20  |  4.5<HH>    Ca    7.5<L>      2021 07:58  Mg     1.9         TPro  4.2<L>  /  Alb  2.3<L>  /  TBili  0.2  /  DBili  x   /  AST  12  /  ALT  8   /  AlkPhos  77      PTT - ( 2021 20:00 )  PTT:33.4 sec  Urinalysis Basic - ( 2021 02:32 )    Color: Light Orange / Appearance: Turbid / S.011 / pH: x  Gluc: x / Ketone: Negative  / Bili: Negative / Urobili: <2 mg/dL   Blood: x / Protein: 300 mg/dL / Nitrite: Negative   Leuk Esterase: Large / RBC: 2 /HPF / WBC >720 /HPF   Sq Epi: x / Non Sq Epi: 2 /HPF / Bacteria: Negative          [id number]        <<<<<RADIOLOGY>>>>>    < from: Xray Chest 1 View- PORTABLE-Routine (Xray Chest 1 View- PORTABLE-Routine .) (21 @ 12:56) >    EXAM:  XR CHEST PORTABLE ROUTINE 1V            PROCEDURE DATE:  2021            INTERPRETATION:  Clinical History / Reason for exam: Congestion    Comparison : Chest radiograph 10/28/2021.    Technique/Positioning: Frontal.    Findings:    Support devices: None.    Cardiac/mediastinum/hilum: Stable    Lung parenchyma/Pleura: Increasing bilateral lung opacities    Skeleton/soft tissues: Stable    Impression:    Increasing bilateral lung opacities    < end of copied text >      <<<<<PHYSICAL EXAM>>>>>  GENERAL: NAD, resting comfortably in bed  PULMONARY: Clear to auscultation bilaterally. No rales, rhonchi, or wheezing.  CARDIOVASCULAR: Regular rate and rhythm, S1-S2, no murmurs  GASTROINTESTINAL: No blood found in colostomy bag, draining fecal contents  SKIN/EXTREMITIES: b/l LE edema  NEUROLOGIC: AAOX3        -----------------------------------------------------------------------------------------------------------------------------------------------------------------------------------------------

## 2021-11-13 NOTE — PROGRESS NOTE ADULT - SUBJECTIVE AND OBJECTIVE BOX
seen and examined  no distress   lying comfortable         PAST HISTORY  --------------------------------------------------------------------------------  No significant changes to PMH, PSH, FHx, SHx, unless otherwise noted    ALLERGIES & MEDICATIONS  --------------------------------------------------------------------------------  Allergies    No Known Allergies    Intolerances      Standing Inpatient Medications  amLODIPine   Tablet 10 milliGRAM(s) Oral daily  aspirin  chewable 81 milliGRAM(s) Oral daily  atorvastatin 20 milliGRAM(s) Oral at bedtime  calcium acetate 667 milliGRAM(s) Oral three times a day with meals  chlorhexidine 4% Liquid 1 Application(s) Topical daily  furosemide    Tablet 20 milliGRAM(s) Oral daily  hydrALAZINE 25 milliGRAM(s) Oral three times a day  pantoprazole    Tablet 40 milliGRAM(s) Oral before breakfast  sodium bicarbonate 1300 milliGRAM(s) Oral every 8 hours  tamsulosin 0.8 milliGRAM(s) Oral at bedtime    PRN Inpatient Medications  acetaminophen     Tablet .. 650 milliGRAM(s) Oral every 6 hours PRN        VITALS/PHYSICAL EXAM  --------------------------------------------------------------------------------  T(C): 35.8 (11-13-21 @ 00:00), Max: 36.3 (11-12-21 @ 08:17)  HR: 76 (11-13-21 @ 00:00) (70 - 80)  BP: 152/65 (11-13-21 @ 00:00) (113/55 - 152/65)  RR: 18 (11-13-21 @ 00:00) (18 - 18)  SpO2: 91% (11-12-21 @ 08:17) (91% - 91%)  Wt(kg): --        Physical Exam:  	Gen: NAD  	Pulm: decrease BS  B/L  	CV:  S1S2; no rub  	Abd: +distended    LABS/STUDIES  --------------------------------------------------------------------------------              8.3    7.41  >-----------<  208      [11-12-21 @ 20:00]              25.3     131  |  96  |  48  ----------------------------<  113      [11-12-21 @ 07:58]  4.2   |  20  |  4.5        Ca     7.5     [11-12-21 @ 07:58]      Mg     1.9     [11-12-21 @ 07:58]    TPro  4.2  /  Alb  2.3  /  TBili  0.2  /  DBili  x   /  AST  12  /  ALT  8   /  AlkPhos  77  [11-12-21 @ 07:58]      PTT: 33.4       [11-12-21 @ 20:00]    Creatinine Trend:  SCr 4.5 [11-12 @ 07:58]  SCr 4.7 [11-11 @ 08:13]  SCr 4.5 [11-10 @ 11:00]  SCr 4.7 [11-10 @ 06:14]  SCr 4.7 [11-09 @ 05:17]    Urinalysis - [11-12-21 @ 02:32]      Color Light Orange / Appearance Turbid / SG 1.011 / pH 6.5      Gluc 100 mg/dL / Ketone Negative  / Bili Negative / Urobili <2 mg/dL       Blood Small / Protein 300 mg/dL / Leuk Est Large / Nitrite Negative      RBC 2 / WBC >720 / Hyaline 10 / Gran  / Sq Epi  / Non Sq Epi 2 / Bacteria Negative      Iron 87, TIBC 183, %sat 48      [10-24-21 @ 11:32]  Ferritin 326      [10-24-21 @ 11:32]  TSH 0.43      [09-02-21 @ 07:44]  Lipid: chol 116, , HDL 39, LDL --      [09-02-21 @ 07:44]      Free Light Chains: kappa 4.29, lambda 3.88, ratio = 1.11      [10-24 @ 11:32]  Immunofixation Serum:   No Monoclonal Band Identified    Reference Range: None Detected      [10-24-21 @ 11:32]  SPEP Interpretation: Hypogammaglobulinemia      [10-24-21 @ 11:32]  Immunofixation Urine: Reference Range: None Detected      [10-24-21 @ 18:44]  UPEP Interpretation: Mild Selective (Glomerular) Proteinuria      [10-24-21 @ 18:44]

## 2021-11-13 NOTE — PROGRESS NOTE ADULT - ASSESSMENT
85 year old M with PMH of DM, Crohn s/p colostomy, CKD 4, HTN, HLD, BPH presents to ED with complaints of weakness. Pt states 10 days prior was diagnosed with UTI, placed on bactrim, Family states due to size of pill, pt was unable to swallow pill, only took 1/2 dose for 5 days. Family states over past few days pt becoming increasingly lethargic, having chills as well.   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 Intraluminal air is likely secondary to Sanford. Bowel containing spigelian hernia without evidence of bowel obstruction or pneumoperitoneum. Small bilateral pleural effusions with adjacent atelectasis.  Indeterminate bilateral renal lesions. Nonemergent reimaging with renal protocol recommended.  -s/p 2U  packed RBC  - s/p Flexible sigmoidoscopy :  The rectal mucosa looked erythematous and friable and was covered with multiple blood clots and mucus. 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 Diverticulosis of the rectosigmoid junction Internal hemorrhoids. The scope was advanced to the rectosigmoid junction but could not be advanced further in the colon due to the presence of blood clots and structuring of the sigmoid in the setting of the diverticular disease.     - pathology  revealed  inflammation  - GI F/u: short chain fatty acid enema or coconut oil enema for diversion colitis, consider CT pelvis w/ rectal enema to rule out any masses in the blind colonic stump  -As per IR:  pt at risk for perforation with rectal contrast  -  will transfuse with 1 unit PRBC  - monitor HB/HCT  - GI f/u     # Oral Dysphagia  - Speech and swallow eval: Soft and bite sized, thin liquids    # Metabolic encephalopathy -resolved    # Acute kidney injury on CKD stage4  # Hyperphosphatemia  # Metabolic acidosis  # Hyponatremia  # urinary retention, H/o BPH  - : US Renal (09.02.21 @ 14:08) >No hydronephrosisBilateral renal cysts.  - failed TOV  - c/w sanford, c/w flomax  - non-oliguric  - c/w  lasix  - c/w phoslo   - c/w sodium bicarb  - monitor BMP  - nephrology following    # DVT  -  VA Duplex Lower Ext Vein Scan, Bilat (10.25.21 @ 15:36) >Acute thrombosis of the left posterior tibial and peroneal vein branches. 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 .  - Vascular surgery eval    # Hypomagnesemia- resolved    # Hypertension  - c/w norvasc, hydralazine, lasix    # Dyslipidemia  - c/w statin    # DNR/DNI    # Pending: monitor BMP, cbc, vascular surgery eval, GI F/u  # Disposition: STR when stable           85 year old M with PMH of DM, Crohn s/p colostomy, CKD 4, HTN, HLD, BPH presents to ED with complaints of weakness. Pt states 10 days prior was diagnosed with UTI, placed on bactrim, Family states due to size of pill, pt was unable to swallow pill, only took 1/2 dose for 5 days. Family states over past few days pt becoming increasingly lethargic, having chills as well.   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 Intraluminal air is likely secondary to Sanford. Bowel containing spigelian hernia without evidence of bowel obstruction or pneumoperitoneum. Small bilateral pleural effusions with adjacent atelectasis.  Indeterminate bilateral renal lesions. Nonemergent reimaging with renal protocol recommended.  -s/p 2U  packed RBC  - s/p Flexible sigmoidoscopy :  The rectal mucosa looked erythematous and friable and was covered with multiple blood clots and mucus. 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 Diverticulosis of the rectosigmoid junction Internal hemorrhoids. The scope was advanced to the rectosigmoid junction but could not be advanced further in the colon due to the presence of blood clots and structuring of the sigmoid in the setting of the diverticular disease.     - pathology  revealed  inflammation  - GI F/u: short chain fatty acid enema or coconut oil enema for diversion colitis, consider CT pelvis w/ rectal enema to rule out any masses in the blind colonic stump  -As per IR:  pt at risk for perforation with rectal contrast  - S/p  1 unit PRBC on 11/12/21  - monitor HB/HCT  - GI f/u     # Oral Dysphagia  - Speech and swallow eval: Soft and bite sized, thin liquids    # Metabolic encephalopathy -resolved    # Acute kidney injury on CKD stage4  # Hyperphosphatemia  # Metabolic acidosis  # Hyponatremia  # urinary retention, H/o BPH  - : US Renal (09.02.21 @ 14:08) >No hydronephrosisBilateral renal cysts.  - failed TOV  - c/w sanford, c/w flomax  - non-oliguric  - c/w  lasix  - c/w phoslo   - c/w sodium bicarb  - monitor BMP  - nephrology following    # DVT  -  VA Duplex Lower Ext Vein Scan, Bilat (10.25.21 @ 15:36) >Acute thrombosis of the left posterior tibial and peroneal vein branches. 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 .  - eliquis held sec to rectal bleed  - Vascular surgery eval    # Hypomagnesemia- resolved    # Hypertension  - c/w norvasc, hydralazine, lasix    # Dyslipidemia  - c/w statin    # DNR/DNI    # Pending: monitor BMP, cbc, vascular surgery eval, GI F/u  # Disposition: STR when stable

## 2021-11-13 NOTE — PROGRESS NOTE ADULT - SUBJECTIVE AND OBJECTIVE BOX
Patient is a 85y old  Male who presents with a chief complaint of weakness (10 Nov 2021 10:45)    Patient was seen and examined.  no new complaints    PAST MEDICAL & SURGICAL HISTORY:  History of medical problems  [place] b/l ears, dm, htn, hypercholesteremia, ckd, bowel/bladder fistula, kidney stones  Colostomy present    History of surgery  procedure for kidney stones ( ?lithotripsy)    Allergies  No Known Allergies      Home Medications:  amLODIPine 5 mg oral tablet: 1 tab(s) orally 2 times a day (01 Sep 2021 16:54)  Aspir 81 oral delayed release tablet: 1 tab(s) orally once a day (01 Sep 2021 16:54)  atorvastatin 10 mg oral tablet: 2 tab(s) orally once a day (01 Sep 2021 16:54)  famotidine 20 mg oral tablet: 1 tab(s) orally once a day (01 Sep 2021 16:54)  Januvia 50 mg oral tablet: 1 tab(s) orally once a day (01 Sep 2021 16:54)  Lasix 20 mg oral tablet: 1 tab(s) orally every other day (01 Sep 2021 16:54)  pioglitazone 30 mg oral tablet: 1 tab(s) orally once a day (01 Sep 2021 16:54)  ramipril 5 mg oral capsule: 2 cap(s) orally once a day (01 Sep 2021 16:54)    MEDICATIONS  (STANDING):  amLODIPine   Tablet 10 milliGRAM(s) Oral daily  aspirin  chewable 81 milliGRAM(s) Oral daily  atorvastatin 20 milliGRAM(s) Oral at bedtime  calcium acetate 667 milliGRAM(s) Oral three times a day with meals  chlorhexidine 4% Liquid 1 Application(s) Topical daily  furosemide    Tablet 20 milliGRAM(s) Oral daily  hydrALAZINE 25 milliGRAM(s) Oral three times a day  pantoprazole    Tablet 40 milliGRAM(s) Oral before breakfast  sodium bicarbonate 1300 milliGRAM(s) Oral every 8 hours  tamsulosin 0.8 milliGRAM(s) Oral at bedtime    MEDICATIONS  (PRN):  acetaminophen     Tablet .. 650 milliGRAM(s) Oral every 6 hours PRN Mild Pain (1 - 3)    T(C): 35.9 (11-13-21 @ 08:32), Max: 36.3 (11-12-21 @ 16:00)  HR: 77 (11-13-21 @ 08:32) (70 - 77)  BP: 130/59 (11-13-21 @ 08:32) (113/55 - 152/65)  RR: 18 (11-13-21 @ 08:32) (18 - 18)  SpO2: --    O/E:  Awake, alert, not in distress.  HEENT: atraumatic, EOMI.  Chest: decreased breath sounds at bases  CVS: SIS2 +, no murmur.  P/A: Soft, BS+, colostomy+, sanford+  CNS: awake, alert , not oriented  Ext: no edema feet.  Skin: no rash, no ulcers.  All systems reviewed positive findings as above.                               8.0<L>  7.51  )-----------( 219      ( 13 Nov 2021 09:45 )             24.0<L>                        8.3<L>  7.41  )-----------( 208      ( 12 Nov 2021 20:00 )             25.3<L>  11-13    134<L>  |  98  |  49<H>  ----------------------------<  138<H>  4.1   |  20  |  4.8<HH>  11-12    131<L>  |  96<L>  |  48<H>  ----------------------------<  113<H>  4.2   |  20  |  4.5<HH>    Ca    7.7<L>      13 Nov 2021 09:45  Ca    7.5<L>      12 Nov 2021 07:58  Mg     2.0     11-13    TPro  4.5<L>  /  Alb  2.6<L>  /  TBili  0.3  /  DBili  x   /  AST  15  /  ALT  9   /  AlkPhos  83  11-13  TPro  4.2<L>  /  Alb  2.3<L>  /  TBili  0.2  /  DBili  x   /  AST  12  /  ALT  8   /  AlkPhos  77  11-12

## 2021-11-13 NOTE — PROGRESS NOTE ADULT - ASSESSMENT
85 year old M with PMH of nephrolithiasis, DM, Crohn s/p colostomy, CKD 4, HTN, HLD, BPH who presented with weakness. CT a/p showed bladder wall is thickened and patient found to have JOSH with creat 8.3. His electrolytes improved with no need for hemodialysis and he was downgraded from the ICU on 10/26.       Acute on chronic anemia   Hematochezia  on admission  -s/p 2U pRBC this admission   -maintain hgb >7  - GI consult- s/p flexible sigmoidoscopy 11/4, Flexible sigmoidoscopy findings: The rectal mucosa looked erythematous and friable and was covered with multiple blood clots and mucus. diversion colitis / Diverticulosis of the rectosigmoid junction. Internal hemorrhoids. The scope was advanced to the rectosigmoid junction but could not be advanced further in the colon due to the presence of blood clots and structuring of the sigmoid in the setting of the diverticular disease.    - heparin discontinued, currently on eliquis  -as per GI, CT with rectal contrast not necessary for now  -pt has IVC filter but is on eliquis at home; vascular consulted on whether this needs to be changed; eliquis held for now  -received 1 unit prbcs yesterday, Hb 8.3 on repeat CBC; 8.0 this morning    Altered mental status - improving   likely 2/2 metabolic encephalopathy and delirium  -avoid CNS depressant  -monitor electrolytes   -strict Is and Os  - frequent reorientation     JOSH on CKD4   -as per nephro, lasix restarted; sodium bicarb 1300mg q8  - creat 4.5 on most recent labs; f/u Cr  - nephro following  - c/w phoslo 667mg tid       Indeterminate bilateral renal lesions   -outpatient f/u    Acute and chronic DVT  -eliquis held for now due to acute drop in Hb yesterday    HLD   - atorvastatin 20mg qd   - aspirin 81mg     HTN   - amlodipine 10mg qd   - hydralazine 25mg tid      DVT prophylaxis: none for now    GI prophylaxis: pantoprazole   Diet: carb consistent   Activity Score (AM-PAC)   Code status: DNR/DNI   Disposition: acute for now

## 2021-11-13 NOTE — PROGRESS NOTE ADULT - ASSESSMENT
# JOSH on CKD 4 - likely ATN d/t severe sepsis / acute on chronic anemia  # Metabolic acidosis w/ anion gap   # Hyponatremia / low eav  # Acute on chronic anemia   # Acute pyelonephritis / Abdominal wall cellulitis   # Hx of crohn s/p colostomy     Recommendations:  - urinary retention - cont to monitor daily with Bladder scan, straight cath if PVR>200  - creatinine stable   - document UO   - continue sodium bicarb 1300mg q8h   - last phos level noted, at goal, cont phoslo/ check repeat   - BP well controlled  - repeat sodium level   - CT noted w/o hydronephrosis  - pt is close to HD, but does not want to do it unless it is absolutely needed (his wife was on HD).  Currently w/o acute indication, cont monitoring off HD  - DNR/DNI  - will follow

## 2021-11-14 NOTE — PROGRESS NOTE ADULT - SUBJECTIVE AND OBJECTIVE BOX
[facility] FOLLOW UP NOTE  --------------------------------------------------------------------------------  Chief Complaint:    24 hour events/subjective:        PAST HISTORY  --------------------------------------------------------------------------------  No significant changes to PMH, PSH, FHx, SHx, unless otherwise noted    ALLERGIES & MEDICATIONS  --------------------------------------------------------------------------------  Allergies    No Known Allergies    Intolerances      Standing Inpatient Medications  amLODIPine   Tablet 10 milliGRAM(s) Oral daily  aspirin  chewable 81 milliGRAM(s) Oral daily  atorvastatin 20 milliGRAM(s) Oral at bedtime  calcium acetate 667 milliGRAM(s) Oral three times a day with meals  chlorhexidine 4% Liquid 1 Application(s) Topical daily  furosemide    Tablet 20 milliGRAM(s) Oral daily  hydrALAZINE 25 milliGRAM(s) Oral three times a day  pantoprazole    Tablet 40 milliGRAM(s) Oral before breakfast  sodium bicarbonate 1300 milliGRAM(s) Oral every 8 hours  tamsulosin 0.8 milliGRAM(s) Oral at bedtime    PRN Inpatient Medications  acetaminophen     Tablet .. 650 milliGRAM(s) Oral every 6 hours PRN      REVIEW OF SYSTEMS  --------------------------------------------------------------------------------  Gen: No weight changes, fatigue, fevers/chills, weakness  Skin: No rashes  Head/Eyes/Ears/Mouth: No headache; Normal hearing; Normal vision w/o blurriness; No sinus pain/discomfort, sore throat  Respiratory: No dyspnea, cough, wheezing, hemoptysis  CV: No chest pain, PND, orthopnea  GI: No abdominal pain, diarrhea, constipation, nausea, vomiting, melena, hematochezia  : No increased frequency, dysuria, hematuria, nocturia  MSK: No joint pain/swelling; no back pain; no edema  Neuro: No dizziness/lightheadedness, weakness, seizures, numbness, tingling  Heme: No easy bruising or bleeding  Endo: No heat/cold intolerance  Psych: No significant nervousness, anxiety, stress, depression    All other systems were reviewed and are negative, except as noted.    VITALS/PHYSICAL EXAM  --------------------------------------------------------------------------------  T(C): 35.9 (11-14-21 @ 08:00), Max: 37 (11-13-21 @ 16:02)  HR: 77 (11-14-21 @ 08:00) (73 - 77)  BP: 122/60 (11-14-21 @ 08:00) (115/56 - 122/60)  RR: 18 (11-14-21 @ 08:00) (17 - 18)  SpO2: 99% (11-14-21 @ 00:00) (99% - 99%)  Wt(kg): --        11-13-21 @ 07:01  -  11-14-21 @ 07:00  --------------------------------------------------------  IN: 0 mL / OUT: 950 mL / NET: -950 mL      Physical Exam:  	Gen: NAD, well-appearing  	HEENT: PERRL, supple neck, clear oropharynx  	Pulm: CTA B/L  	CV: RRR, S1S2; no rub  	Back: No spinal or CVA tenderness; no sacral edema  	Abd: +BS, soft, nontender/nondistended  	: No suprapubic tenderness  	UE: Warm, FROM, no clubbing, intact strength; no edema; no asterixis  	LE: Warm, FROM, no clubbing, intact strength; no edema  	Neuro: No focal deficits, intact gait  	Psych: Normal affect and mood  	Skin: Warm, without rashes  	Vascular access:    LABS/STUDIES  --------------------------------------------------------------------------------              8.9    7.93  >-----------<  250      [11-14-21 @ 04:30]              27.3     133  |  95  |  48  ----------------------------<  134      [11-14-21 @ 04:30]  4.7   |  22  |  4.8        Ca     8.2     [11-14-21 @ 04:30]      Mg     2.1     [11-14-21 @ 04:30]    TPro  4.8  /  Alb  2.7  /  TBili  0.3  /  DBili  x   /  AST  17  /  ALT  11  /  AlkPhos  93  [11-14-21 @ 04:30]      PTT: 31.9       [11-14-21 @ 04:30]      Creatinine Trend:  SCr 4.8 [11-14 @ 04:30]  SCr 4.8 [11-13 @ 09:45]  SCr 4.5 [11-12 @ 07:58]  SCr 4.7 [11-11 @ 08:13]  SCr 4.5 [11-10 @ 11:00]    Urinalysis - [11-12-21 @ 02:32]      Color Light Orange / Appearance Turbid / SG 1.011 / pH 6.5      Gluc 100 mg/dL / Ketone Negative  / Bili Negative / Urobili <2 mg/dL       Blood Small / Protein 300 mg/dL / Leuk Est Large / Nitrite Negative      RBC 2 / WBC >720 / Hyaline 10 / Gran  / Sq Epi  / Non Sq Epi 2 / Bacteria Negative      Iron 87, TIBC 183, %sat 48      [10-24-21 @ 11:32]  Ferritin 326      [10-24-21 @ 11:32]  TSH 0.43      [09-02-21 @ 07:44]  Lipid: chol 116, , HDL 39, LDL --      [09-02-21 @ 07:44]      Free Light Chains: kappa 4.29, lambda 3.88, ratio = 1.11      [10-24 @ 11:32]  Immunofixation Serum:   No Monoclonal Band Identified    Reference Range: None Detected      [10-24-21 @ 11:32]  SPEP Interpretation: Hypogammaglobulinemia      [10-24-21 @ 11:32]  Immunofixation Urine: Reference Range: None Detected      [10-24-21 @ 18:44]  UPEP Interpretation: Mild Selective (Glomerular) Proteinuria      [10-24-21 @ 18:44]

## 2021-11-14 NOTE — PROGRESS NOTE ADULT - SUBJECTIVE AND OBJECTIVE BOX
Patient is a 85y old  Male who presents with a chief complaint of weakness (10 Nov 2021 10:45)    Patient was seen and examined.  no new complaints    PAST MEDICAL & SURGICAL HISTORY:  History of medical problems  [place] b/l ears, dm, htn, hypercholesteremia, ckd, bowel/bladder fistula, kidney stones  Colostomy present    History of surgery  procedure for kidney stones ( ?lithotripsy)    Allergies  No Known Allergies      Home Medications:  amLODIPine 5 mg oral tablet: 1 tab(s) orally 2 times a day (01 Sep 2021 16:54)  Aspir 81 oral delayed release tablet: 1 tab(s) orally once a day (01 Sep 2021 16:54)  atorvastatin 10 mg oral tablet: 2 tab(s) orally once a day (01 Sep 2021 16:54)  famotidine 20 mg oral tablet: 1 tab(s) orally once a day (01 Sep 2021 16:54)  Januvia 50 mg oral tablet: 1 tab(s) orally once a day (01 Sep 2021 16:54)  Lasix 20 mg oral tablet: 1 tab(s) orally every other day (01 Sep 2021 16:54)  pioglitazone 30 mg oral tablet: 1 tab(s) orally once a day (01 Sep 2021 16:54)  ramipril 5 mg oral capsule: 2 cap(s) orally once a day (01 Sep 2021 16:54)    MEDICATIONS  (STANDING):  amLODIPine   Tablet 10 milliGRAM(s) Oral daily  aspirin  chewable 81 milliGRAM(s) Oral daily  atorvastatin 20 milliGRAM(s) Oral at bedtime  calcium acetate 667 milliGRAM(s) Oral three times a day with meals  chlorhexidine 4% Liquid 1 Application(s) Topical daily  furosemide    Tablet 20 milliGRAM(s) Oral daily  hydrALAZINE 25 milliGRAM(s) Oral three times a day  pantoprazole    Tablet 40 milliGRAM(s) Oral before breakfast  sodium bicarbonate 1300 milliGRAM(s) Oral every 8 hours  tamsulosin 0.8 milliGRAM(s) Oral at bedtime    MEDICATIONS  (PRN):  acetaminophen     Tablet .. 650 milliGRAM(s) Oral every 6 hours PRN Mild Pain (1 - 3)    T(C): 35.9 (11-14-21 @ 08:00), Max: 37 (11-13-21 @ 16:02)  HR: 77 (11-14-21 @ 08:00) (73 - 77)  BP: 122/60 (11-14-21 @ 08:00) (115/56 - 122/60)  RR: 18 (11-14-21 @ 08:00) (17 - 18)  SpO2: 99% (11-14-21 @ 00:00) (99% - 99%)    O/E:  Awake, alert, not in distress.  HEENT: atraumatic, EOMI.  Chest: decreased breath sounds at bases  CVS: SIS2 +, no murmur.  P/A: Soft, BS+, colostomy+, sanford+  CNS: awake, alert , not oriented  Ext: no edema feet.  Skin: no rash, no ulcers.  All systems reviewed positive findings as above.                              8.9<L>  7.93  )-----------( 250      ( 14 Nov 2021 04:30 )             27.3<L>                        8.0<L>  7.51  )-----------( 219      ( 13 Nov 2021 09:45 )             24.0<L>  11-14    133<L>  |  95<L>  |  48<H>  ----------------------------<  134<H>  4.7   |  22  |  4.8<HH>  11-13    134<L>  |  98  |  49<H>  ----------------------------<  138<H>  4.1   |  20  |  4.8<HH>    Ca    8.2<L>      14 Nov 2021 04:30  Ca    7.7<L>      13 Nov 2021 09:45  Mg     2.1     11-14    TPro  4.8<L>  /  Alb  2.7<L>  /  TBili  0.3  /  DBili  x   /  AST  17  /  ALT  11  /  AlkPhos  93  11-14  TPro  4.5<L>  /  Alb  2.6<L>  /  TBili  0.3  /  DBili  x   /  AST  15  /  ALT  9   /  AlkPhos  83  11-13

## 2021-11-14 NOTE — PROGRESS NOTE ADULT - ASSESSMENT
CKD 4 , JOSH , creatinine 4.8 , bicarb 22 on bicarb replacement , Na 133 , anemia hemoglobin 8.9 , antibiotics for abdominal  wall cellulitis , no RRT today but arrangements for dialysis should be arranged , iron sat 48 after infusion , MARIE replacement is required

## 2021-11-14 NOTE — PROGRESS NOTE ADULT - ASSESSMENT
85 year old M with PMH of DM, Crohn s/p colostomy, CKD 4, HTN, HLD, BPH presents to ED with complaints of weakness. Pt states 10 days prior was diagnosed with UTI, placed on bactrim, Family states due to size of pill, pt was unable to swallow pill, only took 1/2 dose for 5 days. Family states over past few days pt becoming increasingly lethargic, having chills as well.   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 Intraluminal air is likely secondary to Sanford. Bowel containing spigelian hernia without evidence of bowel obstruction or pneumoperitoneum. Small bilateral pleural effusions with adjacent atelectasis.  Indeterminate bilateral renal lesions. Nonemergent reimaging with renal protocol recommended.  -s/p 2U  packed RBC  - s/p Flexible sigmoidoscopy :  The rectal mucosa looked erythematous and friable and was covered with multiple blood clots and mucus. 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 Diverticulosis of the rectosigmoid junction Internal hemorrhoids. The scope was advanced to the rectosigmoid junction but could not be advanced further in the colon due to the presence of blood clots and structuring of the sigmoid in the setting of the diverticular disease.     - pathology  revealed  inflammation  - GI F/u: short chain fatty acid enema or coconut oil enema for diversion colitis, consider CT pelvis w/ rectal enema to rule out any masses in the blind colonic stump  -As per IR:  pt at risk for perforation with rectal contrast  - S/p  1 unit PRBC on 11/12/21  - monitor HB/HCT  - GI f/u     # Oral Dysphagia  - Speech and swallow eval: Soft and bite sized, thin liquids    # Metabolic encephalopathy -resolved    # Acute kidney injury on CKD stage4  # Hyperphosphatemia  # Metabolic acidosis  # Hyponatremia  # urinary retention, H/o BPH  - : US Renal (09.02.21 @ 14:08) >No hydronephrosisBilateral renal cysts.  - failed TOV  - c/w sanford, c/w flomax  - non-oliguric  - c/w  lasix  - c/w phoslo   - c/w sodium bicarb  - monitor BMP  - nephrology following: no RRT today but arrangements for dialysis should be arranged , iron sat 48 after infusion , MARIE replacement is required      # DVT  -  [facility] Duplex Lower Ext Vein Scan, Bilat (10.25.21 @ 15:36) >Acute thrombosis of the left posterior tibial and peroneal vein branches. 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 .  - eliquis held sec to rectal bleed  - Vascular surgery eval    # Hypomagnesemia- resolved    # Hypertension  - c/w norvasc, hydralazine, lasix    # Dyslipidemia  - c/w statin    # DNR/DNI    # Pending: monitor BMP, cbc, vascular surgery eval, GI F/u  # Disposition: STR when stable

## 2021-11-15 NOTE — PROGRESS NOTE ADULT - ASSESSMENT
85 year old M with PMH of DM, Crohn s/p colostomy, CKD 4, HTN, HLD, BPH presents to ED with complaints of weakness. Pt states 10 days prior was diagnosed with UTI, placed on bactrim, Family states due to size of pill, pt was unable to swallow pill, only took 1/2 dose for 5 days. Family states over past few days pt becoming increasingly lethargic, having chills as well.   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 Intraluminal air is likely secondary to Sanford. Bowel containing spigelian hernia without evidence of bowel obstruction or pneumoperitoneum. Small bilateral pleural effusions with adjacent atelectasis.  Indeterminate bilateral renal lesions. Nonemergent reimaging with renal protocol recommended.  -s/p 2U  packed RBC  - s/p Flexible sigmoidoscopy :  The rectal mucosa looked erythematous and friable and was covered with multiple blood clots and mucus. 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 Diverticulosis of the rectosigmoid junction Internal hemorrhoids. The scope was advanced to the rectosigmoid junction but could not be advanced further in the colon due to the presence of blood clots and structuring of the sigmoid in the setting of the diverticular disease.     - pathology  revealed  inflammation  - GI F/u: short chain fatty acid enema or coconut oil enema for diversion colitis, consider CT pelvis w/ rectal enema to rule out any masses in the blind colonic stump  -As per IR:  pt at risk for perforation with rectal contrast  - S/p  1 unit PRBC on 11/12/21  - monitor HB/HCT  - GI f/u     # Oral Dysphagia  - Speech and swallow eval: Soft and bite sized, thin liquids    # Metabolic encephalopathy -resolved    # Acute kidney injury on CKD stage4  # Hyperphosphatemia  # Metabolic acidosis  # Hyponatremia  # urinary retention, H/o BPH  - : US Renal (09.02.21 @ 14:08) >No hydronephrosisBilateral renal cysts.  - failed TOV  - c/w sanford, c/w flomax  - non-oliguric  - c/w  lasix  - c/w phoslo   - c/w sodium bicarb  - monitor BMP  - nephrology following: no RRT today but arrangements for dialysis should be arranged , iron sat 48 after infusion , MARIE replacement is required      # DVT  -  VA Duplex Lower Ext Vein Scan, Bilat (10.25.21 @ 15:36) >Acute thrombosis of the left posterior tibial and peroneal vein branches. 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 .  - eliquis held sec to rectal bleed  - Vascular surgery eval    # Hypomagnesemia- resolved    # Hypertension  - c/w norvasc, hydralazine, lasix    # Dyslipidemia  - c/w statin    # DNR/DNI    # Pending: monitor BMP, cbc, vascular surgery eval, Renal F/u GI F/u  # Disposition: STR when stable

## 2021-11-15 NOTE — PROGRESS NOTE ADULT - ASSESSMENT
ASSESSMENT:  Patient is 85M with PMH of DM, Crohn s/p colostomy, CKD 4, HTN, HLD, BPH. Vascular surgery consulted for DVTs seen on duplex on 10/25 in the Left posterior tibial and peroneal branches.     PLAN:  - continue ASA and if possible DVT prophylaxis   - No indication for IVC filter this time  - Recommend repeat Venous Duplex of bilateral lower extremities since imaging is from 10/25  - Vascular to follow  - Plan to be discussed with fellow, Dr. Fajardo  - Plan to be discussed with Vascular Attending, Dr. Espinal    Lines/Tubes: [facility]    VASCULAR TEAM SPECTRA: [id number]   ASSESSMENT:  Patient is 85M with PMH of DM, Crohn s/p colostomy, CKD 4, HTN, HLD, BPH. Vascular surgery consulted for IVC vs. therapeutic AC for DVTs seen on duplex on 10/25 in the Left posterior tibial and Left peroneal branches in setting of GI bleeding.    PLAN:  - continue ASA and if possible DVT prophylaxis   - No indication for IVC filter this time  - Recommend repeat Venous Duplex of bilateral lower extremities since imaging is from 10/25  - Vascular to follow  - Plan to be discussed with fellow, Dr. Fajardo  - Plan to be discussed with Vascular Attending, Dr. Espinal    Lines/Tubes: [facility]    VASCULAR TEAM SPECTRA: [id number]

## 2021-11-15 NOTE — PROGRESS NOTE ADULT - SUBJECTIVE AND OBJECTIVE BOX
CECILLE FRANKEL 85y Male  MRN#: [id number]   CODE STATUS: DNR/DNI    Hospital Day: 23d    Pt is currently admitted with the primary diagnosis of Weakness 2ry to acute on chronic anemia     SUBJECTIVE  Hospital Course: The patient is clinically and hemodynamically stable. Patients hemoglobin has been stable. Patient was found to have an acute on chronic DVT 10/25, may warrant repeat imaging, and patient is being followed up by vascular surgery. Eliquis is held because of risk of hematochezia. Patient is refusing dialysis.     Overnight events: No acute overnight events.      Subjective complaints: No subjective complaints  Present Today:   - Sanford:  No [X], Yes [   ] : Indication:     - Type of IV Access:       .. CVC/Piccline:  No [X], Yes [   ] : Indication:       .. Midline: No [X], Yes [   ] : Indication:                                             ----------------------------------------------------------  OBJECTIVE  PAST MEDICAL & SURGICAL HISTORY  History of medical problems  [place] b/l ears, dm, htn, hypercholesteremia, ckd, bowel/bladder fistula, kidney stones    Colostomy present    History of surgery  procedure for kidney stones ( ?lithotripsy)                                              -----------------------------------------------------------  ALLERGIES:  No Known Allergies                                            ------------------------------------------------------------    HOME MEDICATIONS  Home Medications:  amLODIPine 5 mg oral tablet: 1 tab(s) orally 2 times a day (01 Sep 2021 16:54)  Aspir 81 oral delayed release tablet: 1 tab(s) orally once a day (01 Sep 2021 16:54)  atorvastatin 10 mg oral tablet: 2 tab(s) orally once a day (01 Sep 2021 16:54)  famotidine 20 mg oral tablet: 1 tab(s) orally once a day (01 Sep 2021 16:54)  Januvia 50 mg oral tablet: 1 tab(s) orally once a day (01 Sep 2021 16:54)  Lasix 20 mg oral tablet: 1 tab(s) orally every other day (01 Sep 2021 16:54)  pioglitazone 30 mg oral tablet: 1 tab(s) orally once a day (01 Sep 2021 16:54)  ramipril 5 mg oral capsule: 2 cap(s) orally once a day (01 Sep 2021 16:54)                           MEDICATIONS:  STANDING MEDICATIONS  amLODIPine   Tablet 10 milliGRAM(s) Oral daily  aspirin  chewable 81 milliGRAM(s) Oral daily  atorvastatin 20 milliGRAM(s) Oral at bedtime  calcium acetate 667 milliGRAM(s) Oral three times a day with meals  chlorhexidine 4% Liquid 1 Application(s) Topical daily  furosemide    Tablet 20 milliGRAM(s) Oral daily  hydrALAZINE 25 milliGRAM(s) Oral three times a day  pantoprazole    Tablet 40 milliGRAM(s) Oral before breakfast  sodium bicarbonate 1300 milliGRAM(s) Oral every 8 hours  tamsulosin 0.8 milliGRAM(s) Oral at bedtime    PRN MEDICATIONS  acetaminophen     Tablet .. 650 milliGRAM(s) Oral every 6 hours PRN                                            ------------------------------------------------------------  VITAL SIGNS: Last 24 Hours  T(C): 36.1 (15 Nov 2021 07:59), Max: 36.1 (15 Nov 2021 07:59)  T(F): 96.9 (15 Nov 2021 07:59), Max: 96.9 (15 Nov 2021 07:59)  HR: 70 (15 Nov 2021 12:57) (68 - 88)  BP: 126/58 (15 Nov 2021 12:57) (121/58 - 135/86)  BP(mean): --  RR: 18 (15 Nov 2021 07:59) (18 - 18)  SpO2: --      11-14-21 @ 07:01  -  11-15-21 @ 07:00  --------------------------------------------------------  IN: 0 mL / OUT: 1100 mL / NET: -1100 mL    11-15-21 @ 07:01  -  11-15-21 @ 15:34  --------------------------------------------------------  IN: 570 mL / OUT: 0 mL / NET: 570 mL                                             --------------------------------------------------------------  LABS:                        8.3    7.92  )-----------( 228      ( 15 Nov 2021 05:43 )             24.8     11-15    133<L>  |  97<L>  |  49<H>  ----------------------------<  122<H>  4.5   |  19  |  4.4<HH>    Ca    7.9<L>      15 Nov 2021 05:43  Mg     2.1     11-15    TPro  4.6<L>  /  Alb  2.6<L>  /  TBili  0.3  /  DBili  x   /  AST  16  /  ALT  10  /  AlkPhos  86  11-15    PTT - ( 15 Nov 2021 11:24 )  PTT:31.4 sec                                              -------------------------------------------------------------  RADIOLOGY:  No new imaging                                             --------------------------------------------------------------    PHYSICAL EXAM:  General: Awake, alert, not in distress.  HEENT: Atraumatic, EOMI.  Chest: Decreased breath sounds at bases  CVS: SIS2 +, no murmur.  P/A: Soft, BS+, colostomy+, sanford+  CNS: Awake, alert , not oriented  Ext: No edema feet.  Skin: No rash, no ulcers.                                             --------------------------------------------------------------    ASSESSMENT & PLAN  85 year old M with PMH of DM, Crohn s/p colostomy, CKD 4, HTN, HLD, BPH presents to ED with complaints of weakness. 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   - s/p 2U  packed RBC  - s/p Flexible sigmoidoscopy: The rectal mucosa looked erythematous and friable and was covered with multiple blood clots and mucus. 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 Diverticulosis of the rectosigmoid junction Internal hemorrhoids. The scope was advanced to the rectosigmoid junction but could not be advanced further in the colon due to the presence of blood clots and structuring of the sigmoid in the setting of the diverticular disease.     - pathology revealed inflammation  - GI F/u: short chain fatty acid enema or coconut oil enema for diversion colitis, consider CT pelvis w/ rectal enema to rule out any masses in the blind colonic stump  - As per IR:  pt at risk for perforation with rectal contrast  - S/p  1 unit PRBC on 11/12/21  - monitor HB/HCT  - GI f/u     # DVT  -  VA Duplex Lower Ext Vein Scan, Bilat (10.25.21) -> Acute thrombosis of the left posterior tibial and peroneal vein branches. 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  - Eliquis held sec to rectal bleed  - Vascular surgery eval: No IVC filter at this time, consider repeat duplex, consider aspirin and DVT prophylaxis if condition allows     # Acute kidney injury on CKD stage4  - US Renal (09.02.21) ->No hydronephrosis & Bilateral renal cysts.  - c/w sanford, c/w flomax  - non-oliguric (failed TOV)  - c/w  lasix, phoslo, & sodium bicarb  - monitor BMP  - nephrology following: Patient refusing dialysis and plan may include dialysis on outside basis when patient is ready     # Oral Dysphagia  - Speech and swallow eval: Soft and bite sized, thin liquids    # Hypertension  - c/w norvasc, hydralazine, lasix    # Dyslipidemia  - c/w statin    # DNR/DNI    Daughter Kerry Perry contacted on [phone] for update but there was no response

## 2021-11-15 NOTE — PROGRESS NOTE ADULT - SUBJECTIVE AND OBJECTIVE BOX
Patient is a 85y old  Male who presents with a chief complaint of weakness (10 Nov 2021 10:45)    Patient was seen and examined.  no new complaints    PAST MEDICAL & SURGICAL HISTORY:  History of medical problems  [place] b/l ears, dm, htn, hypercholesteremia, ckd, bowel/bladder fistula, kidney stones  Colostomy present    History of surgery  procedure for kidney stones ( ?lithotripsy)    Allergies  No Known Allergies      Home Medications:  amLODIPine 5 mg oral tablet: 1 tab(s) orally 2 times a day (01 Sep 2021 16:54)  Aspir 81 oral delayed release tablet: 1 tab(s) orally once a day (01 Sep 2021 16:54)  atorvastatin 10 mg oral tablet: 2 tab(s) orally once a day (01 Sep 2021 16:54)  famotidine 20 mg oral tablet: 1 tab(s) orally once a day (01 Sep 2021 16:54)  Januvia 50 mg oral tablet: 1 tab(s) orally once a day (01 Sep 2021 16:54)  Lasix 20 mg oral tablet: 1 tab(s) orally every other day (01 Sep 2021 16:54)  pioglitazone 30 mg oral tablet: 1 tab(s) orally once a day (01 Sep 2021 16:54)  ramipril 5 mg oral capsule: 2 cap(s) orally once a day (01 Sep 2021 16:54)    MEDICATIONS  (STANDING):  amLODIPine   Tablet 10 milliGRAM(s) Oral daily  aspirin  chewable 81 milliGRAM(s) Oral daily  atorvastatin 20 milliGRAM(s) Oral at bedtime  calcium acetate 667 milliGRAM(s) Oral three times a day with meals  chlorhexidine 4% Liquid 1 Application(s) Topical daily  furosemide    Tablet 20 milliGRAM(s) Oral daily  hydrALAZINE 25 milliGRAM(s) Oral three times a day  pantoprazole    Tablet 40 milliGRAM(s) Oral before breakfast  sodium bicarbonate 1300 milliGRAM(s) Oral every 8 hours  tamsulosin 0.8 milliGRAM(s) Oral at bedtime    MEDICATIONS  (PRN):  acetaminophen     Tablet .. 650 milliGRAM(s) Oral every 6 hours PRN Mild Pain (1 - 3)    T(C): 36.1 (11-15-21 @ 07:59), Max: 36.1 (11-15-21 @ 07:59)  HR: 70 (11-15-21 @ 12:57) (68 - 88)  BP: 126/58 (11-15-21 @ 12:57) (121/58 - 135/86)  RR: 18 (11-15-21 @ 07:59) (18 - 18)  SpO2: --  O/E:  Awake, alert, not in distress.  HEENT: atraumatic, EOMI.  Chest: decreased breath sounds at bases  CVS: SIS2 +, no murmur.  P/A: Soft, BS+, colostomy+, sanford+  CNS: awake, alert , not oriented  Ext: no edema feet.  Skin: no rash, no ulcers.  All systems reviewed positive findings as above.                                    8.3<L>  7.92  )-----------( 228      ( 15 Nov 2021 05:43 )             24.8<L>                        8.9<L>  7.93  )-----------( 250      ( 14 Nov 2021 04:30 )             27.3<L>  11-15    133<L>  |  97<L>  |  49<H>  ----------------------------<  122<H>  4.5   |  19  |  4.4<HH>  11-14    133<L>  |  95<L>  |  48<H>  ----------------------------<  134<H>  4.7   |  22  |  4.8<HH>    Ca    7.9<L>      15 Nov 2021 05:43  Ca    8.2<L>      14 Nov 2021 04:30  Mg     2.1     11-15    TPro  4.6<L>  /  Alb  2.6<L>  /  TBili  0.3  /  DBili  x   /  AST  16  /  ALT  10  /  AlkPhos  86  11-15  TPro  4.8<L>  /  Alb  2.7<L>  /  TBili  0.3  /  DBili  x   /  AST  17  /  ALT  11  /  AlkPhos  93  11-14

## 2021-11-15 NOTE — PROGRESS NOTE ADULT - ASSESSMENT
# JOSH on CKD 4 - likely ATN d/t severe sepsis / acute on chronic anemia  # Metabolic acidosis w/ anion gap   # Hyponatremia / low eav  # Acute on chronic anemia   # Acute pyelonephritis / Abdominal wall cellulitis   # Hx of crohn s/p colostomy     Recommendations:  - urinary retention / failed TOV, maintain sanford catheter,  f/u (can f/u outpatient)  - creatinine down trending  - cont strict i/o / daily weights  - continue sodium bicarb 1300mg q8h   - last phos level at goal, cont phoslo  - BP well controlled  - CT noted w/o hydronephrosis  - appreciate GI / IR f/u   - monitor hgb, RBC transfusion as needed  - pt is close to HD, but does not want to do it unless it is absolutely needed (his wife was on HD).  Currently w/o acute indication, cont monitoring off HD  - DNR/DNI

## 2021-11-15 NOTE — PROGRESS NOTE ADULT - SUBJECTIVE AND OBJECTIVE BOX
Nephrology Progress Note    CECILLE FRANKEL  MRN-[id number]  85y  Male    S:  Patient is seen and examined, events over the last 24h noted.    O:  Allergies:  No Known Allergies    Hospital Medications:   MEDICATIONS  (STANDING):  amLODIPine   Tablet 10 milliGRAM(s) Oral daily  aspirin  chewable 81 milliGRAM(s) Oral daily  atorvastatin 20 milliGRAM(s) Oral at bedtime  calcium acetate 667 milliGRAM(s) Oral three times a day with meals  chlorhexidine 4% Liquid 1 Application(s) Topical daily  furosemide    Tablet 20 milliGRAM(s) Oral daily  hydrALAZINE 25 milliGRAM(s) Oral three times a day  pantoprazole    Tablet 40 milliGRAM(s) Oral before breakfast  sodium bicarbonate 1300 milliGRAM(s) Oral every 8 hours  tamsulosin 0.8 milliGRAM(s) Oral at bedtime    MEDICATIONS  (PRN):  acetaminophen     Tablet .. 650 milliGRAM(s) Oral every 6 hours PRN Mild Pain (1 - 3)    Home Medications:  amLODIPine 5 mg oral tablet: 1 tab(s) orally 2 times a day (01 Sep 2021 16:54)  Aspir 81 oral delayed release tablet: 1 tab(s) orally once a day (01 Sep 2021 16:54)  atorvastatin 10 mg oral tablet: 2 tab(s) orally once a day (01 Sep 2021 16:54)  famotidine 20 mg oral tablet: 1 tab(s) orally once a day (01 Sep 2021 16:54)  Januvia 50 mg oral tablet: 1 tab(s) orally once a day (01 Sep 2021 16:54)  Lasix 20 mg oral tablet: 1 tab(s) orally every other day (01 Sep 2021 16:54)  pioglitazone 30 mg oral tablet: 1 tab(s) orally once a day (01 Sep 2021 16:54)  ramipril 5 mg oral capsule: 2 cap(s) orally once a day (01 Sep 2021 16:54)      VITALS:  Daily     Daily   T(F): 96.9 (11-15-21 @ 07:59), Max: 96.9 (11-15-21 @ 07:59)  HR: 70 (11-15-21 @ 12:57)  BP: 126/58 (11-15-21 @ 12:57)  RR: 18 (11-15-21 @ 07:59)  SpO2: --  Wt(kg): --  I&O's Detail    2021 07:01  -  15 Nov 2021 07:00  --------------------------------------------------------  IN:  Total IN: 0 mL    OUT:    Colostomy (mL): 100 mL    Indwelling Catheter - Urethral (mL): 400 mL    Intermittent Catheterization - Urethral (mL): 600 mL  Total OUT: 1100 mL    Total NET: -1100 mL      15 Nov 2021 07:01  -  15 Nov 2021 17:01  --------------------------------------------------------  IN:    Oral Fluid: 570 mL  Total IN: 570 mL    OUT:  Total OUT: 0 mL    Total NET: 570 mL        I&O's Summary    2021 07:01  -  15 Nov 2021 07:00  --------------------------------------------------------  IN: 0 mL / OUT: 1100 mL / NET: -1100 mL    15 Nov 2021 07:01  -  15 Nov 2021 17:01  --------------------------------------------------------  IN: 570 mL / OUT: 0 mL / NET: 570 mL          PHYSICAL EXAM:  Gen: NAD  Resp: b/l breath sounds   Card: S1/S2  Abd: soft, colostomy  Extremities: no edema  Vascular access:       LABS:    11-15    133<L>  |  97<L>  |  49<H>  ----------------------------<  122<H>  4.5   |  19  |  4.4<HH>    Ca    7.9<L>      15 Nov 2021 05:43  Mg     2.1     11-15    TPro  4.6<L>  /  Alb  2.6<L>  /  TBili  0.3  /  DBili      /  AST  16  /  ALT  10  /  AlkPhos  86  11-15    eGFR if Non African American: 11 mL/min/1.73M2 (11-15-21 @ 05:43)  eGFR if African American: 13 mL/min/1.73M2 (11-15-21 @ 05:43)    Phosphorus Level, Serum: 3.9 mg/dL (21 @ 04:30)  Phosphorus Level, Serum: 3.8 mg/dL (10-31-21 @ 04:30)    Osmolality, Serum: 298 mos/kg (10-24-21 @ 11:32)  Osmolality, Serum: 288 mos/kg (21 @ 07:44)                          8.3    7.92  )-----------( 228      ( 15 Nov 2021 05:43 )             24.8     Mean Cell Volume: 88.3 fL (11-15-21 @ 05:43)    % Saturation, Iron: 48 % (10-24-21 @ 11:32)  Ferritin, Serum: 326 ng/mL (10-24-21 @ 11:32)      Urine Studies:  Urinalysis Basic - ( 2021 02:32 )    Color: Light Orange / Appearance: Turbid / S.011 / pH:   Gluc:  / Ketone: Negative  / Bili: Negative / Urobili: <2 mg/dL   Blood:  / Protein: 300 mg/dL / Nitrite: Negative   Leuk Esterase: Large / RBC: 2 /HPF / WBC >720 /HPF   Sq Epi:  / Non Sq Epi: 2 /HPF / Bacteria: Negative        Urea Nitrogen,  Random Urine: 247 mg/dL (21 @ 19:33)    Culture Results:   <10,000 CFU/mL Normal Urogenital Beronica ( @ 01:05)  Culture Results:   <10,000 CFU/mL Normal Urogenital Beronica (10-28 @ 11:34)    Creatinine trend:  Creatinine, Serum: 4.4 mg/dL (11-15-21 @ 05:43)  Creatinine, Serum: 4.8 mg/dL (21 @ 04:30)  Creatinine, Serum: 4.8 mg/dL (21 @ 09:45)  Creatinine, Serum: 4.5 mg/dL (21 @ 07:58)  Creatinine, Serum: 4.7 mg/dL (21 @ 08:13)  Creatinine, Serum: 4.5 mg/dL (11-10-21 @ 11:00)  Creatinine, Serum: 4.7 mg/dL (11-10-21 @ 06:14)  Creatinine, Serum: 4.7 mg/dL (21 @ 05:17)

## 2021-11-15 NOTE — PROGRESS NOTE ADULT - SUBJECTIVE AND OBJECTIVE BOX
GENERAL SURGERY PROGRESS NOTE    Patient: CECILLE FRANKEL , 85y ([birth year])Male   MRN: [id number]  Location: [address]  Visit: 10-23-21 Inpatient  Date: 11-15-21 @ 14:49    Hospital Day #: 24  Post-Op Day #: None    Procedure/Dx/Injuries:     Events of past 24 hours:    PAST MEDICAL & SURGICAL HISTORY:  History of medical problems  [place] b/l ears, dm, htn, hypercholesteremia, ckd, bowel/bladder fistula, kidney stones    Colostomy present    History of surgery  procedure for kidney stones ( ?lithotripsy)      Vitals:   T(F): 96.9 (11-15-21 @ 07:59), Max: 96.9 (11-15-21 @ 07:59)  HR: 70 (11-15-21 @ 12:57)  BP: 126/58 (11-15-21 @ 12:57)  RR: 18 (11-15-21 @ 07:59)  SpO2: --      Diet, Consistent Carbohydrate w/Evening Snack:   Fiber/Residue Restricted  60 gm Protein (PRO60G)     Qty per Day:  VANILLA FLAVOR ONLY  Supplement Feeding Modality:  Oral  Glucerna Shake Cans or Servings Per Day:  1       Frequency:  Two Times a day      Fluids:     I & O's:    11-14-21 @ 07:01  -  11-15-21 @ 07:00  --------------------------------------------------------  IN:  Total IN: 0 mL    OUT:    Colostomy (mL): 100 mL    Indwelling Catheter - Urethral (mL): 400 mL    Intermittent Catheterization - Urethral (mL): 600 mL  Total OUT: 1100 mL    Total NET: -1100 mL        Bowel Movement: : [] YES [] NO  Flatus: : [] YES [] NO    PHYSICAL EXAM:  General: NAD, AAOx3, calm and cooperative  HEENT: EOMI, Trachea ML, Neck supple  Cardiac: RRR  Respiratory: normal respiratory effort, breath sounds equal BL, no wheeze, rhonchi or crackles  Abdomen: Soft, non-distended, non-tender, no rebound, no guarding   Rectal: Good tone, +stool, no blood, no louisa-anal masses/lesions, no fistulas, fissures, hemorrhoids  Musculoskeletal: Strength 5/5 BL UE/LE, ROM intact, compartments soft  Neuro: Sensation grossly intact and equal throughout, no focal deficits  Vascular: Pulses 2+ throughout, extremities well perfused  Skin: Warm/dry, normal color, no jaundice  Incision/wound: healing well, dressings in place, clean, dry and intact    MEDICATIONS  (STANDING):  amLODIPine   Tablet 10 milliGRAM(s) Oral daily  aspirin  chewable 81 milliGRAM(s) Oral daily  atorvastatin 20 milliGRAM(s) Oral at bedtime  calcium acetate 667 milliGRAM(s) Oral three times a day with meals  chlorhexidine 4% Liquid 1 Application(s) Topical daily  furosemide    Tablet 20 milliGRAM(s) Oral daily  hydrALAZINE 25 milliGRAM(s) Oral three times a day  pantoprazole    Tablet 40 milliGRAM(s) Oral before breakfast  sodium bicarbonate 1300 milliGRAM(s) Oral every 8 hours  tamsulosin 0.8 milliGRAM(s) Oral at bedtime    MEDICATIONS  (PRN):  acetaminophen     Tablet .. 650 milliGRAM(s) Oral every 6 hours PRN Mild Pain (1 - 3)      DVT PROPHYLAXIS:   GI PROPHYLAXIS: pantoprazole    Tablet 40 milliGRAM(s) Oral before breakfast    ANTICOAGULATION:   ANTIBIOTICS:            LAB/STUDIES:  Labs:  CAPILLARY BLOOD GLUCOSE      POCT Blood Glucose.: 188 mg/dL (15 Nov 2021 11:06)  POCT Blood Glucose.: 159 mg/dL (15 Nov 2021 08:05)  POCT Blood Glucose.: 165 mg/dL (14 Nov 2021 20:56)  POCT Blood Glucose.: 191 mg/dL (14 Nov 2021 16:28)                          8.3    7.92  )-----------( 228      ( 15 Nov 2021 05:43 )             24.8         11-15    133<L>  |  97<L>  |  49<H>  ----------------------------<  122<H>  4.5   |  19  |  4.4<HH>      Calcium, Total Serum: 7.9 mg/dL (11-15-21 @ 05:43)      LFTs:             4.6  | 0.3  | 16       ------------------[86      ( 15 Nov 2021 05:43 )  2.6  | x    | 10          Lipase:x      Amylase:x             Coags:     x      ----< x       ( 15 Nov 2021 11:24 )     31.4                            IMAGING:      ACCESS/ DEVICES:  [ ] Peripheral IV  [ ] Central Venous Line	[ ] R	[ ] L	[ ] IJ	[ ] Fem	[ ] SC	Placed:   [ ] Arterial Line		[ ] R	[ ] L	[ ] Fem	[ ] Rad	[ ] Ax	Placed:   [ ] PICC:					[ ] Mediport  [ ] Urinary Catheter,  Date Placed:   [ ] Chest tube: [ ] Right, [ ] Left  [ ] TERELL/Marv Drains      ASSESSMENT:  85y M w/ PMHx of  ****    PLAN:  - Pain Management  - Strict Ins and Out  - Continue Current Diet Orders    Lines/Tubes: PIV, Midline, Central Line, A-Line, Chest tubes, Marv/TERELL drains, Luna Catheter.    TRAUMA SPECTRA: 8259  BLUE TEAM SPECTRA: 8285  GREEN TEAM SPECTRA: 8001  VASCULAR TEAM SPECTRA: 6098     GENERAL SURGERY PROGRESS NOTE    Patient: CECILLE FRANKEL , 85y ([birth year])Male   MRN: [id number]  Location: [address]  Visit: 10-23-21 Inpatient  Date: 11-15-21 @ 14:49    Hospital Day #: 24  Post-Op Day #: None    Procedure/Dx/Injuries:  Patient is 85M with PMH of DM, Crohn s/p colostomy, CKD 4, HTN, HLD, BPH. Vascular surgery consulted for DVTs seen on duplex on 10/25 in the Left posterior tibial and peroneal branches.   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 Patient required 3units PRBC since admission. Most recent sigmoidoscopy showed erythematous and friable rectal mucosa covered with multiple blood clots and mucus. 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 Diverticulosis of the rectosigmoid junction Internal hemorrhoids were also noted. The scope was advanced to the rectosigmoid junction but could not be advanced further in the colon due to the presence of blood clots and structuring of the sigmoid in the setting of the diverticular disease. Concern now is for anticoagulation/filter in setting of DVTS with ongoing BRBPR.    PAST MEDICAL & SURGICAL HISTORY:  History of medical problems  [place] b/l ears, dm, htn, hypercholesteremia, ckd, bowel/bladder fistula, kidney stones    Colostomy present    History of surgery  procedure for kidney stones ( ?lithotripsy)      Vitals:   T(F): 96.9 (11-15-21 @ 07:59), Max: 96.9 (11-15-21 @ 07:59)  HR: 70 (11-15-21 @ 12:57)  BP: 126/58 (11-15-21 @ 12:57)  RR: 18 (11-15-21 @ 07:59)  SpO2: --      Diet, Consistent Carbohydrate w/Evening Snack:   Fiber/Residue Restricted  60 gm Protein (PRO60G)     Qty per Day:  VANILLA FLAVOR ONLY  Supplement Feeding Modality:  Oral  Glucerna Shake Cans or Servings Per Day:  1       Frequency:  Two Times a day      Fluids:     I & O's:    11-14-21 @ 07:01  -  11-15-21 @ 07:00  --------------------------------------------------------  IN:  Total IN: 0 mL    OUT:    Colostomy (mL): 100 mL    Indwelling Catheter - Urethral (mL): 400 mL    Intermittent Catheterization - Urethral (mL): 600 mL  Total OUT: 1100 mL    Total NET: -1100 mL    Bowel Movement: : [x] YES [] NO  Flatus: : [x] YES [] NO    PHYSICAL EXAM:  General: NAD  HEENT: EOMI  Cardiac: RRR  Respiratory: normal respiratory effort  Abdomen: Soft, non-distended, non-tender, no rebound, no guarding   Musculoskeletal: Compartments soft, nontender  Vascular: Pulses 2+ throughout, extremities well perfused    MEDICATIONS  (STANDING):  amLODIPine   Tablet 10 milliGRAM(s) Oral daily  aspirin  chewable 81 milliGRAM(s) Oral daily  atorvastatin 20 milliGRAM(s) Oral at bedtime  calcium acetate 667 milliGRAM(s) Oral three times a day with meals  chlorhexidine 4% Liquid 1 Application(s) Topical daily  furosemide    Tablet 20 milliGRAM(s) Oral daily  hydrALAZINE 25 milliGRAM(s) Oral three times a day  pantoprazole    Tablet 40 milliGRAM(s) Oral before breakfast  sodium bicarbonate 1300 milliGRAM(s) Oral every 8 hours  tamsulosin 0.8 milliGRAM(s) Oral at bedtime    MEDICATIONS  (PRN):  acetaminophen     Tablet .. 650 milliGRAM(s) Oral every 6 hours PRN Mild Pain (1 - 3)      DVT PROPHYLAXIS:   GI PROPHYLAXIS: pantoprazole    Tablet 40 milliGRAM(s) Oral before breakfast    Labs:  CAPILLARY BLOOD GLUCOSE    POCT Blood Glucose.: 188 mg/dL (15 Nov 2021 11:06)  POCT Blood Glucose.: 159 mg/dL (15 Nov 2021 08:05)  POCT Blood Glucose.: 165 mg/dL (14 Nov 2021 20:56)  POCT Blood Glucose.: 191 mg/dL (14 Nov 2021 16:28)                          8.3    7.92  )-----------( 228      ( 15 Nov 2021 05:43 )             24.8         11-15    133<L>  |  97<L>  |  49<H>  ----------------------------<  122<H>  4.5   |  19  |  4.4<HH>      Calcium, Total Serum: 7.9 mg/dL (11-15-21 @ 05:43)      LFTs:             4.6  | 0.3  | 16       ------------------[86      ( 15 Nov 2021 05:43 )  2.6  | x    | 10          Lipase:x      Amylase:x        Coags:     x      ----< x       ( 15 Nov 2021 11:24 )     31.4      IMAGING:  < from: [facility] Duplex Lower Ext Vein Scan, Eusebia (10.25.21 @ 15:36) >  Impression:  Acute thrombosis of the left posterior tibial and peroneal vein branches  Chronic deep vein thrombosis of the left common femoral and distalfemoral veins. .    < end of copied text >    ACCESS/ DEVICES:  [x] Peripheral IV   GENERAL SURGERY PROGRESS NOTE    Patient: CECILLE FRANKEL , 85y ([birth year])Male   MRN: [id number]  Location: [address]  Visit: 10-23-21 Inpatient  Date: 11-15-21 @ 14:49    Hospital Day #: 24  Post-Op Day #: None    Procedure/Dx/Injuries:  Patient is 85M with PMH of DM, Crohn s/p colostomy, CKD 4, HTN, HLD, BPH. Vascular surgery consulted for DVTs seen on duplex on 10/25 in the Left posterior tibial and peroneal branches.   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 Patient required 3units PRBC since admission. Most recent sigmoidoscopy showed erythematous and friable rectal mucosa covered with multiple blood clots and mucus. 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 Diverticulosis of the rectosigmoid junction Internal hemorrhoids were also noted. The scope was advanced to the rectosigmoid junction but could not be advanced further in the colon due to the presence of blood clots and structuring of the sigmoid in the setting of the diverticular disease. Concern now is for anticoagulation/filter in setting of DVTS.    PAST MEDICAL & SURGICAL HISTORY:  History of medical problems  [place] b/l ears, dm, htn, hypercholesteremia, ckd, bowel/bladder fistula, kidney stones    Colostomy present    History of surgery  procedure for kidney stones ( ?lithotripsy)      Vitals:   T(F): 96.9 (11-15-21 @ 07:59), Max: 96.9 (11-15-21 @ 07:59)  HR: 70 (11-15-21 @ 12:57)  BP: 126/58 (11-15-21 @ 12:57)  RR: 18 (11-15-21 @ 07:59)  SpO2: --      Diet, Consistent Carbohydrate w/Evening Snack:   Fiber/Residue Restricted  60 gm Protein (PRO60G)     Qty per Day:  VANILLA FLAVOR ONLY  Supplement Feeding Modality:  Oral  Glucerna Shake Cans or Servings Per Day:  1       Frequency:  Two Times a day      Fluids:     I & O's:    11-14-21 @ 07:01  -  11-15-21 @ 07:00  --------------------------------------------------------  IN:  Total IN: 0 mL    OUT:    Colostomy (mL): 100 mL    Indwelling Catheter - Urethral (mL): 400 mL    Intermittent Catheterization - Urethral (mL): 600 mL  Total OUT: 1100 mL    Total NET: -1100 mL    Bowel Movement: : [x] YES [] NO  Flatus: : [x] YES [] NO    PHYSICAL EXAM:  General: NAD  HEENT: EOMI  Cardiac: RRR  Respiratory: normal respiratory effort  Abdomen: Soft, non-distended, non-tender, no rebound, no guarding, Colostomy functioning with no hernias or blood in bag.  Musculoskeletal: Compartments soft, nontender  Vascular: Pulses 2+ throughout, extremities well perfused    MEDICATIONS  (STANDING):  amLODIPine   Tablet 10 milliGRAM(s) Oral daily  aspirin  chewable 81 milliGRAM(s) Oral daily  atorvastatin 20 milliGRAM(s) Oral at bedtime  calcium acetate 667 milliGRAM(s) Oral three times a day with meals  chlorhexidine 4% Liquid 1 Application(s) Topical daily  furosemide    Tablet 20 milliGRAM(s) Oral daily  hydrALAZINE 25 milliGRAM(s) Oral three times a day  pantoprazole    Tablet 40 milliGRAM(s) Oral before breakfast  sodium bicarbonate 1300 milliGRAM(s) Oral every 8 hours  tamsulosin 0.8 milliGRAM(s) Oral at bedtime    MEDICATIONS  (PRN):  acetaminophen     Tablet .. 650 milliGRAM(s) Oral every 6 hours PRN Mild Pain (1 - 3)      DVT PROPHYLAXIS:   GI PROPHYLAXIS: pantoprazole    Tablet 40 milliGRAM(s) Oral before breakfast    Labs:  CAPILLARY BLOOD GLUCOSE    POCT Blood Glucose.: 188 mg/dL (15 Nov 2021 11:06)  POCT Blood Glucose.: 159 mg/dL (15 Nov 2021 08:05)  POCT Blood Glucose.: 165 mg/dL (14 Nov 2021 20:56)  POCT Blood Glucose.: 191 mg/dL (14 Nov 2021 16:28)                          8.3    7.92  )-----------( 228      ( 15 Nov 2021 05:43 )             24.8         11-15    133<L>  |  97<L>  |  49<H>  ----------------------------<  122<H>  4.5   |  19  |  4.4<HH>      Calcium, Total Serum: 7.9 mg/dL (11-15-21 @ 05:43)      LFTs:             4.6  | 0.3  | 16       ------------------[86      ( 15 Nov 2021 05:43 )  2.6  | x    | 10          Lipase:x      Amylase:x        Coags:     x      ----< x       ( 15 Nov 2021 11:24 )     31.4      IMAGING:  < from: [facility] Duplex Lower Ext Vein Scan, Eusebia (10.25.21 @ 15:36) >  Impression:  Acute thrombosis of the left posterior tibial and peroneal vein branches  Chronic deep vein thrombosis of the left common femoral and distalfemoral veins. .    < end of copied text >    ACCESS/ DEVICES:  [x] Peripheral IV

## 2021-11-16 NOTE — PROVIDER CONTACT NOTE (OTHER) - SITUATION
pt recent /75 asymptomatic on heparin IV with hematuria in sanford
Pt on heparin drip at 8 cc/hr. pt tugged on sanford and penis is now bleeding. skin tear on L hand bleeding as well.
pt bleeding from rectum.
Pt has not voided after sanford removal. Sanford removed at 12pm. PO fluids encouraged. Bladder scan at 1900 = 124cc.
pt aPTT level >200. heparin drip infusing at 8 cc/hr.
Pt bleeding from rectum. Hgb 8.3, Vitals signs stable at this time. Pt in no distress.
Pt's MOLST form noted incomplete, missing witness signatures  and Lack of Capacity form.
Pt noted to have scrotal edema and discharge from penis. Bilateral feet also +3 edema.

## 2021-11-16 NOTE — PROGRESS NOTE ADULT - ASSESSMENT
85 year old M with PMH of DM, Crohn s/p colostomy, CKD 4, HTN, HLD, BPH presents to ED with complaints of weakness. Pt states 10 days prior was diagnosed with UTI, placed on bactrim, Family states due to size of pill, pt was unable to swallow pill, only took 1/2 dose for 5 days. Family states over past few days pt becoming increasingly lethargic, having chills as well.   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 Intraluminal air is likely secondary to Sanford. Bowel containing spigelian hernia without evidence of bowel obstruction or pneumoperitoneum. Small bilateral pleural effusions with adjacent atelectasis.  Indeterminate bilateral renal lesions. Nonemergent reimaging with renal protocol recommended.  -s/p 2U  packed RBC  - s/p Flexible sigmoidoscopy :  The rectal mucosa looked erythematous and friable and was covered with multiple blood clots and mucus. 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 Diverticulosis of the rectosigmoid junction Internal hemorrhoids. The scope was advanced to the rectosigmoid junction but could not be advanced further in the colon due to the presence of blood clots and structuring of the sigmoid in the setting of the diverticular disease.     - pathology  revealed  inflammation  - GI F/u: short chain fatty acid enema or coconut oil enema for diversion colitis, consider CT pelvis w/ rectal enema to rule out any masses in the blind colonic stump  -As per IR:  pt at risk for perforation with rectal contrast  - S/p  1 unit PRBC on 11/12/21  - monitor HB/HCT  - GI f/u     # Oral Dysphagia  - Speech and swallow eval: Soft and bite sized, thin liquids    # Metabolic encephalopathy -resolved    # Acute kidney injury on CKD stage4  # Hyperphosphatemia  # Metabolic acidosis  # Hyponatremia  # urinary retention, H/o BPH  - : US Renal (09.02.21 @ 14:08) >No hydronephrosisBilateral renal cysts.  - failed TOV  - c/w sanford, c/w flomax  - non-oliguric  - c/w  lasix  - c/w phoslo   - c/w sodium bicarb  - monitor BMP  - nephrology following: pt is close to HD, but does not want to do it unless it is absolutely needed (his wife was on HD).  Currently w/o acute indication, cont monitoring off HD    # DVT  -  [facility] Duplex Lower Ext Vein Scan, Bilat (10.25.21 @ 15:36) >Acute thrombosis of the left posterior tibial and peroneal vein branches. 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 .   - VA Duplex Lower Ext Vein Scan, Bilat (11.16.21 @ 14:31) >Chronic DVT was noted in the left common femoral and femoral veins resolution of the previously note left calf vein thrombi. Right leg free from thrombus  - eliquis held sec to rectal bleed   - vascular F/u : continue ASA and if possible DVT prophylaxis  No indication for IVC filter this time    # Hypomagnesemia- resolved    # Hypertension  - c/w norvasc, hydralazine, lasix    # Dyslipidemia  - c/w statin    # DNR/DNI    # Pending: monitor BMP, cbc,  GI F/u  # Plan of care discussed with patients daughter  # Disposition: STR when stable

## 2021-11-16 NOTE — PROGRESS NOTE ADULT - SUBJECTIVE AND OBJECTIVE BOX
Nephrology progress note    Patient is seen and examined, events over the last 24 h noted .  Denies SOB at rest, nausea  Allergies:  No Known Allergies    Hospital Medications:   MEDICATIONS  (STANDING):  amLODIPine   Tablet 10 milliGRAM(s) Oral daily  aspirin  chewable 81 milliGRAM(s) Oral daily  atorvastatin 20 milliGRAM(s) Oral at bedtime  calcium acetate 667 milliGRAM(s) Oral three times a day with meals  chlorhexidine 4% Liquid 1 Application(s) Topical daily  dextrose 40% Gel 15 Gram(s) Oral once  dextrose 5%. 1000 milliLiter(s) (50 mL/Hr) IV Continuous <Continuous>  dextrose 5%. 1000 milliLiter(s) (100 mL/Hr) IV Continuous <Continuous>  dextrose 50% Injectable 25 Gram(s) IV Push once  dextrose 50% Injectable 12.5 Gram(s) IV Push once  dextrose 50% Injectable 25 Gram(s) IV Push once  furosemide    Tablet 20 milliGRAM(s) Oral daily  glucagon  Injectable 1 milliGRAM(s) IntraMuscular once  hydrALAZINE 25 milliGRAM(s) Oral three times a day  insulin lispro (ADMELOG) corrective regimen sliding scale   SubCutaneous three times a day before meals  pantoprazole    Tablet 40 milliGRAM(s) Oral before breakfast  sodium bicarbonate 1300 milliGRAM(s) Oral every 8 hours  tamsulosin 0.8 milliGRAM(s) Oral at bedtime        VITALS:  T(F): 97.7 (21 @ 08:25), Max: 97.7 (21 @ 08:25)  HR: 74 (21 @ 08:25)  BP: 128/62 (21 @ 08:25)  RR: 18 (21 @ 08:25)  SpO2: --  Wt(kg): --     @ 07:01  -  11-15 @ 07:00  --------------------------------------------------------  IN: 0 mL / OUT: 1100 mL / NET: -1100 mL    11-15 @ 07:01  -   @ 07:00  --------------------------------------------------------  IN: 570 mL / OUT: 750 mL / NET: -180 mL     @ 07:01  -   @ 12:11  --------------------------------------------------------  IN: 120 mL / OUT: 0 mL / NET: 120 mL          PHYSICAL EXAM:  Constitutional: NAD  HEENT: anicteric sclera  Neck: No JVD  Respiratory: CTA  Cardiovascular: S1, S2, RRR  Gastrointestinal: BS+, soft, NT/ND  Extremities: No peripheral edema  Neurological: Awake alert  : + sanford.   Skin: No rashes  Vascular Access:    LABS:      137  |  100  |  48<H>  ----------------------------<  122<H>  4.7   |  21  |  4.5<HH>    Ca    8.0<L>      2021 04:30  Mg     2.0         TPro  4.5<L>  /  Alb  2.6<L>  /  TBili  0.2  /  DBili      /  AST  17  /  ALT  11  /  AlkPhos  80                            8.0    7.87  )-----------( 218      ( 2021 04:30 )             24.8       Urine Studies:  Urinalysis Basic - ( 2021 02:32 )    Color: Light Orange / Appearance: Turbid / S.011 / pH:   Gluc:  / Ketone: Negative  / Bili: Negative / Urobili: <2 mg/dL   Blood:  / Protein: 300 mg/dL / Nitrite: Negative   Leuk Esterase: Large / RBC: 2 /HPF / WBC >720 /HPF   Sq Epi:  / Non Sq Epi: 2 /HPF / Bacteria: Negative        RADIOLOGY & ADDITIONAL STUDIES:

## 2021-11-16 NOTE — PROGRESS NOTE ADULT - TIME BILLING
Discussed on rounds with Housestaff
Discussed with Housestaff
Discussed on rounds with Housestaff

## 2021-11-16 NOTE — PROGRESS NOTE ADULT - SUBJECTIVE AND OBJECTIVE BOX
Patient is a 85y old  Male who presents with a chief complaint of weakness (10 Nov 2021 10:45)    Patient was seen and examined.  Pt had an episode of Hematochezia last night  Denies any complaints today    PAST MEDICAL & SURGICAL HISTORY:  History of medical problems  [place] b/l ears, dm, htn, hypercholesteremia, ckd, bowel/bladder fistula, kidney stones  Colostomy present    History of surgery  procedure for kidney stones ( ?lithotripsy)    Allergies  No Known Allergies      Home Medications:  amLODIPine 5 mg oral tablet: 1 tab(s) orally 2 times a day (01 Sep 2021 16:54)  Aspir 81 oral delayed release tablet: 1 tab(s) orally once a day (01 Sep 2021 16:54)  atorvastatin 10 mg oral tablet: 2 tab(s) orally once a day (01 Sep 2021 16:54)  famotidine 20 mg oral tablet: 1 tab(s) orally once a day (01 Sep 2021 16:54)  Januvia 50 mg oral tablet: 1 tab(s) orally once a day (01 Sep 2021 16:54)  Lasix 20 mg oral tablet: 1 tab(s) orally every other day (01 Sep 2021 16:54)  pioglitazone 30 mg oral tablet: 1 tab(s) orally once a day (01 Sep 2021 16:54)  ramipril 5 mg oral capsule: 2 cap(s) orally once a day (01 Sep 2021 16:54)    MEDICATIONS  (STANDING):  amLODIPine   Tablet 10 milliGRAM(s) Oral daily  aspirin  chewable 81 milliGRAM(s) Oral daily  atorvastatin 20 milliGRAM(s) Oral at bedtime  calcium acetate 667 milliGRAM(s) Oral three times a day with meals  furosemide    Tablet 20 milliGRAM(s) Oral daily  glucagon  Injectable 1 milliGRAM(s) IntraMuscular once  hydrALAZINE 25 milliGRAM(s) Oral three times a day  insulin lispro (ADMELOG) corrective regimen sliding scale   SubCutaneous three times a day before meals  pantoprazole    Tablet 40 milliGRAM(s) Oral before breakfast  sodium bicarbonate 1300 milliGRAM(s) Oral every 8 hours  tamsulosin 0.8 milliGRAM(s) Oral at bedtime    MEDICATIONS  (PRN):  acetaminophen     Tablet .. 650 milliGRAM(s) Oral every 6 hours PRN Mild Pain (1 - 3)    T(C): 36.4 (11-16-21 @ 15:37), Max: 36.5 (11-16-21 @ 08:25)  HR: 56 (11-16-21 @ 15:37) (56 - 75)  BP: 119/60 (11-16-21 @ 15:37) (115/56 - 146/63)  RR: 18 (11-16-21 @ 15:37) (16 - 18)  SpO2: --    O/E:  Awake, alert, not in distress.  HEENT: atraumatic, EOMI.  Chest: decreased breath sounds at bases  CVS: SIS2 +, no murmur.  P/A: Soft, BS+, colostomy+, sanford+  CNS: awake, alert , not oriented  Ext: b/l edema feet+  Skin: no rash, no ulcers.  All systems reviewed positive findings as above.                             8.0<L>  7.87  )-----------( 218      ( 16 Nov 2021 04:30 )             24.8<L>                        8.3<L>  7.92  )-----------( 228      ( 15 Nov 2021 05:43 )             24.8<L>  11-16    137  |  100  |  48<H>  ----------------------------<  122<H>  4.7   |  21  |  4.5<HH>  11-15    133<L>  |  97<L>  |  49<H>  ----------------------------<  122<H>  4.5   |  19  |  4.4<HH>    Ca    8.0<L>      16 Nov 2021 04:30  Ca    7.9<L>      15 Nov 2021 05:43  Mg     2.0     11-16    TPro  4.5<L>  /  Alb  2.6<L>  /  TBili  0.2  /  DBili  x   /  AST  17  /  ALT  11  /  AlkPhos  80  11-16  TPro  4.6<L>  /  Alb  2.6<L>  /  TBili  0.3  /  DBili  x   /  AST  16  /  ALT  10  /  AlkPhos  86  11-15

## 2021-11-16 NOTE — PROVIDER CONTACT NOTE (OTHER) - DATE AND TIME:
01-Nov-2021 10:00
11-Nov-2021 19:02
31-Oct-2021 10:00
10-Nov-2021 09:14
16-Nov-2021 14:25
15-Nov-2021 16:39
30-Oct-2021 19:21
30-Oct-2021 23:50

## 2021-11-16 NOTE — PROVIDER CONTACT NOTE (OTHER) - ACTION/TREATMENT ORDERED:
MD Castellanos notified of pt bleeding excessively, will come and assess the pt at bedside. keep heparin drip infusing for now.
MD Schneider to draw stat labs.
MD Vyas assessed pt, did rectal exam. MD Vyas will contact GI.
MD Diaz assessed pt at bedside, may order diuretic. Pt also going for bilateral LE duplex.
MD Diaz to come assess pt at bedside.
No further interventions at this time. Will endorse to following shift.
MD to contact family for form completion.

## 2021-11-16 NOTE — PROVIDER CONTACT NOTE (OTHER) - REASON
hypertensive
bleeding from rectum
DTV at 1600- has not voided
aPTT >200
MOLST form incomplete.
pt bleeding
pt bleeding from rectum
scrotal edema

## 2021-11-16 NOTE — PROGRESS NOTE ADULT - SUBJECTIVE AND OBJECTIVE BOX
CECILLE FRANKEL 85y Male  MRN#: [id number]   CODE STATUS: DNR/DNI    Hospital Day: 24d    Pt is currently admitted with the primary diagnosis of Weakness 2ry to acute on chronic anemia      SUBJECTIVE  Hospital Course: The patient is clinically and hemodynamically stable. Patients hemoglobin has been stable. Patient was found to have an acute on chronic DVT 10/25, may warrant repeat imaging, and patient is being followed up by vascular surgery. Eliquis is held because of risk of hematochezia. Patient is refusing dialysis.     Overnight events: No acute overnight events.      Subjective complaints: No subjective complaints    Present Today:   - Luna:  No [  ], Yes [   ] : Indication:     - Type of IV Access:       .. CVC/Piccline:  No [  ], Yes [   ] : Indication:       .. Midline: No [  ], Yes [   ] : Indication:                                             ----------------------------------------------------------  OBJECTIVE  PAST MEDICAL & SURGICAL HISTORY  History of medical problems  [place] b/l ears, dm, htn, hypercholesteremia, ckd, bowel/bladder fistula, kidney stones    Colostomy present    History of surgery  procedure for kidney stones ( ?lithotripsy)                                              -----------------------------------------------------------  ALLERGIES:  No Known Allergies                                            ------------------------------------------------------------    HOME MEDICATIONS  Home Medications:  amLODIPine 5 mg oral tablet: 1 tab(s) orally 2 times a day (01 Sep 2021 16:54)  Aspir 81 oral delayed release tablet: 1 tab(s) orally once a day (01 Sep 2021 16:54)  atorvastatin 10 mg oral tablet: 2 tab(s) orally once a day (01 Sep 2021 16:54)  famotidine 20 mg oral tablet: 1 tab(s) orally once a day (01 Sep 2021 16:54)  Januvia 50 mg oral tablet: 1 tab(s) orally once a day (01 Sep 2021 16:54)  Lasix 20 mg oral tablet: 1 tab(s) orally every other day (01 Sep 2021 16:54)  pioglitazone 30 mg oral tablet: 1 tab(s) orally once a day (01 Sep 2021 16:54)  ramipril 5 mg oral capsule: 2 cap(s) orally once a day (01 Sep 2021 16:54)                           MEDICATIONS:  STANDING MEDICATIONS  amLODIPine   Tablet 10 milliGRAM(s) Oral daily  aspirin  chewable 81 milliGRAM(s) Oral daily  atorvastatin 20 milliGRAM(s) Oral at bedtime  calcium acetate 667 milliGRAM(s) Oral three times a day with meals  chlorhexidine 4% Liquid 1 Application(s) Topical daily  furosemide    Tablet 20 milliGRAM(s) Oral daily  hydrALAZINE 25 milliGRAM(s) Oral three times a day  pantoprazole    Tablet 40 milliGRAM(s) Oral before breakfast  sodium bicarbonate 1300 milliGRAM(s) Oral every 8 hours  tamsulosin 0.8 milliGRAM(s) Oral at bedtime    PRN MEDICATIONS  acetaminophen     Tablet .. 650 milliGRAM(s) Oral every 6 hours PRN                                            ------------------------------------------------------------  VITAL SIGNS: Last 24 Hours  T(C): 36.5 (16 Nov 2021 08:25), Max: 36.5 (16 Nov 2021 08:25)  T(F): 97.7 (16 Nov 2021 08:25), Max: 97.7 (16 Nov 2021 08:25)  HR: 74 (16 Nov 2021 08:25) (70 - 75)  BP: 128/62 (16 Nov 2021 08:25) (126/58 - 146/63)  BP(mean): --  RR: 18 (16 Nov 2021 08:25) (18 - 18)  SpO2: --      11-15-21 @ 07:01  -  11-16-21 @ 07:00  --------------------------------------------------------  IN: 570 mL / OUT: 750 mL / NET: -180 mL    11-16-21 @ 07:01  -  11-16-21 @ 10:59  --------------------------------------------------------  IN: 120 mL / OUT: 0 mL / NET: 120 mL                                             --------------------------------------------------------------  LABS:                        8.0    7.87  )-----------( 218      ( 16 Nov 2021 04:30 )             24.8     11-16    137  |  100  |  48<H>  ----------------------------<  122<H>  4.7   |  21  |  4.5<HH>    Ca    8.0<L>      16 Nov 2021 04:30  Mg     2.0     11-16    TPro  4.5<L>  /  Alb  2.6<L>  /  TBili  0.2  /  DBili  x   /  AST  17  /  ALT  11  /  AlkPhos  80  11-16    PT/INR - ( 16 Nov 2021 04:30 )   PT: 11.50 sec;   INR: 1.00 ratio         PTT - ( 16 Nov 2021 04:30 )  PTT:30.5 sec                                                          -------------------------------------------------------------  RADIOLOGY:                                            --------------------------------------------------------------    PHYSICAL EXAM:  General:   HEENT:  LUNGS:  HEART:  ABDOMEN:  EXT:  NEURO:  SKIN:                                           --------------------------------------------------------------    ASSESSMENT & PLAN    Past medical history and hospital course                                                                                                           ----------------------------------------------------  # DVT prophylaxis     # GI prophylaxis     # Diet     # Activity Score (AM-PAC)    # Code status     # Disposition                                                                              --------------------------------------------------------    # Handoff      CECILLE FRANKEL 85y Male  MRN#: [id number]   CODE STATUS: DNR/DNI    Hospital Day: 24d    Pt is currently admitted with the primary diagnosis of Weakness 2ry to acute on chronic anemia.      SUBJECTIVE  Hospital Course: The patient is clinically and hemodynamically stable. Patients hemoglobin has been stable. Patient was found to have an acute on chronic DVT 10/25, may warrant repeat imaging, and patient is being followed up by vascular surgery. Eliquis is held because of risk of hematochezia. Patient is refusing dialysis. GI were contacted regarding the GI bleed pending their input. Patient is on 3L NC and CXR was ordered pending read.     Overnight events: No acute overnight events. Patient is on 3L NC.       Subjective complaints: No subjective complaints    Present Today:   - Luna:  No [  ], Yes [X] : Indication: Obstruction      - Type of IV Access:       .. CVC/Piccline:  No [X], Yes [   ] : Indication:       .. Midline: No [X], Yes [   ] : Indication:                                             ----------------------------------------------------------  OBJECTIVE  PAST MEDICAL & SURGICAL HISTORY  History of medical problems  [place] b/l ears, dm, htn, hypercholesteremia, ckd, bowel/bladder fistula, kidney stones    Colostomy present    History of surgery  procedure for kidney stones ( ?lithotripsy)                                              -----------------------------------------------------------  ALLERGIES:  No Known Allergies                                            ------------------------------------------------------------    HOME MEDICATIONS  Home Medications:  amLODIPine 5 mg oral tablet: 1 tab(s) orally 2 times a day (01 Sep 2021 16:54)  Aspir 81 oral delayed release tablet: 1 tab(s) orally once a day (01 Sep 2021 16:54)  atorvastatin 10 mg oral tablet: 2 tab(s) orally once a day (01 Sep 2021 16:54)  famotidine 20 mg oral tablet: 1 tab(s) orally once a day (01 Sep 2021 16:54)  Januvia 50 mg oral tablet: 1 tab(s) orally once a day (01 Sep 2021 16:54)  Lasix 20 mg oral tablet: 1 tab(s) orally every other day (01 Sep 2021 16:54)  pioglitazone 30 mg oral tablet: 1 tab(s) orally once a day (01 Sep 2021 16:54)  ramipril 5 mg oral capsule: 2 cap(s) orally once a day (01 Sep 2021 16:54)                           MEDICATIONS:  STANDING MEDICATIONS  amLODIPine   Tablet 10 milliGRAM(s) Oral daily  aspirin  chewable 81 milliGRAM(s) Oral daily  atorvastatin 20 milliGRAM(s) Oral at bedtime  calcium acetate 667 milliGRAM(s) Oral three times a day with meals  chlorhexidine 4% Liquid 1 Application(s) Topical daily  furosemide    Tablet 20 milliGRAM(s) Oral daily  hydrALAZINE 25 milliGRAM(s) Oral three times a day  pantoprazole    Tablet 40 milliGRAM(s) Oral before breakfast  sodium bicarbonate 1300 milliGRAM(s) Oral every 8 hours  tamsulosin 0.8 milliGRAM(s) Oral at bedtime    PRN MEDICATIONS  acetaminophen     Tablet .. 650 milliGRAM(s) Oral every 6 hours PRN                                            ------------------------------------------------------------  VITAL SIGNS: Last 24 Hours  T(C): 36.5 (16 Nov 2021 08:25), Max: 36.5 (16 Nov 2021 08:25)  T(F): 97.7 (16 Nov 2021 08:25), Max: 97.7 (16 Nov 2021 08:25)  HR: 74 (16 Nov 2021 08:25) (70 - 75)  BP: 128/62 (16 Nov 2021 08:25) (126/58 - 146/63)  BP(mean): --  RR: 18 (16 Nov 2021 08:25) (18 - 18)  SpO2: --      11-15-21 @ 07:01  -  11-16-21 @ 07:00  --------------------------------------------------------  IN: 570 mL / OUT: 750 mL / NET: -180 mL    11-16-21 @ 07:01  -  11-16-21 @ 10:59  --------------------------------------------------------  IN: 120 mL / OUT: 0 mL / NET: 120 mL                                             --------------------------------------------------------------  LABS:                        8.0    7.87  )-----------( 218      ( 16 Nov 2021 04:30 )             24.8     11-16    137  |  100  |  48<H>  ----------------------------<  122<H>  4.7   |  21  |  4.5<HH>    Ca    8.0<L>      16 Nov 2021 04:30  Mg     2.0     11-16    TPro  4.5<L>  /  Alb  2.6<L>  /  TBili  0.2  /  DBili  x   /  AST  17  /  ALT  11  /  AlkPhos  80  11-16    PT/INR - ( 16 Nov 2021 04:30 )   PT: 11.50 sec;   INR: 1.00 ratio         PTT - ( 16 Nov 2021 04:30 )  PTT:30.5 sec                                                -------------------------------------------------------------  RADIOLOGY:    EXAM:  XR CHEST PORTABLE IMMED 1V      PROCEDURE DATE:  11/16/2021      Impression:  Stable bilateral opacities and effusions.                                            --------------------------------------------------------------    PHYSICAL EXAM:  General:   HEENT:  LUNGS:  HEART:  ABDOMEN:  EXT:  NEURO:  SKIN:                                           --------------------------------------------------------------    ASSESSMENT & PLAN    Past medical history and hospital course                                                                                                           ----------------------------------------------------  # DVT prophylaxis     # GI prophylaxis     # Diet     # Activity Score (AM-PAC)    # Code status     # Disposition                                                                              --------------------------------------------------------    # Handoff      CECILLE FRANKEL 85y Male  MRN#: [id number]   CODE STATUS: DNR/DNI    Hospital Day: 24d    Pt is currently admitted with the primary diagnosis of Weakness 2ry to acute on chronic anemia.      SUBJECTIVE  Hospital Course: The patient is clinically and hemodynamically stable. Patients hemoglobin has been stable. Patient was found to have an acute on chronic DVT 10/25, may warrant repeat imaging, and patient is being followed up by vascular surgery. Eliquis is held because of risk of hematochezia. Patient is refusing dialysis. GI were contacted regarding the GI bleed pending their input. Patient is on 3L NC and CXR was ordered pending read.     Overnight events: No acute overnight events. Patient is on 3L NC.       Subjective complaints: No subjective complaints    Present Today:   - Sanford:  No [  ], Yes [X] : Indication: Obstruction      - Type of IV Access:       .. CVC/Piccline:  No [X], Yes [   ] : Indication:       .. Midline: No [X], Yes [   ] : Indication:                                             ----------------------------------------------------------  OBJECTIVE  PAST MEDICAL & SURGICAL HISTORY  History of medical problems  [place] b/l ears, dm, htn, hypercholesteremia, ckd, bowel/bladder fistula, kidney stones    Colostomy present    History of surgery  procedure for kidney stones ( ?lithotripsy)                                              -----------------------------------------------------------  ALLERGIES:  No Known Allergies                                            ------------------------------------------------------------    HOME MEDICATIONS  Home Medications:  amLODIPine 5 mg oral tablet: 1 tab(s) orally 2 times a day (01 Sep 2021 16:54)  Aspir 81 oral delayed release tablet: 1 tab(s) orally once a day (01 Sep 2021 16:54)  atorvastatin 10 mg oral tablet: 2 tab(s) orally once a day (01 Sep 2021 16:54)  famotidine 20 mg oral tablet: 1 tab(s) orally once a day (01 Sep 2021 16:54)  Januvia 50 mg oral tablet: 1 tab(s) orally once a day (01 Sep 2021 16:54)  Lasix 20 mg oral tablet: 1 tab(s) orally every other day (01 Sep 2021 16:54)  pioglitazone 30 mg oral tablet: 1 tab(s) orally once a day (01 Sep 2021 16:54)  ramipril 5 mg oral capsule: 2 cap(s) orally once a day (01 Sep 2021 16:54)                           MEDICATIONS:  STANDING MEDICATIONS  amLODIPine   Tablet 10 milliGRAM(s) Oral daily  aspirin  chewable 81 milliGRAM(s) Oral daily  atorvastatin 20 milliGRAM(s) Oral at bedtime  calcium acetate 667 milliGRAM(s) Oral three times a day with meals  chlorhexidine 4% Liquid 1 Application(s) Topical daily  furosemide    Tablet 20 milliGRAM(s) Oral daily  hydrALAZINE 25 milliGRAM(s) Oral three times a day  pantoprazole    Tablet 40 milliGRAM(s) Oral before breakfast  sodium bicarbonate 1300 milliGRAM(s) Oral every 8 hours  tamsulosin 0.8 milliGRAM(s) Oral at bedtime    PRN MEDICATIONS  acetaminophen     Tablet .. 650 milliGRAM(s) Oral every 6 hours PRN                                            ------------------------------------------------------------  VITAL SIGNS: Last 24 Hours  T(C): 36.5 (16 Nov 2021 08:25), Max: 36.5 (16 Nov 2021 08:25)  T(F): 97.7 (16 Nov 2021 08:25), Max: 97.7 (16 Nov 2021 08:25)  HR: 74 (16 Nov 2021 08:25) (70 - 75)  BP: 128/62 (16 Nov 2021 08:25) (126/58 - 146/63)  BP(mean): --  RR: 18 (16 Nov 2021 08:25) (18 - 18)  SpO2: --      11-15-21 @ 07:01  -  11-16-21 @ 07:00  --------------------------------------------------------  IN: 570 mL / OUT: 750 mL / NET: -180 mL    11-16-21 @ 07:01  -  11-16-21 @ 10:59  --------------------------------------------------------  IN: 120 mL / OUT: 0 mL / NET: 120 mL                                             --------------------------------------------------------------  LABS:                        8.0    7.87  )-----------( 218      ( 16 Nov 2021 04:30 )             24.8     11-16    137  |  100  |  48<H>  ----------------------------<  122<H>  4.7   |  21  |  4.5<HH>    Ca    8.0<L>      16 Nov 2021 04:30  Mg     2.0     11-16    TPro  4.5<L>  /  Alb  2.6<L>  /  TBili  0.2  /  DBili  x   /  AST  17  /  ALT  11  /  AlkPhos  80  11-16    PT/INR - ( 16 Nov 2021 04:30 )   PT: 11.50 sec;   INR: 1.00 ratio         PTT - ( 16 Nov 2021 04:30 )  PTT:30.5 sec                                                -------------------------------------------------------------  RADIOLOGY:    EXAM:  XR CHEST PORTABLE IMMED 1V      PROCEDURE DATE:  11/16/2021      Impression:  Stable bilateral opacities and effusions.                                            --------------------------------------------------------------    PHYSICAL EXAM:  General: Awake, alert, not in distress.  HEENT: Atraumatic, EOMI.  Chest: Decreased breath sounds at bases  CVS: SIS2 +, no murmur.  P/A: Soft, BS+, colostomy+, sanford+  CNS: Awake, alert , not oriented  Ext: No edema feet.  Skin: No rash, no ulcers.                                         --------------------------------------------------------------    ASSESSMENT & PLAN  85 year old M with PMH of DM, Crohn s/p colostomy, CKD 4, HTN, HLD, BPH presents to ED with complaints of weakness. 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   - s/p 2U  packed RBC  - s/p Flexible sigmoidoscopy: The rectal mucosa looked erythematous and friable and was covered with multiple blood clots and mucus. 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 Diverticulosis of the rectosigmoid junction Internal hemorrhoids. The scope was advanced to the rectosigmoid junction but could not be advanced further in the colon due to the presence of blood clots and structuring of the sigmoid in the setting of the diverticular disease.     - pathology revealed inflammation  - GI F/u: short chain fatty acid enema or coconut oil enema for diversion colitis, consider CT pelvis w/ rectal enema to rule out any masses in the blind colonic stump  - As per IR:  pt at risk for perforation with rectal contrast  - S/p  1 unit PRBC on 11/12/21  - monitor HB/HCT  - GI f/u     # DVT  -  [facility] Duplex Lower Ext Vein Scan, Bilat (10.25.21) -> Acute thrombosis of the left posterior tibial and peroneal vein branches. 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  - Eliquis held sec to rectal bleed  - Vascular surgery eval: No IVC filter at this time, consider repeat duplex, consider aspirin and DVT prophylaxis if condition allows     # Acute kidney injury on CKD stage4  - US Renal (09.02.21) ->No hydronephrosis & Bilateral renal cysts.  - c/w sanford, c/w flomax  - non-oliguric (failed TOV)  - c/w  lasix, phoslo, & sodium bicarb  - monitor BMP  - nephrology following: Patient refusing dialysis and plan may include dialysis on outside basis when patient is ready   - Keep Sanford     # Oral Dysphagia  - Speech and swallow eval: Soft and bite sized, thin liquids    # Hypertension  - c/w norvasc, hydralazine, lasix    # Dyslipidemia  - c/w statin    # DNR/DNI    Daughter Kerry Perry contacted on [phone] and updated about her father's status. Made aware that father is at risk of clots since eliquis is held. Informed that vascular do not want to place an IVC filter at this point, Sanford is to remain in place and this may increase risk of infection, and that GI will be following up regarding the rectal bleed.

## 2021-11-16 NOTE — PROVIDER CONTACT NOTE (OTHER) - BACKGROUND
Pt bilateral feet usually swollen, but today more swollen than normal. First episode of scrotal edema and discharge noted.
hx of rectal bleeding in this admission.

## 2021-11-17 NOTE — PROGRESS NOTE ADULT - SUBJECTIVE AND OBJECTIVE BOX
CECILLE FRANKEL  85y  [facility] [id number]      Patient is a 85y old  Male who presents with a chief complaint of weakness (17 Nov 2021 15:22)      INTERVAL HPI/OVERNIGHT EVENTS:    no acute events overnight    REVIEW OF SYSTEMS:  ROS NEG  FAMILY HISTORY:    T(C): 36.3 (11-17-21 @ 08:20), Max: 36.4 (11-16-21 @ 15:37)  HR: 70 (11-17-21 @ 13:08) (56 - 75)  BP: 114/88 (11-17-21 @ 13:08) (114/88 - 128/62)  RR: 18 (11-17-21 @ 08:20) (18 - 18)  SpO2: 97% (11-17-21 @ 00:00) (97% - 97%)  Wt(kg): --Vital Signs Last 24 Hrs  T(C): 36.3 (17 Nov 2021 08:20), Max: 36.4 (16 Nov 2021 15:37)  T(F): 97.3 (17 Nov 2021 08:20), Max: 97.5 (16 Nov 2021 15:37)  HR: 70 (17 Nov 2021 13:08) (56 - 75)  BP: 114/88 (17 Nov 2021 13:08) (114/88 - 128/62)  BP(mean): --  RR: 18 (17 Nov 2021 08:20) (18 - 18)  SpO2: 97% (17 Nov 2021 00:00) (97% - 97%)    PHYSICAL EXAM:  GENERAL: NAD,thin  HEAD:  Atraumatic, Normocephalic  EYES: EOMI, PERRLA, conjunctiva and sclera clear  ENMT: No tonsillar erythema, exudates, or enlargement; Moist mucous membranes,  NECK: Supple, No JVD, Normal thyroid  NERVOUS SYSTEM:  Alert & Oriented X3  PULM: Clear to auscultation bilaterally  CARDIAC: Regular rate and rhythm; No murmurs, rubs, or gallops  GI: Soft, + Colostomy   EXTREMITIES:  trace lower extremity edema   LYMPH: No lymphadenopathy noted  SKIN: No rashes or lesions    Consultant(s) Notes Reviewed:  [x ] YES  [ ] NO  Care Discussed with Consultants/Other Providers [ x] YES  [ ] NO    LABS:                            7.7    8.15  )-----------( 206      ( 17 Nov 2021 06:07 )             23.2   11-17    136  |  99  |  49<H>  ----------------------------<  96  4.8   |  21  |  4.7<HH>    Ca    8.0<L>      17 Nov 2021 06:07  Mg     1.9     11-17    TPro  4.6<L>  /  Alb  2.7<L>  /  TBili  0.2  /  DBili  x   /  AST  18  /  ALT  12  /  AlkPhos  80  11-17            acetaminophen     Tablet .. 650 milliGRAM(s) Oral every 6 hours PRN  amLODIPine   Tablet 10 milliGRAM(s) Oral daily  aspirin  chewable 81 milliGRAM(s) Oral daily  atorvastatin 20 milliGRAM(s) Oral at bedtime  calcium acetate 667 milliGRAM(s) Oral three times a day with meals  chlorhexidine 4% Liquid 1 Application(s) Topical daily  dextrose 40% Gel 15 Gram(s) Oral once  dextrose 5%. 1000 milliLiter(s) IV Continuous <Continuous>  dextrose 5%. 1000 milliLiter(s) IV Continuous <Continuous>  dextrose 50% Injectable 25 Gram(s) IV Push once  dextrose 50% Injectable 12.5 Gram(s) IV Push once  dextrose 50% Injectable 25 Gram(s) IV Push once  furosemide    Tablet 20 milliGRAM(s) Oral daily  glucagon  Injectable 1 milliGRAM(s) IntraMuscular once  hydrALAZINE 25 milliGRAM(s) Oral three times a day  insulin lispro (ADMELOG) corrective regimen sliding scale   SubCutaneous three times a day before meals  pantoprazole    Tablet 40 milliGRAM(s) Oral before breakfast  sodium bicarbonate 1300 milliGRAM(s) Oral every 8 hours  tamsulosin 0.8 milliGRAM(s) Oral at bedtime      HEALTH ISSUES - PROBLEM Dx:          Case Discussed with House Staff   Spectra [phone]

## 2021-11-17 NOTE — PROGRESS NOTE ADULT - SUBJECTIVE AND OBJECTIVE BOX
CECILLE FRANKEL 85y Male  MRN#: [id number]   CODE STATUS: DNR/DNI    Hospital Day: 25d    Pt is currently admitted with the primary diagnosis of Weakness 2ry to acute on chronic anemia.         SUBJECTIVE  Hospital Course: The patient is clinically and hemodynamically stable. Patients hemoglobin has been stable. Patient was found to have an acute on chronic DVT 10/25, may warrant repeat imaging, and patient is being followed up by vascular surgery. Eliquis is held because of risk of hematochezia. Patient is refusing dialysis. GI were contacted regarding the GI bleed pending their input. Patient is on 2L NC and CXR showed no acute worsening. Scrotal edema improving.     Overnight events: No acute overnight events. Patient is on 2L NC.       Subjective complaints: No subjective complaints    Present Today:   - Sanford:  No [  ], Yes [X] : Indication: Obstruction      - Type of IV Access:       .. CVC/Piccline:  No [  ], Yes [   ] : Indication:       .. Midline: No [  ], Yes [   ] : Indication:                                             ----------------------------------------------------------  OBJECTIVE  PAST MEDICAL & SURGICAL HISTORY  History of medical problems  Knik b/l ears, dm, htn, hypercholesteremia, ckd, bowel/bladder fistula, kidney stones    Colostomy present    History of surgery  procedure for kidney stones ( ?lithotripsy)                                              -----------------------------------------------------------  ALLERGIES:  No Known Allergies                                            ------------------------------------------------------------    HOME MEDICATIONS  Home Medications:  amLODIPine 5 mg oral tablet: 1 tab(s) orally 2 times a day (01 Sep 2021 16:54)  Aspir 81 oral delayed release tablet: 1 tab(s) orally once a day (01 Sep 2021 16:54)  atorvastatin 10 mg oral tablet: 2 tab(s) orally once a day (01 Sep 2021 16:54)  famotidine 20 mg oral tablet: 1 tab(s) orally once a day (01 Sep 2021 16:54)  Januvia 50 mg oral tablet: 1 tab(s) orally once a day (01 Sep 2021 16:54)  Lasix 20 mg oral tablet: 1 tab(s) orally every other day (01 Sep 2021 16:54)  pioglitazone 30 mg oral tablet: 1 tab(s) orally once a day (01 Sep 2021 16:54)  ramipril 5 mg oral capsule: 2 cap(s) orally once a day (01 Sep 2021 16:54)                           MEDICATIONS:  STANDING MEDICATIONS  amLODIPine   Tablet 10 milliGRAM(s) Oral daily  aspirin  chewable 81 milliGRAM(s) Oral daily  atorvastatin 20 milliGRAM(s) Oral at bedtime  calcium acetate 667 milliGRAM(s) Oral three times a day with meals  chlorhexidine 4% Liquid 1 Application(s) Topical daily  dextrose 40% Gel 15 Gram(s) Oral once  dextrose 5%. 1000 milliLiter(s) IV Continuous <Continuous>  dextrose 5%. 1000 milliLiter(s) IV Continuous <Continuous>  dextrose 50% Injectable 25 Gram(s) IV Push once  dextrose 50% Injectable 12.5 Gram(s) IV Push once  dextrose 50% Injectable 25 Gram(s) IV Push once  furosemide    Tablet 20 milliGRAM(s) Oral daily  glucagon  Injectable 1 milliGRAM(s) IntraMuscular once  hydrALAZINE 25 milliGRAM(s) Oral three times a day  insulin lispro (ADMELOG) corrective regimen sliding scale   SubCutaneous three times a day before meals  pantoprazole    Tablet 40 milliGRAM(s) Oral before breakfast  sodium bicarbonate 1300 milliGRAM(s) Oral every 8 hours  tamsulosin 0.8 milliGRAM(s) Oral at bedtime    PRN MEDICATIONS  acetaminophen     Tablet .. 650 milliGRAM(s) Oral every 6 hours PRN                                            ------------------------------------------------------------  VITAL SIGNS: Last 24 Hours  T(C): 36.3 (2021 08:20), Max: 36.4 (2021 15:37)  T(F): 97.3 (2021 08:20), Max: 97.5 (2021 15:37)  HR: 75 (2021 08:20) (56 - 75)  BP: 117/58 (2021 08:20) (115/56 - 128/62)  BP(mean): --  RR: 18 (2021 08:20) (16 - 18)  SpO2: 97% (2021 00:00) (97% - 97%)      21 @ 07:01  -  21 @ 07:00  --------------------------------------------------------  IN: 120 mL / OUT: 880 mL / NET: -760 mL                                             --------------------------------------------------------------  LABS:                        7.7    8.15  )-----------( 206      ( 2021 06:07 )             23.2     11    136  |  99  |  49<H>  ----------------------------<  96  4.8   |  21  |  4.7<HH>    Ca    8.0<L>      2021 06:07  Mg     1.9         TPro  4.6<L>  /  Alb  2.7<L>  /  TBili  0.2  /  DBili  x   /  AST  18  /  ALT  12  /  AlkPhos  80      PT/INR - ( 2021 04:30 )   PT: 11.50 sec;   INR: 1.00 ratio         PTT - ( 2021 06:07 )  PTT:30.7 sec  Urinalysis Basic - ( 2021 12:50 )    Color: Yellow / Appearance: Turbid / S.014 / pH: x  Gluc: x / Ketone: Negative  / Bili: Negative / Urobili: <2 mg/dL   Blood: x / Protein: 300 mg/dL / Nitrite: Negative   Leuk Esterase: Large / RBC: 9 /HPF / WBC >720 /HPF   Sq Epi: x / Non Sq Epi: 0 /HPF / Bacteria: Few                                                -------------------------------------------------------------  RADIOLOGY:  No new imaging                                           --------------------------------------------------------------    PHYSICAL EXAM:  General: Awake, alert, not in distress.  HEENT: Atraumatic, EOMI.  Chest: Decreased breath sounds at bases  CVS: SIS2 +, no murmur.  P/A: Soft, BS+, colostomy+, sanford+  CNS: Awake, alert , not oriented  Ext: 2+ edema feet, Scrotal Edema   Skin: No rash, no ulcers.                                           --------------------------------------------------------------    ASSESSMENT & PLAN  85 year old M with PMH of DM, Crohn s/p colostomy, CKD 4, HTN, HLD, BPH presents to ED with complaints of weakness. 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   - s/p 2U  packed RBC  - s/p Flexible sigmoidoscopy: The rectal mucosa looked erythematous and friable and was covered with multiple blood clots and mucus. 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 Diverticulosis of the rectosigmoid junction Internal hemorrhoids. The scope was advanced to the rectosigmoid junction but could not be advanced further in the colon due to the presence of blood clots and structuring of the sigmoid in the setting of the diverticular disease.     - pathology revealed inflammation  - GI F/u: short chain fatty acid enema or coconut oil enema for diversion colitis, consider CT pelvis w/ rectal enema to rule out any masses in the blind colonic stump  - As per IR:  pt at risk for perforation with rectal contrast  - S/p  1 unit PRBC on 21  - monitor HB/HCT  - GI f/u     # DVT  -  VA Duplex Lower Ext Vein Scan, Bilat (10.25.21) -> Acute thrombosis of the left posterior tibial and peroneal vein branches. 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  - Eliquis held sec to rectal bleed  - Vascular surgery eval: No IVC filter at this time, consider repeat duplex, consider aspirin and DVT prophylaxis if condition allows     # Acute kidney injury on CKD stage4  - US Renal (21) ->No hydronephrosis & Bilateral renal cysts.  - c/w sanford, c/w flomax  - non-oliguric (failed TOV)  - c/w  lasix, phoslo, & sodium bicarb  - monitor BMP  - nephrology following: Patient refusing dialysis and plan may include dialysis on outside basis when patient is ready   - Keep Sanford     # Edema   - Scrotal Edema Improving     # Oral Dysphagia  - Speech and swallow eval: Soft and bite sized, thin liquids    # Hypertension  - c/w norvasc, hydralazine, lasix    # Dyslipidemia  - c/w statin    # DNR/DNI    Daughter Kerry Perry contacted yesterday on [phone] and updated about her father's status. Made aware that father is at risk of clots since eliquis is held. Informed that vascular do not want to place an IVC filter at this point, Sanford is to remain in place and this may increase risk of infection, and that GI will be following up regarding the rectal bleed.

## 2021-11-17 NOTE — PROGRESS NOTE ADULT - ASSESSMENT
#Acute on chronic anemia secondary to hematochezia  secondary to suspected diverticular bleed in the setting of diverticulosis versus hemorrhoids  appreciate vascular eval , continue with aspirin for now   monitor hemoglobin    #Mild mitral valve regurgitation.     #. Mild tricuspid regurgitation.    #JOSH on CKD 4 secondary to suspected ATN improving   appreciate nephrology follow up, may need rrt in future not on this admission     #DM   POCT Blood Glucose.: 222 mg/dL (17 Nov 2021 10:54)  POCT Blood Glucose.: 108 mg/dL (17 Nov 2021 07:44)  POCT Blood Glucose.: 165 mg/dL (16 Nov 2021 20:26)  POCT Blood Glucose.: 180 mg/dL (16 Nov 2021 17:07)  controlled    #HTN   BP: 114/88 (17 Nov 2021 13:08) (114/88 - 128/62)  controlled    #BPH     #Dyslipidemia on statin     #Pulmonary hypertension     PROGRESS NOTE HANDOFF    Pending: monitor hemoglobin x 24 hours , then likely dc planning     Family discussion: daughter aware of plan of care    Disposition: SNF

## 2021-11-17 NOTE — PROGRESS NOTE ADULT - ASSESSMENT
# JOSH on CKD 4 - likely ATN d/t severe sepsis / acute on chronic anemia  # Metabolic acidosis w/ anion gap   # Hyponatremia / low eav  # Acute on chronic anemia   # Acute pyelonephritis / Abdominal wall cellulitis   # Hx of crohn s/p colostomy     Recommendations:  - urinary retention / failed TOV, maintain sanford catheter,  f/u (can f/u outpatient)  - creatinine stable  - cont strict i/o / daily weights  - continue sodium bicarb 1300mg q8h   - last phos level at goal, cont phoslo  - BP well controlled / check orthostatics, decrease tamsulosin to 0.4mg if positive   - CT noted w/o hydronephrosis  - appreciate GI f/u  - monitor hgb, RBC transfusion as needed  - pt is close to HD, but does not want to do it unless it is absolutely needed (his wife was on HD).  Currently w/o acute indication, cont monitoring off HD  - DNR/DNI   # JOSH on CKD 4 - likely ATN d/t severe sepsis / acute on chronic anemia  # Metabolic acidosis w/ anion gap   # Hyponatremia / low eav  # Acute on chronic anemia   # Acute pyelonephritis / Abdominal wall cellulitis   # Hx of crohn s/p colostomy     Recommendations:  - urinary retention / failed TOV, maintain sanford catheter,  f/u (can f/u outpatient)  - creatinine stable  - cont strict i/o / daily weights  - continue sodium bicarb 1300mg q8h   - last phos level at goal, cont phoslo  - BP well controlled / decrease amlodipine to 5mg if hypotensive or orthostatic positive  - CT noted w/o hydronephrosis  - appreciate GI f/u  - monitor hgb, RBC transfusion as needed  - pt is close to HD, but does not want to do it unless it is absolutely needed (his wife was on HD).  Currently w/o acute indication, cont monitoring off HD  - DNR/DNI

## 2021-11-17 NOTE — PROGRESS NOTE ADULT - SUBJECTIVE AND OBJECTIVE BOX
Nephrology Progress Note    CECILLE FRANKEL  MRN-[id number]  85y  Male    S:  Patient is seen and examined, events over the last 24h noted.    O:  Allergies:  No Known Allergies    Hospital Medications:   MEDICATIONS  (STANDING):  amLODIPine   Tablet 10 milliGRAM(s) Oral daily  aspirin  chewable 81 milliGRAM(s) Oral daily  atorvastatin 20 milliGRAM(s) Oral at bedtime  calcium acetate 667 milliGRAM(s) Oral three times a day with meals  furosemide    Tablet 20 milliGRAM(s) Oral daily  glucagon  Injectable 1 milliGRAM(s) IntraMuscular once  hydrALAZINE 25 milliGRAM(s) Oral three times a day  insulin lispro (ADMELOG) corrective regimen sliding scale   SubCutaneous three times a day before meals  pantoprazole    Tablet 40 milliGRAM(s) Oral before breakfast  sodium bicarbonate 1300 milliGRAM(s) Oral every 8 hours  tamsulosin 0.8 milliGRAM(s) Oral at bedtime    MEDICATIONS  (PRN):  acetaminophen     Tablet .. 650 milliGRAM(s) Oral every 6 hours PRN Mild Pain (1 - 3)    Home Medications:  amLODIPine 5 mg oral tablet: 1 tab(s) orally 2 times a day (01 Sep 2021 16:54)  Aspir 81 oral delayed release tablet: 1 tab(s) orally once a day (01 Sep 2021 16:54)  atorvastatin 10 mg oral tablet: 2 tab(s) orally once a day (01 Sep 2021 16:54)  famotidine 20 mg oral tablet: 1 tab(s) orally once a day (01 Sep 2021 16:54)  Januvia 50 mg oral tablet: 1 tab(s) orally once a day (01 Sep 2021 16:54)  Lasix 20 mg oral tablet: 1 tab(s) orally every other day (01 Sep 2021 16:54)  pioglitazone 30 mg oral tablet: 1 tab(s) orally once a day (01 Sep 2021 16:54)  ramipril 5 mg oral capsule: 2 cap(s) orally once a day (01 Sep 2021 16:54)      VITALS:  T(F): 97.3 (21 @ 08:20), Max: 97.5 (21 @ 15:37)  HR: 70 (21 @ 13:08)  BP: 114/88 (21 @ 13:08)  RR: 18 (21 @ 08:20)  SpO2: 97% (21 @ 00:00)  Wt(kg): --  I&O's Detail    2021 07:01  -  2021 07:00  --------------------------------------------------------  IN:    Oral Fluid: 120 mL  Total IN: 120 mL    OUT:    Indwelling Catheter - Urethral (mL): 500 mL    Voided (mL): 380 mL  Total OUT: 880 mL    Total NET: -760 mL        I&O's Summary    2021 07:01  -  2021 07:00  --------------------------------------------------------  IN: 120 mL / OUT: 880 mL / NET: -760 mL          PHYSICAL EXAM:  Gen: NAD, ill appearing  Resp: CTAB  Card: S1/S2  Abd: soft, colostomy  Extremities: no edema  +sanford      LABS:        136  |  99  |  49<H>  ----------------------------<  96  4.8   |  21  |  4.7<HH>    Ca    8.0<L>      2021 06:07  Mg     1.9         TPro  4.6<L>  /  Alb  2.7<L>  /  TBili  0.2  /  DBili      /  AST  18  /  ALT  12  /  AlkPhos  80      eGFR if Non African American: 11 mL/min/1.73M2 (21 @ 06:07)  eGFR if : 12 mL/min/1.73M2 (21 @ 06:07)    Phosphorus Level, Serum: 3.9 mg/dL (21 @ 04:30)  Phosphorus Level, Serum: 3.8 mg/dL (10-31-21 @ 04:30)    Osmolality, Serum: 298 mos/kg (10-24-21 @ 11:32)  Osmolality, Serum: 288 mos/kg (21 @ 07:44)                          7.7    8.15  )-----------( 206      ( 2021 06:07 )             23.2     Mean Cell Volume: 89.6 fL (21 @ 06:07)    % Saturation, Iron: 48 % (10-24-21 @ 11:32)  Ferritin, Serum: 326 ng/mL (10-24-21 @ 11:32)      Urine Studies:  Urinalysis Basic - ( 2021 12:50 )    Color: Yellow / Appearance: Turbid / S.014 / pH:   Gluc:  / Ketone: Negative  / Bili: Negative / Urobili: <2 mg/dL   Blood:  / Protein: 300 mg/dL / Nitrite: Negative   Leuk Esterase: Large / RBC: 9 /HPF / WBC >720 /HPF   Sq Epi:  / Non Sq Epi: 0 /HPF / Bacteria: Few        Urea Nitrogen,  Random Urine: 247 mg/dL (21 @ 19:33)    Culture Results:   <10,000 CFU/mL Normal Urogenital Beronica ( @ 01:05)  Culture Results:   <10,000 CFU/mL Normal Urogenital Beronica (10-28 @ 11:34)    Creatinine trend:  Creatinine, Serum: 4.7 mg/dL (21 @ 06:07)  Creatinine, Serum: 4.5 mg/dL (21 @ 04:30)  Creatinine, Serum: 4.4 mg/dL (11-15-21 @ 05:43)  Creatinine, Serum: 4.8 mg/dL (21 @ 04:30)  Creatinine, Serum: 4.8 mg/dL (21 @ 09:45)  Creatinine, Serum: 4.5 mg/dL (21 @ 07:58)  Creatinine, Serum: 4.7 mg/dL (21 @ 08:13)      Hepatitis B Surface Antibody: Reactive (11-15-21 @ 19:09)  Hepatitis B Core Antibody, Total: Nonreact (11-15-21 @ 19:09)  Hepatitis B Core IgM Antibody: Nonreact (11-15-21 @ 19:09)    US Renal:   EXAM:  US KIDNEY(S)            PROCEDURE DATE:  2021            INTERPRETATION:  CLINICAL INFORMATION: Urinary retention    COMPARISON: None available.    TECHNIQUE: Sonography of the kidneys and bladder.    FINDINGS:    Right kidney: 10.5 cm. 2.9 x 2.6 x 2.9 cm renal cyst in the midpole. Normal echogenicity. No evidence of hydronephrosis or solid mass. Vascular flow demonstrated in the hilum.    Left kidney:  9.8 cm. 2.0 x 1.9 x 1.9 cm renal cyst in the upper pole. Normal echogenicity. No evidence of hydronephrosis or solid mass. Vascular flow demonstrated in the hilum.    Urinary bladder: Cannot be evaluated as it is empty    IMPRESSION:  No hydronephrosis  Bilateral renal cysts.      --- End of Report ---            GERMÁN THAO MD; Resident Radiologist  This document has been electronically signed.  REBECA BARON MD; Attending Radiologist  This document has been electronically signed. Sep  3 2021  9:15AM (21 @ 14:08)

## 2021-11-17 NOTE — CHART NOTE - NSCHARTNOTEFT_GEN_A_CORE
Registered Dietitian Follow-Up     Patient Profile Reviewed                           Yes [x]   No []     Nutrition History Previously Obtained        Yes [x]  No []       Pertinent Subjective Information: RN reports pt is a picky eater and eats what he prefers. eats about 50% of his meals. However, does drink Glucerna BID.       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/ Diverticulosis/ Internal Hemorrhoids- monitor HB/HCT  - GI f/u   - Vascular surgery eval: No IVC filter at this time, consider repeat duplex, consider aspirin and DVT prophylaxis if condition allows   # Acute kidney injury on CKD stage4- nephrology following: Patient refusing dialysis and plan may include dialysis on outside basis when patient is ready      Diet order: Diet, Consistent Carbohydrate w/Evening Snack:   Fiber/Residue Restricted  60 gm Protein (PRO60G)     Qty per Day:  VANILLA FLAVOR ONLY  Supplement Feeding Modality:  Oral  Glucerna Shake Cans or Servings Per Day:  1       Frequency:  Two Times a day (11-10-21 @ 11:43) [Active]    Anthropometrics:  Height (cm): 167.6cm   Weight (kg): 66.5 (11-04-21 @ 14:20) -- other wts noted, fluctuations likely r/t edema noted   BMI (kg/m2): 16.5 (11-04-21 @ 14:20)  IBW: 64.4kg     MEDICATIONS  (STANDING):  amLODIPine   Tablet 10 milliGRAM(s) Oral daily  aspirin  chewable 81 milliGRAM(s) Oral daily  atorvastatin 20 milliGRAM(s) Oral at bedtime  calcium acetate 667 milliGRAM(s) Oral three times a day with meals  dextrose 40% Gel 15 Gram(s) Oral once  dextrose 5%. 1000 milliLiter(s) (100 mL/Hr) IV Continuous <Continuous>  dextrose 5%. 1000 milliLiter(s) (50 mL/Hr) IV Continuous <Continuous>  dextrose 50% Injectable 25 Gram(s) IV Push once  dextrose 50% Injectable 12.5 Gram(s) IV Push once  dextrose 50% Injectable 25 Gram(s) IV Push once  furosemide    Tablet 20 milliGRAM(s) Oral daily  glucagon  Injectable 1 milliGRAM(s) IntraMuscular once  hydrALAZINE 25 milliGRAM(s) Oral three times a day  insulin lispro (ADMELOG) corrective regimen sliding scale   SubCutaneous three times a day before meals  pantoprazole    Tablet 40 milliGRAM(s) Oral before breakfast  sodium bicarbonate 1300 milliGRAM(s) Oral every 8 hours  tamsulosin 0.8 milliGRAM(s) Oral at bedtime    Pertinent Labs: 11-17 @ 06:07: Na 136, BUN 49<H>, Cr 4.7<HH>, BG 96, K+ 4.8, Phos --, Mg 1.9, Alk Phos 80, ALT/SGPT 12, AST/SGOT 18, HbA1c --    Finger Sticks:  POCT Blood Glucose.: 222 mg/dL (11-17 @ 10:54)  POCT Blood Glucose.: 108 mg/dL (11-17 @ 07:44)  POCT Blood Glucose.: 165 mg/dL (11-16 @ 20:26)  POCT Blood Glucose.: 180 mg/dL (11-16 @ 17:07)    Physical Findings:  - Appearance: confused/ disoriented to time, situation; 11/17: 2+ L/R foot, 3+ scrotum edema    - GI function: colostomy, LBM 11/17   - Tubes: n/a   - Oral/Mouth cavity: Soft and bite sized, thin liquids per SLP 11/12  - Skin: ecchymosis      Nutrition Requirements: per prev RD assessment 11/6  Weight Used: 61.2kg      Estimated Energy Needs    Continue [x]  Adjust []  MSJ*1.2-1.3= 1495-1620kcal     Estimated Protein Needs    Continue [x]  Adjust []  61-73 g protein based on 1.0-1.2 g/kg -- d/t elevated renal fx -- aim towards lower end     Estimated Fluid Needs        Continue [x]  Adjust []  per LIP     Nutrient Intake: 50% of meals + 440kcal/20g PRO from Supplements -- likely meeting needs w/ both      [] Previous Nutrition Diagnosis: Increased Nutrient Needs            [x] Ongoing          [] Resolved     Nutrition Intervention: meals and snacks, coordination of care     Goal/Expected Outcome: Pt to consume and tolerate >75% of meals/snacks and PO supplements in 7-10 days.      Nutrition Monitoring:diet order,energy intake,body composition,NFPF, renal/electrolyte/glucose profile    Recommendation:  1. Add Soft/bite-sized w/ thins diet order per SLP reccs   2. cont w/ low fiber/residue + CHO + 60g PRO rest diet order   3. obtain daily wts   4. encourage po intake     [phone]  RD remains available: Rimma Peralta, SEPIDEHN [phone].

## 2021-11-18 NOTE — PROGRESS NOTE ADULT - ASSESSMENT
#Acute on chronic anemia secondary to hematochezia  secondary to suspected diverticular bleed in the setting of diverticulosis versus hemorrhoids  continue with aspirin , appreciate gi follow up     #Mild mitral valve regurgitation.     #. Mild tricuspid regurgitation.    #JOSH on CKD 4 secondary to suspected ATN improving   improved marginally    #DM   CAPILLARY BLOOD GLUCOSE      POCT Blood Glucose.: 241 mg/dL (18 Nov 2021 16:09)  POCT Blood Glucose.: 136 mg/dL (18 Nov 2021 11:19)  POCT Blood Glucose.: 116 mg/dL (18 Nov 2021 07:40)  POCT Blood Glucose.: 137 mg/dL (17 Nov 2021 21:14)  POCT Blood Glucose.: 122 mg/dL (17 Nov 2021 16:32)  controlled    #HTN   BP: 128/65 (18 Nov 2021 12:49) (121/61 - 144/64)  controlled    #BPH     #Dyslipidemia on statin     #Pulmonary hypertension     PROGRESS NOTE HANDOFF    Pending:  anticipate dc in am     Family discussion: daughter aware of plan of care    Disposition: SNF

## 2021-11-18 NOTE — PROGRESS NOTE ADULT - SUBJECTIVE AND OBJECTIVE BOX
CECILLE FRANKEL 85y Male  MRN#: [id number]   CODE STATUS: DNR/DNI    Hospital Day: 26d    Pt is currently admitted with the primary diagnosis of Weakness 2ry to acute on chronic anemia.    SUBJECTIVE  Hospital Course: The patient is clinically and hemodynamically stable. Patients hemoglobin has been stable. Patient was found to have an acute on chronic DVT 10/25, may warrant repeat imaging, and patient is being followed up by vascular surgery. Eliquis is held because of risk of hematochezia, started on aspirin instead. Patient is refusing dialysis. GI were contacted regarding the GI bleed pending their input. Patient is on 2L NC and CXR showed no acute worsening. Scrotal edema improving.     Overnight events: No acute overnight events. Patient is on 2L NC.       Subjective complaints: No subjective complaints    Present Today:   - Sanford:  No [  ], Yes [X] : Indication: Obstruction      - Type of IV Access:       .. CVC/Piccline:  No [  ], Yes [   ] : Indication:       .. Midline: No [  ], Yes [   ] : Indication:                                             ----------------------------------------------------------  OBJECTIVE  PAST MEDICAL & SURGICAL HISTORY  History of medical problems  [place] b/l ears, dm, htn, hypercholesteremia, ckd, bowel/bladder fistula, kidney stones    Colostomy present    History of surgery  procedure for kidney stones ( ?lithotripsy)                                              -----------------------------------------------------------  ALLERGIES:  No Known Allergies                                            ------------------------------------------------------------    HOME MEDICATIONS  Home Medications:  amLODIPine 5 mg oral tablet: 1 tab(s) orally 2 times a day (01 Sep 2021 16:54)  Aspir 81 oral delayed release tablet: 1 tab(s) orally once a day (01 Sep 2021 16:54)  atorvastatin 10 mg oral tablet: 2 tab(s) orally once a day (01 Sep 2021 16:54)  famotidine 20 mg oral tablet: 1 tab(s) orally once a day (01 Sep 2021 16:54)  Januvia 50 mg oral tablet: 1 tab(s) orally once a day (01 Sep 2021 16:54)  Lasix 20 mg oral tablet: 1 tab(s) orally every other day (01 Sep 2021 16:54)  pioglitazone 30 mg oral tablet: 1 tab(s) orally once a day (01 Sep 2021 16:54)  ramipril 5 mg oral capsule: 2 cap(s) orally once a day (01 Sep 2021 16:54)                           MEDICATIONS:  STANDING MEDICATIONS  amLODIPine   Tablet 10 milliGRAM(s) Oral daily  aspirin  chewable 81 milliGRAM(s) Oral daily  atorvastatin 20 milliGRAM(s) Oral at bedtime  calcium acetate 667 milliGRAM(s) Oral three times a day with meals  chlorhexidine 4% Liquid 1 Application(s) Topical daily  dextrose 40% Gel 15 Gram(s) Oral once  dextrose 5%. 1000 milliLiter(s) IV Continuous <Continuous>  dextrose 5%. 1000 milliLiter(s) IV Continuous <Continuous>  dextrose 50% Injectable 25 Gram(s) IV Push once  dextrose 50% Injectable 12.5 Gram(s) IV Push once  dextrose 50% Injectable 25 Gram(s) IV Push once  furosemide    Tablet 20 milliGRAM(s) Oral daily  glucagon  Injectable 1 milliGRAM(s) IntraMuscular once  hydrALAZINE 25 milliGRAM(s) Oral three times a day  insulin lispro (ADMELOG) corrective regimen sliding scale   SubCutaneous three times a day before meals  pantoprazole    Tablet 40 milliGRAM(s) Oral before breakfast  sodium bicarbonate 1300 milliGRAM(s) Oral every 8 hours  tamsulosin 0.8 milliGRAM(s) Oral at bedtime    PRN MEDICATIONS  acetaminophen     Tablet .. 650 milliGRAM(s) Oral every 6 hours PRN                                            ------------------------------------------------------------  VITAL SIGNS: Last 24 Hours  T(C): 36.2 (2021 07:42), Max: 36.2 (2021 07:42)  T(F): 97.2 (2021 07:42), Max: 97.2 (2021 07:42)  HR: 77 (2021 07:42) (70 - 80)  BP: 128/79 (2021 07:42) (114/88 - 144/64)  BP(mean): --  RR: 18 (2021 07:42) (18 - 18)  SpO2: 93% (2021 00:00) (92% - 93%)      21 @ 07:01  -  21 @ 07:00  --------------------------------------------------------  IN: 0 mL / OUT: 1250 mL / NET: -1250 mL                                             --------------------------------------------------------------  LABS:                        8.0    8.64  )-----------( 222      ( 2021 07:15 )             24.8         138  |  99  |  49<H>  ----------------------------<  111<H>  4.9   |  21  |  4.6<HH>    Ca    8.2<L>      2021 07:15  Mg     1.9         TPro  4.8<L>  /  Alb  2.8<L>  /  TBili  0.2  /  DBili  x   /  AST  20  /  ALT  13  /  AlkPhos  85      PTT - ( 2021 07:15 )  PTT:29.9 sec  Urinalysis Basic - ( 2021 12:50 )    Color: Yellow / Appearance: Turbid / S.014 / pH: x  Gluc: x / Ketone: Negative  / Bili: Negative / Urobili: <2 mg/dL   Blood: x / Protein: 300 mg/dL / Nitrite: Negative   Leuk Esterase: Large / RBC: 9 /HPF / WBC >720 /HPF   Sq Epi: x / Non Sq Epi: 0 /HPF / Bacteria: Few                                              -------------------------------------------------------------  RADIOLOGY:  No new imaging                                             --------------------------------------------------------------    PHYSICAL EXAM:  General: Awake, alert, not in distress.  HEENT: Atraumatic, EOMI.  Chest: Decreased breath sounds at bases  CVS: SIS2 +, no murmur.  P/A: Soft, BS+, colostomy+, sanford+  CNS: Awake, alert , not oriented  Ext: 1+ edema feet, Scrotal Edema Resolved  Skin: No rash, no ulcers.                                             --------------------------------------------------------------    ASSESSMENT & PLAN  85 year old M with PMH of DM, Crohn s/p colostomy, CKD 4, HTN, HLD, BPH presents to ED with complaints of weakness. 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   - s/p 2U  packed RBC  - s/p Flexible sigmoidoscopy: The rectal mucosa looked erythematous and friable and was covered with multiple blood clots and mucus. 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 Diverticulosis of the rectosigmoid junction Internal hemorrhoids. The scope was advanced to the rectosigmoid junction but could not be advanced further in the colon due to the presence of blood clots and structuring of the sigmoid in the setting of the diverticular disease.     - pathology revealed inflammation  - GI F/u: short chain fatty acid enema or coconut oil enema for diversion colitis, consider CT pelvis w/ rectal enema to rule out any masses in the blind colonic stump  - As per IR:  pt at risk for perforation with rectal contrast  - S/p  1 unit PRBC on 21  - monitor HB/HCT  - GI f/u     # DVT  -  VA Duplex Lower Ext Vein Scan, Bilat (10.25.21) -> Acute thrombosis of the left posterior tibial and peroneal vein branches. 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  - Eliquis held sec to rectal bleed, Aspirin 81 Q24 started   - Vascular surgery eval: No IVC filter at this time, consider repeat duplex, consider aspirin and DVT prophylaxis if condition allows     # Acute kidney injury on CKD stage4  - US Renal (21) ->No hydronephrosis & Bilateral renal cysts.  - c/w sanford, c/w flomax  - non-oliguric (failed TOV)  - c/w  lasix, phoslo, & sodium bicarb  - monitor BMP  - nephrology following: Patient refusing dialysis and plan may include dialysis on outside basis when patient is ready   - Keep Sanford  - FU UCx     # Edema   - Scrotal Edema Improving     # Oral Dysphagia  - Speech and swallow eval: Soft and bite sized, thin liquids    # Hypertension  - c/w norvasc, hydralazine, lasix    # Dyslipidemia  - c/w statin    # DNR/DNI    Daughter Kerry Perry contacted today on [phone] called twice with no answer

## 2021-11-18 NOTE — PROGRESS NOTE ADULT - SUBJECTIVE AND OBJECTIVE BOX
CECILLE FRANKEL  85y  [facility] [id number] B      Patient is a 85y old  Male who presents with a chief complaint of weakness (18 Nov 2021 14:55)      INTERVAL HPI/OVERNIGHT EVENTS:    no acute events overnight     REVIEW OF SYSTEMS:  ROS neg    T(C): 36.2 (11-18-21 @ 07:42), Max: 36.2 (11-18-21 @ 07:42)  HR: 78 (11-18-21 @ 12:49) (72 - 80)  BP: 128/65 (11-18-21 @ 12:49) (121/61 - 144/64)  RR: 18 (11-18-21 @ 07:42) (18 - 18)  SpO2: 93% (11-18-21 @ 00:00) (92% - 93%)  Wt(kg): --Vital Signs Last 24 Hrs  T(C): 36.2 (18 Nov 2021 07:42), Max: 36.2 (18 Nov 2021 07:42)  T(F): 97.2 (18 Nov 2021 07:42), Max: 97.2 (18 Nov 2021 07:42)  HR: 78 (18 Nov 2021 12:49) (72 - 80)  BP: 128/65 (18 Nov 2021 12:49) (121/61 - 144/64)  BP(mean): --  RR: 18 (18 Nov 2021 07:42) (18 - 18)  SpO2: 93% (18 Nov 2021 00:00) (92% - 93%)    PHYSICAL EXAM:  GENERAL: NAD, well-groomed, well-developed  HEAD:  Atraumatic, Normocephalic  EYES: EOMI, PERRLA, conjunctiva and sclera clear  ENMT: No tonsillar erythema, exudates, or enlargement; Moist mucous membranes, Good dentition, No lesions  NECK: Supple, No JVD, Normal thyroid  NERVOUS SYSTEM:  Alert   PULM: Clear to auscultation bilaterally  CARDIAC: Regular rate and rhythm; No murmurs, rubs, or gallops  GI: Soft, + colostomy   EXTREMITIES:  2+ Peripheral Pulses, No clubbing, cyanosis, or edema  LYMPH: No lymphadenopathy noted  SKIN: No rashes or lesions    Consultant(s) Notes Reviewed:  [x ] YES  [ ] NO  Care Discussed with Consultants/Other Providers [ x] YES  [ ] NO    LABS:                            8.0    8.64  )-----------( 222      ( 18 Nov 2021 07:15 )             24.8   11-18    138  |  99  |  49<H>  ----------------------------<  111<H>  4.9   |  21  |  4.6<HH>    Ca    8.2<L>      18 Nov 2021 07:15  Mg     1.9     11-18    TPro  4.8<L>  /  Alb  2.8<L>  /  TBili  0.2  /  DBili  x   /  AST  20  /  ALT  13  /  AlkPhos  85  11-18            acetaminophen     Tablet .. 650 milliGRAM(s) Oral every 6 hours PRN  amLODIPine   Tablet 10 milliGRAM(s) Oral daily  aspirin  chewable 81 milliGRAM(s) Oral daily  atorvastatin 20 milliGRAM(s) Oral at bedtime  calcium acetate 667 milliGRAM(s) Oral three times a day with meals  chlorhexidine 4% Liquid 1 Application(s) Topical daily  dextrose 40% Gel 15 Gram(s) Oral once  dextrose 5%. 1000 milliLiter(s) IV Continuous <Continuous>  dextrose 5%. 1000 milliLiter(s) IV Continuous <Continuous>  dextrose 50% Injectable 25 Gram(s) IV Push once  dextrose 50% Injectable 12.5 Gram(s) IV Push once  dextrose 50% Injectable 25 Gram(s) IV Push once  enoxaparin Injectable 40 milliGRAM(s) SubCutaneous daily  furosemide    Tablet 20 milliGRAM(s) Oral daily  glucagon  Injectable 1 milliGRAM(s) IntraMuscular once  hydrALAZINE 25 milliGRAM(s) Oral three times a day  insulin lispro (ADMELOG) corrective regimen sliding scale   SubCutaneous three times a day before meals  pantoprazole    Tablet 40 milliGRAM(s) Oral before breakfast  sodium bicarbonate 1300 milliGRAM(s) Oral every 8 hours  tamsulosin 0.8 milliGRAM(s) Oral at bedtime      HEALTH ISSUES - PROBLEM Dx:          Case Discussed with House Staff   Spectra [phone]

## 2021-11-18 NOTE — PROGRESS NOTE ADULT - ASSESSMENT
85 year old M with PMH of DM, Crohn s/p colostomy, CKD 4, HTN, HLD, BPH, DVT on eliquis presents to ED with complaints of weakness. Admitted with working diagnosis of pyelonephritis, JOSH on CKD, acute on chronic DVT, altered mental status metabolic encephalopathy delirium. GI was consulted initially for BRBPR s/p sigmoidoscopy showed diversion colitis. recalled GI for rectal bleed on 10/7.     #)h/o crohn's with colostomy bag  #)Hematochezia: due to diversion colitis - resolving  - H&H stable at 8  -Hemodynamically stable  - CARLI: minimal red blood  - s/p Flexible sigmoidoscopy :    The rectal mucosa looked erythematous and friable and was covered with multiple blood clots and mucus. 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      Diverticulosis of the rectosigmoid junction.      Internal hemorrhoids.      The scope was advanced to the rectosigmoid junction but could not be advanced further in the colon due to the presence of blood clots and structuring of the sigmoid in the setting of the diverticular disease.     - pathology showed inflammation      Rec:  - can resume anticoagulation if clinically required  - Follow up with our [facility] located at [address] [place] [address]. Phone Number: [phone]

## 2021-11-18 NOTE — PROGRESS NOTE ADULT - SUBJECTIVE AND OBJECTIVE BOX
seen and examined  no distress   lying comfortable         PAST HISTORY  --------------------------------------------------------------------------------  No significant changes to PMH, PSH, FHx, SHx, unless otherwise noted    ALLERGIES & MEDICATIONS  --------------------------------------------------------------------------------  Allergies    No Known Allergies    Intolerances      Standing Inpatient Medications  amLODIPine   Tablet 10 milliGRAM(s) Oral daily  aspirin  chewable 81 milliGRAM(s) Oral daily  atorvastatin 20 milliGRAM(s) Oral at bedtime  calcium acetate 667 milliGRAM(s) Oral three times a day with meals  chlorhexidine 4% Liquid 1 Application(s) Topical daily  dextrose 40% Gel 15 Gram(s) Oral once  dextrose 5%. 1000 milliLiter(s) IV Continuous <Continuous>  dextrose 5%. 1000 milliLiter(s) IV Continuous <Continuous>  dextrose 50% Injectable 25 Gram(s) IV Push once  dextrose 50% Injectable 12.5 Gram(s) IV Push once  dextrose 50% Injectable 25 Gram(s) IV Push once  furosemide    Tablet 20 milliGRAM(s) Oral daily  glucagon  Injectable 1 milliGRAM(s) IntraMuscular once  hydrALAZINE 25 milliGRAM(s) Oral three times a day  insulin lispro (ADMELOG) corrective regimen sliding scale   SubCutaneous three times a day before meals  pantoprazole    Tablet 40 milliGRAM(s) Oral before breakfast  sodium bicarbonate 1300 milliGRAM(s) Oral every 8 hours  tamsulosin 0.8 milliGRAM(s) Oral at bedtime    PRN Inpatient Medications  acetaminophen     Tablet .. 650 milliGRAM(s) Oral every 6 hours PRN          VITALS/PHYSICAL EXAM  --------------------------------------------------------------------------------  T(C): 35.9 (11-18-21 @ 00:00), Max: 36.3 (11-17-21 @ 08:20)  HR: 78 (11-18-21 @ 05:33) (70 - 80)  BP: 126/62 (11-18-21 @ 05:33) (114/88 - 144/64)  RR: 18 (11-18-21 @ 00:00) (18 - 18)  SpO2: 93% (11-18-21 @ 00:00) (92% - 93%)  Wt(kg): --        11-17-21 @ 07:01  -  11-18-21 @ 07:00  --------------------------------------------------------  IN: 0 mL / OUT: 1250 mL / NET: -1250 mL      Physical Exam:  	Gen: NAD  	Pulm: decrease BS  B/L  	CV: S1S2; no rub  	Abd: +distended  	    LABS/STUDIES  --------------------------------------------------------------------------------              8.0    8.39  >-----------<  218      [11-17-21 @ 18:35]              24.3     136  |  99  |  49  ----------------------------<  96      [11-17-21 @ 06:07]  4.8   |  21  |  4.7        Ca     8.0     [11-17-21 @ 06:07]      Mg     1.9     [11-17-21 @ 06:07]    TPro  4.6  /  Alb  2.7  /  TBili  0.2  /  DBili  x   /  AST  18  /  ALT  12  /  AlkPhos  80  [11-17-21 @ 06:07]      PTT: 30.1       [11-17-21 @ 18:35]      Creatinine Trend:  SCr 4.7 [11-17 @ 06:07]  SCr 4.5 [11-16 @ 04:30]  SCr 4.4 [11-15 @ 05:43]  SCr 4.8 [11-14 @ 04:30]  SCr 4.8 [11-13 @ 09:45]    Urinalysis - [11-16-21 @ 12:50]      Color Yellow / Appearance Turbid / SG 1.014 / pH 7.5      Gluc Trace / Ketone Negative  / Bili Negative / Urobili <2 mg/dL       Blood Small / Protein 300 mg/dL / Leuk Est Large / Nitrite Negative      RBC 9 / WBC >720 / Hyaline 6 / Gran  / Sq Epi  / Non Sq Epi 0 / Bacteria Few      Iron 87, TIBC 183, %sat 48      [10-24-21 @ 11:32]  Ferritin 326      [10-24-21 @ 11:32]  TSH 0.43      [09-02-21 @ 07:44]  Lipid: chol 116, , HDL 39, LDL --      [09-02-21 @ 07:44]    HBsAb Reactive      [11-15-21 @ 19:09]  HBsAg Nonreact      [11-15-21 @ 19:09]  HBcAb Nonreact      [11-15-21 @ 19:09]    Free Light Chains: kappa 4.29, lambda 3.88, ratio = 1.11      [10-24 @ 11:32]  Immunofixation Serum:   No Monoclonal Band Identified    Reference Range: None Detected      [10-24-21 @ 11:32]  SPEP Interpretation: Hypogammaglobulinemia      [10-24-21 @ 11:32]  Immunofixation Urine: Reference Range: None Detected      [10-24-21 @ 18:44]  UPEP Interpretation: Mild Selective (Glomerular) Proteinuria      [10-24-21 @ 18:44]

## 2021-11-18 NOTE — PROGRESS NOTE ADULT - ASSESSMENT
# JOSH on CKD 4 - likely ATN d/t severe sepsis / acute on chronic anemia  # Metabolic acidosis w/ anion gap   # Hyponatremia / low eav  # Acute on chronic anemia   # Acute pyelonephritis / Abdominal wall cellulitis   # Hx of crohn s/p colostomy     Recommendations:  - urinary retention / failed TOV, maintain sanford catheter,  f/u (can f/u outpatient)  - creatinine was trending up/ check repeat today   - cont strict i/o / daily weights  - continue sodium bicarb 1300mg q8h   - last phos level at goal, in binders   - BP well controlled / decrease amlodipine to 5mg   - CT noted w/o hydronephrosis  - monitor hgb, RBC transfusion as needed  - pt is close to HD, but does not want to do it unless it is absolutely needed (his wife was on HD).  Currently w/o acute indication, cont monitoring off HD  - DNR/DNI

## 2021-11-18 NOTE — PROGRESS NOTE ADULT - SUBJECTIVE AND OBJECTIVE BOX
Gastroenterology progress note:     Patient is a 85y old  Male who presents with a chief complaint of weakness (18 Nov 2021 10:05)       Admitted on: 10-23-21    We are following the patient for:       Interval History:    No acute events overnight.   - Diet - tolerating  - last BM - per colostomy brown  - Abdominal pain - none      PAST MEDICAL & SURGICAL HISTORY:  History of medical problems  [place] b/l ears, dm, htn, hypercholesteremia, ckd, bowel/bladder fistula, kidney stones    Colostomy present    History of surgery  procedure for kidney stones ( ?lithotripsy)        MEDICATIONS  (STANDING):  amLODIPine   Tablet 10 milliGRAM(s) Oral daily  aspirin  chewable 81 milliGRAM(s) Oral daily  atorvastatin 20 milliGRAM(s) Oral at bedtime  calcium acetate 667 milliGRAM(s) Oral three times a day with meals  chlorhexidine 4% Liquid 1 Application(s) Topical daily  dextrose 40% Gel 15 Gram(s) Oral once  dextrose 5%. 1000 milliLiter(s) (50 mL/Hr) IV Continuous <Continuous>  dextrose 5%. 1000 milliLiter(s) (100 mL/Hr) IV Continuous <Continuous>  dextrose 50% Injectable 25 Gram(s) IV Push once  dextrose 50% Injectable 12.5 Gram(s) IV Push once  dextrose 50% Injectable 25 Gram(s) IV Push once  furosemide    Tablet 20 milliGRAM(s) Oral daily  glucagon  Injectable 1 milliGRAM(s) IntraMuscular once  hydrALAZINE 25 milliGRAM(s) Oral three times a day  insulin lispro (ADMELOG) corrective regimen sliding scale   SubCutaneous three times a day before meals  pantoprazole    Tablet 40 milliGRAM(s) Oral before breakfast  sodium bicarbonate 1300 milliGRAM(s) Oral every 8 hours  tamsulosin 0.8 milliGRAM(s) Oral at bedtime    MEDICATIONS  (PRN):  acetaminophen     Tablet .. 650 milliGRAM(s) Oral every 6 hours PRN Mild Pain (1 - 3)      Allergies  No Known Allergies      Review of Systems:   Cardiovascular:  No Chest Pain, No Palpitations  Respiratory:  No Cough, No Dyspnea  Gastrointestinal:  As described in HPI  Skin:  No Skin Lesions, No Jaundice  Neuro:  No Syncope, No Dizziness    Physical Examination:  T(C): 36.2 (11-18-21 @ 07:42), Max: 36.2 (11-18-21 @ 07:42)  HR: 78 (11-18-21 @ 12:49) (72 - 80)  BP: 128/65 (11-18-21 @ 12:49) (121/61 - 144/64)  RR: 18 (11-18-21 @ 07:42) (18 - 18)  SpO2: 93% (11-18-21 @ 00:00) (92% - 93%)      11-17-21 @ 07:01  -  11-18-21 @ 07:00  --------------------------------------------------------  IN: 0 mL / OUT: 1250 mL / NET: -1250 mL    11-18-21 @ 07:01  -  11-18-21 @ 14:56  --------------------------------------------------------  IN: 360 mL / OUT: 0 mL / NET: 360 mL        GENERAL: AAOx3, no acute distress.  HEAD:  Atraumatic, Normocephalic  EYES: conjunctiva and sclera clear  NECK: Supple, no JVD or thyromegaly  CHEST/LUNG: Clear to auscultation bilaterally; No wheeze, rhonchi, or rales  HEART: Regular rate and rhythm; normal S1, S2, No murmurs.  ABDOMEN: Soft, nontender, nondistended; Bowel sounds present. CARLI: minimal red blood. Colostomy bag brown stool  NEUROLOGY: No asterixis or tremor.   SKIN: Intact, no jaundice     Data:                        8.0    8.64  )-----------( 222      ( 18 Nov 2021 07:15 )             24.8     Hgb trend:  8.0  11-18-21 @ 07:15  8.0  11-17-21 @ 18:35  7.7  11-17-21 @ 06:07  8.0  11-16-21 @ 04:30        11-18    138  |  99  |  49<H>  ----------------------------<  111<H>  4.9   |  21  |  4.6<HH>    Ca    8.2<L>      18 Nov 2021 07:15  Mg     1.9     11-18    TPro  4.8<L>  /  Alb  2.8<L>  /  TBili  0.2  /  DBili  x   /  AST  20  /  ALT  13  /  AlkPhos  85  11-18    Liver panel trend:  TBili 0.2   /   AST 20   /   ALT 13   /   AlkP 85   /   Tptn 4.8   /   Alb 2.8    /   DBili --      11-18  TBili 0.2   /   AST 18   /   ALT 12   /   AlkP 80   /   Tptn 4.6   /   Alb 2.7    /   DBili --      11-17  TBili 0.2   /   AST 17   /   ALT 11   /   AlkP 80   /   Tptn 4.5   /   Alb 2.6    /   DBili --      11-16  TBili 0.3   /   AST 16   /   ALT 10   /   AlkP 86   /   Tptn 4.6   /   Alb 2.6    /   DBili --      11-15  TBili 0.3   /   AST 17   /   ALT 11   /   AlkP 93   /   Tptn 4.8   /   Alb 2.7    /   DBili --      11-14  TBili 0.3   /   AST 15   /   ALT 9   /   AlkP 83   /   Tptn 4.5   /   Alb 2.6    /   DBili --      11-13  TBili 0.2   /   AST 12   /   ALT 8   /   AlkP 77   /   Tptn 4.2   /   Alb 2.3    /   DBili --      11-12  TBili 0.2   /   AST 12   /   ALT 9   /   AlkP 92   /   Tptn 4.8   /   Alb 2.8    /   DBili --      11-11  TBili 0.3   /   AST 14   /   ALT 10   /   AlkP 90   /   Tptn 4.8   /   Alb 2.8    /   DBili --      11-10  TBili 0.2   /   AST 13   /   ALT 9   /   AlkP 80   /   Tptn 4.4   /   Alb 2.3    /   DBili --      11-10  TBili 0.2   /   AST 13   /   ALT 10   /   AlkP 74   /   Tptn 4.3   /   Alb 2.4    /   DBili --      11-09      PTT - ( 18 Nov 2021 07:15 )  PTT:29.9 sec       Radiology:

## 2021-11-19 NOTE — DISCHARGE NOTE PROVIDER - CARE PROVIDER_API CALL
Paresh Colón  Gastroenterology  [address] [place] [address]  Phone: [phone]  Fax: [phone]  Follow Up Time: 2 weeks    Yaquelin Montes)  Internal Medicine; Nephrology  [address] [place] [address]  Phone: [phone]  Fax: [phone]  Follow Up Time: 2 weeks    Varun Espinal)  Surgery; Vascular Surgery  [address] [place] [address]  Phone: [phone]  Fax: [phone]  Follow Up Time: 2 weeks    Tutu Ruiz)  Infectious Disease; Internal Medicine  [address] [place] [address]  Phone: [phone]  Fax: [phone]  Follow Up Time: 1 month

## 2021-11-19 NOTE — PROGRESS NOTE ADULT - ASSESSMENT
# JOSH on CKD 4 - likely ATN d/t severe sepsis / acute on chronic anemia  # Metabolic acidosis w/ anion gap   # Hyponatremia / low eav  # Acute on chronic anemia   # Acute pyelonephritis / Abdominal wall cellulitis   # Hx of crohn s/p colostomy     Recommendations:  - urinary retention / failed TOV, maintain sanford catheter,  f/u (can f/u outpatient)  - creatinine stable   - cont strict i/o / daily weights  - continue sodium bicarb 1300mg q8h   - last phos level at goal, in binders   - BP well controlled / continue current   - CT noted w/o hydronephrosis  - monitor hgb, RBC transfusion as needed  - pt is close to HD, but does not want to do it unless it is absolutely needed (his wife was on HD).  Currently w/o acute indication, cont monitoring off HD  - DNR/DNI  if discharged will need OP renal follow up

## 2021-11-19 NOTE — DISCHARGE NOTE NURSING/CASE MANAGEMENT/SOCIAL WORK - PATIENT PORTAL LINK FT
You can access the FollowMyHealth Patient Portal offered by [facility] by registering at the following website: [email or web address]. By joining [facility]’s FollowMyHealth portal, you will also be able to view your health information using other applications (apps) compatible with our system.

## 2021-11-19 NOTE — DISCHARGE NOTE PROVIDER - PROVIDER TOKENS
PROVIDER:[TOKEN:[[id number]],FOLLOWUP:[2 weeks]],PROVIDER:[TOKEN:[[id number]],FOLLOWUP:[2 weeks]],PROVIDER:[TOKEN:[[id number]],FOLLOWUP:[2 weeks]],PROVIDER:[TOKEN:[[id number]],FOLLOWUP:[1 month]]

## 2021-11-19 NOTE — PROGRESS NOTE ADULT - NUTRITIONAL ASSESSMENT
This patient has been assessed with a concern for Malnutrition and has been determined to have a diagnosis/diagnoses of Moderate protein-calorie malnutrition.    This patient is being managed with:   Diet Consistent Carbohydrate w/Evening Snack-  60 gm Protein (PRO60G)     Qty per Day:  Vanilla flavor  Supplement Feeding Modality:  Oral  Glucerna Shake Cans or Servings Per Day:  1       Frequency:  Two Times a day  Entered: Nov  3 2021  2:08PM    The following pending diet order is being considered for treatment of Moderate protein-calorie malnutrition:  Diet Regular-  60 gm Protein (PRO60G)  Low Sodium  Supplement Feeding Modality:  Oral  Glucerna Shake Cans or Servings Per Day:  1       Frequency:  Two Times a day  Entered: Nov 6 2021  9:03PM  
This patient has been assessed with a concern for Malnutrition and has been determined to have a diagnosis/diagnoses of Moderate protein-calorie malnutrition.    This patient is being managed with:   Diet Consistent Carbohydrate w/Evening Snack-  60 gm Protein (PRO60G)     Qty per Day:  Vanilla flavor  Supplement Feeding Modality:  Oral  Glucerna Shake Cans or Servings Per Day:  1       Frequency:  Two Times a day  Entered: Nov  3 2021  2:08PM    The following pending diet order is being considered for treatment of Moderate protein-calorie malnutrition:  Diet Regular-  60 gm Protein (PRO60G)  Low Sodium  Supplement Feeding Modality:  Oral  Glucerna Shake Cans or Servings Per Day:  1       Frequency:  Two Times a day  Entered: Nov 6 2021  9:03PM  
This patient has been assessed with a concern for Malnutrition and has been determined to have a diagnosis/diagnoses of Moderate protein-calorie malnutrition.    This patient is being managed with:   Diet Consistent Carbohydrate w/Evening Snack-  Fiber/Residue Restricted  60 gm Protein (PRO60G)     Qty per Day:  VANILLA FLAVOR ONLY  Supplement Feeding Modality:  Oral  Glucerna Shake Cans or Servings Per Day:  1       Frequency:  Two Times a day  Entered: Nov 10 2021 11:43AM    The following pending diet order is being considered for treatment of Moderate protein-calorie malnutrition:  Diet Regular-  60 gm Protein (PRO60G)  Low Sodium  Supplement Feeding Modality:  Oral  Glucerna Shake Cans or Servings Per Day:  1       Frequency:  Two Times a day  Entered: Nov 6 2021  9:03PM  
This patient has been assessed with a concern for Malnutrition and has been determined to have a diagnosis/diagnoses of Moderate protein-calorie malnutrition.    This patient is being managed with:   Diet Consistent Carbohydrate w/Evening Snack-  Fiber/Residue Restricted  60 gm Protein (PRO60G)     Qty per Day:  VANILLA FLAVOR ONLY  Supplement Feeding Modality:  Oral  Glucerna Shake Cans or Servings Per Day:  1       Frequency:  Two Times a day  Entered: Nov 10 2021 11:43AM    The following pending diet order is being considered for treatment of Moderate protein-calorie malnutrition:  Diet Regular-  60 gm Protein (PRO60G)  Low Sodium  Supplement Feeding Modality:  Oral  Glucerna Shake Cans or Servings Per Day:  1       Frequency:  Two Times a day  Entered: Nov 6 2021  9:03PM  
This patient has been assessed with a concern for Malnutrition and has been determined to have a diagnosis/diagnoses of Moderate protein-calorie malnutrition.    This patient is being managed with:   Diet Consistent Carbohydrate w/Evening Snack-  Fiber/Residue Restricted  60 gm Protein (PRO60G)     Qty per Day:  VANILLA FLAVOR ONLY  Supplement Feeding Modality:  Oral  Glucerna Shake Cans or Servings Per Day:  1       Frequency:  Two Times a day  Entered: Nov 10 2021 11:43AM    The following pending diet order is being considered for treatment of Moderate protein-calorie malnutrition:  Diet Soft and Bite Sized-  Consistent Carbohydrate {Evening Snack}  Fiber/Residue Restricted  60 gm Protein (PRO60G)     Qty per Day:  VANILLA FLAVOR ONLY  Supplement Feeding Modality:  Oral  Glucerna Shake Cans or Servings Per Day:  1       Frequency:  Two Times a day  Entered: Nov 17 2021  1:05PM    Diet Regular-  60 gm Protein (PRO60G)  Low Sodium  Supplement Feeding Modality:  Oral  Glucerna Shake Cans or Servings Per Day:  1       Frequency:  Two Times a day  Entered: Nov 6 2021  9:03PM  
This patient has been assessed with a concern for Malnutrition and has been determined to have a diagnosis/diagnoses of Moderate protein-calorie malnutrition.    This patient is being managed with:   Diet Consistent Carbohydrate w/Evening Snack-  Fiber/Residue Restricted  60 gm Protein (PRO60G)     Qty per Day:  VANILLA FLAVOR ONLY  Supplement Feeding Modality:  Oral  Glucerna Shake Cans or Servings Per Day:  1       Frequency:  Two Times a day  Entered: Nov 10 2021 11:43AM    The following pending diet order is being considered for treatment of Moderate protein-calorie malnutrition:  Diet Regular-  60 gm Protein (PRO60G)  Low Sodium  Supplement Feeding Modality:  Oral  Glucerna Shake Cans or Servings Per Day:  1       Frequency:  Two Times a day  Entered: Nov 6 2021  9:03PM  
This patient has been assessed with a concern for Malnutrition and has been determined to have a diagnosis/diagnoses of Moderate protein-calorie malnutrition.    This patient is being managed with:   Diet Consistent Carbohydrate w/Evening Snack-  Fiber/Residue Restricted  60 gm Protein (PRO60G)     Qty per Day:  VANILLA FLAVOR ONLY  Supplement Feeding Modality:  Oral  Glucerna Shake Cans or Servings Per Day:  1       Frequency:  Two Times a day  Entered: Nov 10 2021 11:43AM    The following pending diet order is being considered for treatment of Moderate protein-calorie malnutrition:  Diet Regular-  60 gm Protein (PRO60G)  Low Sodium  Supplement Feeding Modality:  Oral  Glucerna Shake Cans or Servings Per Day:  1       Frequency:  Two Times a day  Entered: Nov 6 2021  9:03PM  
This patient has been assessed with a concern for Malnutrition and has been determined to have a diagnosis/diagnoses of Moderate protein-calorie malnutrition.    This patient is being managed with:   Diet Consistent Carbohydrate w/Evening Snack-  Fiber/Residue Restricted  60 gm Protein (PRO60G)     Qty per Day:  VANILLA FLAVOR ONLY  Supplement Feeding Modality:  Oral  Glucerna Shake Cans or Servings Per Day:  1       Frequency:  Two Times a day  Entered: Nov 10 2021 11:43AM    The following pending diet order is being considered for treatment of Moderate protein-calorie malnutrition:  Diet Soft and Bite Sized-  Consistent Carbohydrate {Evening Snack}  Fiber/Residue Restricted  60 gm Protein (PRO60G)     Qty per Day:  VANILLA FLAVOR ONLY  Supplement Feeding Modality:  Oral  Glucerna Shake Cans or Servings Per Day:  1       Frequency:  Two Times a day  Entered: Nov 17 2021  1:05PM    Diet Regular-  60 gm Protein (PRO60G)  Low Sodium  Supplement Feeding Modality:  Oral  Glucerna Shake Cans or Servings Per Day:  1       Frequency:  Two Times a day  Entered: Nov 6 2021  9:03PM  
This patient has been assessed with a concern for Malnutrition and has been determined to have a diagnosis/diagnoses of Moderate protein-calorie malnutrition.    This patient is being managed with:   Diet Regular-  For patients receiving Renal Replacement - No Protein Restr No Conc K No Conc Phos Low Sodium  Prosource Gelatein 20 Sugar Free     Qty per Day:  2  Supplement Feeding Modality:  Oral  Nepro Cans or Servings Per Day:  1       Frequency:  Daily  Entered: Oct 25 2021 10:56AM    
This patient has been assessed with a concern for Malnutrition and has been determined to have a diagnosis/diagnoses of Moderate protein-calorie malnutrition.    This patient is being managed with:   Diet Consistent Carbohydrate w/Evening Snack-  60 gm Protein (PRO60G)     Qty per Day:  Vanilla flavor  Supplement Feeding Modality:  Oral  Glucerna Shake Cans or Servings Per Day:  1       Frequency:  Two Times a day  Entered: Nov  3 2021  2:08PM    The following pending diet order is being considered for treatment of Moderate protein-calorie malnutrition:  Diet Regular-  60 gm Protein (PRO60G)  Low Sodium  Supplement Feeding Modality:  Oral  Glucerna Shake Cans or Servings Per Day:  1       Frequency:  Two Times a day  Entered: Nov 6 2021  9:03PM  
This patient has been assessed with a concern for Malnutrition and has been determined to have a diagnosis/diagnoses of Moderate protein-calorie malnutrition.    This patient is being managed with:   Diet Consistent Carbohydrate w/Evening Snack-  Fiber/Residue Restricted  60 gm Protein (PRO60G)     Qty per Day:  VANILLA FLAVOR ONLY  Supplement Feeding Modality:  Oral  Glucerna Shake Cans or Servings Per Day:  1       Frequency:  Two Times a day  Entered: Nov 10 2021 11:43AM    The following pending diet order is being considered for treatment of Moderate protein-calorie malnutrition:  Diet Regular-  60 gm Protein (PRO60G)  Low Sodium  Supplement Feeding Modality:  Oral  Glucerna Shake Cans or Servings Per Day:  1       Frequency:  Two Times a day  Entered: Nov 6 2021  9:03PM  
This patient has been assessed with a concern for Malnutrition and has been determined to have a diagnosis/diagnoses of Moderate protein-calorie malnutrition.    This patient is being managed with:   Diet Consistent Carbohydrate w/Evening Snack-  Fiber/Residue Restricted  60 gm Protein (PRO60G)     Qty per Day:  VANILLA FLAVOR ONLY  Supplement Feeding Modality:  Oral  Glucerna Shake Cans or Servings Per Day:  1       Frequency:  Two Times a day  Entered: Nov 10 2021 11:43AM    The following pending diet order is being considered for treatment of Moderate protein-calorie malnutrition:  Diet Regular-  60 gm Protein (PRO60G)  Low Sodium  Supplement Feeding Modality:  Oral  Glucerna Shake Cans or Servings Per Day:  1       Frequency:  Two Times a day  Entered: Nov 6 2021  9:03PM  
This patient has been assessed with a concern for Malnutrition and has been determined to have a diagnosis/diagnoses of Moderate protein-calorie malnutrition.    This patient is being managed with:   Diet Regular-  For patients receiving Renal Replacement - No Protein Restr No Conc K No Conc Phos Low Sodium  Prosource Gelatein 20 Sugar Free     Qty per Day:  2  Supplement Feeding Modality:  Oral  Nepro Cans or Servings Per Day:  1       Frequency:  Daily  Entered: Oct 25 2021 10:56AM    
This patient has been assessed with a concern for Malnutrition and has been determined to have a diagnosis/diagnoses of Moderate protein-calorie malnutrition.    This patient is being managed with:   Diet Regular-  For patients receiving Renal Replacement - No Protein Restr No Conc K No Conc Phos Low Sodium  Prosource Gelatein 20 Sugar Free     Qty per Day:  2  Supplement Feeding Modality:  Oral  Nepro Cans or Servings Per Day:  1       Frequency:  Daily  Entered: Oct 25 2021 10:56AM    
This patient has been assessed with a concern for Malnutrition and has been determined to have a diagnosis/diagnoses of Moderate protein-calorie malnutrition.    This patient is being managed with:   Diet Consistent Carbohydrate Renal w/Evening Snack-  Entered: Nov 4 2021  6:14PM    Diet NPO after Midnight-     NPO Start Date: 03-Nov-2021   NPO Start Time: 23:59  Entered: Nov  3 2021  6:02PM    Diet NPO after Midnight-     NPO Start Date: 03-Nov-2021   NPO Start Time: 23:59  Entered: Nov  3 2021  5:28PM    The following pending diet order is being considered for treatment of Moderate protein-calorie malnutrition:  Diet Consistent Carbohydrate w/Evening Snack-  60 gm Protein (PRO60G)     Qty per Day:  Vanilla flavor  Supplement Feeding Modality:  Oral  Glucerna Shake Cans or Servings Per Day:  1       Frequency:  Two Times a day  Entered: Nov  3 2021  2:08PM  
This patient has been assessed with a concern for Malnutrition and has been determined to have a diagnosis/diagnoses of Moderate protein-calorie malnutrition.    This patient is being managed with:   Diet Consistent Carbohydrate w/Evening Snack-  60 gm Protein (PRO60G)     Qty per Day:  Vanilla flavor  Supplement Feeding Modality:  Oral  Glucerna Shake Cans or Servings Per Day:  1       Frequency:  Two Times a day  Entered: Nov  3 2021  2:08PM    The following pending diet order is being considered for treatment of Moderate protein-calorie malnutrition:  Diet Regular-  60 gm Protein (PRO60G)  Low Sodium  Supplement Feeding Modality:  Oral  Glucerna Shake Cans or Servings Per Day:  1       Frequency:  Two Times a day  Entered: Nov 6 2021  9:03PM  
This patient has been assessed with a concern for Malnutrition and has been determined to have a diagnosis/diagnoses of Moderate protein-calorie malnutrition.    This patient is being managed with:   Diet Consistent Carbohydrate w/Evening Snack-  Fiber/Residue Restricted  60 gm Protein (PRO60G)     Qty per Day:  VANILLA FLAVOR ONLY  Supplement Feeding Modality:  Oral  Glucerna Shake Cans or Servings Per Day:  1       Frequency:  Two Times a day  Entered: Nov 10 2021 11:43AM    The following pending diet order is being considered for treatment of Moderate protein-calorie malnutrition:  Diet Regular-  60 gm Protein (PRO60G)  Low Sodium  Supplement Feeding Modality:  Oral  Glucerna Shake Cans or Servings Per Day:  1       Frequency:  Two Times a day  Entered: Nov 6 2021  9:03PM  
This patient has been assessed with a concern for Malnutrition and has been determined to have a diagnosis/diagnoses of Moderate protein-calorie malnutrition.    This patient is being managed with:   Diet Consistent Carbohydrate w/Evening Snack-  Fiber/Residue Restricted  60 gm Protein (PRO60G)     Qty per Day:  VANILLA FLAVOR ONLY  Supplement Feeding Modality:  Oral  Glucerna Shake Cans or Servings Per Day:  1       Frequency:  Two Times a day  Entered: Nov 10 2021 11:43AM    The following pending diet order is being considered for treatment of Moderate protein-calorie malnutrition:  Diet Soft and Bite Sized-  Consistent Carbohydrate {Evening Snack}  Fiber/Residue Restricted  60 gm Protein (PRO60G)     Qty per Day:  VANILLA FLAVOR ONLY  Supplement Feeding Modality:  Oral  Glucerna Shake Cans or Servings Per Day:  1       Frequency:  Two Times a day  Entered: Nov 17 2021  1:05PM    Diet Regular-  60 gm Protein (PRO60G)  Low Sodium  Supplement Feeding Modality:  Oral  Glucerna Shake Cans or Servings Per Day:  1       Frequency:  Two Times a day  Entered: Nov 6 2021  9:03PM  
This patient has been assessed with a concern for Malnutrition and has been determined to have a diagnosis/diagnoses of Moderate protein-calorie malnutrition.    This patient is being managed with:   Diet Regular-  For patients receiving Renal Replacement - No Protein Restr No Conc K No Conc Phos Low Sodium  Prosource Gelatein 20 Sugar Free     Qty per Day:  2  Supplement Feeding Modality:  Oral  Nepro Cans or Servings Per Day:  1       Frequency:  Daily  Entered: Oct 25 2021 10:56AM    
This patient has been assessed with a concern for Malnutrition and has been determined to have a diagnosis/diagnoses of Moderate protein-calorie malnutrition.    This patient is being managed with:   Diet Consistent Carbohydrate w/Evening Snack-  60 gm Protein (PRO60G)     Qty per Day:  Vanilla flavor  Supplement Feeding Modality:  Oral  Glucerna Shake Cans or Servings Per Day:  1       Frequency:  Two Times a day  Entered: Nov  3 2021  2:08PM    
This patient has been assessed with a concern for Malnutrition and has been determined to have a diagnosis/diagnoses of Moderate protein-calorie malnutrition.    This patient is being managed with:   Diet Consistent Carbohydrate w/Evening Snack-  60 gm Protein (PRO60G)     Qty per Day:  Vanilla flavor  Supplement Feeding Modality:  Oral  Glucerna Shake Cans or Servings Per Day:  1       Frequency:  Two Times a day  Entered: Nov  3 2021  2:08PM    The following pending diet order is being considered for treatment of Moderate protein-calorie malnutrition:  Diet Regular-  60 gm Protein (PRO60G)  Low Sodium  Supplement Feeding Modality:  Oral  Glucerna Shake Cans or Servings Per Day:  1       Frequency:  Two Times a day  Entered: Nov 6 2021  9:03PM  
This patient has been assessed with a concern for Malnutrition and has been determined to have a diagnosis/diagnoses of Moderate protein-calorie malnutrition.    This patient is being managed with:   Diet Consistent Carbohydrate w/Evening Snack-  60 gm Protein (PRO60G)     Qty per Day:  Vanilla flavor  Supplement Feeding Modality:  Oral  Glucerna Shake Cans or Servings Per Day:  1       Frequency:  Two Times a day  Entered: Nov  3 2021  2:08PM    The following pending diet order is being considered for treatment of Moderate protein-calorie malnutrition:  Diet Regular-  60 gm Protein (PRO60G)  Low Sodium  Supplement Feeding Modality:  Oral  Glucerna Shake Cans or Servings Per Day:  1       Frequency:  Two Times a day  Entered: Nov 6 2021  9:03PM  
This patient has been assessed with a concern for Malnutrition and has been determined to have a diagnosis/diagnoses of Moderate protein-calorie malnutrition.    This patient is being managed with:   Diet Consistent Carbohydrate w/Evening Snack-  60 gm Protein (PRO60G)     Qty per Day:  Vanilla flavor  Supplement Feeding Modaliy:  Oral  Glucerna Shake Cans or Servings Per Day:  1       Frequency:  Two Times a day  Entered: Nov  3 2021  2:08PM    The following pending diet order is being considered for treatment of Moderate protein-calorie malnutrition:  Diet Regular-  60 gm Protein (PRO60G)  Low Sodium  Supplement Feeding Modality:  Oral  Glucerna Shake Cans or Servings Per Day:  1       Frequency:  Two Times a day  Entered: Nov 6 2021  9:03PM
This patient has been assessed with a concern for Malnutrition and has been determined to have a diagnosis/diagnoses of Moderate protein-calorie malnutrition.    This patient is being managed with:   Diet Consistent Carbohydrate w/Evening Snack-  Fiber/Residue Restricted  60 gm Protein (PRO60G)     Qty per Day:  VANILLA FLAVOR ONLY  Supplement Feeding Modality:  Oral  Glucerna Shake Cans or Servings Per Day:  1       Frequency:  Two Times a day  Entered: Nov 10 2021 11:43AM    Diet Consistent Carbohydrate w/Evening Snack-  60 gm Protein (PRO60G)     Qty per Day:  Vanilla flavor  Supplement Feeding Modality:  Oral  Glucerna Shake Cans or Servings Per Day:  1       Frequency:  Two Times a day  Entered: Nov  3 2021  2:08PM    The following pending diet order is being considered for treatment of Moderate protein-calorie malnutrition:  Diet Regular-  60 gm Protein (PRO60G)  Low Sodium  Supplement Feeding Modality:  Oral  Glucerna Shake Cans or Servings Per Day:  1       Frequency:  Two Times a day  Entered: Nov 6 2021  9:03PM  
This patient has been assessed with a concern for Malnutrition and has been determined to have a diagnosis/diagnoses of Moderate protein-calorie malnutrition.    This patient is being managed with:   Diet Consistent Carbohydrate w/Evening Snack-  Fiber/Residue Restricted  60 gm Protein (PRO60G)     Qty per Day:  VANILLA FLAVOR ONLY  Supplement Feeding Modality:  Oral  Glucerna Shake Cans or Servings Per Day:  1       Frequency:  Two Times a day  Entered: Nov 10 2021 11:43AM    The following pending diet order is being considered for treatment of Moderate protein-calorie malnutrition:  Diet Regular-  60 gm Protein (PRO60G)  Low Sodium  Supplement Feeding Modality:  Oral  Glucerna Shake Cans or Servings Per Day:  1       Frequency:  Two Times a day  Entered: Nov 6 2021  9:03PM  
This patient has been assessed with a concern for Malnutrition and has been determined to have a diagnosis/diagnoses of Moderate protein-calorie malnutrition.    This patient is being managed with:   Diet Consistent Carbohydrate w/Evening Snack-  Fiber/Residue Restricted  60 gm Protein (PRO60G)     Qty per Day:  VANILLA FLAVOR ONLY  Supplement Feeding Modality:  Oral  Glucerna Shake Cans or Servings Per Day:  1       Frequency:  Two Times a day  Entered: Nov 10 2021 11:43AM    The following pending diet order is being considered for treatment of Moderate protein-calorie malnutrition:  Diet Soft and Bite Sized-  Consistent Carbohydrate {Evening Snack}  Fiber/Residue Restricted  60 gm Protein (PRO60G)     Qty per Day:  VANILLA FLAVOR ONLY  Supplement Feeding Modality:  Oral  Glucerna Shake Cans or Servings Per Day:  1       Frequency:  Two Times a day  Entered: Nov 17 2021  1:05PM    Diet Regular-  60 gm Protein (PRO60G)  Low Sodium  Supplement Feeding Modality:  Oral  Glucerna Shake Cans or Servings Per Day:  1       Frequency:  Two Times a day  Entered: Nov 6 2021  9:03PM  
This patient has been assessed with a concern for Malnutrition and has been determined to have a diagnosis/diagnoses of Moderate protein-calorie malnutrition.    This patient is being managed with:   Diet NPO after Midnight-     NPO Start Date: 03-Nov-2021   NPO Start Time: 23:59  Entered: Nov  3 2021  6:02PM    Diet NPO after Midnight-     NPO Start Date: 03-Nov-2021   NPO Start Time: 23:59  Entered: Nov  3 2021  5:28PM    Diet Consistent Carbohydrate w/Evening Snack-  60 gm Protein (PRO60G)     Qty per Day:  Vanilla flavor  Supplement Feeding Modality:  Oral  Glucerna Shake Cans or Servings Per Day:  1       Frequency:  Two Times a day  Entered: Nov  3 2021  2:08PM    
This patient has been assessed with a concern for Malnutrition and has been determined to have a diagnosis/diagnoses of Moderate protein-calorie malnutrition.    This patient is being managed with:   Diet NPO after Midnight-     NPO Start Date: 03-Nov-2021   NPO Start Time: 23:59  Entered: Nov  3 2021  6:02PM    Diet NPO after Midnight-     NPO Start Date: 03-Nov-2021   NPO Start Time: 23:59  Entered: Nov  3 2021  5:28PM    Diet Regular-  For patients receiving Renal Replacement - No Protein Restr No Conc K No Conc Phos Low Sodium  Prosource Gelatein 20 Sugar Free     Qty per Day:  2  Supplement Feeding Modality:  Oral  Nepro Cans or Servings Per Day:  1       Frequency:  Daily  Entered: Oct 25 2021 10:56AM    The following pending diet order is being considered for treatment of Moderate protein-calorie malnutrition:  Diet Consistent Carbohydrate w/Evening Snack-  60 gm Protein (PRO60G)     Qty per Day:  Vanilla flavor  Supplement Feeding Modality:  Oral  Glucerna Shake Cans or Servings Per Day:  1       Frequency:  Two Times a day  Entered: Nov  3 2021  2:08PM  
This patient has been assessed with a concern for Malnutrition and has been determined to have a diagnosis/diagnoses of Moderate protein-calorie malnutrition.    This patient is being managed with:   Diet NPO after Midnight-     NPO Start Date: 03-Nov-2021   NPO Start Time: 23:59  Entered: Nov  3 2021  6:02PM    Diet NPO after Midnight-     NPO Start Date: 03-Nov-2021   NPO Start Time: 23:59  Entered: Nov  3 2021  5:28PM    Diet Regular-  For patients receiving Renal Replacement - No Protein Restr No Conc K No Conc Phos Low Sodium  Prosource Gelatein 20 Sugar Free     Qty per Day:  2  Supplement Feeding Modality:  Oral  Nepro Cans or Servings Per Day:  1       Frequency:  Daily  Entered: Oct 25 2021 10:56AM    The following pending diet order is being considered for treatment of Moderate protein-calorie malnutrition:  Diet Consistent Carbohydrate w/Evening Snack-  60 gm Protein (PRO60G)     Qty per Day:  Vanilla flavor  Supplement Feeding Modality:  Oral  Glucerna Shake Cans or Servings Per Day:  1       Frequency:  Two Times a day  Entered: Nov  3 2021  2:08PM  
This patient has been assessed with a concern for Malnutrition and has been determined to have a diagnosis/diagnoses of Moderate protein-calorie malnutrition.    This patient is being managed with:   Diet Regular-  For patients receiving Renal Replacement - No Protein Restr No Conc K No Conc Phos Low Sodium  Prosource Gelatein 20 Sugar Free     Qty per Day:  2  Supplement Feeding Modality:  Oral  Nepro Cans or Servings Per Day:  1       Frequency:  Daily  Entered: Oct 25 2021 10:56AM    
This patient has been assessed with a concern for Malnutrition and has been determined to have a diagnosis/diagnoses of Moderate protein-calorie malnutrition.    This patient is being managed with:   Diet Consistent Carbohydrate w/Evening Snack-  Fiber/Residue Restricted  60 gm Protein (PRO60G)     Qty per Day:  VANILLA FLAVOR ONLY  Supplement Feeding Modality:  Oral  Glucerna Shake Cans or Servings Per Day:  1       Frequency:  Two Times a day  Entered: Nov 10 2021 11:43AM    The following pending diet order is being considered for treatment of Moderate protein-calorie malnutrition:  Diet Regular-  60 gm Protein (PRO60G)  Low Sodium  Supplement Feeding Modality:  Oral  Glucerna Shake Cans or Servings Per Day:  1       Frequency:  Two Times a day  Entered: Nov 6 2021  9:03PM

## 2021-11-19 NOTE — PROGRESS NOTE ADULT - PROVIDER SPECIALTY LIST ADULT
Hospitalist
Internal Medicine
Nephrology
Nephrology
Vascular Surgery
Critical Care
Gastroenterology
Gastroenterology
Hospitalist
Hospitalist
Internal Medicine
Nephrology
Critical Care
Hospitalist
Infectious Disease
Internal Medicine
Nephrology
Hospitalist
Internal Medicine
Nephrology
Hospitalist
Internal Medicine
Internal Medicine
Nephrology
Infectious Disease

## 2021-11-19 NOTE — DISCHARGE NOTE PROVIDER - CARE PROVIDERS DIRECT ADDRESSES
[email or web address],DirectAddress_Unknown

## 2021-11-19 NOTE — DISCHARGE NOTE PROVIDER - NSDCMRMEDTOKEN_GEN_ALL_CORE_FT
amLODIPine 5 mg oral tablet: 1 tab(s) orally 2 times a day  apixaban 2.5 mg oral tablet: 1 tab(s) orally 2 times a day  Aspir 81 oral delayed release tablet: 1 tab(s) orally once a day  atorvastatin 10 mg oral tablet: 2 tab(s) orally once a day  famotidine 20 mg oral tablet: 1 tab(s) orally once a day  hydrALAZINE 25 mg oral tablet: 1 tab(s) orally 3 times a day  Januvia 50 mg oral tablet: 1 tab(s) orally once a day  Lasix 20 mg oral tablet: 1 tab(s) orally every other day  pioglitazone 30 mg oral tablet: 1 tab(s) orally once a day  ramipril 5 mg oral capsule: 2 cap(s) orally once a day  tamsulosin 0.4 mg oral capsule: 2 cap(s) orally once a day (at bedtime)   amLODIPine 5 mg oral tablet: 1 tab(s) orally 2 times a day  Aspir 81 oral delayed release tablet: 1 tab(s) orally once a day  atorvastatin 10 mg oral tablet: 2 tab(s) orally once a day  calcium acetate 667 mg oral tablet: 1 tab(s) orally 3 times a day  hydrALAZINE 25 mg oral tablet: 1 tab(s) orally 3 times a day  Januvia 50 mg oral tablet: 1 tab(s) orally once a day  Lasix 20 mg oral tablet: 1 tab(s) orally every other day  pantoprazole 40 mg oral delayed release tablet: 1 tab(s) orally once a day (before a meal)  pioglitazone 30 mg oral tablet: 1 tab(s) orally once a day  sodium bicarbonate 650 mg oral tablet: 2 tab(s) orally every 8 hours  tamsulosin 0.4 mg oral capsule: 2 cap(s) orally once a day (at bedtime)

## 2021-11-19 NOTE — PROGRESS NOTE ADULT - SUBJECTIVE AND OBJECTIVE BOX
Nephrology progress note    THIS IS AN INCOMPLETE NOTE . FULL NOTE TO FOLLOW SHORTLY    Patient is seen and examined, events over the last 24 h noted .    Allergies:  No Known Allergies    Hospital Medications:   MEDICATIONS  (STANDING):  amLODIPine   Tablet 10 milliGRAM(s) Oral daily  aspirin  chewable 81 milliGRAM(s) Oral daily  atorvastatin 20 milliGRAM(s) Oral at bedtime  calcium acetate 667 milliGRAM(s) Oral three times a day with meals  chlorhexidine 4% Liquid 1 Application(s) Topical daily  dextrose 40% Gel 15 Gram(s) Oral once  dextrose 5%. 1000 milliLiter(s) (100 mL/Hr) IV Continuous <Continuous>  dextrose 5%. 1000 milliLiter(s) (50 mL/Hr) IV Continuous <Continuous>  dextrose 50% Injectable 25 Gram(s) IV Push once  dextrose 50% Injectable 12.5 Gram(s) IV Push once  dextrose 50% Injectable 25 Gram(s) IV Push once  enoxaparin Injectable 40 milliGRAM(s) SubCutaneous daily  furosemide    Tablet 20 milliGRAM(s) Oral daily  glucagon  Injectable 1 milliGRAM(s) IntraMuscular once  hydrALAZINE 25 milliGRAM(s) Oral three times a day  insulin lispro (ADMELOG) corrective regimen sliding scale   SubCutaneous three times a day before meals  pantoprazole    Tablet 40 milliGRAM(s) Oral before breakfast  sodium bicarbonate 1300 milliGRAM(s) Oral every 8 hours  tamsulosin 0.8 milliGRAM(s) Oral at bedtime        VITALS:  T(F): 96.2 (21 @ 07:55), Max: 97.1 (21 @ 23:36)  HR: 79 (21 @ 07:55)  BP: 132/71 (21 @ 07:55)  RR: 17 (21 @ 07:55)  SpO2: 94% (21 @ 16:00)  Wt(kg): --     @ 07:01  -   @ 07:00  --------------------------------------------------------  IN: 0 mL / OUT: 1250 mL / NET: -1250 mL     @ 07:01  -   @ 07:00  --------------------------------------------------------  IN: 360 mL / OUT: 0 mL / NET: 360 mL          PHYSICAL EXAM:  Constitutional: NAD  HEENT: anicteric sclera, oropharynx clear, MMM  Neck: No JVD  Respiratory: CTAB, no wheezes, rales or rhonchi  Cardiovascular: S1, S2, RRR  Gastrointestinal: BS+, soft, NT/ND  Extremities: No cyanosis or clubbing. No peripheral edema  :  No sanford.   Skin: No rashes    LABS:      138  |  102  |  50<H>  ----------------------------<  101<H>  5.0   |  21  |  4.6<HH>    Ca    8.3<L>      2021 06:40  Mg     2.1         TPro  4.6<L>  /  Alb  2.7<L>  /  TBili  0.2  /  DBili      /  AST  18  /  ALT  13  /  AlkPhos  83                            7.8    9.25  )-----------( 211      ( 2021 06:40 )             24.1       Urine Studies:  Urinalysis Basic - ( 2021 12:50 )    Color: Yellow / Appearance: Turbid / S.014 / pH:   Gluc:  / Ketone: Negative  / Bili: Negative / Urobili: <2 mg/dL   Blood:  / Protein: 300 mg/dL / Nitrite: Negative   Leuk Esterase: Large / RBC: 9 /HPF / WBC >720 /HPF   Sq Epi:  / Non Sq Epi: 0 /HPF / Bacteria: Few        RADIOLOGY & ADDITIONAL STUDIES:   Nephrology progress note  Patient is seen and examined, events over the last 24 h noted .  sitting in chair comfortable  no events  Allergies:  No Known Allergies    Hospital Medications:   MEDICATIONS  (STANDING):    amLODIPine   Tablet 10 milliGRAM(s) Oral daily  aspirin  chewable 81 milliGRAM(s) Oral daily  atorvastatin 20 milliGRAM(s) Oral at bedtime  calcium acetate 667 milliGRAM(s) Oral three times a day with meals  enoxaparin Injectable 40 milliGRAM(s) SubCutaneous daily  furosemide    Tablet 20 milliGRAM(s) Oral daily  glucagon  Injectable 1 milliGRAM(s) IntraMuscular once  hydrALAZINE 25 milliGRAM(s) Oral three times a day  insulin lispro (ADMELOG) corrective regimen sliding scale   SubCutaneous three times a day before meals  pantoprazole    Tablet 40 milliGRAM(s) Oral before breakfast  sodium bicarbonate 1300 milliGRAM(s) Oral every 8 hours  tamsulosin 0.8 milliGRAM(s) Oral at bedtime        VITALS:  T(F): 96.2 (21 @ 07:55), Max: 97.1 (21 @ 23:36)  HR: 79 (21 @ 07:55)  BP: 132/71 (21 @ 07:55)  RR: 17 (21 @ 07:55)  SpO2: 94% (21 @ 16:00)       @ 07:01  -   @ 07:00  --------------------------------------------------------  IN: 0 mL / OUT: 1250 mL / NET: -1250 mL     @ 07:01  -   @ 07:00  --------------------------------------------------------  IN: 360 mL / OUT: 0 mL / NET: 360 mL          PHYSICAL EXAM:  Constitutional: NAD  Neck: No JVD  Respiratory: CTAB,   Cardiovascular: S1, S2, RRR  Gastrointestinal: BS+, soft, NT/ND  Extremities: No cyanosis or clubbing. No peripheral edema  :  No sanford.   Skin: No rashes    LABS:        138  |  102  |  50<H>  ----------------------------<  101<H>  5.0   |  21  |  4.6<HH>    Ca    8.3<L>      2021 06:40  Mg     2.1         TPro  4.6<L>  /  Alb  2.7<L>  /  TBili  0.2  /  DBili      /  AST  18  /  ALT  13  /  AlkPhos  83                            7.8    9.25  )-----------( 211      ( 2021 06:40 )             24.1     Creatinine Trend: 4.6<--, 4.6<--, 4.7<--, 4.5<--, 4.4<--, 4.8<--  Urine Studies:  Urinalysis Basic - ( 2021 12:50 )    Color: Yellow / Appearance: Turbid / S.014 / pH:   Gluc:  / Ketone: Negative  / Bili: Negative / Urobili: <2 mg/dL   Blood:  / Protein: 300 mg/dL / Nitrite: Negative   Leuk Esterase: Large / RBC: 9 /HPF / WBC >720 /HPF   Sq Epi:  / Non Sq Epi: 0 /HPF / Bacteria: Few        RADIOLOGY & ADDITIONAL STUDIES:

## 2021-11-19 NOTE — DISCHARGE NOTE PROVIDER - NSDCCPCAREPLAN_GEN_ALL_CORE_FT
PRINCIPAL DISCHARGE DIAGNOSIS  Diagnosis: Acute cystitis  Assessment and Plan of Treatment: You were diagnosed with acute cystis. You were treated with antibiotics and will be discharged on Linezolid oral. The infection is contained and is resolving. Please dial 911 or present to nearest ED if symptoms recurr.      SECONDARY DISCHARGE DIAGNOSES  Diagnosis: Acute sepsis  Assessment and Plan of Treatment: The sepsis was secondary to your urinary tract infection. Once treated you returned to normal level of functioning.    Diagnosis: Hyperkalemia  Assessment and Plan of Treatment: This issue resolved with adequate hydration, treating the infection, and medications that lower potassium levels in the blood.    Diagnosis: JOSH (acute kidney injury)  Assessment and Plan of Treatment: Your kidney function returned to baseline level of functioning after being adequately treated.

## 2021-11-19 NOTE — DISCHARGE NOTE PROVIDER - DETAILS OF MALNUTRITION DIAGNOSIS/DIAGNOSES
This patient has been assessed with a concern for Malnutrition and was treated during this hospitalization for the following Nutrition diagnosis/diagnoses:     -  10/25/2021: Moderate protein-calorie malnutrition

## 2021-11-19 NOTE — DISCHARGE NOTE PROVIDER - HOSPITAL COURSE
HPI:  85 year old M with PMH of DM, Crohn s/p colostomy, CKD 4, HTN, HLD, BPH presents to ED with complaints of weakness. Pt states 10 days prior was diagnosed with UTI, placed on bactrim, Family states due to size of pill, pt was unable to swallow pill, only took 1/2 dose for 5 days. Family states over past few days pt becoming increasingly lethargic, having chills as well. Denies cough, chest pain, sob, abdominal pain, NVCD.  In the ED, CT a/p was done and showed Urinary bladder wall is thickened in the setting of under-distention. Intraluminal air is likely secondary to Luna. If clinically warranted, correlate with UA to rule out acute cystitis. Labs were remarkable for Hb 6.2 (baseline ~8), Na 126, K 6.2, HCO3 8, AG 18, BUN 90, Cr 8.3 (baseline 2.3). UA was negative. He was given rocephin x1, 2L IVF bolus, insulin and dextrose, calcium gluconate x1, sodium bicarb x1, lokelma 10g x1, and lasix x1. He received 1U PRBC.     Vital Signs Last 24 Hrs  T(C): 34.3 (23 Oct 2021 18:10), Max: 35.8 (23 Oct 2021 13:05)  T(F): 93.7 (23 Oct 2021 18:10), Max: 96.4 (23 Oct 2021 13:05)  HR: 9 (23 Oct 2021 18:10) (9 - 89)  BP: 102/49 (23 Oct 2021 18:10) (102/49 - 117/58)  RR: 20 (23 Oct 2021 18:10) (18 - 20)  SpO2: 99% (23 Oct 2021 18:10) (97% - 99%)    MICU:  Patient was admitted to MICU for acute on chronic renal failure. Vascular surgery was consulted regarding Uldall placement but patient's JOSH and electrolytes improved on own without need for renal replacement therapy. He was on bicarb drip and his acidosis improved. He was downgraded to the stepdown unit. Per ID, he has been on ceftriaxone for pyelonephritis. He is on heparin drip due to history of DVT. Patient's mental status has been a concern as he seems to be delirious and has been pulling at lines and tubes. He is stable for downgrade to general medical floor.     Medical Floor:  The patient is clinically and hemodynamically stable. Patients hemoglobin has been stable. Patient was found to have an acute on chronic DVT 10/25, that did not warrant repeat imaging, and patient is being followed up by vascular surgery. Eliquis is held because of risk of hematochezia, started on aspirin instead. Patient is refusing dialysis. GI were contacted regarding the GI bleed who determined no need for intervention and will be following up on outside basis. Patient is on 2L NC and CXR showed no acute worsening. Scrotal edema improving. Patient back to baseline and can be discharged.

## 2021-11-19 NOTE — PROGRESS NOTE ADULT - SUBJECTIVE AND OBJECTIVE BOX
CECILLE FRANKEL  85y  [facility] [id number]      Patient is a 85y old  Male who presents with a chief complaint of weakness (19 Nov 2021 09:31)      INTERVAL HPI/OVERNIGHT EVENTS:  no acute events overnight       REVIEW OF SYSTEMS:  CONSTITUTIONAL: No fever, weight loss, or fatigue  EYES: No eye pain, visual disturbances, or discharge  ENMT:  No difficulty hearing, tinnitus, vertigo; No sinus or throat pain  NECK: No pain or stiffness  BREASTS: No pain, masses, or nipple discharge  RESPIRATORY: No cough, wheezing, chills or hemoptysis; No shortness of breath  CARDIOVASCULAR: No chest pain, palpitations, dizziness, or leg swelling  GASTROINTESTINAL: No abdominal or epigastric pain. No nausea, vomiting, or hematemesis; No diarrhea or constipation. No melena or hematochezia.  GENITOURINARY: No dysuria, frequency, hematuria, or incontinence  NEUROLOGICAL: No headaches, memory loss, loss of strength, numbness, or tremors  SKIN: No itching, burning, rashes, or lesions   LYMPH NODES: No enlarged glands  ENDOCRINE: No heat or cold intolerance; No hair loss  MUSCULOSKELETAL: No joint pain or swelling; No muscle, back, or extremity pain  PSYCHIATRIC: No depression, anxiety, mood swings, or difficulty sleeping  HEME/LYMPH: No easy bruising, or bleeding gums  ALLERY AND IMMUNOLOGIC: No hives or eczema  FAMILY HISTORY:    T(C): 35.7 (11-19-21 @ 07:55), Max: 36.2 (11-18-21 @ 23:36)  HR: 79 (11-19-21 @ 07:55) (65 - 79)  BP: 132/71 (11-19-21 @ 07:55) (121/61 - 139/63)  RR: 17 (11-19-21 @ 07:55) (17 - 18)  SpO2: 94% (11-18-21 @ 16:00) (94% - 94%)  Wt(kg): --Vital Signs Last 24 Hrs  T(C): 35.7 (19 Nov 2021 07:55), Max: 36.2 (18 Nov 2021 23:36)  T(F): 96.2 (19 Nov 2021 07:55), Max: 97.1 (18 Nov 2021 23:36)  HR: 79 (19 Nov 2021 07:55) (65 - 79)  BP: 132/71 (19 Nov 2021 07:55) (121/61 - 139/63)  BP(mean): --  RR: 17 (19 Nov 2021 07:55) (17 - 18)  SpO2: 94% (18 Nov 2021 16:00) (94% - 94%)    PHYSICAL EXAM:  GENERAL: NAD, thin   HEAD:  Atraumatic, Normocephalic  EYES: EOMI, PERRLA, conjunctiva and sclera clear  ENMT: No tonsillar erythema, exudates, or enlargement; Moist mucous membranes,   NECK: Supple, No JVD, Normal thyroid  NERVOUS SYSTEM:  Alert & Oriented X3,  PULM: Clear to auscultation bilaterally  CARDIAC: Regular rate and rhythm; No murmurs, rubs, or gallops  GI: Soft, Nontender, Nondistended; Bowel sounds present + Colostomy bag with no evidence of bleeding  EXTREMITIES:  2+ Peripheral Pulses, No clubbing, cyanosis, or edema  LYMPH: No lymphadenopathy noted  SKIN: No rashes or lesions    Consultant(s) Notes Reviewed:  [x ] YES  [ ] NO  Care Discussed with Consultants/Other Providers [ x] YES  [ ] NO    LABS:                            7.8    9.25  )-----------( 211      ( 19 Nov 2021 06:40 )             24.1   11-19    138  |  102  |  50<H>  ----------------------------<  101<H>  5.0   |  21  |  4.6<HH>    Ca    8.3<L>      19 Nov 2021 06:40  Mg     2.1     11-19    TPro  4.6<L>  /  Alb  2.7<L>  /  TBili  0.2  /  DBili  x   /  AST  18  /  ALT  13  /  AlkPhos  83  11-19            Culture - Urine (collected 17 Nov 2021 13:52)  Source: Catheterized Catheterized  Final Report (18 Nov 2021 22:05):    No growth      acetaminophen     Tablet .. 650 milliGRAM(s) Oral every 6 hours PRN  amLODIPine   Tablet 10 milliGRAM(s) Oral daily  aspirin  chewable 81 milliGRAM(s) Oral daily  atorvastatin 20 milliGRAM(s) Oral at bedtime  calcium acetate 667 milliGRAM(s) Oral three times a day with meals  chlorhexidine 4% Liquid 1 Application(s) Topical daily  dextrose 40% Gel 15 Gram(s) Oral once  dextrose 5%. 1000 milliLiter(s) IV Continuous <Continuous>  dextrose 5%. 1000 milliLiter(s) IV Continuous <Continuous>  dextrose 50% Injectable 25 Gram(s) IV Push once  dextrose 50% Injectable 12.5 Gram(s) IV Push once  dextrose 50% Injectable 25 Gram(s) IV Push once  enoxaparin Injectable 40 milliGRAM(s) SubCutaneous daily  furosemide    Tablet 20 milliGRAM(s) Oral daily  glucagon  Injectable 1 milliGRAM(s) IntraMuscular once  hydrALAZINE 25 milliGRAM(s) Oral three times a day  insulin lispro (ADMELOG) corrective regimen sliding scale   SubCutaneous three times a day before meals  pantoprazole    Tablet 40 milliGRAM(s) Oral before breakfast  sodium bicarbonate 1300 milliGRAM(s) Oral every 8 hours  tamsulosin 0.8 milliGRAM(s) Oral at bedtime      HEALTH ISSUES - PROBLEM Dx:          Case Discussed with House Staff   Spectra [phone]

## 2021-11-19 NOTE — PROGRESS NOTE ADULT - ASSESSMENT
#Acute on chronic anemia secondary to hematochezia  secondary to suspected diverticular bleed in the setting of diverticulosis versus hemorrhoids  hemoglobin stable , resolved, no further episodes noted,   no a/c indicated    #Chronic Left common fermoral dvt- discussed with Vascular NO FURTHER A/C NEEDED  , continue with aspirin     #Mild mitral valve regurgitation.     #. Mild tricuspid regurgitation.    #JOSH on CKD 4 secondary to suspected ATN improving   nephrology following , will need rrt eventually     #DM   POCT Blood Glucose.: 157 mg/dL (19 Nov 2021 11:06)  POCT Blood Glucose.: 123 mg/dL (19 Nov 2021 07:31)  POCT Blood Glucose.: 137 mg/dL (18 Nov 2021 21:50)  POCT Blood Glucose.: 241 mg/dL (18 Nov 2021 16:09)  controlled    #HTN   BP: 132/71 (19 Nov 2021 07:55) (121/61 - 139/63)  controlled    #BPH     #Dyslipidemia on statin     #Pulmonary hypertension     PROGRESS NOTE HANDOFF    Pending:  awaiting placement     Family discussion family aware of plan for dc planning    Disposition: SNF

## 2021-12-06 NOTE — ED PROVIDER NOTE - CARE PLAN
1 Principal Discharge DX:	Sepsis  Secondary Diagnosis:	Acute kidney injury superimposed on CKD  Secondary Diagnosis:	Acute respiratory failure with hypoxia   Principal Discharge DX:	Sepsis  Secondary Diagnosis:	Acute kidney injury superimposed on CKD  Secondary Diagnosis:	Acute respiratory failure with hypoxia  Secondary Diagnosis:	Hyperkalemia

## 2021-12-06 NOTE — H&P ADULT - NSHPLABSRESULTS_GEN_ALL_CORE
8.5    15.19 )-----------( 134      ( 06 Dec 2021 14:45 )             26.8       12-06    142  |  105  |  66<HH>  ----------------------------<  68<L>  5.9<H>   |  13<L>  |  5.4<HH>    Ca    8.0<L>      06 Dec 2021 14:45    TPro  4.7<L>  /  Alb  2.6<L>  /  TBili  0.2  /  DBili  x   /  AST  32  /  ALT  19  /  AlkPhos  85  12-06    PT/INR - ( 06 Dec 2021 14:45 )   PT: 12.40 sec;   INR: 1.08 ratio         PTT - ( 06 Dec 2021 14:45 )  PTT:33.0 sec    Lactate Trend      CARDIAC MARKERS ( 06 Dec 2021 14:45 )  x     / 0.16 ng/mL / x     / x     / x          Culture Results:   No growth (11-17 @ 13:52)      < from: CT Head No Cont (12.06.21 @ 15:45) >    IMPRESSION:  No acute intracranial pathology. No evidence of midline shift, mass effect or intracranial hemorrhage.    --- End of Report ---      CHAD SHEPHERD MD; Attending Radiologist  This document has been electronically signed. Dec  6 2021  3:51PM    < end of copied text >    < from: Xray Chest 1 View- PORTABLE-Urgent (12.06.21 @ 15:39) >      INTERPRETATION:  STUDY INDICATION: Sepsis    Comparison: 11/16/2021.    Technique/Positioning: Frontal view of the chest.    Findings:    Support devices: Overlying telemetry leads.    Cardiac/mediastinum/hilum: Partially obscured. Grossly stable cardiomediastinal silhouette.    Lung parenchyma/Pleura: Decreased right basal pleural-parenchymal opacities. Left pleural-parenchymal opacities appears slightly increased. No pneumothorax.    Skeleton/soft tissues: Unchanged.    Impression:    Decreased right basal pleural-parenchymal opacities. Left pleural-parenchymal opacities appears slightly increased. No pneumothorax.    --- End of Report ---    < end of copied text >

## 2021-12-06 NOTE — ED PROVIDER NOTE - PHYSICAL EXAMINATION
GENERAL: Ill-appearing   SKIN: warm, dry  HEAD: Normocephalic; atraumatic.  EYES: PERRLA, EOMI, no conjunctival erythema  CARD: S1, S2 normal; no murmurs, gallops, or rubs. Regular rate and rhythm.   RESP: crackles and decreased breath sounds at bases   ABD: soft, nontender, and nondistended  EXT: bilateral pitting edema   NEURO: A&O x 0, withdraws from pain

## 2021-12-06 NOTE — ED ADULT NURSE NOTE - NSIMPLEMENTINTERV_GEN_ALL_ED
Implemented All Fall with Harm Risk Interventions:  [place] to call system. Call bell, personal items and telephone within reach. Instruct patient to call for assistance. Room bathroom lighting operational. Non-slip footwear when patient is off stretcher. Physically safe environment: no spills, clutter or unnecessary equipment. Stretcher in lowest position, wheels locked, appropriate side rails in place. Provide visual cue, wrist band, yellow gown, etc. Monitor gait and stability. Monitor for mental status changes and reorient to person, place, and time. Review medications for side effects contributing to fall risk. Reinforce activity limits and safety measures with patient and family. Provide visual clues: red socks.

## 2021-12-06 NOTE — ED PROVIDER NOTE - CLINICAL SUMMARY MEDICAL DECISION MAKING FREE TEXT BOX
84yo M history of HTN DL DM BPH CKD presenting for worsening weakness, shortness of breath- Pt poor historian at this time, but no other history per family. chronically ill appearing, NAD, non toxic. NCAT PERRLA EOMI neck supple non tender tachypneic, coarse breath sounds b/l regular tachycardic abdomen s nt nd no rebound no guarding [id number] neuro non focal. labs ekg imaging reviewed. pt placed on bipap for increased wob. +fluid overload, diuresed. empiric abx started. unable to give fluid bolus 2/2 fluid overload status. +NSTEMI. dw ICU, no need for ICU at this time. will admit for further eval

## 2021-12-06 NOTE — H&P ADULT - ASSESSMENT
A 86 y/o male w/ PMH of DM, Crohn s/p colostomy, CKD 4, HTN, HLD, BPH sent from [facility] for AMS and SOB.        #Acute respiratory failure 2/2 PAN vs CHF  -continue IV abx cefepime and vanco renal dosed   -continue NIV  -monitor pulse ox  -IV lasix 40 BID  -strict I and O   -ID conult  -daily weight  -follow up blood cultures     #altered mental status  likely 2/2 metabolic encephalopathy   -will treat PNA   -monitor electrolytes  -follow up ua cultures   -speech and swallow     #JOSH on CKD4   -s/p sanford placement  -monitor urine output   -nephrology consult  -UA electrolytes and osmolality   -avoid nephrotoxic meds   -continue bicarb     #Hyperkalemia   s/p d50 and insulin  -monitor potassium level     #chronic tryptonemia  -trend troponin level  -cardiac monitor   -EKG in am      # hx  DVT  -seen by vascular last admission   -continue  aspirin and DVT prophylaxis    # Hypertension  - c/w norvasc, hydralazine, lasix    # Dyslipidemia  - c/w statin    # DNR/DNI     A 86 y/o male w/ PMH of DM, Crohn s/p colostomy, CKD 4, HTN, HLD, BPH sent from [facility] for AMS and SOB.        #Acute respiratory failure 2/2 PAN vs CHF  -continue IV abx cefepime and vanco renal dosed   -continue NIV  -monitor pulse ox  -IV lasix 40 BID  -strict I and O   -ID conult  -daily weight  -follow up blood cultures     #altered mental status  likely 2/2 metabolic encephalopathy   -will treat PNA   -monitor electrolytes  -follow up ua cultures   -speech and swallow     #JOSH on CKD4   -s/p sanford placement  -monitor urine output   -nephrology consult  -UA electrolytes and osmolality   -avoid nephrotoxic meds   -continue bicarb     #Hyperkalemia   s/p d50 and insulin  -monitor potassium level     #chronic tryptonemia  -trend troponin level  -cardiac monitor   -EKG in am      # hx  DVT  -seen by vascular last admission   -continue  aspirin and DVT prophylaxis    #DM   -ISS  -monitor FS     # Hypertension  - c/w norvasc, hydralazine, lasix    # Dyslipidemia  - c/w statin    # DNR/DNI     A 84 y/o male w/ PMH of DM, Crohn s/p colostomy, CKD 4, HTN, HLD, BPH sent from [facility] for AMS and SOB.        #Acute respiratory failure 2/2 PAN vs CHF  -continue IV abx cefepime and vanco renal dosed   -continue NIV  -monitor pulse ox  -IV lasix 40 BID  -strict I and O   -ID conult  -daily weight  -follow up blood cultures     #altered mental status  likely 2/2 metabolic encephalopathy   -will treat PNA   -monitor electrolytes  -follow up ua cultures   -speech and swallow     #JOSH on CKD4   -s/p sanford placement  -monitor urine output   -nephrology consult  -UA electrolytes and osmolality   -avoid nephrotoxic meds   -continue bicarb     #Hyperkalemia   s/p d50 and insulin  -monitor potassium level     #chronic tryptonemia  -trend troponin level  -cardiac monitor   -EKG in am      # hx  DVT  -seen by vascular last admission   -continue  aspirin and DVT prophylaxis    #DM   -ISS  -monitor FS     # Hypertension  - c/w norvasc, hydralazine, lasix    # Dyslipidemia  - c/w statin      #BPH  -continue tamsulosin   # DNR/DNI

## 2021-12-06 NOTE — H&P ADULT - HISTORY OF PRESENT ILLNESS
A 86 y/o male w/ PMH of DM, Crohn s/p colostomy, CKD 4, HTN, HLD, BPH sent from [facility] for [facility]MS and SOB. Pt unable to provide history, as per EMR pt was discharged  2 weeks  after being treated for sepsis, acute on chronic CKD, and UTI.  Per EMR, pt normally able to answer questions but started becoming lethargic past few days and appeared SOB, O2 sat in 80s after NRB.  No paper work could be found accompanying pt form [facility]. ICU consulted , was cleared to go to medical floor. Pt on NIV , DNR/DNI.

## 2021-12-06 NOTE — ED ADULT NURSE NOTE - CHIEF COMPLAINT QUOTE
BIBA from [facility] for hypoxia and SOB, pt 89% on RA placed on NRB by EMS pulse ox 95%

## 2021-12-06 NOTE — ED ADULT NURSE NOTE - NSICDXPASTMEDICALHX_GEN_ALL_CORE_FT
PAST MEDICAL HISTORY:  History of medical problems [place] b/l ears, dm, htn, hypercholesteremia, ckd, bowel/bladder fistula, kidney stones

## 2021-12-06 NOTE — ED PROVIDER NOTE - PROGRESS NOTE DETAILS
Jody: Attempted to reach [place] and pt's daughter but was unable to reach both numbers. Jody: Spoke to pt's daughter Kerry.  per daughter, pt is normally A&O x 2-3. ольга: per dr riojas, no ICU, will admit to floors.

## 2021-12-06 NOTE — H&P ADULT - NSICDXPASTMEDICALHX_GEN_ALL_CORE_FT
PAST MEDICAL HISTORY:  History of medical problems [place] b/[place] ears, dm, htn, hypercholesteremia, ckd, bowel/bladder fistula, kidney stones

## 2021-12-06 NOTE — PATIENT PROFILE ADULT - FALL HARM RISK - HARM RISK INTERVENTIONS

## 2021-12-06 NOTE — ED PROVIDER NOTE - OBJECTIVE STATEMENT
86 yo male w/ no PMH of DM, Crohn s/p colostomy, CKD 4, HTN, HLD, BPH presents for AMS and SOB.  From [facility] was dced 2 weeks ago for sepsis, acute on chronic CKD, and UTI.  Per EMS, pt normally able to answer questions but started becoming lethargic past few days and appeared SOB, O2 sat in 80s after NRB.  Unable to provide further HPI due to AMS.

## 2021-12-07 NOTE — DIETITIAN INITIAL EVALUATION ADULT. - NUTRITIONGOAL OUTCOME1
maintain weight >/= CBW 59.1 kg, pt to meet >75% est needs within 3 days, improvement in skin status

## 2021-12-07 NOTE — PROGRESS NOTE ADULT - SUBJECTIVE AND OBJECTIVE BOX
A 84 y/o male w/ PMH of DM, Crohn s/p colostomy, CKD 4, HTN, HLD, BPH sent from [facility] for AMS and SOB.    Today:  Seen at bedside, no events overnight.        REVIEW OF SYSTEMS:  Not a reliable historian.      MEDICATIONS  (STANDING):  amLODIPine   Tablet 5 milliGRAM(s) Oral <User Schedule>  aspirin enteric coated 81 milliGRAM(s) Oral daily  atorvastatin 20 milliGRAM(s) Oral at bedtime  calcium acetate 667 milliGRAM(s) Oral three times a day with meals  cefepime   IVPB 1000 milliGRAM(s) IV Intermittent daily  chlorhexidine 4% Liquid 1 Application(s) Topical <User Schedule>  dextrose 40% Gel 15 Gram(s) Oral once  dextrose 5%. 1000 milliLiter(s) (50 mL/Hr) IV Continuous <Continuous>  dextrose 5%. 1000 milliLiter(s) (100 mL/Hr) IV Continuous <Continuous>  dextrose 50% Injectable 25 Gram(s) IV Push once  dextrose 50% Injectable 12.5 Gram(s) IV Push once  dextrose 50% Injectable 25 Gram(s) IV Push once  furosemide   Injectable 40 milliGRAM(s) IV Push two times a day  glucagon  Injectable 1 milliGRAM(s) IntraMuscular once  heparin   Injectable 5000 Unit(s) SubCutaneous every 8 hours  hydrALAZINE 25 milliGRAM(s) Oral three times a day  insulin lispro (ADMELOG) corrective regimen sliding scale   SubCutaneous three times a day before meals  pantoprazole    Tablet 40 milliGRAM(s) Oral before breakfast  pioglitazone 30 milliGRAM(s) Oral daily  sodium bicarbonate 1300 milliGRAM(s) Oral every 8 hours  tamsulosin 0.8 milliGRAM(s) Oral at bedtime  vancomycin  IVPB 500 milliGRAM(s) IV Intermittent every 48 hours    MEDICATIONS  (PRN):  acetaminophen     Tablet .. 650 milliGRAM(s) Oral every 6 hours PRN Temp greater or equal to 38C (100.4F), Mild Pain (1 - 3)      Allergies  No Known Allergies          Vital Signs Last 24 Hrs  T(C): 34.5 (07 Dec 2021 15:49), Max: 34.5 (07 Dec 2021 15:49)  T(F): 94.1 (07 Dec 2021 15:49), Max: 94.1 (07 Dec 2021 15:49)  HR: 67 (07 Dec 2021 14:18) (54 - 68)  BP: 136/60 (07 Dec 2021 14:18) (100/54 - 136/60)  RR: 16 (07 Dec 2021 14:18) (14 - 18)  SpO2: 99% (07 Dec 2021 08:27) (92% - 99%)    PHYSICAL EXAM:  GENERAL: elderly, ill-appearing  HEAD:  Atraumatic, Normocephalic  EYES: EOMI, PERRLA, conjunctiva and sclera clear  ENT: Moist mucous membranes  NECK: Supple, No JVD  CHEST/LUNG: on NIV, saturating 98 % , Clear to auscultation bilaterally; No rales, rhonchi, wheezing, or rubs.   HEART: Regular rate and rhythm;   ABDOMEN: Soft, Nontender, Nondistended. No hepatomegaly, colostomy bag on LLQ with stool.   EXTREMITIES:  bilateral lower extremity, more on feet,   NERVOUS SYSTEM:  Able to open eyes to painful stimulus, unable to answer questions.   MSK: withdrawal to pain on all extremities   SKIN: right forearm, abrasion, ecchymoses.      LABS:                        7.8    15.27 )-----------( 103      ( 07 Dec 2021 07:17 )             24.5     12-07    146  |  109  |  72<HH>  ----------------------------<  99  4.3   |  18  |  5.7<HH>    Ca    8.3<L>      07 Dec 2021 07:17  Phos  6.6     12-  Mg     1.8     12-    TPro  4.2<L>  /  Alb  2.4<L>  /  TBili  <0.2  /  DBili  x   /  AST  21  /  ALT  15  /  AlkPhos  82  12-07    PT/INR - ( 06 Dec 2021 14:45 )   PT: 12.40 sec;   INR: 1.08 ratio         PTT - ( 06 Dec 2021 14:45 )  PTT:33.0 sec  Urinalysis Basic - ( 06 Dec 2021 14:45 )    Color: Yellow / Appearance: Cloudy / S.025 / pH: x  Gluc: x / Ketone: 15  / Bili: Negative / Urobili: 0.2 mg/dL   Blood: x / Protein: >=300 mg/dL / Nitrite: Negative   Leuk Esterase: Small / RBC: 11-25 /HPF / WBC >50 /HPF   Sq Epi: x / Non Sq Epi: Few /HPF / Bacteria: Moderate

## 2021-12-07 NOTE — SWALLOW BEDSIDE ASSESSMENT ADULT - COMMENTS
Dysphagia evaluation deferred at this time as patient is currently on BIPAP. Patient will benefit from time for respiratory status to improve prior to dysphagia evaluation.

## 2021-12-07 NOTE — CONSULT NOTE ADULT - ASSESSMENT
ASSESSMENT  84 y/o male w/ PMH of DM, Crohn s/p colostomy, CKD 4, HTN, HLD, BPH sent from [facility] for AMS and SOB    IMPRESSION  #Sepsis on admission (T<96.8, WBC>12), rule out pneumonia with left pleural effusion   #Crohn's disease s/p colostomy  #CKD stage IV  #Obesity BMI (kg/m2): 21  #DM   #Abx allergy: NKDA    RECOMMENDATIONS  This is a preliminary incomplete pended note, all final recommendations to follow after interview and examination of the patient.    Please call or message on [employer] Teams if with any questions.  Spectra [phone]       ASSESSMENT  86 y/o male w/ PMH of DM, Crohn s/p colostomy, CKD 4, HTN, HLD, BPH sent from [facility] for AMS and SOB    IMPRESSION  #Sepsis on admission (T<96.8, WBC>12), rule out pneumonia with left pleural effusion   #Crohn's disease s/p colostomy  #CKD stage IV  #Obesity BMI (kg/m2): 21  #DM   #Abx allergy: NKDA    RECOMMENDATIONS  - check MRSA nares and urine legionella   - please check level prior to next vancomycin dose - goal 15-20  - continue cefepime - increase to 1g q 24 hours   - if MRSA nares negative, stop vancomycin   - follow-up blood cultures     Please call or message on [employer] Teams if with any questions.  Spectra [phone]       ASSESSMENT  86 y/o male w/ PMH of DM, Crohn s/p colostomy, CKD 4, HTN, HLD, BPH sent from [facility] for AMS and SOB    IMPRESSION  #Sepsis on admission (Hypothermia, WBC>12), rule out pneumonia with left pleural effusion   #Acute respiratory failure   #Crohn's disease s/p colostomy  #CKD stage IV  #Obesity BMI (kg/m2): 21  #DM   #Abx allergy: NKDA    RECOMMENDATIONS  - check MRSA nares and urine legionella   - please check level prior to next vancomycin dose - goal 15-20  - continue cefepime - increase to 1g q 24 hours   - if MRSA nares negative, stop vancomycin   - follow-up blood cultures  - check TSH   -      Please call or message on [employer] Teams if with any questions.  Spectra [phone]

## 2021-12-07 NOTE — SWALLOW BEDSIDE ASSESSMENT ADULT - H & P REVIEW
A 86 y/o male w/ PMH of DM, Crohn s/p colostomy, CKD 4, HTN, HLD, BPH [facility] and SOB. Pt unable to provide history, as per EMR pt was discharged  2 weeks  after being treated for sepsis, acute on chronic CKD, and UTI.  Per EMR, pt normally able to answer questions but started becoming lethargic past few days and appeared SOB, O2 sat in 80s after NRB.  No paper work could be found accompanying pt form [facility]. ICU consulted , was cleared to go to medical floor. Pt on NIV , DNR/DNI./yes

## 2021-12-07 NOTE — DIETITIAN INITIAL EVALUATION ADULT. - ADD RECOMMEND
When medically appropriate and pending SLP eval, advance diet as above, pt is at high risk, f/u in 4 days.

## 2021-12-07 NOTE — DIETITIAN INITIAL EVALUATION ADULT. - PERTINENT MEDS FT
amLODIPine   Tablet 5 milliGRAM(s) Oral <User Schedule>  aspirin enteric coated 81 milliGRAM(s) Oral daily  atorvastatin 20 milliGRAM(s) Oral at bedtime  calcium acetate 667 milliGRAM(s) Oral three times a day with meals  cefepime   IVPB 1000 milliGRAM(s) IV Intermittent daily  furosemide   Injectable 40 milliGRAM(s) IV Push two times a day  heparin   Injectable 5000 Unit(s) SubCutaneous every 8 hours  hydrALAZINE 25 milliGRAM(s) Oral three times a day  insulin lispro (ADMELOG) corrective regimen sliding scale   SubCutaneous three times a day before meals  pantoprazole    Tablet 40 milliGRAM(s) Oral before breakfast  pioglitazone 30 milliGRAM(s) Oral daily  sodium bicarbonate 1300 milliGRAM(s) Oral every 8 hours  tamsulosin 0.8 milliGRAM(s) Oral at bedtime  vancomycin  IVPB 500 milliGRAM(s) IV Intermittent every 48 hours

## 2021-12-07 NOTE — ADVANCED PRACTICE NURSE CONSULT - RECOMMEDATIONS
Plan: Clean sacrum  with soap and water, pat dry then apply triad hydrophilic dressing   Continue Pressure  injury  preventive  measures   Continue skin care   Assess wound and inform primary provider of any changes   Case discussed with primary Rn   Wound/ ostomy specialist  to f/u as needed     Offloading: [x ] Frequent position changes [ ] Devices/Equipment  Cleansing: [ ] Saline [ x] Soap/Water [ ] Other: ______  Topicals: [x ] Barrier Cream [ ] Antimicrobial [ ] Enzymatic Wound Debridement  Dressings: [ ] Dry, sterile [ ] Foam [ ] Absorbant Pads [ ] Collagenase

## 2021-12-07 NOTE — ADVANCED PRACTICE NURSE CONSULT - ASSESSMENT
The patient  is a 86 y/o male w/ PMH of DM, Crohn s/p colostomy, CKD 4, HTN, HLD, BPH [facility] SOB. Pt unable to provide history, as per EMR pt was discharged  2 weeks  after being treated for sepsis, acute on chronic CKD, and UTI.  Per EMR, pt normally able to answer questions but started becoming lethargic past few days and appeared SOB, O2 sat in 80s after NRB.  No paper work could be found accompanying pt form [facility]. ICU consulted , was cleared to go to medical floor. Pt on NIV , DNR/DNI.      PAST MEDICAL & SURGICAL HISTORY:  History of medical problems  [place] b/l ears, dm, htn, hypercholesteremia, ckd, bowel/bladder fistula, kidney stones  Colostomy present  History of surgery  procedure for kidney stones ( ?lithotripsy)    Assessment:  Patient received in bed, non verbal. PCA present at bedside at time of assessment.                      Skin assessed-     Pressure Injury  #1  Location:  sacrococcyx  Stage: II  Size: 3x3  Tissue Description :  Pale pink, Macerated   Wound Exudate : None    Wound Edge:  Intact  Periwound Condition : Macerated

## 2021-12-07 NOTE — CONSULT NOTE ADULT - ASSESSMENT
A 86 y/o male w/ PMH of DM, Crohn s/p colostomy, CKD 4, HTN, HLD, BPH sent from [facility] for AMS and SOB.    # JOSH on CKD 5 / pre-renal/ATN d/t hypotension/sepsis  # Encephalopathy / sepsis / UTI/PNA  # Acute resp failure   # Normocytic anemia    Recommendations:  - replete free water peripherally with d5w 50cc/hr  - abx per ID, f/u cultures  - monitor strict i/o  - palliative care/ GOC discussions  - d/w daughter Kerry at length, who says her father did not want to be on dialysis or any other machines

## 2021-12-07 NOTE — DIETITIAN INITIAL EVALUATION ADULT. - RD TO REMAIN AVAILABLE
Interventions: meals and snacks, medical food supplements, coordinatin of care. Monitoring/evaluation: PO intake, diet order, weight, labs, skin status, NFPF

## 2021-12-07 NOTE — DIETITIAN INITIAL EVALUATION ADULT. - PHYSCIAL ASSESSMENT
+colostomy  Skin: stage 2 PU sacral spine  Edema 2+ BL, feet  Lethargic, arouses to voice Unable to conduct physical assessment at this time

## 2021-12-07 NOTE — DIETITIAN INITIAL EVALUATION ADULT. - OTHER INFO
The patient  is a 84 y/o male w/ PMH of DM, Crohn s/p colostomy, CKD 4, HTN, HLD, BPH sent from [facility] for AMS and SOB.  D/c'd 2 weeks ago s/p admit r/t sepsis, JOSH on CKD, UTI. Admitted w/current dx acute respiratory failure, AMS, JOSH on CKD, hyperkalemia,     Pt does not participate in nutrition interview. Known to nutrition service from previous admissions. Seen most recently 11/17. Per EMR review pt's preferred supplement is Glucerna.     Stage 2 PU to sacrococcyx The patient  is a 86 y/o male w/ PMH of DM, Crohn s/p colostomy, CKD 4, HTN, HLD, BPH sent from [facility] for AMS and SOB.  D/c'd 2 weeks ago s/p admit r/t sepsis, JOSH on CKD, UTI. Admitted w/current dx acute respiratory failure, AMS, JOSH on CKD, hyperkalemia,     Pt does not participate in nutrition interview. Known to nutrition service from previous admissions. Seen most recently 11/17. Per EMR review pt's preferred supplement is Glucerna. Pt has required texture modified diet at times, discharged on regular texture previous admission.    Wt hx:   12/6 59.1 kg  11/4 66.5 kg  10/23 66.5 kg  Wt loss of 7.4 kg, 11% x 1 mo    Pt currently NPO, on bipap at time of nutrition interview, will need s/s eval.     Stage 2 PU to sacrococcyx

## 2021-12-07 NOTE — PROGRESS NOTE ADULT - ASSESSMENT
A 84 y/o male w/ PMH of DM, Crohn s/p colostomy, CKD 4, HTN, HLD, BPH sent from [facility] for AMS and SOB.        #Acute respiratory failure 2/2 PAN vs CHF, Sepsis POA  -continue IV abx cefepime and vanco renal dosed   -continue NIV  -monitor pulse ox  -IV lasix 40 BID  -strict I and O   -ID consult appreciated, abx adjusted  -daily weight  -follow up blood cultures     #altered mental status  likely 2/2 metabolic encephalopathy   -will treat PNA   -monitor electrolytes  -follow up ua cultures   -speech and swallow     #JOSH on CKD4   -s/p [place] placement  -monitor urine output   -nephrology consult  -UA electrolytes and osmolality   -avoid nephrotoxic meds   -continue bicarb     #chronic tryptonemia  -No need to trend as very unlikely ACS in this context  -cardiac monitor      # hx  DVT  -seen by vascular last admission   -continue DVT prophylaxis, not on full AC    #DM   -ISS  -monitor FS     # Hypertension  - Hold norvasc, hydralazine for now as pt has been borderline Hypotensive and is septic    # Dyslipidemia  - NPO now, resume statin if clinical status improves      #BPH  -Hold tamsulosin as patient NPO on NIV  # DNR/DNI

## 2021-12-07 NOTE — CONSULT NOTE ADULT - SUBJECTIVE AND OBJECTIVE BOX
NEPHROLOGY CONSULTATION NOTE    CECILLE FRANKEL  86y  Male  MRN-[id number]    CC:   Patient is a 86y old  Male who presents with a chief complaint of ARF (07 Dec 2021 08:09)      HPI:  A 86 y/o male w/ PMH of DM, Crohn s/p colostomy, CKD 4, HTN, HLD, BPH sent from [facility] for AMS and SOB. Pt unable to provide history, as per EMR pt was discharged  2 weeks  after being treated for sepsis, acute on chronic CKD, and UTI.  Per EMR, pt normally able to answer questions but started becoming lethargic past few days and appeared SOB, O2 sat in 80s after NRB.  No paper work could be found accompanying pt form [facility]. ICU consulted , was cleared to go to medical floor. Pt on NIV , DNR/DNI.  (06 Dec 2021 18:45)      PAST MEDICAL & SURGICAL HISTORY:  History of medical problems  [place] b/l ears, dm, htn, hypercholesteremia, ckd, bowel/bladder fistula, kidney stones    Colostomy present    History of surgery  procedure for kidney stones ( ?lithotripsy)      Allergies:  No Known Allergies    Home Medications Reviewed  Hospital Medications:   MEDICATIONS  (STANDING):  amLODIPine   Tablet 5 milliGRAM(s) Oral <User Schedule>  aspirin enteric coated 81 milliGRAM(s) Oral daily  atorvastatin 20 milliGRAM(s) Oral at bedtime  calcium acetate 667 milliGRAM(s) Oral three times a day with meals  cefepime   IVPB 500 milliGRAM(s) IV Intermittent every 24 hours  furosemide   Injectable 40 milliGRAM(s) IV Push two times a day  glucagon  Injectable 1 milliGRAM(s) IntraMuscular once  heparin   Injectable 5000 Unit(s) SubCutaneous every 8 hours  hydrALAZINE 25 milliGRAM(s) Oral three times a day  insulin lispro (ADMELOG) corrective regimen sliding scale   SubCutaneous three times a day before meals  pantoprazole    Tablet 40 milliGRAM(s) Oral before breakfast  pioglitazone 30 milliGRAM(s) Oral daily  sodium bicarbonate 1300 milliGRAM(s) Oral every 8 hours  tamsulosin 0.8 milliGRAM(s) Oral at bedtime  vancomycin  IVPB 500 milliGRAM(s) IV Intermittent every 48 hours    MEDICATIONS  (PRN):  acetaminophen     Tablet .. 650 milliGRAM(s) Oral every 6 hours PRN Temp greater or equal to 38C (100.4F), Mild Pain (1 - 3)    Home medications:  amLODIPine 5 mg oral tablet: 1 tab(s) orally 2 times a day (06 Dec 2021 18:51)  Aspir 81 oral delayed release tablet: 1 tab(s) orally once a day (06 Dec 2021 18:51)  atorvastatin 10 mg oral tablet: 2 tab(s) orally once a day (at bedtime) (06 Dec 2021 18:51)  calcium acetate 667 mg oral tablet: 1 tab(s) orally 3 times a day (06 Dec 2021 18:51)  Januvia 50 mg oral tablet: 1 tab(s) orally once a day (06 Dec 2021 18:51)  Lasix 20 mg oral tablet: 1 tab(s) orally every other day (06 Dec 2021 18:51)  pantoprazole 40 mg oral delayed release tablet: 1 tab(s) orally once a day (06 Dec 2021 18:51)  pioglitazone 30 mg oral tablet: 1 tab(s) orally once a day (06 Dec 2021 18:51)  sodium bicarbonate 650 mg oral tablet: 2 tab(s) orally every 8 hours (06 Dec 2021 18:51)      SOCIAL HISTORY:  Social History:  unable to determine (06 Dec 2021 18:45)      FAMILY HISTORY:      REVIEW OF SYSTEMS:   All other review of systems is negative unless indicated above.    VITALS:  T(F): 94.1 (21 @ 15:49), Max: 94.1 (21 @ 15:49)  HR: 67 (21 @ 14:18)  BP: 136/60 (21 @ 14:18)  RR: 16 (21 @ 14:18)  SpO2: 99% (21 @ 08:27)    Height (cm): 167.6 ( @ 20:53)  Weight (kg): 59.1 ( @ 20:53)  BMI (kg/m2): 21 ( @ 20:53)  BSA (m2): 1.67 ( @ 20:53)  I&O's Detail    06 Dec 2021 07:01  -  07 Dec 2021 07:00  --------------------------------------------------------  IN:  Total IN: 0 mL    OUT:    Oral Fluid: 0 mL  Total OUT: 0 mL    Total NET: 0 mL      07 Dec 2021 07:01  -  07 Dec 2021 15:54  --------------------------------------------------------  IN:  Total IN: 0 mL    OUT:    Indwelling Catheter - Urethral (mL): 100 mL  Total OUT: 100 mL    Total NET: -100 mL          Creatine Kinase, Serum: 63 U/L (21 @ 07:17)    I&O's Summary    06 Dec 2021 07:01  -  07 Dec 2021 07:00  --------------------------------------------------------  IN: 0 mL / OUT: 0 mL / NET: 0 mL    07 Dec 2021 07:01  -  07 Dec 2021 15:54  --------------------------------------------------------  IN: 0 mL / OUT: 100 mL / NET: -100 mL        PHYSICAL EXAM:  Gen: NAD  resp:   card: S1/S2  abd: soft  ext: no edema  vascular access:     LABS:  Daily Height in cm: 167.64 (06 Dec 2021 20:53)    Daily Weight in k.9 (07 Dec 2021 04:57)        146  |  109  |  72<HH>  ----------------------------<  99  4.3   |  18  |  5.7<HH>    Ca    8.3<L>      07 Dec 2021 07:17  Phos  6.6       Mg     1.8         TPro  4.2<L>  /  Alb  2.4<L>  /  TBili  <0.2  /  DBili      /  AST  21  /  ALT  15  /  AlkPhos  82      eGFR if Non African American: 8 mL/min/1.73M2 (21 @ 07:17)  eGFR if African American: 10 mL/min/1.73M2 (21 @ 07:17)  eGFR if African American: 10 mL/min/1.73M2 (21 @ 21:36)  eGFR if Non African American: 9 mL/min/1.73M2 (21 @ 21:36)    Creatinine Trend:   Creatinine, Serum: 5.7 mg/dL (21 @ 07:17)  Creatinine, Serum: 5.5 mg/dL (21 @ 21:36)  Creatinine, Serum: 5.4 mg/dL (21 @ 14:45)  Creatinine, Serum: 4.6 mg/dL (21 @ 06:40)  Creatinine, Serum: 4.6 mg/dL (21 @ 07:15)  Creatinine, Serum: 4.7 mg/dL (21 @ 06:07)  Creatinine, Serum: 4.5 mg/dL (21 @ 04:30)  Creatinine, Serum: 4.4 mg/dL (11-15-21 @ 05:43)                          7.8    15.27 )-----------( 103      ( 07 Dec 2021 07:17 )             24.5     Mean Cell Volume: 93.9 fL (21 @ 07:17)    Urine Studies:  Urinalysis Basic - ( 06 Dec 2021 14:45 )    Color: Yellow / Appearance: Cloudy / S.025 / pH:   Gluc:  / Ketone: 15  / Bili: Negative / Urobili: 0.2 mg/dL   Blood:  / Protein: >=300 mg/dL / Nitrite: Negative   Leuk Esterase: Small / RBC: 11-25 /HPF / WBC >50 /HPF   Sq Epi:  / Non Sq Epi: Few /HPF / Bacteria: Moderate      Osmolality, Random Urine: 249 mos/kg (10-24 @ 18:44)  Sodium, Random Urine: 46.0 mmoL/L (10-24 @ 18:44)            RADIOLOGY & ADDITIONAL STUDIES:        Xray Chest 1 View- PORTABLE-Urgent:   EXAM:  XR CHEST PORTABLE URGENT 1V            PROCEDURE DATE:  2021            INTERPRETATION:  STUDY INDICATION: Sepsis    Comparison: 2021.    Technique/Positioning: Frontal view of the chest.    Findings:    Support devices: Overlying telemetry leads.    Cardiac/mediastinum/hilum: Partially obscured. Grossly stable cardiomediastinal silhouette.    Lung parenchyma/Pleura: Decreased right basal pleural-parenchymal opacities. Left pleural-parenchymal opacities appears slightly increased. No pneumothorax.    Skeleton/soft tissues: Unchanged.    Impression:    Decreased right basal pleural-parenchymal opacities. Left pleural-parenchymal opacities appears slightly increased. No pneumothorax.     NEPHROLOGY CONSULTATION NOTE    CECILLE FRANKEL  86y  Male  MRN-[id number]    CC:   Patient is a 86y old  Male who presents with a chief complaint of ARF (07 Dec 2021 08:09)      HPI:  A 84 y/o male w/ PMH of DM, Crohn s/p colostomy, CKD 4, HTN, HLD, BPH sent from [facility] for AMS and SOB. Pt unable to provide history, as per EMR pt was discharged  2 weeks  after being treated for sepsis, acute on chronic CKD, and UTI.  Per EMR, pt normally able to answer questions but started becoming lethargic past few days and appeared SOB, O2 sat in 80s after NRB.  No paper work could be found accompanying pt form [facility]. ICU consulted , was cleared to go to medical floor. Pt on NIV , DNR/DNI.  (06 Dec 2021 18:45)      PAST MEDICAL & SURGICAL HISTORY:  History of medical problems  [place] b/l ears, dm, htn, hypercholesteremia, ckd, bowel/bladder fistula, kidney stones    Colostomy present    History of surgery  procedure for kidney stones ( ?lithotripsy)      Allergies:  No Known Allergies    Home Medications Reviewed  Hospital Medications:   MEDICATIONS  (STANDING):  amLODIPine   Tablet 5 milliGRAM(s) Oral <User Schedule>  aspirin enteric coated 81 milliGRAM(s) Oral daily  atorvastatin 20 milliGRAM(s) Oral at bedtime  calcium acetate 667 milliGRAM(s) Oral three times a day with meals  cefepime   IVPB 500 milliGRAM(s) IV Intermittent every 24 hours  furosemide   Injectable 40 milliGRAM(s) IV Push two times a day  glucagon  Injectable 1 milliGRAM(s) IntraMuscular once  heparin   Injectable 5000 Unit(s) SubCutaneous every 8 hours  hydrALAZINE 25 milliGRAM(s) Oral three times a day  insulin lispro (ADMELOG) corrective regimen sliding scale   SubCutaneous three times a day before meals  pantoprazole    Tablet 40 milliGRAM(s) Oral before breakfast  pioglitazone 30 milliGRAM(s) Oral daily  sodium bicarbonate 1300 milliGRAM(s) Oral every 8 hours  tamsulosin 0.8 milliGRAM(s) Oral at bedtime  vancomycin  IVPB 500 milliGRAM(s) IV Intermittent every 48 hours    MEDICATIONS  (PRN):  acetaminophen     Tablet .. 650 milliGRAM(s) Oral every 6 hours PRN Temp greater or equal to 38C (100.4F), Mild Pain (1 - 3)    Home medications:  amLODIPine 5 mg oral tablet: 1 tab(s) orally 2 times a day (06 Dec 2021 18:51)  Aspir 81 oral delayed release tablet: 1 tab(s) orally once a day (06 Dec 2021 18:51)  atorvastatin 10 mg oral tablet: 2 tab(s) orally once a day (at bedtime) (06 Dec 2021 18:51)  calcium acetate 667 mg oral tablet: 1 tab(s) orally 3 times a day (06 Dec 2021 18:51)  Januvia 50 mg oral tablet: 1 tab(s) orally once a day (06 Dec 2021 18:51)  Lasix 20 mg oral tablet: 1 tab(s) orally every other day (06 Dec 2021 18:51)  pantoprazole 40 mg oral delayed release tablet: 1 tab(s) orally once a day (06 Dec 2021 18:51)  pioglitazone 30 mg oral tablet: 1 tab(s) orally once a day (06 Dec 2021 18:51)  sodium bicarbonate 650 mg oral tablet: 2 tab(s) orally every 8 hours (06 Dec 2021 18:51)      SOCIAL HISTORY:  Social History:  unable to determine (06 Dec 2021 18:45)      FAMILY HISTORY:      REVIEW OF SYSTEMS:  unable to obtain d/t lethargy      VITALS:  T(F): 94.1 (21 @ 15:49), Max: 94.1 (21 @ 15:49)  HR: 67 (21 @ 14:18)  BP: 136/60 (21 @ 14:18)  RR: 16 (21 @ 14:18)  SpO2: 99% (21 @ 08:27)    Height (cm): 167.6 ( @ 20:53)  Weight (kg): 59.1 ( @ 20:53)  BMI (kg/m2): 21 ( @ 20:53)  BSA (m2): 1.67 ( @ 20:53)  I&O's Detail    06 Dec 2021 07:01  -  07 Dec 2021 07:00  --------------------------------------------------------  IN:  Total IN: 0 mL    OUT:    Oral Fluid: 0 mL  Total OUT: 0 mL    Total NET: 0 mL      07 Dec 2021 07:01  -  07 Dec 2021 15:54  --------------------------------------------------------  IN:  Total IN: 0 mL    OUT:    Indwelling Catheter - Urethral (mL): 100 mL  Total OUT: 100 mL    Total NET: -100 mL          Creatine Kinase, Serum: 63 U/L (21 @ 07:17)    I&O's Summary    06 Dec 2021 07:  -  07 Dec 2021 07:00  --------------------------------------------------------  IN: 0 mL / OUT: 0 mL / NET: 0 mL    07 Dec 2021 07:01  -  07 Dec 2021 15:54  --------------------------------------------------------  IN: 0 mL / OUT: 100 mL / NET: -100 mL        PHYSICAL EXAM:  Gen: lethargic, not responding to verbal stimuli, on face mask  resp: no bs on the left, poor air movement on R  card: S1/S2  abd: soft  ext: no dep edema / b/l pedal edema  Luna draining purulent urine    LABS:  Daily Height in cm: 167.64 (06 Dec 2021 20:53)    Daily Weight in k.9 (07 Dec 2021 04:57)        146  |  109  |  72<HH>  ----------------------------<  99  4.3   |  18  |  5.7<HH>    Ca    8.3<L>      07 Dec 2021 07:17  Phos  6.6       Mg     1.8         TPro  4.2<L>  /  Alb  2.4<L>  /  TBili  <0.2  /  DBili      /  AST  21  /  ALT  15  /  AlkPhos  82      Creatinine Trend:   Creatinine, Serum: 5.7 mg/dL (21 @ 07:17)  Creatinine, Serum: 5.5 mg/dL (21 @ 21:36)  Creatinine, Serum: 5.4 mg/dL (21 @ 14:45)  Creatinine, Serum: 4.6 mg/dL (21 @ 06:40)  Creatinine, Serum: 4.6 mg/dL (21 @ 07:15)  Creatinine, Serum: 4.7 mg/dL (21 @ 06:07)  Creatinine, Serum: 4.5 mg/dL (21 @ 04:30)  Creatinine, Serum: 4.4 mg/dL (11-15-21 @ 05:43)                          7.8    15.27 )-----------( 103      ( 07 Dec 2021 07:17 )             24.5     Mean Cell Volume: 93.9 fL (21 @ 07:17)    Urine Studies:  Urinalysis Basic - ( 06 Dec 2021 14:45 )    Color: Yellow / Appearance: Cloudy / S.025 / pH:   Gluc:  / Ketone: 15  / Bili: Negative / Urobili: 0.2 mg/dL   Blood:  / Protein: >=300 mg/dL / Nitrite: Negative   Leuk Esterase: Small / RBC: 11-25 /HPF / WBC >50 /HPF   Sq Epi:  / Non Sq Epi: Few /HPF / Bacteria: Moderate      Osmolality, Random Urine: 249 mos/kg (10-24 @ 18:44)  Sodium, Random Urine: 46.0 mmoL/L (10-24 @ 18:44)            RADIOLOGY & ADDITIONAL STUDIES:        Xray Chest 1 View- PORTABLE-Urgent:   EXAM:  XR CHEST PORTABLE URGENT 1V            PROCEDURE DATE:  2021            INTERPRETATION:  STUDY INDICATION: Sepsis    Comparison: 2021.    Technique/Positioning: Frontal view of the chest.    Findings:    Support devices: Overlying telemetry leads.    Cardiac/mediastinum/hilum: Partially obscured. Grossly stable cardiomediastinal silhouette.    Lung parenchyma/Pleura: Decreased right basal pleural-parenchymal opacities. Left pleural-parenchymal opacities appears slightly increased. No pneumothorax.    Skeleton/soft tissues: Unchanged.    Impression:    Decreased right basal pleural-parenchymal opacities. Left pleural-parenchymal opacities appears slightly increased. No pneumothorax.    < from: TTE Echo Complete w/o Contrast w/ Doppler (10.26.21 @ 08:17) >  Summary:   1. Normal global left ventricular systolic function.   2. LV Ejection Fraction by Almeida'sMethod with a biplane EF of 59 %.   3. Normal left ventricular internal cavity size.   4. The left ventricular diastolic function could not be assessed in this study.   5. Mildly enlarged left atrium.   6. Normal right atrial size.   7. Moderate pleural effusion in the left lateral region.   8. Mild mitral valve regurgitation.   9. Mild tricuspid regurgitation.  10. Sclerotic aortic valve with normal opening.  11. Estimated pulmonary artery systolic pressure is 35.3 mmHg assuming a right atrial pressure of 3 mmHg, which is consistent with borderline pulmonary hypertension.  12. LA volume Index is 35.3 ml/m² ml/m2.      < end of copied text >

## 2021-12-07 NOTE — CONSULT NOTE ADULT - SUBJECTIVE AND OBJECTIVE BOX
CECILLE FRANKEL  86y, Male  Allergy: No Known Allergies      CHIEF COMPLAINT: ARF (06 Dec 2021 18:45)      LOS  1d    HPI:  A 86 y/o male w/ PMH of DM, Crohn s/p colostomy, CKD 4, HTN, HLD, BPH sent from [facility] for AMS and SOB. Pt unable to provide history, as per EMR pt was discharged  2 weeks  after being treated for sepsis, acute on chronic CKD, and UTI.  Per EMR, pt normally able to answer questions but started becoming lethargic past few days and appeared SOB, O2 sat in 80s after NRB.  No paper work could be found accompanying pt form [facility]. ICU consulted , was cleared to go to medical floor. Pt on NIV , DNR/DNI.  (06 Dec 2021 18:45)      INFECTIOUS DISEASE HISTORY:  History as above.   Presented with lethargy and acute respiratory failure.   CXR with left pleural effusion.   On vancomycin + cefepime       PAST MEDICAL & SURGICAL HISTORY:  History of medical problems  [place] b/l ears, dm, htn, hypercholesteremia, ckd, bowel/bladder fistula, kidney stones    Colostomy present    History of surgery  procedure for kidney stones ( ?lithotripsy)        FAMILY HISTORY  Family Hx reviewed and non-contributory     SOCIAL HISTORY  Social History:  unable to determine (06 Dec 2021 18:45)        ROS  unable to obtain history secondary to patient's mental status    VITALS:  T(F): 90.5, Max: 94.2 (21 @ 14:25)  HR: 54  BP: 101/54  RR: 18Vital Signs Last 24 Hrs  T(C): 32.5 (07 Dec 2021 05:56), Max: 34.6 (06 Dec 2021 14:25)  T(F): 90.5 (07 Dec 2021 05:56), Max: 94.2 (06 Dec 2021 14:25)  HR: 54 (07 Dec 2021 04:57) (54 - 88)  BP: 101/54 (07 Dec 2021 04:57) (100/54 - 156/101)  BP(mean): --  RR: 18 (06 Dec 2021 22:20) (14 - 24)  SpO2: 96% (07 Dec 2021 05:56) (92% - 97%)    PHYSICAL EXAM:  Gen: NAD, resting in bed  HEENT: Normocephalic, atraumatic  Neck: supple, no lymphadenopathy  CV: Regular rate & regular rhythm  Lungs: decreased BS at bases, no fremitus  Abdomen: Soft, BS present  Ext: Warm, well perfused  Neuro: non focal, awake  Skin: no rash, no erythema  Lines: no phlebitis    TESTS & MEASUREMENTS:                        8.5    15.19 )-----------( 134      ( 06 Dec 2021 14:45 )             26.8         141  |  106  |  67<HH>  ----------------------------<  105<H>  5.3<H>   |  12<L>  |  5.5<HH>    Ca    8.6      06 Dec 2021 21:36    TPro  4.7<L>  /  Alb  2.6<L>  /  TBili  0.2  /  DBili  x   /  AST  32  /  ALT  19  /  AlkPhos  85      eGFR if : 10 mL/min/1.73M2 (21 @ 21:36)  eGFR if Non African American: 9 mL/min/1.73M2 (21 @ 21:36)  eGFR if Non African American: 9 mL/min/1.73M2 (21 @ 14:45)  eGFR if : 10 mL/min/1.73M2 (21 @ 14:45)    LIVER FUNCTIONS - ( 06 Dec 2021 14:45 )  Alb: 2.6 g/dL / Pro: 4.7 g/dL / ALK PHOS: 85 U/L / ALT: 19 U/L / AST: 32 U/L / GGT: x           Urinalysis Basic - ( 06 Dec 2021 14:45 )    Color: Yellow / Appearance: Cloudy / S.025 / pH: x  Gluc: x / Ketone: 15  / Bili: Negative / Urobili: 0.2 mg/dL   Blood: x / Protein: >=300 mg/dL / Nitrite: Negative   Leuk Esterase: Small / RBC: 11-25 /HPF / WBC >50 /HPF   Sq Epi: x / Non Sq Epi: Few /HPF / Bacteria: Moderate        Culture - Urine (collected 21 @ 13:52)  Source: Catheterized Catheterized  Final Report (21 @ 22:05):    No growth    Culture - Urine (collected 21 @ 01:05)  Source: Catheterized Catheterized  Final Report (21 @ 07:16):    <10,000 CFU/mL Normal Urogenital Beronica    Culture - Urine (collected 10-28-21 @ 11:34)  Source: Clean Catch Clean Catch (Midstream)  Final Report (10-30-21 @ 00:19):    <10,000 CFU/mL Normal Urogenital Beronica    Culture - Urine (collected 10-23-21 @ 16:05)  Source: Clean Catch Clean Catch (Midstream)  Final Report (10-24-21 @ 21:39):    No growth    Culture - Blood (collected 10-23-21 @ 14:18)  Source: .Blood Blood-Peripheral  Final Report (10-28-21 @ 20:00):    No Growth Final    Culture - Urine (collected 21 @ 11:20)  Source: Catheterized Catheterized  Final Report (21 @ 15:28):    No growth        Blood Gas Venous - Lactate: 1.00 mmol/L (21 @ 16:06)      INFECTIOUS DISEASES TESTING  COVID-19 PCR: NotDetec (21 @ 13:53)  Hepatitis B Surface Antigen: Nonreact (11-15-21 @ 19:09)  COVID-19 PCR: NotDetec (21 @ 12:05)  COVID-19 PCR: NotDetec (21 @ 12:50)  COVID-19 PCR: NotDetec (10-31-21 @ 13:25)  Procalcitonin, Serum: 0.26 ng/mL (10-24-21 @ 00:37)  COVID-19 PCR: NotDetec (10-23-21 @ 14:38)  COVID-19 PCR: Detected (21 @ 11:43)  COVID-19 PCR: Detected (21 @ 17:05)  Procalcitonin, Serum: 0.23 ng/mL (21 @ 07:44)  COVID-19 PCR: Detected (21 @ 13:10)      RADIOLOGY & ADDITIONAL TESTS:  I have personally reviewed the last Chest xray  CXR  Xray Chest 1 View- PORTABLE-Urgent:   EXAM:  XR CHEST PORTABLE URGENT 1V            PROCEDURE DATE:  2021            INTERPRETATION:  STUDY INDICATION: Sepsis    Comparison: 2021.    Technique/Positioning: Frontal view of the chest.    Findings:    Support devices: Overlying telemetry leads.    Cardiac/mediastinum/hilum: Partially obscured. Grossly stable cardiomediastinal silhouette.    Lung parenchyma/Pleura: Decreased right basal pleural-parenchymal opacities. Left pleural-parenchymal opacities appears slightly increased. No pneumothorax.    Skeleton/soft tissues: Unchanged.    Impression:    Decreased right basal pleural-parenchymal opacities. Left pleural-parenchymal opacities appears slightly increased. No pneumothorax.    --- End of Report ---              BOBY BOLDEN MD; Attending Radiologist  This document has been electronically signed. Dec  6 2021  4:13PM (21 @ 15:39)      CT      CARDIOLOGY TESTING  12 Lead ECG:   Ventricular Rate 81 BPM    Atrial Rate 82 BPM    QRS Duration 128 ms    Q-T Interval 394 ms    QTC Calculation(Bazett) 457 ms    P Axis 0 degrees    R Axis 196 degrees    T Axis -3 degrees    Diagnosis Line Sinus rhythm with Premature supraventricular complexes  Right bundle branch block  Possible Inferior infarct , age undetermined  Abnormal ECG    Confirmed by JUANA JAQUEZ MD ([id number]) on 2021 4:58:56 PM (21 @ 14:43)      MEDICATIONS  amLODIPine   Tablet 5 Oral <User Schedule>  aspirin enteric coated 81 Oral daily  atorvastatin 20 Oral at bedtime  calcium acetate 667 Oral three times a day with meals  cefepime   IVPB 500 IV Intermittent every 24 hours  chlorhexidine 4% Liquid 1 Topical <User Schedule>  dextrose 40% Gel 15 Oral once  dextrose 5%. 1000 IV Continuous <Continuous>  dextrose 5%. 1000 IV Continuous <Continuous>  dextrose 50% Injectable 25 IV Push once  dextrose 50% Injectable 12.5 IV Push once  dextrose 50% Injectable 25 IV Push once  furosemide   Injectable 40 IV Push two times a day  glucagon  Injectable 1 IntraMuscular once  heparin   Injectable 5000 SubCutaneous every 8 hours  hydrALAZINE 25 Oral three times a day  insulin lispro (ADMELOG) corrective regimen sliding scale  SubCutaneous three times a day before meals  pantoprazole    Tablet 40 Oral before breakfast  pioglitazone 30 Oral daily  sodium bicarbonate 1300 Oral every 8 hours  tamsulosin 0.8 Oral at bedtime  vancomycin  IVPB 500 IV Intermittent every 48 hours      Weight  Weight (kg): 59.1 (21 @ 20:53)    ANTIBIOTICS:  cefepime   IVPB 500 milliGRAM(s) IV Intermittent every 24 hours  vancomycin  IVPB 500 milliGRAM(s) IV Intermittent every 48 hours      ALLERGIES:  No Known Allergies

## 2021-12-08 NOTE — DISCHARGE NOTE FOR THE EXPIRED PATIENT - HOSPITAL COURSE
A 87 y/o male w/ PMH of DM, Crohn s/p colostomy, CKD 4, HTN, HLD, BPH sent from [facility] for AMS and SOB-found to be septic likely 2' to MRSA PNA    -Patient was placed on BIPAP and treated with broad spectrum abx.  Due to overall clinical status and DNR/DNI patient was not accepted to medical ICU.  Patient was found pulseless and with absent breath sounds at 11:36 AM.  Code blue was not called as patient was DNR/DNI.  Family notified.

## 2021-12-11 LAB
CULTURE RESULTS: SIGNIFICANT CHANGE UP
CULTURE RESULTS: SIGNIFICANT CHANGE UP
SPECIMEN SOURCE: SIGNIFICANT CHANGE UP
SPECIMEN SOURCE: SIGNIFICANT CHANGE UP

## 2021-12-17 DIAGNOSIS — E66.9 OBESITY, UNSPECIFIED: ICD-10-CM

## 2021-12-17 DIAGNOSIS — A41.9 SEPSIS, UNSPECIFIED ORGANISM: ICD-10-CM

## 2021-12-17 DIAGNOSIS — G93.40 ENCEPHALOPATHY, UNSPECIFIED: ICD-10-CM

## 2021-12-17 DIAGNOSIS — Z66 DO NOT RESUSCITATE: ICD-10-CM

## 2021-12-17 DIAGNOSIS — D63.1 ANEMIA IN CHRONIC KIDNEY DISEASE: ICD-10-CM

## 2021-12-17 DIAGNOSIS — H91.93 UNSPECIFIED HEARING LOSS, BILATERAL: ICD-10-CM

## 2021-12-17 DIAGNOSIS — A41.02 SEPSIS DUE TO METHICILLIN RESISTANT STAPHYLOCOCCUS AUREUS: ICD-10-CM

## 2021-12-17 DIAGNOSIS — R65.20 SEVERE SEPSIS WITHOUT SEPTIC SHOCK: ICD-10-CM

## 2021-12-17 DIAGNOSIS — J96.01 ACUTE RESPIRATORY FAILURE WITH HYPOXIA: ICD-10-CM

## 2021-12-17 DIAGNOSIS — Z78.1 PHYSICAL RESTRAINT STATUS: ICD-10-CM

## 2021-12-17 DIAGNOSIS — E78.5 HYPERLIPIDEMIA, UNSPECIFIED: ICD-10-CM

## 2021-12-17 DIAGNOSIS — N40.0 BENIGN PROSTATIC HYPERPLASIA WITHOUT LOWER URINARY TRACT SYMPTOMS: ICD-10-CM

## 2021-12-17 DIAGNOSIS — R79.89 OTHER SPECIFIED ABNORMAL FINDINGS OF BLOOD CHEMISTRY: ICD-10-CM

## 2021-12-17 DIAGNOSIS — Z86.718 PERSONAL HISTORY OF OTHER VENOUS THROMBOSIS AND EMBOLISM: ICD-10-CM

## 2021-12-17 DIAGNOSIS — I12.0 HYPERTENSIVE CHRONIC KIDNEY DISEASE WITH STAGE 5 CHRONIC KIDNEY DISEASE OR END STAGE RENAL DISEASE: ICD-10-CM

## 2021-12-17 DIAGNOSIS — K50.10 CROHN'S DISEASE OF LARGE INTESTINE WITHOUT COMPLICATIONS: ICD-10-CM

## 2021-12-17 DIAGNOSIS — J15.212 PNEUMONIA DUE TO METHICILLIN RESISTANT STAPHYLOCOCCUS AUREUS: ICD-10-CM

## 2021-12-17 DIAGNOSIS — E87.5 HYPERKALEMIA: ICD-10-CM

## 2021-12-17 DIAGNOSIS — N17.0 ACUTE KIDNEY FAILURE WITH TUBULAR NECROSIS: ICD-10-CM

## 2021-12-17 DIAGNOSIS — Z93.3 COLOSTOMY STATUS: ICD-10-CM

## 2021-12-17 DIAGNOSIS — Z79.84 LONG TERM (CURRENT) USE OF ORAL HYPOGLYCEMIC DRUGS: ICD-10-CM

## 2021-12-17 DIAGNOSIS — N18.5 CHRONIC KIDNEY DISEASE, STAGE 5: ICD-10-CM

## 2021-12-17 DIAGNOSIS — Z79.82 LONG TERM (CURRENT) USE OF ASPIRIN: ICD-10-CM

## 2021-12-17 DIAGNOSIS — E11.22 TYPE 2 DIABETES MELLITUS WITH DIABETIC CHRONIC KIDNEY DISEASE: ICD-10-CM

## 2022-02-04 ENCOUNTER — APPOINTMENT (OUTPATIENT)
Dept: GASTROENTEROLOGY | Facility: CLINIC | Age: 87
End: 2022-02-04

## 2022-06-09 NOTE — ASU PREOP CHECKLIST - NOTHING BY MOUTH SINCE
07-Apr-2021 00:01 Hydroxyzine Counseling: Patient advised that the medication is sedating and not to drive a car after taking this medication.  Patient informed of potential adverse effects including but not limited to dry mouth, urinary retention, and blurry vision.  The patient verbalized understanding of the proper use and possible adverse effects of hydroxyzine.  All of the patient's questions and concerns were addressed.

## 2022-09-14 NOTE — PROGRESS NOTE ADULT - ASSESSMENT
Impression    Acute on chronic renal failure non oliguric  CKD4  Acute on chronic anemia s/p 2U PRBC  HO of covid  HO DVT on eliquis  Crohn s/p colostomy  delirium      PLAN:    CNS: avoid CNS depressant.  seroquel  25 qhs, monitor QT    HEENT:  Oral care    PULMONARY:  HOB @ 45 degrees, aspiration precaution, CXR reviewed.     CARDIOVASCULAR:   nephrology f/u , PO bicarb     GI: GI prophylaxis / protonix                                         Feeding low k diet    RENAL:  F/u  lytes.  Correct as needed. accurate I/O, improved UO , trend BMP.     INFECTIOUS DISEASE: per ID, UA and cultures     HEMATOLOGICAL:  DVT prophylaxis. LE doppler reviewed  , cbc q 12 , adjust ptt.    ENDOCRINE:  Follow up FS.  Insulin protocol if needed.    DISPOSITION: downgrade to medicine      Impression    Acute on chronic renal failure non oliguric  CKD4  Acute on chronic anemia s/p 2U PRBC  HO of covid  HO DVT on eliquis  Crohn s/p colostomy  delirium      PLAN:    CNS: avoid CNS depressant.  seroquel  25 qhs, monitor QT    HEENT:  Oral care    PULMONARY:  HOB @ 45 degrees, aspiration precaution    CARDIOVASCULAR:   nephrology f/u , PO bicarb     GI: GI prophylaxis / protonix                                         Feeding low k diet    RENAL:  F/u  lytes.  Correct as needed. accurate I/O, improved UO , trend BMP.     INFECTIOUS DISEASE: per Id    HEMATOLOGICAL:  DVT prophylaxis. LE doppler reviewed  , cbc q 12 , adjust ptt.    ENDOCRINE:  Follow up FS.  Insulin protocol if needed.    DISPOSITION: downgrade to medicine      Wartpeel Counseling:  I discussed with the patient the risks of Wartpeel including but not limited to erythema, scaling, itching, weeping, crusting, and pain.

## 2023-11-10 NOTE — PATIENT PROFILE ADULT - FALL HARM RISK - FALLEN IN PAST
-- DO NOT REPLY / DO NOT REPLY ALL --  -- Message is from [facility] ([facility]) --    General Patient Message: Patient returning call and confirmed letter should excuse her from work for tomorrow and Sunday.        Alternative phone number: no     Can a detailed message be left? Yes    Message Turnaround:     Is it Working Hours? Yes - Working Hours     [place]:    Please give this turnaround time to the caller:   \"This message will be sent to [state Provider's name]. The clinical team will fulfill your request as soon as they review your message.\"                 Unable to determine

## 2024-04-27 NOTE — PROGRESS NOTE ADULT - REASON FOR ADMISSION
weakness
77-year-old male denies past medical history, 20 years smoker now coming in stating he fell 1 week ago.  Denies head strike.  Patient states he has had several falls the last 2 weeks but has not fallen for a week.  Denies head strike.  Patient able to ambulate.  Denies chest pain, nausea, vomiting, fevers, chills.  Patient appears well no acute distress
